# Patient Record
Sex: MALE | Race: BLACK OR AFRICAN AMERICAN | Employment: OTHER | URBAN - METROPOLITAN AREA
[De-identification: names, ages, dates, MRNs, and addresses within clinical notes are randomized per-mention and may not be internally consistent; named-entity substitution may affect disease eponyms.]

---

## 2017-03-01 RX ORDER — OXYCODONE AND ACETAMINOPHEN 7.5; 325 MG/1; MG/1
1 TABLET ORAL SEE ADMIN INSTRUCTIONS
Qty: 75 TABLET | Refills: 0 | Status: SHIPPED | OUTPATIENT
Start: 2017-03-04 | End: 2017-03-22 | Stop reason: SDUPTHER

## 2017-03-01 RX ORDER — MORPHINE SULFATE 30 MG/1
30 TABLET, FILM COATED, EXTENDED RELEASE ORAL EVERY 12 HOURS
Qty: 60 TABLET | Refills: 0 | Status: SHIPPED | OUTPATIENT
Start: 2017-03-04 | End: 2017-03-22 | Stop reason: SDUPTHER

## 2017-03-22 ENCOUNTER — HOSPITAL ENCOUNTER (OUTPATIENT)
Dept: PAIN MANAGEMENT | Age: 67
Discharge: HOME | End: 2017-03-22
Payer: MEDICARE

## 2017-03-22 VITALS
HEIGHT: 67 IN | OXYGEN SATURATION: 97 % | WEIGHT: 190 LBS | DIASTOLIC BLOOD PRESSURE: 104 MMHG | HEART RATE: 59 BPM | RESPIRATION RATE: 16 BRPM | TEMPERATURE: 97.6 F | BODY MASS INDEX: 29.82 KG/M2 | SYSTOLIC BLOOD PRESSURE: 134 MMHG

## 2017-03-22 DIAGNOSIS — M17.12 PRIMARY OSTEOARTHRITIS OF LEFT KNEE: Primary | ICD-10-CM

## 2017-03-22 DIAGNOSIS — Z51.81 ENCOUNTER FOR MEDICATION MONITORING: ICD-10-CM

## 2017-03-22 DIAGNOSIS — Z98.890 S/P KNEE SURGERY: ICD-10-CM

## 2017-03-22 DIAGNOSIS — M17.11 PRIMARY OSTEOARTHRITIS OF RIGHT KNEE: ICD-10-CM

## 2017-03-22 DIAGNOSIS — Z51.81 MEDICATION MONITORING ENCOUNTER: ICD-10-CM

## 2017-03-22 PROCEDURE — 99214 OFFICE O/P EST MOD 30 MIN: CPT | Performed by: PAIN MEDICINE

## 2017-03-22 PROCEDURE — 99213 OFFICE O/P EST LOW 20 MIN: CPT

## 2017-03-22 RX ORDER — OXYCODONE AND ACETAMINOPHEN 7.5; 325 MG/1; MG/1
1 TABLET ORAL SEE ADMIN INSTRUCTIONS
Qty: 75 TABLET | Refills: 0 | Status: SHIPPED | OUTPATIENT
Start: 2017-04-08 | End: 2017-05-03 | Stop reason: SDUPTHER

## 2017-03-22 RX ORDER — MORPHINE SULFATE 30 MG/1
30 TABLET, FILM COATED, EXTENDED RELEASE ORAL EVERY 12 HOURS
Qty: 60 TABLET | Refills: 0 | Status: SHIPPED | OUTPATIENT
Start: 2017-04-08 | End: 2017-05-03 | Stop reason: SDUPTHER

## 2017-03-22 ASSESSMENT — PAIN SCALES - GENERAL: PAINLEVEL_OUTOF10: 8

## 2017-03-22 ASSESSMENT — ENCOUNTER SYMPTOMS
RESPIRATORY NEGATIVE: 1
EYES NEGATIVE: 1
GASTROINTESTINAL NEGATIVE: 1

## 2017-03-22 ASSESSMENT — PAIN DESCRIPTION - LOCATION: LOCATION: KNEE;LEG

## 2017-03-22 ASSESSMENT — PAIN DESCRIPTION - FREQUENCY: FREQUENCY: CONTINUOUS

## 2017-03-22 ASSESSMENT — PAIN DESCRIPTION - ONSET: ONSET: ON-GOING

## 2017-03-22 ASSESSMENT — PAIN DESCRIPTION - DESCRIPTORS: DESCRIPTORS: ACHING;BURNING;CONSTANT;SHARP

## 2017-03-22 ASSESSMENT — PAIN DESCRIPTION - DIRECTION: RADIATING_TOWARDS: BILATERAL KNEES

## 2017-03-22 ASSESSMENT — PAIN DESCRIPTION - PAIN TYPE: TYPE: CHRONIC PAIN

## 2017-03-22 ASSESSMENT — PAIN DESCRIPTION - PROGRESSION: CLINICAL_PROGRESSION: NOT CHANGED

## 2017-03-22 ASSESSMENT — PAIN DESCRIPTION - ORIENTATION: ORIENTATION: RIGHT;LEFT

## 2017-04-13 RX ORDER — IBUPROFEN 600 MG/1
600 TABLET ORAL EVERY 8 HOURS PRN
Qty: 90 TABLET | Refills: 2 | Status: SHIPPED | OUTPATIENT
Start: 2017-04-13 | End: 2017-07-07

## 2017-05-03 ENCOUNTER — HOSPITAL ENCOUNTER (OUTPATIENT)
Dept: PAIN MANAGEMENT | Age: 67
Discharge: HOME | End: 2017-05-03
Payer: MEDICARE

## 2017-05-03 DIAGNOSIS — Z98.890 S/P KNEE SURGERY: ICD-10-CM

## 2017-05-03 DIAGNOSIS — Z51.81 MEDICATION MONITORING ENCOUNTER: ICD-10-CM

## 2017-05-03 DIAGNOSIS — G89.29 CHRONIC PAIN OF LEFT KNEE: ICD-10-CM

## 2017-05-03 DIAGNOSIS — M17.11 PRIMARY OSTEOARTHRITIS OF RIGHT KNEE: ICD-10-CM

## 2017-05-03 DIAGNOSIS — M17.12 PRIMARY OSTEOARTHRITIS OF LEFT KNEE: Primary | ICD-10-CM

## 2017-05-03 DIAGNOSIS — M17.0 PRIMARY OSTEOARTHRITIS OF BOTH KNEES: ICD-10-CM

## 2017-05-03 DIAGNOSIS — Z51.81 ENCOUNTER FOR MEDICATION MONITORING: ICD-10-CM

## 2017-05-03 DIAGNOSIS — M25.562 CHRONIC PAIN OF LEFT KNEE: ICD-10-CM

## 2017-05-03 PROCEDURE — 99213 OFFICE O/P EST LOW 20 MIN: CPT | Performed by: NURSE PRACTITIONER

## 2017-05-03 PROCEDURE — 99213 OFFICE O/P EST LOW 20 MIN: CPT

## 2017-05-03 PROCEDURE — 80307 DRUG TEST PRSMV CHEM ANLYZR: CPT

## 2017-05-03 RX ORDER — OXYCODONE AND ACETAMINOPHEN 7.5; 325 MG/1; MG/1
1 TABLET ORAL SEE ADMIN INSTRUCTIONS
Qty: 75 TABLET | Refills: 0 | Status: SHIPPED | OUTPATIENT
Start: 2017-05-08 | End: 2017-06-07 | Stop reason: SDUPTHER

## 2017-05-03 RX ORDER — GABAPENTIN 400 MG/1
400 CAPSULE ORAL 3 TIMES DAILY
Qty: 90 CAPSULE | Refills: 3 | Status: SHIPPED | OUTPATIENT
Start: 2017-05-03 | End: 2017-09-05 | Stop reason: SDUPTHER

## 2017-05-03 RX ORDER — MORPHINE SULFATE 30 MG/1
30 TABLET, FILM COATED, EXTENDED RELEASE ORAL EVERY 12 HOURS
Qty: 60 TABLET | Refills: 0 | Status: SHIPPED | OUTPATIENT
Start: 2017-05-08 | End: 2017-06-07 | Stop reason: SDUPTHER

## 2017-05-10 LAB
6-ACETYLMORPHINE, UR: NOT DETECTED
7-AMINOCLONAZEPAM, URINE: NOT DETECTED
ALPHA-OH-ALPRAZ, URINE: NOT DETECTED
ALPRAZOLAM, URINE: NOT DETECTED
AMPHETAMINES, URINE: NOT DETECTED
BARBITURATES, URINE: NOT DETECTED
BENZOYLECGONINE, UR: NOT DETECTED
BUPRENORPHINE URINE: NOT DETECTED
CARISOPRODOL, UR: NOT DETECTED
CLONAZEPAM, URINE: NOT DETECTED
CODEINE, URINE: NOT DETECTED
CREATININE URINE: 146.4 MG/DL (ref 20–400)
DIAZEPAM, URINE: NOT DETECTED
DRUGS EXPECTED, UR: NORMAL
EER HI RES INTERP UR: NORMAL
ETHYL GLUCURONIDE UR: NOT DETECTED
FENTANYL URINE: NOT DETECTED
HYDROCODONE, URINE: NOT DETECTED
HYDROMORPHONE, URINE: NOT DETECTED
LORAZEPAM, URINE: NOT DETECTED
MARIJUANA METAB, UR: NOT DETECTED
MDA, UR: NOT DETECTED
MDEA, EVE, UR: NOT DETECTED
MDMA URINE: NOT DETECTED
MEPERIDINE METAB, UR: NOT DETECTED
METHADONE, URINE: NOT DETECTED
METHAMPHETAMINE, URINE: NOT DETECTED
METHYLPHENIDATE: NOT DETECTED
MIDAZOLAM, URINE: NOT DETECTED
MORPHINE URINE: PRESENT
NORBUPRENORPHINE, URINE: NOT DETECTED
NORDIAZEPAM, URINE: NOT DETECTED
NORFENTANYL, URINE: NOT DETECTED
NORHYDROCODONE, URINE: NOT DETECTED
NOROXYCODONE, URINE: PRESENT
NOROXYMORPHONE, URINE: NOT DETECTED
OXAZEPAM, URINE: NOT DETECTED
OXYCODONE URINE: PRESENT
OXYMORPHONE, URINE: PRESENT
PAIN MANAGEMENT DRUG PANEL INTERP, URINE: NORMAL
PAIN MGT DRUG PANEL, HI RES, UR: NORMAL
PCP,URINE: NOT DETECTED
PHENTERMINE, UR: NOT DETECTED
PROPOXYPHENE, URINE: NOT DETECTED
TAPENTADOL, URINE: NOT DETECTED
TAPENTADOL-O-SULFATE, URINE: NOT DETECTED
TEMAZEPAM, URINE: NOT DETECTED
TRAMADOL, URINE: NOT DETECTED
ZOLPIDEM, URINE: NOT DETECTED

## 2017-06-07 ENCOUNTER — HOSPITAL ENCOUNTER (OUTPATIENT)
Dept: PAIN MANAGEMENT | Age: 67
Discharge: HOME | End: 2017-06-07
Payer: MEDICARE

## 2017-06-07 DIAGNOSIS — M25.562 CHRONIC PAIN OF LEFT KNEE: Primary | ICD-10-CM

## 2017-06-07 DIAGNOSIS — Z98.890 S/P KNEE SURGERY: ICD-10-CM

## 2017-06-07 DIAGNOSIS — Z51.81 MEDICATION MONITORING ENCOUNTER: ICD-10-CM

## 2017-06-07 DIAGNOSIS — M17.12 PRIMARY OSTEOARTHRITIS OF LEFT KNEE: ICD-10-CM

## 2017-06-07 DIAGNOSIS — M17.11 PRIMARY OSTEOARTHRITIS OF RIGHT KNEE: ICD-10-CM

## 2017-06-07 DIAGNOSIS — M17.0 PRIMARY OSTEOARTHRITIS OF BOTH KNEES: ICD-10-CM

## 2017-06-07 DIAGNOSIS — Z51.81 ENCOUNTER FOR MEDICATION MONITORING: ICD-10-CM

## 2017-06-07 DIAGNOSIS — G89.29 CHRONIC PAIN OF LEFT KNEE: Primary | ICD-10-CM

## 2017-06-07 PROCEDURE — 99213 OFFICE O/P EST LOW 20 MIN: CPT | Performed by: NURSE PRACTITIONER

## 2017-06-07 PROCEDURE — 99213 OFFICE O/P EST LOW 20 MIN: CPT

## 2017-06-07 RX ORDER — OXYCODONE AND ACETAMINOPHEN 7.5; 325 MG/1; MG/1
1 TABLET ORAL SEE ADMIN INSTRUCTIONS
Qty: 75 TABLET | Refills: 0 | Status: SHIPPED | OUTPATIENT
Start: 2017-06-07 | End: 2017-07-07 | Stop reason: SDUPTHER

## 2017-06-07 RX ORDER — MORPHINE SULFATE 30 MG/1
30 TABLET, FILM COATED, EXTENDED RELEASE ORAL EVERY 12 HOURS
Qty: 60 TABLET | Refills: 0 | Status: SHIPPED | OUTPATIENT
Start: 2017-06-07 | End: 2017-07-07 | Stop reason: SDUPTHER

## 2017-06-14 ENCOUNTER — OFFICE VISIT (OUTPATIENT)
Dept: INTERNAL MEDICINE CLINIC | Age: 67
End: 2017-06-14
Payer: MEDICARE

## 2017-06-14 VITALS
HEIGHT: 67 IN | DIASTOLIC BLOOD PRESSURE: 80 MMHG | OXYGEN SATURATION: 98 % | SYSTOLIC BLOOD PRESSURE: 138 MMHG | WEIGHT: 213.8 LBS | TEMPERATURE: 98 F | BODY MASS INDEX: 33.56 KG/M2 | RESPIRATION RATE: 21 BRPM | HEART RATE: 81 BPM

## 2017-06-14 DIAGNOSIS — M23.90: ICD-10-CM

## 2017-06-14 DIAGNOSIS — Z12.11 SPECIAL SCREENING FOR MALIGNANT NEOPLASMS, COLON: ICD-10-CM

## 2017-06-14 DIAGNOSIS — L23.89 ALLERGIC CONTACT DERMATITIS DUE TO OTHER AGENTS: ICD-10-CM

## 2017-06-14 DIAGNOSIS — M48.07 STENOSIS OF LUMBOSACRAL SPINE: ICD-10-CM

## 2017-06-14 DIAGNOSIS — R53.83 FATIGUE, UNSPECIFIED TYPE: Primary | ICD-10-CM

## 2017-06-14 DIAGNOSIS — G89.4 CHRONIC PAIN SYNDROME: ICD-10-CM

## 2017-06-14 PROCEDURE — G8427 DOCREV CUR MEDS BY ELIG CLIN: HCPCS | Performed by: FAMILY MEDICINE

## 2017-06-14 PROCEDURE — 3017F COLORECTAL CA SCREEN DOC REV: CPT | Performed by: FAMILY MEDICINE

## 2017-06-14 PROCEDURE — 99204 OFFICE O/P NEW MOD 45 MIN: CPT | Performed by: FAMILY MEDICINE

## 2017-06-14 PROCEDURE — 90670 PCV13 VACCINE IM: CPT | Performed by: FAMILY MEDICINE

## 2017-06-14 PROCEDURE — G8417 CALC BMI ABV UP PARAM F/U: HCPCS | Performed by: FAMILY MEDICINE

## 2017-06-14 PROCEDURE — 4040F PNEUMOC VAC/ADMIN/RCVD: CPT | Performed by: FAMILY MEDICINE

## 2017-06-14 PROCEDURE — 1036F TOBACCO NON-USER: CPT | Performed by: FAMILY MEDICINE

## 2017-06-14 PROCEDURE — G0009 ADMIN PNEUMOCOCCAL VACCINE: HCPCS | Performed by: FAMILY MEDICINE

## 2017-06-14 PROCEDURE — 1123F ACP DISCUSS/DSCN MKR DOCD: CPT | Performed by: FAMILY MEDICINE

## 2017-06-14 ASSESSMENT — ENCOUNTER SYMPTOMS
ALLERGIC/IMMUNOLOGIC NEGATIVE: 1
RESPIRATORY NEGATIVE: 1
EYES NEGATIVE: 1

## 2017-06-15 ENCOUNTER — HOSPITAL ENCOUNTER (OUTPATIENT)
Age: 67
Setting detail: SPECIMEN
Discharge: HOME | End: 2017-06-15
Payer: MEDICARE

## 2017-06-15 DIAGNOSIS — Z12.11 SPECIAL SCREENING FOR MALIGNANT NEOPLASMS, COLON: ICD-10-CM

## 2017-06-15 LAB
-: NORMAL
ABSOLUTE EOS #: 0.2 K/UL (ref 0–0.4)
ABSOLUTE LYMPH #: 1.5 K/UL (ref 1–4.8)
ABSOLUTE MONO #: 0.3 K/UL (ref 0.1–1.2)
ALBUMIN SERPL-MCNC: 4.1 G/DL (ref 3.5–5.2)
ALBUMIN/GLOBULIN RATIO: 1.5 (ref 1–2.5)
ALP BLD-CCNC: 55 U/L (ref 40–129)
ALT SERPL-CCNC: 13 U/L (ref 5–41)
AMORPHOUS: NORMAL
ANION GAP SERPL CALCULATED.3IONS-SCNC: 14 MMOL/L (ref 9–17)
AST SERPL-CCNC: 17 U/L
BACTERIA: NORMAL
BASOPHILS # BLD: 0 %
BASOPHILS ABSOLUTE: 0 K/UL (ref 0–0.2)
BILIRUB SERPL-MCNC: 0.48 MG/DL (ref 0.3–1.2)
BILIRUBIN URINE: NEGATIVE
BUN BLDV-MCNC: 13 MG/DL (ref 8–23)
BUN/CREAT BLD: NORMAL (ref 9–20)
CALCIUM SERPL-MCNC: 8.7 MG/DL (ref 8.6–10.4)
CASTS UA: NORMAL /LPF (ref 0–8)
CHLORIDE BLD-SCNC: 101 MMOL/L (ref 98–107)
CHOLESTEROL/HDL RATIO: 5.2
CHOLESTEROL: 198 MG/DL
CO2: 26 MMOL/L (ref 20–31)
COLOR: YELLOW
CREAT SERPL-MCNC: 1.02 MG/DL (ref 0.7–1.2)
CRYSTALS, UA: NORMAL /HPF
DIFFERENTIAL TYPE: ABNORMAL
EOSINOPHILS RELATIVE PERCENT: 7 %
EPITHELIAL CELLS UA: NORMAL /HPF (ref 0–5)
ESTIMATED AVERAGE GLUCOSE: 123 MG/DL
GFR AFRICAN AMERICAN: >60 ML/MIN
GFR NON-AFRICAN AMERICAN: >60 ML/MIN
GFR SERPL CREATININE-BSD FRML MDRD: NORMAL ML/MIN/{1.73_M2}
GFR SERPL CREATININE-BSD FRML MDRD: NORMAL ML/MIN/{1.73_M2}
GLUCOSE BLD-MCNC: 85 MG/DL (ref 70–99)
GLUCOSE URINE: NEGATIVE
HAV IGM SER IA-ACNC: NONREACTIVE
HBA1C MFR BLD: 5.9 % (ref 4–6)
HCT VFR BLD CALC: 37.5 % (ref 41–53)
HDLC SERPL-MCNC: 38 MG/DL
HEMOGLOBIN: 12.3 G/DL (ref 13.5–17.5)
HEPATITIS B CORE IGM ANTIBODY: NONREACTIVE
HEPATITIS B SURFACE ANTIGEN: NONREACTIVE
HEPATITIS C ANTIBODY: NONREACTIVE
HIV AG/AB: NONREACTIVE
KETONES, URINE: NEGATIVE
LDL CHOLESTEROL: 105 MG/DL (ref 0–130)
LEUKOCYTE ESTERASE, URINE: NEGATIVE
LYMPHOCYTES # BLD: 40 %
MCH RBC QN AUTO: 28 PG (ref 26–34)
MCHC RBC AUTO-ENTMCNC: 32.7 G/DL (ref 31–37)
MCV RBC AUTO: 85.5 FL (ref 80–100)
MONOCYTES # BLD: 9 %
MUCUS: NORMAL
NITRITE, URINE: NEGATIVE
OTHER OBSERVATIONS UA: NORMAL
PDW BLD-RTO: 14.7 % (ref 12.5–15.4)
PH UA: 5.5 (ref 5–8)
PLATELET # BLD: 170 K/UL (ref 140–450)
PLATELET ESTIMATE: ABNORMAL
PMV BLD AUTO: 8.6 FL (ref 6–12)
POTASSIUM SERPL-SCNC: 4.5 MMOL/L (ref 3.7–5.3)
PROSTATE SPECIFIC ANTIGEN: 2.48 UG/L
PROTEIN UA: NEGATIVE
RBC # BLD: 4.39 M/UL (ref 4.5–5.9)
RBC # BLD: ABNORMAL 10*6/UL
RBC UA: NORMAL /HPF (ref 0–4)
RENAL EPITHELIAL, UA: NORMAL /HPF
SEG NEUTROPHILS: 44 %
SEGMENTED NEUTROPHILS ABSOLUTE COUNT: 1.6 K/UL (ref 1.8–7.7)
SODIUM BLD-SCNC: 141 MMOL/L (ref 135–144)
SPECIFIC GRAVITY UA: 1.02 (ref 1–1.03)
TOTAL PROTEIN: 6.9 G/DL (ref 6.4–8.3)
TRICHOMONAS: NORMAL
TRIGL SERPL-MCNC: 276 MG/DL
TSH SERPL DL<=0.05 MIU/L-ACNC: 0.99 MIU/L (ref 0.3–5)
TURBIDITY: CLEAR
URINE HGB: NEGATIVE
UROBILINOGEN, URINE: NORMAL
VLDLC SERPL CALC-MCNC: ABNORMAL MG/DL (ref 1–30)
WBC # BLD: 3.7 K/UL (ref 3.5–11)
WBC # BLD: ABNORMAL 10*3/UL
WBC UA: NORMAL /HPF (ref 0–5)
YEAST: NORMAL

## 2017-06-15 ASSESSMENT — PATIENT HEALTH QUESTIONNAIRE - PHQ9
2. FEELING DOWN, DEPRESSED OR HOPELESS: 0
SUM OF ALL RESPONSES TO PHQ9 QUESTIONS 1 & 2: 0
1. LITTLE INTEREST OR PLEASURE IN DOING THINGS: 0
SUM OF ALL RESPONSES TO PHQ QUESTIONS 1-9: 0

## 2017-06-21 ENCOUNTER — TELEPHONE (OUTPATIENT)
Dept: INTERNAL MEDICINE CLINIC | Age: 67
End: 2017-06-21

## 2017-06-29 DIAGNOSIS — Z12.11 SPECIAL SCREENING FOR MALIGNANT NEOPLASMS, COLON: ICD-10-CM

## 2017-06-29 LAB
CONTROL: NORMAL
HEMOCCULT STL QL: NEGATIVE

## 2017-06-29 PROCEDURE — 82274 ASSAY TEST FOR BLOOD FECAL: CPT | Performed by: FAMILY MEDICINE

## 2017-07-03 ENCOUNTER — OFFICE VISIT (OUTPATIENT)
Dept: INTERNAL MEDICINE CLINIC | Age: 67
End: 2017-07-03
Payer: MEDICARE

## 2017-07-03 VITALS
HEART RATE: 62 BPM | BODY MASS INDEX: 33.53 KG/M2 | HEIGHT: 67 IN | SYSTOLIC BLOOD PRESSURE: 130 MMHG | DIASTOLIC BLOOD PRESSURE: 80 MMHG | WEIGHT: 213.6 LBS | RESPIRATION RATE: 16 BRPM | OXYGEN SATURATION: 97 %

## 2017-07-03 DIAGNOSIS — R73.03 PRE-DIABETES: ICD-10-CM

## 2017-07-03 DIAGNOSIS — M12.811 ROTATOR CUFF ARTHROPATHY, RIGHT: Primary | ICD-10-CM

## 2017-07-03 DIAGNOSIS — Z98.890 S/P KNEE SURGERY: ICD-10-CM

## 2017-07-03 DIAGNOSIS — Z79.891 USE OF OPIATES FOR THERAPEUTIC PURPOSES: ICD-10-CM

## 2017-07-03 DIAGNOSIS — E78.5 DYSLIPIDEMIA: ICD-10-CM

## 2017-07-03 DIAGNOSIS — M25.562 CHRONIC PAIN OF LEFT KNEE: ICD-10-CM

## 2017-07-03 DIAGNOSIS — G89.29 CHRONIC PAIN OF LEFT KNEE: ICD-10-CM

## 2017-07-03 PROCEDURE — 3017F COLORECTAL CA SCREEN DOC REV: CPT | Performed by: FAMILY MEDICINE

## 2017-07-03 PROCEDURE — 1123F ACP DISCUSS/DSCN MKR DOCD: CPT | Performed by: FAMILY MEDICINE

## 2017-07-03 PROCEDURE — 1036F TOBACCO NON-USER: CPT | Performed by: FAMILY MEDICINE

## 2017-07-03 PROCEDURE — 99214 OFFICE O/P EST MOD 30 MIN: CPT | Performed by: FAMILY MEDICINE

## 2017-07-03 PROCEDURE — G8417 CALC BMI ABV UP PARAM F/U: HCPCS | Performed by: FAMILY MEDICINE

## 2017-07-03 PROCEDURE — 4040F PNEUMOC VAC/ADMIN/RCVD: CPT | Performed by: FAMILY MEDICINE

## 2017-07-03 PROCEDURE — G8427 DOCREV CUR MEDS BY ELIG CLIN: HCPCS | Performed by: FAMILY MEDICINE

## 2017-07-03 ASSESSMENT — ENCOUNTER SYMPTOMS
ALLERGIC/IMMUNOLOGIC NEGATIVE: 1
RESPIRATORY NEGATIVE: 1
EYES NEGATIVE: 1

## 2017-07-07 ENCOUNTER — HOSPITAL ENCOUNTER (OUTPATIENT)
Dept: PAIN MANAGEMENT | Age: 67
Discharge: HOME | End: 2017-07-07
Payer: MEDICARE

## 2017-07-07 DIAGNOSIS — M25.562 CHRONIC PAIN OF LEFT KNEE: Primary | ICD-10-CM

## 2017-07-07 DIAGNOSIS — Z98.890 S/P KNEE SURGERY: ICD-10-CM

## 2017-07-07 DIAGNOSIS — M17.11 PRIMARY OSTEOARTHRITIS OF RIGHT KNEE: ICD-10-CM

## 2017-07-07 DIAGNOSIS — Z51.81 ENCOUNTER FOR MEDICATION MONITORING: ICD-10-CM

## 2017-07-07 DIAGNOSIS — G89.29 CHRONIC PAIN OF LEFT KNEE: Primary | ICD-10-CM

## 2017-07-07 DIAGNOSIS — M17.12 PRIMARY OSTEOARTHRITIS OF LEFT KNEE: ICD-10-CM

## 2017-07-07 DIAGNOSIS — M17.0 PRIMARY OSTEOARTHRITIS OF BOTH KNEES: ICD-10-CM

## 2017-07-07 DIAGNOSIS — Z51.81 MEDICATION MONITORING ENCOUNTER: ICD-10-CM

## 2017-07-07 PROCEDURE — 99213 OFFICE O/P EST LOW 20 MIN: CPT | Performed by: NURSE PRACTITIONER

## 2017-07-07 PROCEDURE — 99213 OFFICE O/P EST LOW 20 MIN: CPT

## 2017-07-07 RX ORDER — OXYCODONE AND ACETAMINOPHEN 7.5; 325 MG/1; MG/1
1 TABLET ORAL SEE ADMIN INSTRUCTIONS
Qty: 75 TABLET | Refills: 0 | Status: SHIPPED | OUTPATIENT
Start: 2017-07-07 | End: 2017-07-31 | Stop reason: SDUPTHER

## 2017-07-07 RX ORDER — MORPHINE SULFATE 30 MG/1
30 TABLET, FILM COATED, EXTENDED RELEASE ORAL EVERY 12 HOURS
Qty: 60 TABLET | Refills: 0 | Status: SHIPPED | OUTPATIENT
Start: 2017-07-07 | End: 2017-07-31 | Stop reason: SDUPTHER

## 2017-07-07 RX ORDER — IBUPROFEN 400 MG/1
400 TABLET ORAL EVERY 8 HOURS PRN
Qty: 90 TABLET | Refills: 1 | Status: SHIPPED | OUTPATIENT
Start: 2017-07-07 | End: 2017-09-05 | Stop reason: SDUPTHER

## 2017-07-31 ENCOUNTER — HOSPITAL ENCOUNTER (OUTPATIENT)
Dept: PAIN MANAGEMENT | Age: 67
Discharge: HOME | End: 2017-07-31
Payer: MEDICARE

## 2017-07-31 VITALS
HEIGHT: 67 IN | OXYGEN SATURATION: 97 % | DIASTOLIC BLOOD PRESSURE: 82 MMHG | RESPIRATION RATE: 16 BRPM | SYSTOLIC BLOOD PRESSURE: 136 MMHG | WEIGHT: 213 LBS | TEMPERATURE: 98.3 F | HEART RATE: 62 BPM | BODY MASS INDEX: 33.43 KG/M2

## 2017-07-31 DIAGNOSIS — M25.562 CHRONIC PAIN OF LEFT KNEE: Primary | ICD-10-CM

## 2017-07-31 DIAGNOSIS — Z98.890 S/P KNEE SURGERY: ICD-10-CM

## 2017-07-31 DIAGNOSIS — Z79.891 USE OF OPIATES FOR THERAPEUTIC PURPOSES: ICD-10-CM

## 2017-07-31 DIAGNOSIS — Z51.81 ENCOUNTER FOR MEDICATION MONITORING: ICD-10-CM

## 2017-07-31 DIAGNOSIS — M17.11 PRIMARY OSTEOARTHRITIS OF RIGHT KNEE: ICD-10-CM

## 2017-07-31 DIAGNOSIS — G89.29 CHRONIC PAIN OF LEFT KNEE: Primary | ICD-10-CM

## 2017-07-31 DIAGNOSIS — Z51.81 MEDICATION MONITORING ENCOUNTER: ICD-10-CM

## 2017-07-31 DIAGNOSIS — M17.12 PRIMARY OSTEOARTHRITIS OF LEFT KNEE: ICD-10-CM

## 2017-07-31 PROCEDURE — 99213 OFFICE O/P EST LOW 20 MIN: CPT | Performed by: NURSE PRACTITIONER

## 2017-07-31 PROCEDURE — 99213 OFFICE O/P EST LOW 20 MIN: CPT

## 2017-07-31 RX ORDER — MORPHINE SULFATE 30 MG/1
30 TABLET, FILM COATED, EXTENDED RELEASE ORAL EVERY 12 HOURS
Qty: 60 TABLET | Refills: 0 | Status: SHIPPED | OUTPATIENT
Start: 2017-08-06 | End: 2017-09-05 | Stop reason: SDUPTHER

## 2017-07-31 RX ORDER — OXYCODONE AND ACETAMINOPHEN 7.5; 325 MG/1; MG/1
1 TABLET ORAL SEE ADMIN INSTRUCTIONS
Qty: 75 TABLET | Refills: 0 | Status: SHIPPED | OUTPATIENT
Start: 2017-08-06 | End: 2017-09-05 | Stop reason: SDUPTHER

## 2017-07-31 ASSESSMENT — ENCOUNTER SYMPTOMS
GASTROINTESTINAL NEGATIVE: 1
RESPIRATORY NEGATIVE: 1
EYES NEGATIVE: 1

## 2017-09-05 ENCOUNTER — HOSPITAL ENCOUNTER (OUTPATIENT)
Dept: PAIN MANAGEMENT | Age: 67
Discharge: HOME | End: 2017-09-05
Payer: MEDICARE

## 2017-09-05 VITALS
DIASTOLIC BLOOD PRESSURE: 92 MMHG | WEIGHT: 200 LBS | HEIGHT: 67 IN | HEART RATE: 62 BPM | BODY MASS INDEX: 31.39 KG/M2 | RESPIRATION RATE: 16 BRPM | SYSTOLIC BLOOD PRESSURE: 137 MMHG

## 2017-09-05 DIAGNOSIS — Z51.81 ENCOUNTER FOR MEDICATION MONITORING: Primary | ICD-10-CM

## 2017-09-05 DIAGNOSIS — M17.0 PRIMARY OSTEOARTHRITIS OF BOTH KNEES: ICD-10-CM

## 2017-09-05 PROCEDURE — 99213 OFFICE O/P EST LOW 20 MIN: CPT

## 2017-09-05 PROCEDURE — 99213 OFFICE O/P EST LOW 20 MIN: CPT | Performed by: NURSE PRACTITIONER

## 2017-09-05 RX ORDER — GABAPENTIN 400 MG/1
400 CAPSULE ORAL 3 TIMES DAILY
Qty: 90 CAPSULE | Refills: 3 | Status: SHIPPED | OUTPATIENT
Start: 2017-09-05 | End: 2018-01-03 | Stop reason: SDUPTHER

## 2017-09-05 RX ORDER — IBUPROFEN 400 MG/1
400 TABLET ORAL EVERY 8 HOURS PRN
Qty: 90 TABLET | Refills: 1 | Status: SHIPPED | OUTPATIENT
Start: 2017-09-05 | End: 2017-11-01 | Stop reason: SDUPTHER

## 2017-09-05 RX ORDER — MORPHINE SULFATE 30 MG/1
30 TABLET, FILM COATED, EXTENDED RELEASE ORAL EVERY 12 HOURS
Qty: 60 TABLET | Refills: 0 | Status: SHIPPED | OUTPATIENT
Start: 2017-09-05 | End: 2017-10-04 | Stop reason: SDUPTHER

## 2017-09-05 RX ORDER — OXYCODONE AND ACETAMINOPHEN 7.5; 325 MG/1; MG/1
1 TABLET ORAL SEE ADMIN INSTRUCTIONS
Qty: 75 TABLET | Refills: 0 | Status: SHIPPED | OUTPATIENT
Start: 2017-09-05 | End: 2017-10-04 | Stop reason: SDUPTHER

## 2017-09-05 ASSESSMENT — ENCOUNTER SYMPTOMS
COUGH: 0
SHORTNESS OF BREATH: 0
CONSTIPATION: 0

## 2017-09-05 ASSESSMENT — PAIN SCALES - GENERAL: PAINLEVEL_OUTOF10: 6

## 2017-09-18 ENCOUNTER — TELEPHONE (OUTPATIENT)
Dept: PAIN MANAGEMENT | Age: 67
End: 2017-09-18

## 2017-10-04 ENCOUNTER — HOSPITAL ENCOUNTER (OUTPATIENT)
Dept: PAIN MANAGEMENT | Age: 67
Discharge: HOME | End: 2017-10-04
Payer: MEDICARE

## 2017-10-04 VITALS
BODY MASS INDEX: 31.39 KG/M2 | SYSTOLIC BLOOD PRESSURE: 150 MMHG | RESPIRATION RATE: 16 BRPM | HEART RATE: 65 BPM | WEIGHT: 200 LBS | TEMPERATURE: 98.1 F | HEIGHT: 67 IN | DIASTOLIC BLOOD PRESSURE: 99 MMHG | OXYGEN SATURATION: 97 %

## 2017-10-04 DIAGNOSIS — M17.0 PRIMARY OSTEOARTHRITIS OF BOTH KNEES: Primary | ICD-10-CM

## 2017-10-04 DIAGNOSIS — M25.562 CHRONIC PAIN OF LEFT KNEE: ICD-10-CM

## 2017-10-04 DIAGNOSIS — Z51.81 ENCOUNTER FOR MEDICATION MONITORING: ICD-10-CM

## 2017-10-04 DIAGNOSIS — Z98.890 S/P KNEE SURGERY: ICD-10-CM

## 2017-10-04 DIAGNOSIS — G89.29 CHRONIC PAIN OF LEFT KNEE: ICD-10-CM

## 2017-10-04 PROCEDURE — 99214 OFFICE O/P EST MOD 30 MIN: CPT | Performed by: PAIN MEDICINE

## 2017-10-04 PROCEDURE — 99213 OFFICE O/P EST LOW 20 MIN: CPT

## 2017-10-04 RX ORDER — OXYCODONE AND ACETAMINOPHEN 7.5; 325 MG/1; MG/1
1 TABLET ORAL SEE ADMIN INSTRUCTIONS
Qty: 75 TABLET | Refills: 0 | Status: SHIPPED | OUTPATIENT
Start: 2017-10-09 | End: 2017-11-01 | Stop reason: SDUPTHER

## 2017-10-04 RX ORDER — MORPHINE SULFATE 30 MG/1
30 TABLET, FILM COATED, EXTENDED RELEASE ORAL EVERY 12 HOURS
Qty: 60 TABLET | Refills: 0 | Status: SHIPPED | OUTPATIENT
Start: 2017-10-06 | End: 2017-11-01 | Stop reason: SDUPTHER

## 2017-10-04 ASSESSMENT — PAIN DESCRIPTION - DIRECTION: RADIATING_TOWARDS: BILATERAL KNEES

## 2017-10-04 ASSESSMENT — PAIN DESCRIPTION - PROGRESSION: CLINICAL_PROGRESSION: GRADUALLY WORSENING

## 2017-10-04 ASSESSMENT — PAIN SCALES - GENERAL: PAINLEVEL_OUTOF10: 9

## 2017-10-04 ASSESSMENT — ENCOUNTER SYMPTOMS
GASTROINTESTINAL NEGATIVE: 1
EYES NEGATIVE: 1
RESPIRATORY NEGATIVE: 1

## 2017-10-04 ASSESSMENT — PAIN DESCRIPTION - ORIENTATION: ORIENTATION: RIGHT;LEFT

## 2017-10-04 ASSESSMENT — PAIN DESCRIPTION - DESCRIPTORS: DESCRIPTORS: ACHING;BURNING;CONSTANT;SHARP

## 2017-10-04 ASSESSMENT — PAIN DESCRIPTION - PAIN TYPE: TYPE: CHRONIC PAIN

## 2017-10-04 ASSESSMENT — PAIN DESCRIPTION - LOCATION: LOCATION: KNEE

## 2017-10-04 ASSESSMENT — PAIN DESCRIPTION - FREQUENCY: FREQUENCY: CONTINUOUS

## 2017-10-04 ASSESSMENT — PAIN DESCRIPTION - ONSET: ONSET: ON-GOING

## 2017-10-04 NOTE — IP AVS SNAPSHOT
Patient Information     Patient Name HERNANDEZ Prince 1950         This is your updated medication list to keep with you all times      TAKE these medications     morphine 30 MG extended release tablet   Commonly known as:  MS CONTIN   Take 1 tablet by mouth every 12 hours . Earliest Fill Date: 10/6/17   Start taking on:  10/6/2017       oxyCODONE-acetaminophen 7.5-325 MG per tablet   Commonly known as:  PERCOCET   Take 1 tablet by mouth See Admin Instructions  1 tablet 2-3 times a day as needed  for pain.  Earliest Fill Date: 10/9/17   Start taking on:  10/9/2017         ASK your doctor about these medications     gabapentin 400 MG capsule   Commonly known as:  NEURONTIN   Take 1 capsule by mouth 3 times daily       ibuprofen 400 MG tablet   Commonly known as:  ADVIL;MOTRIN   Take 1 tablet by mouth every 8 hours as needed for Pain       MELATONIN PO

## 2017-10-04 NOTE — PROGRESS NOTES
Left  Pain Radiating Towards: bilateral knees  Pain Descriptors: Aching, Burning, Constant, Sharp  Pain Frequency: Continuous  Pain Onset: On-going  Clinical Progression: Gradually worsening  Effect of Pain on Daily Activities: Unable to walk more than 15 minuts without stopping to rest  Patient's Stated Pain Goal:  (To have less pain with walking and decrease use of medication)  Pain Intervention(s): Medication (see eMar), Rest, Repositioned, Cold applied                    ADVERSE MEDICATION EFFECTS:   Constipation: no  Bowel Regimen: No:   Diet: common adult  Appetite:  ok  Sedation:  no  Urinary Retention: no    FOCUSED PAIN SCALE:  Highest : 10  Lowest :4  Average: Range-4  When and What  was your last procedure:      Was your procedure effective:  not applicable    ACTIVITY/SOCIAL/EMOTIONAL:  Sleep Pattern: 3 hours per night. nightime awakenings  Energy Level:  Tired/Fatigued  Currently attending Physical Therapy:  No  Home Exercises: daily stretches  Mobility: no current mobility problem   Currently seeing a Psychiatrist or Psychologist:  No  Emotional Issues: normal   Mood: appropriate     ABERRANT BEHAVIORS SINCE LAST VISIT:  Have you ever been treated in another Pain Clinic not applicable  Refills for prescriptions appropriate: yes  Lost rx/pills: no  Taking more medication than prescribed:  no  Are you receiving PAIN medications from  other doctors: no  Last Urine/Serum Drug Screen :5/30/17  Was Serum/UDS as anticipated?   yes  Brought pill bottles in :yes   Was Pill count appropriate? :yes   Are currently pregnant? not applicable  Recent ER visits: No             Past Medical History      Diagnosis Date    Arthritis     Left knee pain 4/9/2014    Teeth problem     abcessw       Surgical History  Past Surgical History:   Procedure Laterality Date    CARPAL TUNNEL RELEASE Bilateral     DENTAL SURGERY  10/2015    JOINT REPLACEMENT Left     KNEE  X 6 pt unsure of date    ROTATOR CUFF REPAIR Right     TONSILLECTOMY         Medications  Current Outpatient Prescriptions   Medication Sig Dispense Refill    [START ON 10/6/2017] morphine (MS CONTIN) 30 MG extended release tablet Take 1 tablet by mouth every 12 hours . Earliest Fill Date: 10/6/17 60 tablet 0    [START ON 10/9/2017] oxyCODONE-acetaminophen (PERCOCET) 7.5-325 MG per tablet Take 1 tablet by mouth See Admin Instructions  1 tablet 2-3 times a day as needed  for pain. Earliest Fill Date: 10/9/17 75 tablet 0    ibuprofen (ADVIL;MOTRIN) 400 MG tablet Take 1 tablet by mouth every 8 hours as needed for Pain 90 tablet 1    gabapentin (NEURONTIN) 400 MG capsule Take 1 capsule by mouth 3 times daily 90 capsule 3    MELATONIN PO Take by mouth       No current facility-administered medications for this encounter. Allergies  Oxycontin [oxycodone hcl]    Family History  family history is not on file. Social History  Social History     Social History    Marital status:      Spouse name: N/A    Number of children: N/A    Years of education: N/A     Occupational History    retired      Social History Main Topics    Smoking status: Never Smoker    Smokeless tobacco: Never Used    Alcohol use No    Drug use: No    Sexual activity: No     Other Topics Concern    None     Social History Narrative      reports that he does not use illicit drugs. REVIEW OF SYSTEMS:  Review of Systems   Constitutional:        Left leg   HENT: Negative. Eyes: Negative. Respiratory: Negative. Cardiovascular: Negative for chest pain, palpitations and leg swelling. Gastrointestinal: Negative. Genitourinary: Negative. Musculoskeletal: Positive for joint pain. Bilateral knees   Skin: Negative. Neurological: Positive for tingling and weakness. Left leg   Endo/Heme/Allergies: Negative. Psychiatric/Behavioral: The patient has insomnia.              GENERAL PHYSICAL EXAM:  Vitals: BP (!) 150/99  Pulse 65  Temp 98.1 °F (36.7 °C) (Oral)   Resp 16  Ht 5' 7\" (1.702 m)  Wt 200 lb (90.7 kg)  SpO2 97%  BMI 31.32 kg/m2, Body mass index is 31.32 kg/(m^2). Physical Exam   Constitutional: He is oriented to person, place, and time. He appears well-developed and well-nourished. No distress. HENT:   Head: Normocephalic and atraumatic. Eyes: Conjunctivae are normal. No scleral icterus. Neck: Normal range of motion. Neck supple. No JVD present. Pulmonary/Chest: Effort normal. No respiratory distress. Abdominal: Soft. He exhibits no distension. Musculoskeletal: Normal range of motion. He exhibits deformity. He exhibits no edema. Legs:  Neurological: He is alert and oriented to person, place, and time. He has normal strength and normal reflexes. No cranial nerve deficit or sensory deficit. Gait (due to left knee decreased range of movement. ) abnormal. Coordination normal.   Skin: Skin is warm, dry and intact. No rash noted. Psychiatric: He has a normal mood and affect. His speech is normal and behavior is normal. Judgment and thought content normal. Cognition and memory are normal.   Nursing note and vitals reviewed. Left Knee Exam     Other   Erythema: present  Scars: present  Sensation: normal  Pulse: present  Swelling: none    Comments:  Ext limited              DATA  Labs:  Benzodiazepines   Date Value Ref Range Status   07/03/2013 NEGATIVE NEG Final     Comment:           (Positive cutoff 200 ng/mL)                 Benzodiazepine Screen, Urine   Date Value Ref Range Status   03/05/2014 NEGATIVE NEG Final     Comment:           (Positive cutoff 200 ng/mL)                    Imaging:  Radiology Images and Reports reviewed where indicated and necessary  IMPRESSION:   IMPRESSION:       Distal LEFT ureter stone with moderate hydroureteronephrosis.    Nonobstructing RIGHT kidney stone or stones.       Noncontrast CT appearance of polycystic kidney disease and   bilateral small hemorrhagic cysts or nodules are not   significantly changed since the prior exam.       Prostate gland enlargement, probable cholelithiasis, and other   findings as above.       This report was electronically signed by:       Bernardo Mcclelland MD       11th Sep, 2012 11:35:00PM EDT   Transcribed by: [facility] on Sep 11 2012        [facility] John Roberts is a 77 y.o. male with     1. Primary osteoarthritis of both knees    2. Chronic pain of left knee    3. S/P knee surgery -Multiple    4. Encounter for medication monitoring      Patient Active Problem List   Diagnosis    S/P knee surgery -Multiple    Medication monitoring encounter    Encounter for medication monitoring    Primary osteoarthritis of both knees    Use of opiates for therapeutic purposes    Pre-diabetes    Dyslipidemia       PLAN    We will continue current pain medications  Current medications are being tolerated without any Adverse side effects. Orders Placed This Encounter   Medications    morphine (MS CONTIN) 30 MG extended release tablet     Sig: Take 1 tablet by mouth every 12 hours . Earliest Fill Date: 10/6/17     Dispense:  60 tablet     Refill:  0    oxyCODONE-acetaminophen (PERCOCET) 7.5-325 MG per tablet     Sig: Take 1 tablet by mouth See Admin Instructions  1 tablet 2-3 times a day as needed  for pain. Earliest Fill Date: 10/9/17     Dispense:  75 tablet     Refill:  0     Fill     Urine drug screens have been appropriate. No aberrant activity noted. Analgesia is achieved. Activities of daily living are possible because of medications. Safe use of medications explained to patient. Counselling/Preventive measures for pain  Control:    [x]  Spine strengthening exercises are discussed with patient in detail. [x] Ill effects of being on chronic pain medications such as sleep disturbances, hormonal changes, withdrawal symptoms,  chronic opioid dependence and tolerance were discussed with patient.  I had asked the patient to minimize medication use and utilize pain medications only for uncontrolled rest pain or pain with exertional activities. I advised patient not to self escalate pain medications without consulting with us. At each of patient's future visits we will try to taper pain medications, while adjusting the adjunct medications, and re-evaluating for Physical Therapy to improve spinal and joint strength. We will continue to have discussions to decrease pain medications as tolerated. We discussed the same at today's visit and have not been to implement it, as the patient's pain is not under control with current medications. Patient patient is short on his morphine by a couple of pills. Patient is warned that the drug management agreement should be followed as signed. Dangers of self medicating and not following the agreement were explained to patient including reparatory arrest or death. Any further violation of the agreement may result in patient being discharged from our care. Decision Making Process : Patient's health history and referral records thoroughly reviewed before focused physical examination and discussion with patient. Over 50% of today's visit is spent on examining the patient and counseling. Level of complexity of date to be reviewed is Moderate. The chart date reviewed include the following: Imaging Reports. Summary of Care. Time spent reviewing with patient the below reports:   Medication safety, Treatment options. Level of diagnosis and management options of this case is multiple: involving the following management options: Interventions as needed, medication management as appropriate, future visits, activity modification, heat/ice as needed, Urine drug screen as required. [x]The patient's questions were answered to the best of my abilities. This note was created using voice recognition software. There may be inaccuracies of transcription  that are inadvertently overlooked prior to the signature.   There is any questions about the transcription please contact me. Return in  4 weeks  with  Thais Lesch CNP  for further plan of treatment.     Electronically signed by Rupa Palmer MD on 10/4/2017 at 9:57 AM

## 2017-10-04 NOTE — IP AVS SNAPSHOT
not need to use this code after youve completed the sign-up process. If you do not sign up before the expiration date, you must request a new code. Vput[employer] Access Code: [email or web address]  Expires: 12/3/2017 10:09 AM    4. Enter your Social Security Number ([id number]) and Date of Birth (mm/dd/yyyy) as indicated and click Submit. You will be taken to the next sign-up page. 5. Create a Vputi ID. This will be your Vputi login ID and cannot be changed, so think of one that is secure and easy to remember. 6. Create a Vputi password. You can change your password at any time. 7. Enter your Password Reset Question and Answer. This can be used at a later time if you forget your password. 8. Enter your e-mail address. You will receive e-mail notification when new information is available in [address]. 9. Click Sign Up. You can now view your medical record. Additional Information  If you have questions, please contact the physician practice where you receive care. Remember, Vputi is NOT to be used for urgent needs. For medical emergencies, dial 911. For questions regarding your Vputi account call [phone]. If you have a clinical question, please call your doctor's office. View your information online  ? Review your current list of  medications, immunization, and allergies. ? Review your future test results online . ? Review your discharge instructions provided by your caregivers at discharge    Certain functionality such as prescription refills, scheduling appointments or sending messages to your provider are not activated if your provider does not use [employer] in his/her office    For questions regarding your Vputi account call [phone]. If you have a clinical question, please call your doctor's office.          The information on all pages of the After Visit Summary has been reviewed with me, the patient and/or responsible adult, by my health care provider(s). I had the opportunity to ask questions regarding this information. I understand I should dispose of my armband safely at home to protect my health information. A complete copy of the After Visit Summary has been given to me, the patient and/or responsible adult.            Patient Signature/Responsible Adult:____________________    Clinician Signature:_____________________    Date:_____________________    Time:_____________________

## 2017-11-01 ENCOUNTER — HOSPITAL ENCOUNTER (OUTPATIENT)
Dept: PAIN MANAGEMENT | Age: 67
Discharge: HOME | End: 2017-11-01
Payer: MEDICARE

## 2017-11-01 VITALS
OXYGEN SATURATION: 95 % | TEMPERATURE: 98.3 F | SYSTOLIC BLOOD PRESSURE: 140 MMHG | BODY MASS INDEX: 31.39 KG/M2 | DIASTOLIC BLOOD PRESSURE: 94 MMHG | RESPIRATION RATE: 16 BRPM | WEIGHT: 200 LBS | HEIGHT: 67 IN | HEART RATE: 70 BPM

## 2017-11-01 DIAGNOSIS — M25.562 CHRONIC PAIN OF LEFT KNEE: ICD-10-CM

## 2017-11-01 DIAGNOSIS — Z51.81 MEDICATION MONITORING ENCOUNTER: ICD-10-CM

## 2017-11-01 DIAGNOSIS — Z79.891 USE OF OPIATES FOR THERAPEUTIC PURPOSES: ICD-10-CM

## 2017-11-01 DIAGNOSIS — M17.0 PRIMARY OSTEOARTHRITIS OF BOTH KNEES: Primary | ICD-10-CM

## 2017-11-01 DIAGNOSIS — G89.29 CHRONIC PAIN OF LEFT KNEE: ICD-10-CM

## 2017-11-01 DIAGNOSIS — M17.11 PRIMARY OSTEOARTHRITIS OF RIGHT KNEE: ICD-10-CM

## 2017-11-01 DIAGNOSIS — Z98.890 S/P KNEE SURGERY: ICD-10-CM

## 2017-11-01 DIAGNOSIS — Z51.81 ENCOUNTER FOR MEDICATION MONITORING: ICD-10-CM

## 2017-11-01 DIAGNOSIS — M17.12 PRIMARY OSTEOARTHRITIS OF LEFT KNEE: ICD-10-CM

## 2017-11-01 PROCEDURE — 99213 OFFICE O/P EST LOW 20 MIN: CPT | Performed by: NURSE PRACTITIONER

## 2017-11-01 PROCEDURE — 99213 OFFICE O/P EST LOW 20 MIN: CPT

## 2017-11-01 RX ORDER — IBUPROFEN 400 MG/1
400 TABLET ORAL EVERY 8 HOURS PRN
Qty: 90 TABLET | Refills: 1 | Status: SHIPPED | OUTPATIENT
Start: 2017-11-04 | End: 2018-01-03 | Stop reason: SDUPTHER

## 2017-11-01 RX ORDER — OXYCODONE AND ACETAMINOPHEN 7.5; 325 MG/1; MG/1
1 TABLET ORAL SEE ADMIN INSTRUCTIONS
Qty: 75 TABLET | Refills: 0 | Status: SHIPPED | OUTPATIENT
Start: 2017-11-08 | End: 2017-11-28 | Stop reason: SDUPTHER

## 2017-11-01 RX ORDER — MORPHINE SULFATE 30 MG/1
30 TABLET, FILM COATED, EXTENDED RELEASE ORAL EVERY 12 HOURS
Qty: 60 TABLET | Refills: 0 | Status: SHIPPED | OUTPATIENT
Start: 2017-11-05 | End: 2017-11-28 | Stop reason: SDUPTHER

## 2017-11-01 ASSESSMENT — ENCOUNTER SYMPTOMS: CONSTIPATION: 1

## 2017-11-01 NOTE — PROGRESS NOTES
depends on accuracy and completeness of patient   medication information submitted by client. 6-Acetylmorphine, Ur Not Detected   Final 05/03/2017  9:00 AM [facility]   7-Aminoclonazepam, Urine Not Detected   Final 05/03/2017  9:00 AM [facility]   Alpha-OH-Alpraz, Urine Not Detected   Final 05/03/2017  9:00 AM [facility]   Alprazolam, Urine Not Detected   Final 05/03/2017  9:00 AM [facility]   Amphetamines, urine Not Detected   Final 05/03/2017  9:00 AM [facility]   Barbiturates, Ur Not Detected   Final 05/03/2017  9:00 AM [facility]   Benzoylecgonine, Ur Not Detected   Final 05/03/2017  9:00 AM [facility]   Buprenorphine Urine Not Detected   Final 05/03/2017  9:00 AM [facility]   Carisoprodol, Ur Not Detected   Final 05/03/2017  9:00 AM [facility]   (NOTE)   The carisoprodol immunoassay has cross-reactivity to carisoprodol   and meprobamate.     Clonazepam, Urine Not Detected   Final 05/03/2017  9:00 AM [facility]   Codeine, Urine Not Detected   Final 05/03/2017  9:00 AM [facility]   MDA, Ur Not Detected   Final 05/03/2017  9:00 AM [facility]   Diazepam, Urine Not Detected   Final 05/03/2017  9:00 AM [facility]   Ethyl Glucuronide Ur Not Detected   Final 05/03/2017  9:00 AM [facility]   Fentanyl, Ur Not Detected   Final 05/03/2017  9:00 AM [facility]   Hydrocodone, Urine Not Detected   Final 05/03/2017  9:00 AM [facility]   Hydromorphone, Urine Not Detected   Final 05/03/2017  9:00 AM [facility]   Lorazepam, Urine Not Detected   Final 05/03/2017  9:00 AM [facility]   Marijuana Metab, Ur Not Detected   Final 05/03/2017  9:00 AM [facility]   MDEA, GRACIELA, Ur Not Detected   Final 05/03/2017  9:00 AM [facility]   MDMA URINE Not Detected   Final 05/03/2017  9:00 AM [facility]   Meperidine Metab, Ur Not Detected   Final 05/03/2017  9:00 AM [facility]   Methadone, Urine Not Detected   Final 05/03/2017  9:00 AM [facility]   Methamphetamine, Urine Not Detected   Final 05/03/2017  9:00 AM [facility]   Methylphenidate Not Detected   Final 05/03/2017  9:00 AM [facility]   Midazolam, Urine Not Detected The   concentration must be greater than or equal to the cutoff to be   reported as present.  If specific drug concentrations are   required, contact the laboratory within two weeks of specimen   collection to request quantification by a second analytical   technique. Interpretive questions should be directed to the   laboratory. Results based on immunoassay detection that do not match clinical   expectations should be   interpreted with caution. Confirmatory testing by mass   spectrometry for immunoassay-based results is available, if   ordered within two weeks of specimen collection. Additional   charges apply. For medical purposes only; not valid for forensic use. This test was developed and its performance characteristics   determined by [facility]. The U.S. Food and Drug   Administration has not approved or cleared this test; however, FDA   clearance or approval is not currently required for clinical use. The results are not intended to be used as the sole means for   clinical diagnosis or patient management decisions. EER Hi Res Interp Ur See Note   Final 05/03/2017  9:00 AM [facility]   (NOTE)   Access [facility] Enhanced Report using either link below:   -Direct access: [email or web address]   -Enter Username, Password: [email or web address]   Username: [email or web address]   Password: [email or web address]   Performed by [facility] [address], [address] [facility] [phone]   www. Qasim Gómez MD, Lab. Director   Performed at [address] [place] [address] Dr Keli Lenz.    [address] [phone]      Assessment:     Problem List Items Addressed This Visit     S/P knee surgery -Multiple    Medication monitoring encounter    Encounter for medication monitoring    Primary osteoarthritis of both knees - Primary    Use of opiates for therapeutic purposes    Chronic pain of left knee    Relevant Medications    morphine (MS CONTIN) 30 MG extended release tablet

## 2017-11-28 ENCOUNTER — HOSPITAL ENCOUNTER (OUTPATIENT)
Dept: PAIN MANAGEMENT | Age: 67
Discharge: HOME | End: 2017-11-28
Payer: MEDICARE

## 2017-11-28 VITALS
DIASTOLIC BLOOD PRESSURE: 97 MMHG | TEMPERATURE: 97.8 F | RESPIRATION RATE: 20 BRPM | HEIGHT: 67 IN | OXYGEN SATURATION: 96 % | SYSTOLIC BLOOD PRESSURE: 128 MMHG | BODY MASS INDEX: 31.39 KG/M2 | HEART RATE: 69 BPM | WEIGHT: 200 LBS

## 2017-11-28 DIAGNOSIS — Z79.891 USE OF OPIATES FOR THERAPEUTIC PURPOSES: ICD-10-CM

## 2017-11-28 DIAGNOSIS — Z51.81 ENCOUNTER FOR MEDICATION MONITORING: ICD-10-CM

## 2017-11-28 DIAGNOSIS — M17.12 PRIMARY OSTEOARTHRITIS OF LEFT KNEE: ICD-10-CM

## 2017-11-28 DIAGNOSIS — Z51.81 MEDICATION MONITORING ENCOUNTER: ICD-10-CM

## 2017-11-28 DIAGNOSIS — Z98.890 S/P KNEE SURGERY: ICD-10-CM

## 2017-11-28 DIAGNOSIS — M17.0 PRIMARY OSTEOARTHRITIS OF BOTH KNEES: Primary | ICD-10-CM

## 2017-11-28 DIAGNOSIS — M25.562 CHRONIC PAIN OF LEFT KNEE: ICD-10-CM

## 2017-11-28 DIAGNOSIS — M17.11 PRIMARY OSTEOARTHRITIS OF RIGHT KNEE: ICD-10-CM

## 2017-11-28 DIAGNOSIS — G89.29 CHRONIC PAIN OF LEFT KNEE: ICD-10-CM

## 2017-11-28 PROCEDURE — 99213 OFFICE O/P EST LOW 20 MIN: CPT | Performed by: NURSE PRACTITIONER

## 2017-11-28 PROCEDURE — 99213 OFFICE O/P EST LOW 20 MIN: CPT

## 2017-11-28 RX ORDER — OXYCODONE AND ACETAMINOPHEN 7.5; 325 MG/1; MG/1
1 TABLET ORAL SEE ADMIN INSTRUCTIONS
Qty: 67 TABLET | Refills: 0 | Status: SHIPPED | OUTPATIENT
Start: 2017-12-10 | End: 2018-01-03 | Stop reason: SDUPTHER

## 2017-11-28 RX ORDER — MORPHINE SULFATE 30 MG/1
30 TABLET, FILM COATED, EXTENDED RELEASE ORAL EVERY 12 HOURS
Qty: 60 TABLET | Refills: 0 | Status: SHIPPED | OUTPATIENT
Start: 2017-12-06 | End: 2018-01-03 | Stop reason: SDUPTHER

## 2017-11-28 ASSESSMENT — ENCOUNTER SYMPTOMS
GASTROINTESTINAL NEGATIVE: 1
RESPIRATORY NEGATIVE: 1

## 2017-11-28 NOTE — PROGRESS NOTES
[facility]  Progress  Note    Patient is here today to review medication contract. Chief Complaint:  Knee pain        HPI: He c/o bilateral knee pain, Pain is unchanged. He has had multiple surgereis on left knee. He uses a cane when walking. He is not using braces on knees right now. He goes to the gym 3 times a week to exercise. He sleeps fairly well with melatonin. No Ed visits. Knee Pain    The incident occurred more than 1 week ago. There was no injury mechanism. The pain is present in the right knee, left knee and left leg. The quality of the pain is described as aching and stabbing (sharp, ). The pain is at a severity of 6/10. The pain is moderate. The pain has been constant since onset. Associated symptoms include muscle weakness. Foreign body present: left knee replaced. Exacerbated by: activity, walking. He has tried ice and NSAIDs (whirlpool) for the symptoms. The treatment provided mild relief. Possible side effects, risk of tolerance and or dependence and alternative treatments discussed    Obtaining appropriate analgesic effect of treatment   No signs of potential drug abuse or diversion identified  Treatment goals:  get off pain medication    Functional status: activity the same      Aberrancy  None   Analgesia pain 6  Adverse  Effects :  BM  QOD, rarely takes rx  ADL;s :goes to gym, exercises, housework    Patient denies any new neurological symptoms. No bowel or bladder incontinence, no weakness, and no falling. Pill count: appropriate    32 percocet left and 16 ms contin  Controlled Substances Monitoring:     Attestation: The Prescription Monitoring Report for this patient was reviewed today. QUIQUE Greene)  Documentation: Obtaining appropriate analgesic effect of treatment., No signs of potential drug abuse or diversion identified., Existing medication contract.  (Oarrs done 11-28-17 reviewed, no discrepancy  mmeq 93.75  E RENÉ ROSALES) Favian Blackwood, Glasses   Cardiovascular: Negative. Respiratory: Negative. Skin: Negative. Musculoskeletal: Positive for joint pain. Gastrointestinal: Negative. Genitourinary: Negative. Neurological: Positive for loss of balance. Psychiatric/Behavioral: Negative. Physical Exam:  There were no vitals taken for this visit. Physical Exam   Constitutional: He is oriented to person, place, and time and well-developed, well-nourished, and in no distress. obese   HENT:   Head: Normocephalic. Neck: Normal range of motion. Neck supple. Pulmonary/Chest: Effort normal.   Musculoskeletal:        Right knee: Tenderness found. Medial joint line and lateral joint line tenderness noted. Left knee: He exhibits decreased range of motion. Tenderness found. Medial joint line and lateral joint line tenderness noted. Cervical back: He exhibits tenderness. Lumbar back: He exhibits tenderness. Scars left knee, deformed   Neurological: He is alert and oriented to person, place, and time. Reflex Scores:       Patellar reflexes are 2+ on the right side and 0 on the left side. Achilles reflexes are 2+ on the right side and 2+ on the left side. Gait antalgic   Skin: Skin is warm, dry and intact.    Psychiatric: Affect and judgment normal.       Record/Diagnostics Review:    As above, I did review the imaging  5/10/2017  7:13 PM - Julius, pn Incoming Lab Results From [employer]     Component Results     Component Value Ref Range & Units Status Collected Lab   Pain Management Drug Panel Interp, Urine Consistent   [facility]03/2017  9[facility]   ([facility]   ________________________________________________________________   DRUGS EXPECTED:   OXYCODONE [5/03/17]   MORPHINE [5/03/17]   ________________________________________________________________   CONSISTENT with medications provided:   OXYCODONE : based on oxycodone, noroxycodone, oxymorphone   MORPHINE : based on morphine ________________________________________________________________   INTERPRETIVE INFORMATION:Pain Mgt Sing[place]ary, High Res/EMIT, Ur, Interp   Interpretation depends on accuracy and completeness of patient   medication information submitted by client. 6-Acetylmorphine, Ur Not Detected   Final 05/03/2017  9:00 AM [facility]   7-Aminoclonazepam, Urine Not Detected   Final 05/03/2017  9:00 AM [facility]   Alpha-OH-Alpraz, Urine Not Detected   Final 05/03/2017  9:00 AM [facility]   Alprazolam, Urine Not Detected   Final 05/03/2017  9:00 AM [facility]   Amphetamines, urine Not Detected   Final 05/03/2017  9:00 AM [facility]   Barbiturates, Ur Not Detected   Final 05/03/2017  9:00 AM [facility]   Benzoylecgonine, Ur Not Detected   Final 05/03/2017  9:00 AM [facility]   Buprenorphine Urine Not Detected   Final 05/03/2017  9:00 AM [facility]   Carisoprodol, Ur Not Detected   Final 05/03/2017  9:00 AM [facility]   (NOTE)   The carisoprodol immunoassay has cross-reactivity to carisoprodol   and meprobamate.     Clonazepam, Urine Not Detected   Final 05/03/2017  9:00 AM [facility]   Codeine, Urine Not Detected   Final 05/03/2017  9:00 AM [facility]   MDA, Ur Not Detected   Final 05/03/2017  9:00 AM [facility]   Diazepam, Urine Not Detected   Final 05/03/2017  9:00 AM [facility]   Ethyl Glucuronide Ur Not Detected   Final 05/03/2017  9:00 AM [facility]   Fentanyl, Ur Not Detected   Final 05/03/2017  9:00 AM [facility]   Hydrocodone, Urine Not Detected   Final 05/03/2017  9:00 AM [facility]   Hydromorphone, Urine Not Detected   Final 05/03/2017  9:00 AM [facility]   Lorazepam, Urine Not Detected   Final 05/03/2017  9:00 AM [facility]   Marijuana Metab, Ur Not Detected   Final 05/03/2017  9:00 AM [facility]   MDEA, GRACIELA, Ur Not Detected   Final 05/03/2017  9:00 AM [facility]   MDMA URINE Not Detected   Final 05/03/2017  9:00 AM [facility]   Meperidine Metab, Ur Not Detected   Final 05/03/2017  9:00 AM [facility]   Methadone, Urine Not Detected   Final 05/03/2017  9:00 AM [facility]   Methamphetamine,

## 2018-01-02 ENCOUNTER — TELEPHONE (OUTPATIENT)
Dept: INTERNAL MEDICINE CLINIC | Age: 68
End: 2018-01-02

## 2018-01-02 NOTE — TELEPHONE ENCOUNTER
Back pain since 12-23-17, had a fall, co pain severe. First appt is 1-11-18 and he says he cant wait that long. can he be added some day sooner?

## 2018-01-03 ENCOUNTER — APPOINTMENT (OUTPATIENT)
Dept: GENERAL RADIOLOGY | Age: 68
End: 2018-01-03
Payer: MEDICARE

## 2018-01-03 ENCOUNTER — HOSPITAL ENCOUNTER (OUTPATIENT)
Dept: PAIN MANAGEMENT | Age: 68
Discharge: HOME | End: 2018-01-03
Payer: MEDICARE

## 2018-01-03 ENCOUNTER — HOSPITAL ENCOUNTER (EMERGENCY)
Age: 68
Discharge: HOME | End: 2018-01-03
Attending: EMERGENCY MEDICINE
Payer: MEDICARE

## 2018-01-03 ENCOUNTER — TELEPHONE (OUTPATIENT)
Dept: INTERNAL MEDICINE CLINIC | Age: 68
End: 2018-01-03

## 2018-01-03 VITALS
HEART RATE: 75 BPM | WEIGHT: 200 LBS | DIASTOLIC BLOOD PRESSURE: 96 MMHG | SYSTOLIC BLOOD PRESSURE: 135 MMHG | TEMPERATURE: 98.7 F | RESPIRATION RATE: 20 BRPM | HEIGHT: 67 IN | OXYGEN SATURATION: 97 % | BODY MASS INDEX: 31.39 KG/M2

## 2018-01-03 VITALS
RESPIRATION RATE: 16 BRPM | SYSTOLIC BLOOD PRESSURE: 151 MMHG | HEIGHT: 67 IN | DIASTOLIC BLOOD PRESSURE: 105 MMHG | BODY MASS INDEX: 31.39 KG/M2 | OXYGEN SATURATION: 98 % | TEMPERATURE: 98 F | HEART RATE: 69 BPM | WEIGHT: 200 LBS

## 2018-01-03 DIAGNOSIS — M17.0 OSTEOARTHRITIS OF BOTH KNEES, UNSPECIFIED OSTEOARTHRITIS TYPE: Primary | ICD-10-CM

## 2018-01-03 DIAGNOSIS — Z79.891 USE OF OPIATES FOR THERAPEUTIC PURPOSES: ICD-10-CM

## 2018-01-03 DIAGNOSIS — G89.29 CHRONIC PAIN OF LEFT KNEE: ICD-10-CM

## 2018-01-03 DIAGNOSIS — M25.562 CHRONIC PAIN OF LEFT KNEE: ICD-10-CM

## 2018-01-03 DIAGNOSIS — Z98.890 S/P KNEE SURGERY: ICD-10-CM

## 2018-01-03 DIAGNOSIS — S22.31XA CLOSED FRACTURE OF ONE RIB OF RIGHT SIDE, INITIAL ENCOUNTER: Primary | ICD-10-CM

## 2018-01-03 DIAGNOSIS — Z98.890 S/P LEFT KNEE SURGERY: ICD-10-CM

## 2018-01-03 DIAGNOSIS — M17.0 PRIMARY OSTEOARTHRITIS OF BOTH KNEES: ICD-10-CM

## 2018-01-03 DIAGNOSIS — M17.12 PRIMARY OSTEOARTHRITIS OF LEFT KNEE: ICD-10-CM

## 2018-01-03 DIAGNOSIS — M17.11 PRIMARY OSTEOARTHRITIS OF RIGHT KNEE: ICD-10-CM

## 2018-01-03 PROCEDURE — 94664 DEMO&/EVAL PT USE INHALER: CPT

## 2018-01-03 PROCEDURE — 99213 OFFICE O/P EST LOW 20 MIN: CPT

## 2018-01-03 PROCEDURE — 71101 X-RAY EXAM UNILAT RIBS/CHEST: CPT

## 2018-01-03 PROCEDURE — 99283 EMERGENCY DEPT VISIT LOW MDM: CPT

## 2018-01-03 PROCEDURE — 72072 X-RAY EXAM THORAC SPINE 3VWS: CPT

## 2018-01-03 PROCEDURE — 99213 OFFICE O/P EST LOW 20 MIN: CPT | Performed by: NURSE PRACTITIONER

## 2018-01-03 RX ORDER — OXYCODONE AND ACETAMINOPHEN 7.5; 325 MG/1; MG/1
1 TABLET ORAL SEE ADMIN INSTRUCTIONS
Qty: 75 TABLET | Refills: 0 | Status: SHIPPED | OUTPATIENT
Start: 2018-01-05 | End: 2018-01-31 | Stop reason: SDUPTHER

## 2018-01-03 RX ORDER — MORPHINE SULFATE 30 MG/1
30 TABLET, FILM COATED, EXTENDED RELEASE ORAL EVERY 12 HOURS
Qty: 60 TABLET | Refills: 0 | Status: SHIPPED | OUTPATIENT
Start: 2018-01-05 | End: 2018-01-31 | Stop reason: SDUPTHER

## 2018-01-03 RX ORDER — IBUPROFEN 400 MG/1
400 TABLET ORAL EVERY 8 HOURS PRN
Qty: 90 TABLET | Refills: 2 | Status: SHIPPED | OUTPATIENT
Start: 2018-01-03 | End: 2018-03-28 | Stop reason: SDUPTHER

## 2018-01-03 RX ORDER — GABAPENTIN 400 MG/1
400 CAPSULE ORAL 3 TIMES DAILY
Qty: 90 CAPSULE | Refills: 3 | Status: SHIPPED | OUTPATIENT
Start: 2018-01-03 | End: 2018-05-02 | Stop reason: SDUPTHER

## 2018-01-03 ASSESSMENT — PAIN SCALES - GENERAL: PAINLEVEL_OUTOF10: 10

## 2018-01-03 ASSESSMENT — PAIN DESCRIPTION - DESCRIPTORS: DESCRIPTORS: ACHING

## 2018-01-03 ASSESSMENT — ENCOUNTER SYMPTOMS: BACK PAIN: 1

## 2018-01-03 ASSESSMENT — PAIN DESCRIPTION - LOCATION: LOCATION: BACK

## 2018-01-03 ASSESSMENT — PAIN DESCRIPTION - PAIN TYPE: TYPE: ACUTE PAIN

## 2018-01-03 NOTE — ED PROVIDER NOTES
[address] ED  eMERGENCY dEPARTMENT eNCOUnter   Independent Attestation     Pt Name: Le Hall  MRN: [id number]  [place] 1950  Date of evaluation: 1/3/18       Le Hall is a 79 y.o. male who presents with Back Pain      Vitals:   Vitals:    01/03/18 0905 01/03/18 1028   BP: (!) 174/98 (!) 151/105   Pulse: 70 69   Resp: 16    Temp: 98 °F (36.7 °C)    TempSrc: Oral    SpO2: 100% 98%   Weight: 200 lb (90.7 kg)    Height: 5' 7\" (1.702 m)        Impression:   1. Closed fracture of one rib of right side, initial encounter          Based on the medical record, the care appears appropriate. I was personally available for consultation in the Emergency Department.     Kiley Quezada MD  Attending Emergency  Physician                Kiley Quezada MD  01/03/18 9046

## 2018-01-03 NOTE — ED PROVIDER NOTES
[address]  eMERGENCY dEPARTMENT eNCOUnter      Pt Name: Fabienne Topete  MRN: [id number]  [place] 1950  Date of evaluation: 1/3/2018  Provider: MITCHELL Bueno    CHIEF COMPLAINT       Chief Complaint   Patient presents with    Back Pain           HISTORY OF PRESENT ILLNESS  (Location/Symptom, Timing/Onset, Context/Setting, Quality, Duration, Modifying Factors, Severity.)   Fabienne Topete is a 79 y.o. male who presents to the emergency department Complaining of right mid back pain. States several weeks ago he slipped and fell on the stairs hitting the wall. He states he became concerned because the pain did not go away. Patient is a patient of pain management, takes narcotics daily. Saw pain management before coming down to the emergency room for evaluation. Patient has a history of chronic back pain. No loss of bowel or bladder control, no numbness or tingling  Patient denies any history of IV drug use, denies any fevers, chills, abdominal pain nausea or vomiting    Location/Symptom: right mid back  Quality: Aching  Duration: Persistent  Modifying Factors: Worse with bending and twisting  Severity: Mild    Nursing Notes were reviewed. REVIEW OF SYSTEMS    (2-9 systems for level 4, 10 or more for level 5)     Review of Systems   Constitutional: Negative. Respiratory: Negative. Cardiovascular: Negative. Gastrointestinal: Negative. Musculoskeletal: Complaining of back pain  Genitourinary: Negative. Skin: Negative. Neurological: Negative. Except as noted above the remainder of the review of systems was reviewed and negative.        PAST MEDICAL HISTORY         Diagnosis Date    Arthritis     Left knee pain 4/9/2014    Teeth problem     abcessw     None otherwise stated in nurses notes    SURGICAL HISTORY           Procedure Laterality Date    CARPAL TUNNEL RELEASE Bilateral     DENTAL SURGERY  10/2015    JOINT REPLACEMENT Left     KNEE  X 6 pt unsure of date Normal range of motion. Mild paraspinal muscle tenderness over Thoracic right-sided over the ribs, no crepitus, no midline tenderness, no step-off deformity, no swelling, no rashes, pt able to stand on toes and heels. Pt ambulatory. Neurological: Alert and oriented to person, place, and time. GCS score is 15.  5/5 strength in bilateral lower extremities with sensation to light touch intact. Skin: Skin is warm and dry. No rash noted. No erythema. No pallor. DIAGNOSTIC RESULTS   RADIOLOGY:   All plain film, CT, MRI, and formal ultrasound images (except ED bedside ultrasound) are read by the radiologist and the images and interpretations are directly viewed by the emergency physician. XR THORACIC SPINE (3 VIEWS)   Preliminary Result   No evidence of acute compression fracture or malalignment in the thoracic   spine. XR RIBS RIGHT INCLUDE CHEST (MIN 3 VIEWS)   Preliminary Result   1. Nondisplaced fracture of the lateral right 9th rib. 2.  No acute intrathoracic process. Xr Ribs Right Include Chest (min 3 Views)    Result Date: 1/3/2018  EXAMINATION: TWO-VIEW RIGHT RIB SERIES WITH FRONTAL VIEW OF THE CHEST 1/3/2018 9:59 am COMPARISON: Chest x-ray dated 12/18/2016 HISTORY: ORDERING SYSTEM PROVIDED HISTORY: fall, posterior right ribs TECHNOLOGIST PROVIDED HISTORY: Reason for exam:->fall, posterior right ribs Ordering Physician Provided Reason for Exam: pain S/P fall Acuity: Acute Type of Exam: Initial FINDINGS: Cardiomediastinal silhouette and pulmonary vasculature are within normal limits. No focal airspace consolidation, pneumothorax, or pleural effusion. No free air beneath the diaphragm. No acute osseous abnormality. There is a nondisplaced fracture of the lateral right 9th rib. No displaced rib fractures are identified. 1.  Nondisplaced fracture of the lateral right 9th rib. 2.  No acute intrathoracic process.      Xr Thoracic Spine (3 Views)    Result Date:

## 2018-01-03 NOTE — PROGRESS NOTES
patient was reviewed today. QUIQUE Burt)  Documentation: Obtaining appropriate analgesic effect of treatment., No signs of potential drug abuse or diversion identified. (Oarrs 1-2-18 reiewed 1-3-18 no discrepancy  mmeq 82.84  E RENÉ ROSALES) OPAL Kenny  Chronic Pain: Treatment objectives documented - patient is progressing appropriately. , Functional status reviewed - continues with improved or maintaining ADL's., Reviewed the patient's functional status and documentation, including the 4A's of chronic pain treatment., Dose reduction has been attempted. QUIQUE Burt)  Medication Contracts: Existing medication contract. (QUIQUE Burt)  Morphine equivalent dose as reported on OARRS:  Review of OARRS does not show any aberrant prescription behavior. Medication is helping the patient stay active. Patient denies any side effects and reports adequate analgesia. No sign of misuse/abuse. Past Medical History:   Diagnosis Date    Arthritis     Left knee pain 4/9/2014    Teeth problem     abcessw       Past Surgical History:   Procedure Laterality Date    CARPAL TUNNEL RELEASE Bilateral     DENTAL SURGERY  10/2015    JOINT REPLACEMENT Left     KNEE  X 6 pt unsure of date    ROTATOR CUFF REPAIR Right     TONSILLECTOMY         Allergies   Allergen Reactions    Oxycontin [Oxycodone Hcl] Nausea Only         Current Outpatient Prescriptions:     [START ON 1/5/2018] morphine (MS CONTIN) 30 MG extended release tablet, Take 1 tablet by mouth every 12 hours for 30 days. Earliest Fill Date: 1/5/18, Disp: 60 tablet, Rfl: 0    [START ON 1/5/2018] oxyCODONE-acetaminophen (PERCOCET) 7.5-325 MG per tablet, Take 1 tablet by mouth See Admin Instructions for 30 days 1 tablet 2-3 times a day as needed  for pain.  Earliest Fill Date: 1/5/18, Disp: 75 tablet, Rfl: 0    ibuprofen (ADVIL;MOTRIN) 400 MG tablet, Take 1 tablet by mouth every 8 hours as needed for Pain, Disp: 90 tablet, Rfl: 2    gabapentin medications unless patient cannot get through daily activities due to pain.   Follow up appointment made for 4 weeks

## 2018-01-04 ENCOUNTER — CARE COORDINATION (OUTPATIENT)
Dept: CARE COORDINATION | Age: 68
End: 2018-01-04

## 2018-01-04 NOTE — CARE COORDINATION
Ambulatory Care Coordination ED Follow up Call       Reason for ED Visit:  Closed fracture of one rib, right side  Care Management Risk Score: CMRS 4  How are you feeling? :     not changed  Patient Reports the following:  Patient states he is in pain management, so he is taking pain medication prescribe from them. He states the pain medication doesn't seen to help his rib pain. Writer suggest that takes OTC tylenol if not better to make an appt to see PCP. He voiced understanding. He is ware of appt listed below. Contact [facility] regarding any worsening symptoms from above. Did you call your PCP prior to going to the ED? No          Post Discharge Status:  What health concerns since you left the Emergency Room?  none    Do you have wounds that you are caring for at home? No    Do you have a follow up appt scheduled? yes    Review of Instructions:                                 Do you have any questions regarding your discharge instructions?:  No  Medications:    What questions do you have about your medications? No  Are you taking your medications as directed? If not - why? Yes   Can you afford your medications? yes  ADLS:  Do you need assistance of any kind at home? No   What assistance is needed?  none      FU appts/Provider:    Future Appointments  Date Time Provider Rey Raya   1/31/2018 8:20 AM QUIQUE Dumont [address] None       Health Maintenance Due   Topic Date Due    DTaP/Tdap/Td vaccine (1 - Tdap) 11/13/1969    Zostavax vaccine  11/13/2010    Flu vaccine (1) 09/01/2017     Patient advised to contact PCP office to have HM items/records faxed to PCP Office directly? N/A      As a reminder, writer explained the importance of first calling their PCP to get an appointment instead going the ER especially Monday- Friday during office hours for non-emergency problems.  Writer advised the patient to speak to a nurse or MOA to assist them with the issue and maybe get them in as a same day/sick call before they decide to go to the ER. Writer also stressed that they can call me for any help regarding appointments,medications and questions/concerns regarding ER visits, patient understood well.

## 2018-01-31 ENCOUNTER — HOSPITAL ENCOUNTER (OUTPATIENT)
Dept: PAIN MANAGEMENT | Age: 68
Discharge: HOME | End: 2018-01-31
Payer: MEDICARE

## 2018-01-31 VITALS
BODY MASS INDEX: 31.39 KG/M2 | DIASTOLIC BLOOD PRESSURE: 84 MMHG | HEIGHT: 67 IN | SYSTOLIC BLOOD PRESSURE: 137 MMHG | WEIGHT: 200 LBS | HEART RATE: 68 BPM | TEMPERATURE: 98.5 F | OXYGEN SATURATION: 96 %

## 2018-01-31 DIAGNOSIS — M17.11 PRIMARY OSTEOARTHRITIS OF RIGHT KNEE: ICD-10-CM

## 2018-01-31 DIAGNOSIS — Z51.81 MEDICATION MONITORING ENCOUNTER: ICD-10-CM

## 2018-01-31 DIAGNOSIS — Z98.890 S/P LEFT KNEE SURGERY: ICD-10-CM

## 2018-01-31 DIAGNOSIS — M17.0 PRIMARY OSTEOARTHRITIS OF BOTH KNEES: ICD-10-CM

## 2018-01-31 DIAGNOSIS — Z98.890 S/P KNEE SURGERY: Primary | ICD-10-CM

## 2018-01-31 DIAGNOSIS — M17.0 OSTEOARTHRITIS OF BOTH KNEES, UNSPECIFIED OSTEOARTHRITIS TYPE: ICD-10-CM

## 2018-01-31 DIAGNOSIS — M17.12 PRIMARY OSTEOARTHRITIS OF LEFT KNEE: ICD-10-CM

## 2018-01-31 DIAGNOSIS — Z79.891 USE OF OPIATES FOR THERAPEUTIC PURPOSES: ICD-10-CM

## 2018-01-31 DIAGNOSIS — M25.562 CHRONIC PAIN OF LEFT KNEE: ICD-10-CM

## 2018-01-31 DIAGNOSIS — G89.29 CHRONIC PAIN OF LEFT KNEE: ICD-10-CM

## 2018-01-31 PROCEDURE — 99213 OFFICE O/P EST LOW 20 MIN: CPT

## 2018-01-31 PROCEDURE — 99213 OFFICE O/P EST LOW 20 MIN: CPT | Performed by: NURSE PRACTITIONER

## 2018-01-31 RX ORDER — MORPHINE SULFATE 30 MG/1
30 TABLET, FILM COATED, EXTENDED RELEASE ORAL EVERY 12 HOURS
Qty: 60 TABLET | Refills: 0 | Status: SHIPPED | OUTPATIENT
Start: 2018-02-04 | End: 2018-02-28 | Stop reason: SDUPTHER

## 2018-01-31 RX ORDER — OXYCODONE AND ACETAMINOPHEN 7.5; 325 MG/1; MG/1
1 TABLET ORAL SEE ADMIN INSTRUCTIONS
Qty: 75 TABLET | Refills: 0 | Status: SHIPPED | OUTPATIENT
Start: 2018-02-04 | End: 2018-02-28 | Stop reason: SDUPTHER

## 2018-01-31 ASSESSMENT — ENCOUNTER SYMPTOMS
GASTROINTESTINAL NEGATIVE: 1
RESPIRATORY NEGATIVE: 1
BACK PAIN: 1

## 2018-01-31 NOTE — PROGRESS NOTES
[facility]  Progress  Note    Patient is here today to review medication contract. Chief Complaint:  Knee pain    [facility]   Patient returns to pain clinic for medication visit. He has  history chronic knee pain. C/o bilateral knee pain. History of multiple left knee surgeries. Pain has increased. Right knee . He is going to see his Orthopaedic surgeon. He fell before last visit while walking up the steps , he fell against the wall. He was seen in the ED. He was diagnosed with right fractured rib , states pain is improving. He is not sleeping well due to the pain. He  Still goes to the gym 3 times a week. Knee Pain    The incident occurred more than 1 week ago. There was no injury mechanism. The pain is present in the right knee, left knee and left leg. The quality of the pain is described as pounding. The pain is at a severity of 10/10. The pain is moderate. The pain has been constant since onset. Associated symptoms include muscle weakness and numbness. Associated symptoms comments: Numbness left leg. Foreign body present: left kinee replacement. Exacerbated by:  Steps, walking, standing. He has tried heat, elevation, ice and NSAIDs for the symptoms. The treatment provided mild relief.      Possible side effects, risk of tolerance and or dependence and alternative treatments discussed    Obtaining appropriate analgesic effect of treatment   No signs of potential drug abuse or diversion identified    Treatment goals:  Functional status: to get off pain medications      Aberrancy none   Analgesia  Pain 10  Adverse  Effects :raymond  BM daily  ADL;s : house hold tasks, goes to gym        Patient denies any new neurological symptoms. No bowel or bladder incontinence, no weakness, and no falling. Pill count: appropriate  Controlled Substances Monitoring:     Attestation: The Prescription Monitoring Report for this patient was reviewed today.  QUIQUE Trujillo)  Documentation: Obtaining appropriate 1 tablet by mouth every 8 hours as needed for Pain, Disp: 90 tablet, Rfl: 2    History reviewed. No pertinent family history. Social History     Social History    Marital status:      Spouse name: N/A    Number of children: N/A    Years of education: N/A     Occupational History    retired      Social History Main Topics    Smoking status: Never Smoker    Smokeless tobacco: Never Used    Alcohol use No    Drug use: No    Sexual activity: No     Other Topics Concern    Not on file     Social History Narrative    No narrative on file       Review of Systems:  Review of Systems   Constitution: Negative. HENT: Negative. Eyes:        Glasses   Respiratory: Negative. Skin: Negative. Musculoskeletal: Positive for back pain and joint pain. Gastrointestinal: Negative. Genitourinary: Negative. Neurological: Negative. Psychiatric/Behavioral: Negative. Physical Exam:  /84   Pulse 68   Temp 98.5 °F (36.9 °C) (Oral)   Ht 5' 7\" (1.702 m)   Wt 200 lb (90.7 kg)   SpO2 96%   BMI 31.32 kg/m²     Physical Exam  Constitutional: He is oriented to person, place, and time. HENT:   Head: Normocephalic. Neck: Normal range of motion. Neck supple. Pulmonary/Chest: Effort normal.           Tender right lateral rib    Musculoskeletal:        Right knee: Tenderness found. Left knee: He exhibits decreased range of motion and deformity. Tenderness found. Cervical back: He exhibits tenderness. Lumbar back: He exhibits tenderness. Legs:  Paraspinal tenderness cervical to lumbar, no deformity noted    Scar left knee  Crepitus right knee   Neurological: He is alert and oriented to person, place, and time. He has normal strength. Gait abnormal.   Reflex Scores:       Patellar reflexes are 2+ on the right side and 0 on the left side. Achilles reflexes are 2+ on the right side and 2+ on the left side.   Pain getting up out of chair   Skin: Skin is [facility]   Ethyl Glucuronide Ur Not Detected   Final 05/03/2017  9:00 AM [facility]   Fentanyl, Ur Not Detected   Final 05/03/2017  9:00 AM [facility]   Hydrocodone, Urine Not Detected   Final 05/03/2017  9:00 AM [facility]   Hydromorphone, Urine Not Detected   Final 05/03/2017  9:00 AM [facility]   Lorazepam, Urine Not Detected   Final 05/03/2017  9:00 AM [facility]   Marijuana Metab, Ur Not Detected   Final 05/03/2017  9:00 AM [facility]   MDEA, GRACIELA, Ur Not Detected   Final 05/03/2017  9:00 AM [facility]   MDMA URINE Not Detected   Final 05/03/2017  9:00 AM [facility]   Meperidine Metab, Ur Not Detected   Final 05/03/2017  9:00 AM [facility]   Methadone, Urine Not Detected   Final 05/03/2017  9:00 AM [facility]   Methamphetamine, Urine Not Detected   Final 05/03/2017  9:00 AM [facility]   Methylphenidate Not Detected   Final 05/03/2017  9:00 AM [facility]   Midazolam, Urine Not Detected   Final 05/03/2017  9:00 AM [facility]   Morphine Urine Present   Final 05/03/2017  9:00 AM [facility]   Norbuprenorphine, Urine Not Detected   Final 05/03/2017  9:00 AM [facility]   Nordiazepam, Urine Not Detected   Final 05/03/2017  9:00 AM [facility]   Norfentanyl, Urine Not Detected   Final 05/03/2017  9:00 AM [facility]   NORHYDROCODONE, URINE Not Detected   Final 05/03/2017  9:00 AM [facility]   Noroxycodone, Urine Present   Final 05/03/2017  9:00 AM [facility]   NOROXYMORPHONE, URINE Not Detected   Final 05/03/2017  9:00 AM [facility]   Oxazepam, Urine Not Detected   Final 05/03/2017  9:00 AM [facility]   Oxycodone Urine Present   Final 05/03/2017  9:00 AM [facility]   Oxymorphone, Urine Present   Final 05/03/2017  9:00 AM [facility]   PCP, Urine Not Detected   Final 05/03/2017  9:00 AM [facility]   Phentermine, Ur Not Detected   Final 05/03/2017  9:00 AM [facility]   Propoxyphene, Urine Not Detected   Final 05/03/2017  9:00 AM [facility]   Tapentadol-O-Sulfate, Urine Not Detected   [facility] 05[facility]03/2017  9[facility]   Tapentadol, Urine Not Detected   Final 05/03/2017  9:00 AM [facility]   Temazepam, Urine changes, withdrawal symptoms,  chronic opioid dependence and tolerance as well as risk of taking opioids with Benzodiazepines and taking opioids along with  alcohol,  were discussed with patient. I had asked the patient to minimize medication use and utilize pain medications only for uncontrolled rest pain or pain with exertional activities. I advised patient not to self escalate pain medications without consulting with us. At each of patient's future visits we will try to taper pain medications, while adjusting the adjunct medications, and re-evaluating for Physical Therapy to improve spinal and joint strength. We will continue to have discussions to decrease pain medications as tolerated.      TREATMENT OPTIONS:   Return in 4 weeks  Continue opioid therapy. Script written for ms contin and perccoet  Contract requirements met. Satisfactory pain management plan. Medication helps with personal goals and self care needs. Patient is stable on current regimen of meds and medication is effectively managing pain, we will continue current medications without changes. As always, we encourage daily stretching and strengthening exercises, and recommend minimizing use of pain medications unless patient cannot get through daily activities due to pain.   Follow up appointment made for 4 weeks

## 2018-02-28 ENCOUNTER — HOSPITAL ENCOUNTER (OUTPATIENT)
Dept: PAIN MANAGEMENT | Age: 68
Discharge: HOME | End: 2018-02-28
Payer: MEDICARE

## 2018-02-28 VITALS
HEART RATE: 59 BPM | SYSTOLIC BLOOD PRESSURE: 128 MMHG | BODY MASS INDEX: 31.39 KG/M2 | TEMPERATURE: 97.6 F | OXYGEN SATURATION: 96 % | HEIGHT: 67 IN | DIASTOLIC BLOOD PRESSURE: 93 MMHG | WEIGHT: 200 LBS | RESPIRATION RATE: 20 BRPM

## 2018-02-28 DIAGNOSIS — M25.562 CHRONIC PAIN OF LEFT KNEE: ICD-10-CM

## 2018-02-28 DIAGNOSIS — Z51.81 ENCOUNTER FOR MEDICATION MONITORING: ICD-10-CM

## 2018-02-28 DIAGNOSIS — Z51.81 MEDICATION MONITORING ENCOUNTER: ICD-10-CM

## 2018-02-28 DIAGNOSIS — M17.0 OSTEOARTHRITIS OF BOTH KNEES, UNSPECIFIED OSTEOARTHRITIS TYPE: ICD-10-CM

## 2018-02-28 DIAGNOSIS — Z98.890 S/P LEFT KNEE SURGERY: ICD-10-CM

## 2018-02-28 DIAGNOSIS — Z79.891 USE OF OPIATES FOR THERAPEUTIC PURPOSES: ICD-10-CM

## 2018-02-28 DIAGNOSIS — G89.29 CHRONIC PAIN OF LEFT KNEE: ICD-10-CM

## 2018-02-28 DIAGNOSIS — Z98.890 S/P KNEE SURGERY: ICD-10-CM

## 2018-02-28 DIAGNOSIS — M17.11 PRIMARY OSTEOARTHRITIS OF RIGHT KNEE: Primary | ICD-10-CM

## 2018-02-28 DIAGNOSIS — M17.0 PRIMARY OSTEOARTHRITIS OF BOTH KNEES: ICD-10-CM

## 2018-02-28 DIAGNOSIS — M17.12 PRIMARY OSTEOARTHRITIS OF LEFT KNEE: ICD-10-CM

## 2018-02-28 PROCEDURE — 99213 OFFICE O/P EST LOW 20 MIN: CPT

## 2018-02-28 PROCEDURE — 99213 OFFICE O/P EST LOW 20 MIN: CPT | Performed by: NURSE PRACTITIONER

## 2018-02-28 RX ORDER — MORPHINE SULFATE 30 MG/1
30 TABLET, FILM COATED, EXTENDED RELEASE ORAL EVERY 12 HOURS
Qty: 60 TABLET | Refills: 0 | Status: SHIPPED | OUTPATIENT
Start: 2018-03-06 | End: 2018-03-28 | Stop reason: SDUPTHER

## 2018-02-28 RX ORDER — OXYCODONE AND ACETAMINOPHEN 7.5; 325 MG/1; MG/1
1 TABLET ORAL SEE ADMIN INSTRUCTIONS
Qty: 75 TABLET | Refills: 0 | Status: SHIPPED | OUTPATIENT
Start: 2018-03-11 | End: 2018-03-28 | Stop reason: SDUPTHER

## 2018-02-28 ASSESSMENT — ENCOUNTER SYMPTOMS
GASTROINTESTINAL NEGATIVE: 1
RESPIRATORY NEGATIVE: 1
BACK PAIN: 1

## 2018-02-28 NOTE — PROGRESS NOTES
effect of treatment. QUIQUE Alvarado)         Treatment objectives documented - patient is progressing appropriately. , Functional status reviewed - continues with improved or maintaining ADL's., Reviewed the patient's functional status and documentation. , Dose reduction has been attempted. QUIQUE Alvarado)    Existing medication contract. (QUIQUE Alvarado)    Morphine equivalent dose as reported on OARRS: 93.75  Review of OARRS does not show any aberrant prescription behavior. Medication is helping the patient stay active. Patient denies any side effects and reports adequate analgesia. No sign of misuse/abuse. Past Medical History:   Diagnosis Date    Arthritis     Left knee pain 4/9/2014    Teeth problem     abcessw       Past Surgical History:   Procedure Laterality Date    CARPAL TUNNEL RELEASE Bilateral     DENTAL SURGERY  10/2015    JOINT REPLACEMENT Left     KNEE  X 6 pt unsure of date    ROTATOR CUFF REPAIR Right     TONSILLECTOMY         Allergies   Allergen Reactions    Oxycontin [Oxycodone Hcl] Nausea Only         Current Outpatient Prescriptions:     [START ON 3/6/2018] morphine (MS CONTIN) 30 MG extended release tablet, Take 1 tablet by mouth every 12 hours for 30 days. Earliest Fill Date: 3/6/18, Disp: 60 tablet, Rfl: 0    [START ON 3/11/2018] oxyCODONE-acetaminophen (PERCOCET) 7.5-325 MG per tablet, Take 1 tablet by mouth See Admin Instructions for 30 days 1 tablet 2-3 times a day as needed  for pain. Earliest Fill Date: 3/11/18, Disp: 75 tablet, Rfl: 0    gabapentin (NEURONTIN) 400 MG capsule, Take 1 capsule by mouth 3 times daily for 30 days. , Disp: 90 capsule, Rfl: 3    MELATONIN PO, Take by mouth, Disp: , Rfl:     ibuprofen (ADVIL;MOTRIN) 400 MG tablet, Take 1 tablet by mouth every 8 hours as needed for Pain, Disp: 90 tablet, Rfl: 2    History reviewed. No pertinent family history. Social History     Social History    Marital status:       Spouse name: N/A   Perry Hernandez Hydrocodone, Urine Not Detected    Final 05/03/2017  9:00 AM [facility]   Hydromorphone, Urine Not Detected    Final 05/03/2017  9:00 AM [facility]   Lorazepam, Urine Not Detected    Final 05/03/2017  9:00 AM [facility]   Marijuana Metab, Ur Not Detected    Final 05/03/2017  9:00 AM [facility]   MDEA, GRACIELA, Ur Not Detected    Final 05/03/2017  9:00 AM [facility]   MDMA URINE Not Detected    Final 05/03/2017  9:00 AM [facility]   Meperidine Metab, Ur Not Detected    Final 05/03/2017  9:00 AM [facility]   Methadone, Urine Not Detected    Final 05/03/2017  9:00 AM [facility]   Methamphetamine, Urine Not Detected    Final 05/03/2017  9:00 AM [facility]   Methylphenidate Not Detected    Final 05/03/2017  9:00 AM [facility]   Midazolam, Urine Not Detected    Final 05/03/2017  9:00 AM [facility]   Morphine Urine Present    Final 05/03/2017  9:00 AM [facility]   Norbuprenorphine, Urine Not Detected    Final 05/03/2017  9:00 AM [facility]   Nordiazepam, Urine Not Detected    Final 05/03/2017  9:00 AM [facility]   Norfentanyl, Urine Not Detected    Final 05/03/2017  9:00 AM [facility]   NORHYDROCODONE, URINE Not Detected    Final 05/03/2017  9:00 AM [facility]   Noroxycodone, Urine Present    Final 05/03/2017  9:00 AM [facility]   NOROXYMORPHONE, URINE Not Detected    Final 05/03/2017  9:00 AM [facility]   Oxazepam, Urine Not Detected    Final 05/03/2017  9:00 AM [facility]   Oxycodone Urine Present    Final 05/03/2017  9:00 AM [facility]   Oxymorphone, Urine Present    Final 05/03/2017  9:00 AM [facility]   PCP, Urine Not Detected    Final 05/03/2017  9:00 AM [facility]   Phentermine, Ur Not Detected    Final 05/03/2017  9:00 AM [facility]   Propoxyphene, Urine Not Detected    Final 05/03/2017  9:00 AM [facility]   Tapentadol-O-Sulfate, Urine Not Detected    Final 05/03/2017  9:00 AM [facility]   Tapentadol, Urine Not Detected    Final 05/03/2017  9:00 AM [facility]   Temazepam, Urine Not Detected    Final 05/03/2017  9:00 AM [facility]   Tramadol, Urine Not Detected    Final 05/03/2017  9:[facility] use and utilize pain medications only for uncontrolled rest pain or pain with exertional activities. I advised patient not to self escalate pain medications without consulting with us. At each of patient's future visits we will try to taper pain medications, while adjusting the adjunct medications, and re-evaluating for Physical Therapy to improve spinal and joint strength. We will continue to have discussions to decrease pain medications as tolerated.    short 10 percocet,  Given verbal warning,  Another issue may result in discharge, discussed dangers self escalation  Will bring in for pill count, patient states he believes his pharmacy shorted on his pills, he was told to count them with the pharmacist if he believes he is not getting the correct amount    Will bring in for pill count    TREATMENT OPTIONS:   Return in 4 weeks  Continue opioid therapy. Script written for percocet, ms contin  Contract requirements met. Satisfactory pain management plan. Medication helps with personal goals and self care needs. Patient is stable on current regimen of meds and medication is effectively managing pain, we will continue current medications without changes. As always, we encourage daily stretching and strengthening exercises, and recommend minimizing use of pain medications unless patient cannot get through daily activities due to pain.   Follow up appointment made for 4 weeks

## 2018-03-28 ENCOUNTER — HOSPITAL ENCOUNTER (OUTPATIENT)
Dept: PAIN MANAGEMENT | Age: 68
Discharge: HOME | End: 2018-03-28
Payer: MEDICARE

## 2018-03-28 VITALS
RESPIRATION RATE: 16 BRPM | SYSTOLIC BLOOD PRESSURE: 119 MMHG | HEART RATE: 68 BPM | WEIGHT: 200 LBS | OXYGEN SATURATION: 97 % | BODY MASS INDEX: 31.39 KG/M2 | DIASTOLIC BLOOD PRESSURE: 62 MMHG | TEMPERATURE: 98.1 F | HEIGHT: 67 IN

## 2018-03-28 DIAGNOSIS — Z51.81 MEDICATION MONITORING ENCOUNTER: ICD-10-CM

## 2018-03-28 DIAGNOSIS — M17.0 PRIMARY OSTEOARTHRITIS OF BOTH KNEES: ICD-10-CM

## 2018-03-28 DIAGNOSIS — Z98.890 S/P KNEE SURGERY: ICD-10-CM

## 2018-03-28 DIAGNOSIS — G89.29 CHRONIC PAIN OF LEFT KNEE: ICD-10-CM

## 2018-03-28 DIAGNOSIS — M17.11 PRIMARY OSTEOARTHRITIS OF RIGHT KNEE: ICD-10-CM

## 2018-03-28 DIAGNOSIS — M17.0 OSTEOARTHRITIS OF BOTH KNEES, UNSPECIFIED OSTEOARTHRITIS TYPE: Primary | ICD-10-CM

## 2018-03-28 DIAGNOSIS — M25.562 CHRONIC PAIN OF LEFT KNEE: ICD-10-CM

## 2018-03-28 DIAGNOSIS — M17.12 PRIMARY OSTEOARTHRITIS OF LEFT KNEE: ICD-10-CM

## 2018-03-28 DIAGNOSIS — Z98.890 S/P LEFT KNEE SURGERY: ICD-10-CM

## 2018-03-28 PROCEDURE — 80307 DRUG TEST PRSMV CHEM ANLYZR: CPT

## 2018-03-28 PROCEDURE — 99213 OFFICE O/P EST LOW 20 MIN: CPT

## 2018-03-28 RX ORDER — MORPHINE SULFATE 30 MG/1
30 TABLET, FILM COATED, EXTENDED RELEASE ORAL EVERY 12 HOURS
Qty: 60 TABLET | Refills: 0 | Status: SHIPPED | OUTPATIENT
Start: 2018-04-05 | End: 2018-05-02 | Stop reason: SDUPTHER

## 2018-03-28 RX ORDER — OXYCODONE AND ACETAMINOPHEN 7.5; 325 MG/1; MG/1
1 TABLET ORAL SEE ADMIN INSTRUCTIONS
Qty: 75 TABLET | Refills: 0 | Status: SHIPPED | OUTPATIENT
Start: 2018-04-10 | End: 2018-05-02 | Stop reason: SDUPTHER

## 2018-03-28 RX ORDER — IBUPROFEN 400 MG/1
400 TABLET ORAL EVERY 8 HOURS PRN
Qty: 90 TABLET | Refills: 2 | Status: SHIPPED | OUTPATIENT
Start: 2018-04-03 | End: 2018-08-14 | Stop reason: ALTCHOICE

## 2018-03-28 ASSESSMENT — ENCOUNTER SYMPTOMS
BACK PAIN: 1
CONSTIPATION: 1
RESPIRATORY NEGATIVE: 1

## 2018-03-28 NOTE — PROGRESS NOTES
QUIQUE)  Medication Contracts: Existing medication contract. (Ahmet Sanchez, QUIQUE)  Review of OARRS does not show any aberrant prescription behavior. Medication is helping the patient stay active. Patient denies any side effects and reports adequate analgesia. No sign of misuse/abuse. When was the last UDS: 5-3-17  Was the UDS appropriate:yes    Record/Diagnostics Review:      As above, I did review the imaging    5/10/2017  7:13 PM - Julius, pn Incoming Lab Results From [facility]     Component Results     Component Value Ref Range & Units Status Collected Lab   Pain Management Drug Panel Interp, Urine Consistent   Final 05/03/2017  9:00 AM [facility]   (NOTE)   ________________________________________________________________   DRUGS EXPECTED:   OXYCODONE [5/03/17]   MORPHINE [5/03/17]   ________________________________________________________________   CONSISTENT with medications provided:   OXYCODONE : based on oxycodone, noroxycodone, oxymorphone   MORPHINE : based on morphine   ________________________________________________________________   INTERPRETIVE INFORMATION:Pain Mgt [place], High Res/EMIT, Ur, Interp   Interpretation depends on accuracy and completeness of patient   medication information submitted by client.     6-Acetylmorphine, Ur Not Detected   Final 05/03/2017  9:00 AM [facility]   7-Aminoclonazepam, Urine Not Detected   Final 05/03/2017  9:00 AM [facility]   Alpha-OH-Alpraz, Urine Not Detected   Final 05/03/2017  9:00 AM [facility]   Alprazolam, Urine Not Detected   [facility]2017  [facility]   Amphetamines, urine Not Detected   Final 05/03/2017  9:00 AM [facility]   Barbiturates, Ur Not Detected   Final 05/03/2017  9:00 AM [facility]   Benzoylecgonine, Ur Not Detected   Final [facility]03/2017  9[facility]   Buprenorphine Urine Not Detected   Final 05/03/2017  9:00 [facility]   Carisoprodol, Ur Not Detected   Final 05/03/2017  9:00 [facility]   (NOTE)   The carisoprodol immunoassay has cross-reactivity to carisoprodol   and meprobamate.     Clonazepam, Urine Not Detected   Final 05/03/2017  9:00 AM [facility]   Codeine, Urine Not Detected   Final 05/03/2017  9:00 AM [facility]   MDA, Ur Not Detected   Final 05/03/2017  9:00 AM [facility]   Diazepam, Urine Not Detected   Final 05/03/2017  9:00 AM [facility]   Ethyl Glucuronide Ur Not Detected   Final 05/03/2017  9:00 AM [facility]   Fentanyl, Ur Not Detected   Final 05/03/2017  9:00 AM [facility]   Hydrocodone, Urine Not Detected   Final 05/03/2017  9:00 AM [facility]   Hydromorphone, Urine Not Detected   Final 05/03/2017  9:00 AM [facility]   Lorazepam, Urine Not Detected   Final 05/03/2017  9:00 AM [facility]   Marijuana Metab, Ur Not Detected   Final 05/03/2017  9:00 AM [facility]   MDEA, GRACIELA, Ur Not Detected   Final 05/03/2017  9:00 AM [facility]   MDMA URINE Not Detected   Final 05/03/2017  9:00 AM [facility]   Meperidine Metab, Ur Not Detected   Final 05/03/2017  9:00 AM [facility]   Methadone, Urine Not Detected   Final 05/03/2017  9:00 AM [facility]   Methamphetamine, Urine Not Detected   Final 05/03/2017  9:00 AM [facility]   Methylphenidate Not Detected   Final 05/03/2017  9:00 AM [facility]   Midazolam, Urine Not Detected   Final 05/03/2017  9:00 AM [facility]   Morphine Urine Present   Final 05/03/2017  9:00 AM [facility]   Norbuprenorphine, Urine Not Detected   Final 05/03/2017  9:00 AM [facility]   Nordiazepam, Urine Not Detected   Final 05/03/2017  9:00 AM [facility]   Norfentanyl, Urine Not Detected   Final 05/03/2017  9:00 AM [facility]   NORHYDROCODONE, URINE Not Detected   Final 05/03/2017  9:00 AM [facility]   Noroxycodone, Urine Present   Final 05/03/2017  9:00 AM [facility]   NOROXYMORPHONE, URINE Not Detected   Final 05/03/2017  9:00 AM [facility]   Oxazepam, Urine Not Detected   Final 05/03/2017  9:00 AM [facility]   Oxycodone Urine Present   Final 05/03/2017  9:00 AM [facility]   Oxymorphone, Urine Present   Final 05/03/2017  9:00 AM [facility]   PCP, Urine Not Detected   Final 05/03/2017  9:00 AM [facility]   Phentermine, Ur Not Detected   Final 05/03/2017  9:00 AM [facility]   Propoxyphene, Urine Not Detected   Final 05/03/2017  9:00 AM [facility]   Tapentadol-O-Sulfate, Urine Not Detected   Final 05/03/2017  9:00 AM [facility]   Tapentadol, Urine Not Detected   Final 05/03/2017  9:00 AM [facility]   Temazepam, Urine Not Detected   Final 05/03/2017  9:00 AM [facility]   Tramadol, Urine Not Detected   Final 05/03/2017  9:00 AM [facility]   Zolpidem, Urine Not Detected   Final 05/03/2017  9:00 AM [facility]   Drugs Expected, Ur   Final 05/03/2017  9:00 AM [facility]   OXYCODONE ON 746833 IN AM, MORPHINE ON [id number] IN AM    Creatinine, Ur 146.4  20.0 - 400.0 mg/dL Final 05/03/2017  9:00 AM [facility]   Pain Mgt Drug Panel, Hi Res, Ur See Below   Final 05/03/2017  9:00 AM [facility]   (NOTE)   Methodology: Qualitative Enzyme Immunoassay and Qualitative Liquid   Chromatography-Time of Flight-Mass Spectrometry, Quantitative   Spectrophotometry   The absence of expected drug(s) and/or drug metabolite(s) may   indicate non-compliance, inappropriate timing of specimen   collection relative to drug administration, poor drug absorption,   diluted/adulterated urine, or limitations of testing. The   concentration must be greater than or equal to the cutoff to be   reported as present.  If specific drug concentrations are   required, contact the laboratory within two weeks of specimen   collection to request quantification by a second analytical   technique. Interpretive questions should be directed to the   laboratory. Results based on immunoassay detection that do not match clinical   expectations should be   interpreted with caution. Confirmatory testing by mass   spectrometry for immunoassay-based results is available, if   ordered within two weeks of specimen collection. Additional   charges apply. For medical purposes only; not valid for forensic use. This test was developed and its performance characteristics   determined by [employer]. The [place]  Food and Drug side.    Skin: Skin is warm, dry and intact. Psychiatric: Affect and judgment normal  Assessment:      Problem List Items Addressed This Visit     S/P left knee surgery    Relevant Medications    oxyCODONE-acetaminophen (PERCOCET) 7.5-325 MG per tablet (Start on 4/10/2018)    morphine (MS CONTIN) 30 MG extended release tablet (Start on 4/5/2018)    Medication monitoring encounter    Primary osteoarthritis of both knees    Chronic pain of left knee    Relevant Medications    ibuprofen (ADVIL;MOTRIN) 400 MG tablet (Start on 4/3/2018)    oxyCODONE-acetaminophen (PERCOCET) 7.5-325 MG per tablet (Start on 4/10/2018)    morphine (MS CONTIN) 30 MG extended release tablet (Start on 4/5/2018)    Primary osteoarthritis of left knee    Relevant Medications    ibuprofen (ADVIL;MOTRIN) 400 MG tablet (Start on 4/3/2018)    oxyCODONE-acetaminophen (PERCOCET) 7.5-325 MG per tablet (Start on 4/10/2018)    morphine (MS CONTIN) 30 MG extended release tablet (Start on 4/5/2018)    Osteoarthritis of both knees - Primary    Relevant Medications    ibuprofen (ADVIL;MOTRIN) 400 MG tablet (Start on 4/3/2018)    oxyCODONE-acetaminophen (PERCOCET) 7.5-325 MG per tablet (Start on 4/10/2018)    morphine (MS CONTIN) 30 MG extended release tablet (Start on 4/5/2018)      Other Visit Diagnoses     Primary osteoarthritis of right knee        Relevant Medications    ibuprofen (ADVIL;MOTRIN) 400 MG tablet (Start on 4/3/2018)    oxyCODONE-acetaminophen (PERCOCET) 7.5-325 MG per tablet (Start on 4/10/2018)    morphine (MS CONTIN) 30 MG extended release tablet (Start on 4/5/2018)    S/P knee surgery -Multiple            Treatment Plan:  DISCUSSION: Treatment options discussed with patient and all questions answered to patient's satisfaction.      Possible side effects, risk of tolerance and or dependence and alternative treatments discussed    Obtaining appropriate analgesic effect of treatment   No signs of potential drug abuse or diversion

## 2018-04-01 LAB
6-ACETYLMORPHINE, UR: NOT DETECTED
7-AMINOCLONAZEPAM, URINE: NOT DETECTED
ALPHA-OH-ALPRAZ, URINE: NOT DETECTED
ALPRAZOLAM, URINE: NOT DETECTED
AMPHETAMINES, URINE: NOT DETECTED
BARBITURATES, URINE: NOT DETECTED
BENZOYLECGONINE, UR: NOT DETECTED
BUPRENORPHINE URINE: NOT DETECTED
CARISOPRODOL, UR: NOT DETECTED
CLONAZEPAM, URINE: NOT DETECTED
CODEINE, URINE: NOT DETECTED
CREATININE URINE: 200 MG/DL (ref 20–400)
DIAZEPAM, URINE: NOT DETECTED
DRUGS EXPECTED, UR: NORMAL
EER HI RES INTERP UR: NORMAL
ETHYL GLUCURONIDE UR: NOT DETECTED
FENTANYL URINE: NOT DETECTED
HYDROCODONE, URINE: NOT DETECTED
HYDROMORPHONE, URINE: NOT DETECTED
LORAZEPAM, URINE: NOT DETECTED
MARIJUANA METAB, UR: NOT DETECTED
MDA, UR: NOT DETECTED
MDEA, EVE, UR: NOT DETECTED
MDMA URINE: NOT DETECTED
MEPERIDINE METAB, UR: NOT DETECTED
METHADONE, URINE: NOT DETECTED
METHAMPHETAMINE, URINE: NOT DETECTED
METHYLPHENIDATE: NOT DETECTED
MIDAZOLAM, URINE: NOT DETECTED
MORPHINE URINE: PRESENT
NORBUPRENORPHINE, URINE: NOT DETECTED
NORDIAZEPAM, URINE: NOT DETECTED
NORFENTANYL, URINE: NOT DETECTED
NORHYDROCODONE, URINE: NOT DETECTED
NOROXYCODONE, URINE: PRESENT
NOROXYMORPHONE, URINE: NOT DETECTED
OXAZEPAM, URINE: NOT DETECTED
OXYCODONE URINE: PRESENT
OXYMORPHONE, URINE: PRESENT
PAIN MANAGEMENT DRUG PANEL INTERP, URINE: NORMAL
PAIN MGT DRUG PANEL, HI RES, UR: NORMAL
PCP,URINE: NOT DETECTED
PHENTERMINE, UR: NOT DETECTED
PROPOXYPHENE, URINE: NOT DETECTED
TAPENTADOL, URINE: NOT DETECTED
TAPENTADOL-O-SULFATE, URINE: NOT DETECTED
TEMAZEPAM, URINE: NOT DETECTED
TRAMADOL, URINE: NOT DETECTED
ZOLPIDEM, URINE: NOT DETECTED

## 2018-05-02 ENCOUNTER — HOSPITAL ENCOUNTER (OUTPATIENT)
Dept: PAIN MANAGEMENT | Age: 68
Discharge: HOME | End: 2018-05-02
Payer: MEDICARE

## 2018-05-02 VITALS
TEMPERATURE: 98 F | OXYGEN SATURATION: 97 % | SYSTOLIC BLOOD PRESSURE: 129 MMHG | BODY MASS INDEX: 31.39 KG/M2 | HEIGHT: 67 IN | RESPIRATION RATE: 16 BRPM | WEIGHT: 200 LBS | DIASTOLIC BLOOD PRESSURE: 76 MMHG | HEART RATE: 68 BPM

## 2018-05-02 DIAGNOSIS — Z98.890 S/P LEFT KNEE SURGERY: ICD-10-CM

## 2018-05-02 DIAGNOSIS — M25.562 CHRONIC PAIN OF LEFT KNEE: ICD-10-CM

## 2018-05-02 DIAGNOSIS — M17.0 OSTEOARTHRITIS OF BOTH KNEES, UNSPECIFIED OSTEOARTHRITIS TYPE: ICD-10-CM

## 2018-05-02 DIAGNOSIS — G89.29 CHRONIC PAIN OF LEFT KNEE: ICD-10-CM

## 2018-05-02 DIAGNOSIS — Z51.81 ENCOUNTER FOR MEDICATION MONITORING: ICD-10-CM

## 2018-05-02 DIAGNOSIS — Z79.891 USE OF OPIATES FOR THERAPEUTIC PURPOSES: ICD-10-CM

## 2018-05-02 DIAGNOSIS — M17.0 PRIMARY OSTEOARTHRITIS OF BOTH KNEES: Primary | ICD-10-CM

## 2018-05-02 PROCEDURE — 99214 OFFICE O/P EST MOD 30 MIN: CPT | Performed by: PAIN MEDICINE

## 2018-05-02 PROCEDURE — 99213 OFFICE O/P EST LOW 20 MIN: CPT

## 2018-05-02 RX ORDER — MORPHINE SULFATE 30 MG/1
30 TABLET, FILM COATED, EXTENDED RELEASE ORAL EVERY 12 HOURS
Qty: 60 TABLET | Refills: 0 | Status: SHIPPED | OUTPATIENT
Start: 2018-05-06 | End: 2018-05-30 | Stop reason: SDUPTHER

## 2018-05-02 RX ORDER — OXYCODONE AND ACETAMINOPHEN 7.5; 325 MG/1; MG/1
1 TABLET ORAL SEE ADMIN INSTRUCTIONS
Qty: 75 TABLET | Refills: 0 | Status: SHIPPED | OUTPATIENT
Start: 2018-05-10 | End: 2018-05-30 | Stop reason: SDUPTHER

## 2018-05-02 RX ORDER — GABAPENTIN 400 MG/1
CAPSULE ORAL
COMMUNITY
Start: 2018-04-02 | End: 2018-05-02 | Stop reason: SDUPTHER

## 2018-05-02 RX ORDER — GABAPENTIN 400 MG/1
400 CAPSULE ORAL 3 TIMES DAILY
Qty: 90 CAPSULE | Refills: 3 | Status: SHIPPED | OUTPATIENT
Start: 2018-05-02 | End: 2018-07-19

## 2018-05-02 ASSESSMENT — PAIN DESCRIPTION - ORIENTATION: ORIENTATION: RIGHT;LEFT

## 2018-05-02 ASSESSMENT — ENCOUNTER SYMPTOMS
EYES NEGATIVE: 1
RESPIRATORY NEGATIVE: 1
GASTROINTESTINAL NEGATIVE: 1

## 2018-05-02 ASSESSMENT — PAIN DESCRIPTION - DESCRIPTORS: DESCRIPTORS: ACHING;BURNING;CONSTANT;SHARP

## 2018-05-02 ASSESSMENT — PAIN DESCRIPTION - FREQUENCY: FREQUENCY: CONTINUOUS

## 2018-05-02 ASSESSMENT — PAIN DESCRIPTION - DIRECTION: RADIATING_TOWARDS: BILATERAL KNEES

## 2018-05-02 ASSESSMENT — PAIN DESCRIPTION - PROGRESSION: CLINICAL_PROGRESSION: GRADUALLY WORSENING

## 2018-05-02 ASSESSMENT — PAIN DESCRIPTION - ONSET: ONSET: ON-GOING

## 2018-05-02 ASSESSMENT — PAIN SCALES - GENERAL: PAINLEVEL_OUTOF10: 8

## 2018-05-02 ASSESSMENT — PAIN DESCRIPTION - PAIN TYPE: TYPE: CHRONIC PAIN

## 2018-05-02 ASSESSMENT — PAIN DESCRIPTION - LOCATION: LOCATION: KNEE

## 2018-05-21 ENCOUNTER — OFFICE VISIT (OUTPATIENT)
Dept: INTERNAL MEDICINE CLINIC | Age: 68
End: 2018-05-21
Payer: MEDICARE

## 2018-05-21 VITALS
SYSTOLIC BLOOD PRESSURE: 128 MMHG | HEIGHT: 67 IN | RESPIRATION RATE: 21 BRPM | HEART RATE: 82 BPM | DIASTOLIC BLOOD PRESSURE: 82 MMHG | WEIGHT: 220.6 LBS | BODY MASS INDEX: 34.62 KG/M2 | OXYGEN SATURATION: 96 %

## 2018-05-21 DIAGNOSIS — R73.03 PRE-DIABETES: Primary | ICD-10-CM

## 2018-05-21 DIAGNOSIS — Z98.890 S/P LEFT KNEE SURGERY: ICD-10-CM

## 2018-05-21 DIAGNOSIS — Z01.818 PRE-OPERATIVE CLEARANCE: ICD-10-CM

## 2018-05-21 DIAGNOSIS — Z79.891 USE OF OPIATES FOR THERAPEUTIC PURPOSES: ICD-10-CM

## 2018-05-21 DIAGNOSIS — E78.5 DYSLIPIDEMIA: ICD-10-CM

## 2018-05-21 DIAGNOSIS — Z79.891 CHRONIC PRESCRIPTION OPIATE USE: ICD-10-CM

## 2018-05-21 PROBLEM — M17.11 ARTHRITIS OF RIGHT KNEE: Status: ACTIVE | Noted: 2017-06-01

## 2018-05-21 PROBLEM — T84.54XA INFECTION OF PROSTHETIC LEFT KNEE JOINT (HCC): Status: ACTIVE | Noted: 2017-06-01

## 2018-05-21 PROBLEM — R29.898 WEAKNESS OF LEFT LEG: Status: ACTIVE | Noted: 2018-02-06

## 2018-05-21 PROCEDURE — 93000 ELECTROCARDIOGRAM COMPLETE: CPT | Performed by: FAMILY MEDICINE

## 2018-05-21 PROCEDURE — G8510 SCR DEP NEG, NO PLAN REQD: HCPCS | Performed by: FAMILY MEDICINE

## 2018-05-21 PROCEDURE — 99213 OFFICE O/P EST LOW 20 MIN: CPT | Performed by: FAMILY MEDICINE

## 2018-05-21 ASSESSMENT — ENCOUNTER SYMPTOMS
RESPIRATORY NEGATIVE: 1
ALLERGIC/IMMUNOLOGIC NEGATIVE: 1
GASTROINTESTINAL NEGATIVE: 1
EYES NEGATIVE: 1

## 2018-05-21 ASSESSMENT — PATIENT HEALTH QUESTIONNAIRE - PHQ9
2. FEELING DOWN, DEPRESSED OR HOPELESS: 0
SUM OF ALL RESPONSES TO PHQ QUESTIONS 1-9: 0
SUM OF ALL RESPONSES TO PHQ9 QUESTIONS 1 & 2: 0
1. LITTLE INTEREST OR PLEASURE IN DOING THINGS: 0

## 2018-05-23 ENCOUNTER — HOSPITAL ENCOUNTER (OUTPATIENT)
Age: 68
Setting detail: SPECIMEN
Discharge: HOME | End: 2018-05-23
Payer: MEDICARE

## 2018-05-23 DIAGNOSIS — E78.5 DYSLIPIDEMIA: ICD-10-CM

## 2018-05-23 DIAGNOSIS — R73.03 PRE-DIABETES: ICD-10-CM

## 2018-05-23 LAB
BILIRUBIN URINE: NEGATIVE
CHOLESTEROL/HDL RATIO: 4
CHOLESTEROL: 176 MG/DL
COLOR: YELLOW
COMMENT UA: NORMAL
CREATININE URINE: 115.2 MG/DL (ref 39–259)
ESTIMATED AVERAGE GLUCOSE: 120 MG/DL
GLUCOSE URINE: NEGATIVE
HAV IGM SER IA-ACNC: NONREACTIVE
HBA1C MFR BLD: 5.8 % (ref 4–6)
HDLC SERPL-MCNC: 44 MG/DL
HEPATITIS B CORE IGM ANTIBODY: NONREACTIVE
HEPATITIS B SURFACE ANTIGEN: NONREACTIVE
HEPATITIS C ANTIBODY: NONREACTIVE
HIV AG/AB: NONREACTIVE
KETONES, URINE: NEGATIVE
LDL CHOLESTEROL: 115 MG/DL (ref 0–130)
LEUKOCYTE ESTERASE, URINE: NEGATIVE
MICROALBUMIN/CREAT 24H UR: <12 MG/L
MICROALBUMIN/CREAT UR-RTO: NORMAL MCG/MG CREAT
NITRITE, URINE: NEGATIVE
PH UA: 5.5 (ref 5–8)
PROSTATE SPECIFIC ANTIGEN: 2.51 UG/L
PROTEIN UA: NEGATIVE
SPECIFIC GRAVITY UA: 1.02 (ref 1–1.03)
TRIGL SERPL-MCNC: 87 MG/DL
TSH SERPL DL<=0.05 MIU/L-ACNC: 1.15 MIU/L (ref 0.3–5)
TURBIDITY: CLEAR
URINE HGB: NEGATIVE
UROBILINOGEN, URINE: NORMAL
VLDLC SERPL CALC-MCNC: NORMAL MG/DL (ref 1–30)

## 2018-05-24 LAB — VDRL, QUANTITATIVE: NEGATIVE

## 2018-05-30 ENCOUNTER — HOSPITAL ENCOUNTER (OUTPATIENT)
Dept: PAIN MANAGEMENT | Age: 68
Discharge: HOME | End: 2018-05-30
Payer: MEDICARE

## 2018-05-30 VITALS
BODY MASS INDEX: 34.53 KG/M2 | TEMPERATURE: 98.1 F | RESPIRATION RATE: 20 BRPM | HEART RATE: 68 BPM | WEIGHT: 220 LBS | SYSTOLIC BLOOD PRESSURE: 147 MMHG | OXYGEN SATURATION: 95 % | DIASTOLIC BLOOD PRESSURE: 86 MMHG | HEIGHT: 67 IN

## 2018-05-30 DIAGNOSIS — M17.0 PRIMARY OSTEOARTHRITIS OF BOTH KNEES: ICD-10-CM

## 2018-05-30 DIAGNOSIS — Z98.890 S/P LEFT KNEE SURGERY: Primary | ICD-10-CM

## 2018-05-30 DIAGNOSIS — Z79.891 USE OF OPIATES FOR THERAPEUTIC PURPOSES: ICD-10-CM

## 2018-05-30 DIAGNOSIS — Z98.890 S/P KNEE SURGERY: ICD-10-CM

## 2018-05-30 DIAGNOSIS — R29.898 WEAKNESS OF LEFT LEG: ICD-10-CM

## 2018-05-30 DIAGNOSIS — G89.29 CHRONIC PAIN OF LEFT KNEE: ICD-10-CM

## 2018-05-30 DIAGNOSIS — Z51.81 MEDICATION MONITORING ENCOUNTER: ICD-10-CM

## 2018-05-30 DIAGNOSIS — Z51.81 ENCOUNTER FOR MEDICATION MONITORING: ICD-10-CM

## 2018-05-30 DIAGNOSIS — M17.0 OSTEOARTHRITIS OF BOTH KNEES, UNSPECIFIED OSTEOARTHRITIS TYPE: ICD-10-CM

## 2018-05-30 DIAGNOSIS — M25.562 CHRONIC PAIN OF LEFT KNEE: ICD-10-CM

## 2018-05-30 DIAGNOSIS — M17.12 PRIMARY OSTEOARTHRITIS OF LEFT KNEE: ICD-10-CM

## 2018-05-30 DIAGNOSIS — M17.11 PRIMARY OSTEOARTHRITIS OF RIGHT KNEE: ICD-10-CM

## 2018-05-30 PROCEDURE — 99213 OFFICE O/P EST LOW 20 MIN: CPT

## 2018-05-30 PROCEDURE — 99213 OFFICE O/P EST LOW 20 MIN: CPT | Performed by: NURSE PRACTITIONER

## 2018-05-30 RX ORDER — OXYCODONE AND ACETAMINOPHEN 7.5; 325 MG/1; MG/1
1 TABLET ORAL SEE ADMIN INSTRUCTIONS
Qty: 75 TABLET | Refills: 0 | Status: SHIPPED | OUTPATIENT
Start: 2018-06-09 | End: 2018-07-02 | Stop reason: SDUPTHER

## 2018-05-30 RX ORDER — MORPHINE SULFATE 30 MG/1
30 TABLET, FILM COATED, EXTENDED RELEASE ORAL EVERY 12 HOURS
Qty: 60 TABLET | Refills: 0 | Status: SHIPPED | OUTPATIENT
Start: 2018-06-06 | End: 2018-07-02 | Stop reason: SDUPTHER

## 2018-05-30 RX ORDER — NALOXONE HYDROCHLORIDE 4 MG/.1ML
1 SPRAY NASAL PRN
Qty: 1 EACH | Refills: 0 | Status: SHIPPED | OUTPATIENT
Start: 2018-05-30 | End: 2018-07-19

## 2018-05-30 ASSESSMENT — ENCOUNTER SYMPTOMS: GASTROINTESTINAL NEGATIVE: 1

## 2018-06-12 ENCOUNTER — TELEPHONE (OUTPATIENT)
Dept: PAIN MANAGEMENT | Age: 68
End: 2018-06-12

## 2018-07-02 ENCOUNTER — HOSPITAL ENCOUNTER (OUTPATIENT)
Dept: PAIN MANAGEMENT | Age: 68
Discharge: HOME | End: 2018-07-02
Payer: MEDICARE

## 2018-07-02 DIAGNOSIS — M17.0 OSTEOARTHRITIS OF BOTH KNEES, UNSPECIFIED OSTEOARTHRITIS TYPE: ICD-10-CM

## 2018-07-02 DIAGNOSIS — Z51.81 MEDICATION MONITORING ENCOUNTER: ICD-10-CM

## 2018-07-02 DIAGNOSIS — M17.0 PRIMARY OSTEOARTHRITIS OF BOTH KNEES: Primary | ICD-10-CM

## 2018-07-02 PROCEDURE — 99213 OFFICE O/P EST LOW 20 MIN: CPT | Performed by: NURSE PRACTITIONER

## 2018-07-02 PROCEDURE — 99213 OFFICE O/P EST LOW 20 MIN: CPT

## 2018-07-02 RX ORDER — IBUPROFEN 400 MG/1
400 TABLET ORAL EVERY 8 HOURS PRN
Qty: 90 TABLET | Refills: 2 | Status: SHIPPED | OUTPATIENT
Start: 2018-07-06 | End: 2018-09-20

## 2018-07-02 RX ORDER — OXYCODONE AND ACETAMINOPHEN 7.5; 325 MG/1; MG/1
1 TABLET ORAL SEE ADMIN INSTRUCTIONS
Qty: 60 TABLET | Refills: 0 | Status: SHIPPED | OUTPATIENT
Start: 2018-07-06 | End: 2018-09-20 | Stop reason: SDUPTHER

## 2018-07-02 RX ORDER — MORPHINE SULFATE 30 MG/1
30 TABLET, FILM COATED, EXTENDED RELEASE ORAL EVERY 12 HOURS
Qty: 60 TABLET | Refills: 0 | Status: SHIPPED | OUTPATIENT
Start: 2018-07-06 | End: 2018-08-24 | Stop reason: SDUPTHER

## 2018-07-02 ASSESSMENT — ENCOUNTER SYMPTOMS
CONSTIPATION: 0
SHORTNESS OF BREATH: 0
COUGH: 0

## 2018-07-19 ENCOUNTER — APPOINTMENT (OUTPATIENT)
Dept: GENERAL RADIOLOGY | Age: 68
End: 2018-07-19
Payer: MEDICARE

## 2018-07-19 ENCOUNTER — HOSPITAL ENCOUNTER (EMERGENCY)
Age: 68
Discharge: HOME | End: 2018-07-19
Payer: MEDICARE

## 2018-07-19 VITALS
HEIGHT: 67 IN | WEIGHT: 210 LBS | OXYGEN SATURATION: 94 % | HEART RATE: 114 BPM | SYSTOLIC BLOOD PRESSURE: 135 MMHG | DIASTOLIC BLOOD PRESSURE: 78 MMHG | TEMPERATURE: 98.5 F | RESPIRATION RATE: 16 BRPM | BODY MASS INDEX: 32.96 KG/M2

## 2018-07-19 DIAGNOSIS — M54.6 BILATERAL THORACIC BACK PAIN, UNSPECIFIED CHRONICITY: Primary | ICD-10-CM

## 2018-07-19 DIAGNOSIS — M62.830 BACK SPASM: ICD-10-CM

## 2018-07-19 PROCEDURE — 72072 X-RAY EXAM THORAC SPINE 3VWS: CPT

## 2018-07-19 PROCEDURE — 99283 EMERGENCY DEPT VISIT LOW MDM: CPT

## 2018-07-19 PROCEDURE — 6370000000 HC RX 637 (ALT 250 FOR IP): Performed by: NURSE PRACTITIONER

## 2018-07-19 RX ORDER — CYCLOBENZAPRINE HCL 10 MG
10 TABLET ORAL 3 TIMES DAILY PRN
Qty: 20 TABLET | Refills: 0 | Status: SHIPPED | OUTPATIENT
Start: 2018-07-19 | End: 2018-07-29

## 2018-07-19 RX ORDER — LIDOCAINE 50 MG/G
1 PATCH TOPICAL ONCE
Status: DISCONTINUED | OUTPATIENT
Start: 2018-07-19 | End: 2018-07-19 | Stop reason: HOSPADM

## 2018-07-19 RX ORDER — LIDOCAINE 50 MG/G
1 PATCH TOPICAL DAILY
Qty: 10 PATCH | Refills: 0 | Status: SHIPPED | OUTPATIENT
Start: 2018-07-19 | End: 2018-08-24

## 2018-07-19 ASSESSMENT — ENCOUNTER SYMPTOMS
SHORTNESS OF BREATH: 0
BACK PAIN: 1
COUGH: 0
COLOR CHANGE: 0
ABDOMINAL PAIN: 0

## 2018-07-19 ASSESSMENT — PAIN SCALES - GENERAL: PAINLEVEL_OUTOF10: 10

## 2018-07-19 NOTE — ED PROVIDER NOTES
Attending Supervisory Note/Shared Visit   I have personally performed a face to face diagnostic evaluation on this patient. I have reviewed the mid-levels findings and agree.         (Please note that portions of this note were completed with a voice recognition program.  Efforts were made to edit the dictations but occasionally words are mis-transcribed.)    Lan Willingham MD  Attending Emergency Physician        Lan Willingham MD  07/19/18 [id number]

## 2018-07-19 NOTE — ED PROVIDER NOTES
[address] ED  eMERGENCY dEPARTMENT eNCOUnter      Pt Name: Reena Turcios  MRN: [id number]  [place] 1950  Date of evaluation: 7/19/2018  Provider: QUIQUE Gutierrez [id number]       Chief Complaint   Patient presents with    Back Pain         HISTORY OF PRESENT ILLNESS  (Location/Symptom, Timing/Onset, Context/Setting, Quality, Duration, Modifying Factors, Severity.)   Reena Turcios is a 79 y.o. male who presents to the emergency department via private auto for mid upper back pain. Onset was 3-4 days ago. Denies injury, fever, chills, N/T to his extremities, cough, SOB. The pain increases with palpation and movement. He takes morphine, percocet and gabapentin for his chronic knee pain; he refilled his prescriptions 7-6-18 per OARRS. Rates his pain 10/10 at this time. Nursing Notes were reviewed. ALLERGIES     Oxycontin [oxycodone hcl]    CURRENT MEDICATIONS       Discharge Medication List as of 7/19/2018  3:51 PM      CONTINUE these medications which have NOT CHANGED    Details   morphine (MS CONTIN) 30 MG extended release tablet Take 1 tablet by mouth every 12 hours for 30 days. ., Disp-60 tablet, R-0Normal      oxyCODONE-acetaminophen (PERCOCET) 7.5-325 MG per tablet Take 1 tablet by mouth See Admin Instructions for 30 days. 1 tablet 2-3 times a day as needed  for pain., Disp-60 tablet, R-0Normal      !! ibuprofen (ADVIL;MOTRIN) 400 MG tablet Take 1 tablet by mouth every 8 hours as needed for Pain, Disp-90 tablet, R-2Normal      !! ibuprofen (ADVIL;MOTRIN) 400 MG tablet Take 1 tablet by mouth every 8 hours as needed for Pain, Disp-90 tablet, R-2Fill 4-3-18Normal      MELATONIN PO Take by mouth       !! - Potential duplicate medications found. Please discuss with provider.           PAST MEDICAL HISTORY         Diagnosis Date    Arthritis     Left knee pain 4/9/2014    Teeth problem     abcessw       SURGICAL HISTORY           Procedure Laterality Date    Lidoderm patch was ordered. He was tender with minimal palpation. OARRS was reviewed. He filled prescriptions for morphine, percocet, and Neurontin 7/6/18. Prescriptions were written for Lidoderm and flexeril. The patient was advised to not drink alcohol, drive, or operate heavy machinery while taking the flexeril. Follow up with pcp, return to ED if condition worsens. FINAL IMPRESSION      1. Bilateral thoracic back pain, unspecified chronicity    2.  Back spasm            Problem List  Patient Active Problem List   Diagnosis Code    S/P knee surgery Z98.890    Medication monitoring encounter Z51.81    Encounter for medication monitoring Z51.81    Primary osteoarthritis of both knees M17.0    Use of opiates for therapeutic purposes Z79.891    Pre-diabetes R73.03    Dyslipidemia E78.5    Chronic pain of left knee M25.562, G89.29    Primary osteoarthritis of left knee M17.12    Osteoarthritis of both knees M17.0    Age-related macular degeneration, dry, both eyes H35.3130    Arthritis of right knee M17.11    Cortical cataract of both eyes H26.9    Drusen (degenerative) of retina H35.369    Hyperopia H52.00    Hypertensive retinopathy of left eye H35.032    Infection of prosthetic left knee joint ([address] T84.54XA    Nuclear sclerotic cataract of both eyes H25.13    Open angle with borderline findings and high glaucoma risk in both eyes H40.023    Open angle, high risk H40.029    Presbyopia H52.4    Primary open angle glaucoma of both eyes, mild stage H40.1131    Weakness of left leg R29.898         DISPOSITION/PLAN   DISPOSITION Decision To Discharge 07/19/2018 03:48:25 PM      PATIENT REFERRED TO:   Keila Ceballos MD  [address] [address] [facility]  [phone]    Schedule an appointment as soon as possible for a visit       [place] [facility]  [address] [facility]  [phone]    If symptoms worsen, As needed      DISCHARGE MEDICATIONS:

## 2018-08-14 ENCOUNTER — TELEPHONE (OUTPATIENT)
Dept: PHARMACY | Facility: CLINIC | Age: 68
End: 2018-08-14

## 2018-08-14 DIAGNOSIS — Z96.651 S/P TOTAL KNEE ARTHROPLASTY, RIGHT: Primary | ICD-10-CM

## 2018-08-14 PROCEDURE — 1111F DSCHRG MED/CURRENT MED MERGE: CPT

## 2018-08-14 RX ORDER — DOCUSATE SODIUM 100 MG/1
100 CAPSULE, LIQUID FILLED ORAL 2 TIMES DAILY
COMMUNITY
End: 2018-10-23

## 2018-08-14 RX ORDER — LANOLIN ALCOHOL/MO/W.PET/CERES
6 CREAM (GRAM) TOPICAL NIGHTLY PRN
COMMUNITY

## 2018-08-14 RX ORDER — GABAPENTIN 400 MG/1
400 CAPSULE ORAL 3 TIMES DAILY
COMMUNITY
End: 2018-12-19 | Stop reason: SDUPTHER

## 2018-08-14 RX ORDER — IBUPROFEN 200 MG
1 CAPSULE ORAL 2 TIMES DAILY
COMMUNITY
Start: 2018-07-25 | End: 2018-09-20

## 2018-08-14 RX ORDER — OXYCODONE HYDROCHLORIDE 5 MG/1
5 TABLET ORAL EVERY 6 HOURS PRN
COMMUNITY
End: 2018-09-20 | Stop reason: ALTCHOICE

## 2018-08-14 RX ORDER — ASPIRIN 325 MG
325 TABLET ORAL 2 TIMES DAILY
COMMUNITY
Start: 2018-07-25 | End: 2018-08-24

## 2018-08-14 NOTE — TELEPHONE ENCOUNTER
CLINICAL PHARMACY NOTE  Post-Discharge Transitions of Care (SONG)    Non-face-to-face services provided:  Assessment and support for treatment adherence and medication management-medication reconciliation    Subjective/Objective:  Lennox Prose is a 79 y.o. male. Patient was discharged from [facility] on 7/25/18 with a diagnosis of right total knee arthroplasty. Patient outreach to review discharge medications and provide medication review and management. Spoke with patient    Allergies   Allergen Reactions    Oxycontin [Oxycodone Hcl] Nausea Only       Medication Sig    aspirin 325 MG tablet  · New prescription at discharge for DVT prophylaxis. Pt to take for 30 days total. Added to home med list.  Take 325 mg by mouth 2 times daily    vitamin D (CHOLECALCIFEROL) 1000 UNIT TABS tablet  · New prescription at discharge - pt to take for 30 days. Added to med list.  Take 1,000 Units by mouth daily    calcium citrate (CALCITRATE) 950 MG tablet  · New prescription at discharge - pt to take for 30 days. Added to med list.  Take 1 tablet by mouth 2 times daily    docusate sodium (COLACE) 100 MG capsule  · New prescription at discharge - pt to take for 30 days. Added to med list.  Take 100 mg by mouth 2 times daily    melatonin 3 MG TABS tablet  · Updated - pt reported he will take this for insomnia Take 6 mg by mouth nightly as needed (insomnia)    oxyCODONE (ROXICODONE) 5 MG immediate release tablet  · Added to home med list. Pt sees pain management, recently filled on 8/8/18. Take 5 mg by mouth every 6 hours as needed for Pain. Carry Amsler ibuprofen (ADVIL;MOTRIN) 400 MG tablet Take 1 tablet by mouth every 8 hours as needed for Pain    gabapentin (NEURONTIN) 400 MG capsule  · Not currently taking. Prescribed by pain management. Take 400 mg by mouth 3 times daily. .    lidocaine (LIDODERM) 5 %  · Not taking. Place 1 patch onto the skin daily 12 hours on, 12 hours off.      Meds to #: 0   - Level 3 #: 0  Outreach Status: Review Complete  Care Coordinator Outreach to Patient?: No  Provider Contacted?: No  Waiting on response from: n/a  Time Spent (min): 20    Additional Documentation:

## 2018-08-24 ENCOUNTER — HOSPITAL ENCOUNTER (OUTPATIENT)
Dept: PAIN MANAGEMENT | Age: 68
Discharge: HOME | End: 2018-08-24
Payer: MEDICARE

## 2018-08-24 VITALS
WEIGHT: 200 LBS | DIASTOLIC BLOOD PRESSURE: 101 MMHG | RESPIRATION RATE: 16 BRPM | HEIGHT: 67 IN | BODY MASS INDEX: 31.39 KG/M2 | SYSTOLIC BLOOD PRESSURE: 141 MMHG | HEART RATE: 87 BPM | OXYGEN SATURATION: 97 % | TEMPERATURE: 97.9 F

## 2018-08-24 DIAGNOSIS — Z98.890 S/P LEFT KNEE SURGERY: ICD-10-CM

## 2018-08-24 DIAGNOSIS — M17.0 OSTEOARTHRITIS OF BOTH KNEES, UNSPECIFIED OSTEOARTHRITIS TYPE: ICD-10-CM

## 2018-08-24 PROCEDURE — 99213 OFFICE O/P EST LOW 20 MIN: CPT

## 2018-08-24 PROCEDURE — 99213 OFFICE O/P EST LOW 20 MIN: CPT | Performed by: ANESTHESIOLOGY

## 2018-08-24 RX ORDER — OXYCODONE AND ACETAMINOPHEN 7.5; 325 MG/1; MG/1
1 TABLET ORAL SEE ADMIN INSTRUCTIONS
Qty: 60 TABLET | Refills: 0 | Status: CANCELLED | OUTPATIENT
Start: 2018-08-24 | End: 2018-09-23

## 2018-08-24 RX ORDER — GABAPENTIN 400 MG/1
400 CAPSULE ORAL 3 TIMES DAILY
Qty: 90 CAPSULE | Refills: 3 | Status: SHIPPED | OUTPATIENT
Start: 2018-08-24 | End: 2018-10-23 | Stop reason: SDUPTHER

## 2018-08-24 RX ORDER — MORPHINE SULFATE 30 MG/1
30 TABLET, FILM COATED, EXTENDED RELEASE ORAL EVERY 12 HOURS
Qty: 60 TABLET | Refills: 0 | Status: SHIPPED | OUTPATIENT
Start: 2018-08-24 | End: 2018-09-20 | Stop reason: SDUPTHER

## 2018-08-24 RX ORDER — OXYCODONE HYDROCHLORIDE AND ACETAMINOPHEN 5; 325 MG/1; MG/1
1 TABLET ORAL 2 TIMES DAILY
Qty: 60 TABLET | Refills: 0 | Status: SHIPPED | OUTPATIENT
Start: 2018-08-24 | End: 2018-09-23

## 2018-08-24 ASSESSMENT — ENCOUNTER SYMPTOMS
GASTROINTESTINAL NEGATIVE: 1
EYES NEGATIVE: 1
RESPIRATORY NEGATIVE: 1
BACK PAIN: 1

## 2018-08-24 ASSESSMENT — PAIN DESCRIPTION - LOCATION: LOCATION: KNEE

## 2018-08-24 ASSESSMENT — PAIN DESCRIPTION - ONSET: ONSET: ON-GOING

## 2018-08-24 ASSESSMENT — PAIN DESCRIPTION - PAIN TYPE: TYPE: CHRONIC PAIN

## 2018-08-24 ASSESSMENT — PAIN DESCRIPTION - ORIENTATION: ORIENTATION: LEFT

## 2018-08-24 ASSESSMENT — PAIN DESCRIPTION - PROGRESSION: CLINICAL_PROGRESSION: NOT CHANGED

## 2018-08-24 ASSESSMENT — PAIN SCALES - GENERAL: PAINLEVEL_OUTOF10: 7

## 2018-08-24 ASSESSMENT — PAIN DESCRIPTION - FREQUENCY: FREQUENCY: CONTINUOUS

## 2018-09-20 ENCOUNTER — HOSPITAL ENCOUNTER (OUTPATIENT)
Dept: PAIN MANAGEMENT | Age: 68
Discharge: HOME | End: 2018-09-20
Payer: MEDICARE

## 2018-09-20 VITALS
SYSTOLIC BLOOD PRESSURE: 161 MMHG | TEMPERATURE: 97.8 F | BODY MASS INDEX: 31.39 KG/M2 | OXYGEN SATURATION: 97 % | HEIGHT: 67 IN | RESPIRATION RATE: 16 BRPM | HEART RATE: 65 BPM | DIASTOLIC BLOOD PRESSURE: 106 MMHG | WEIGHT: 200 LBS

## 2018-09-20 DIAGNOSIS — G89.29 CHRONIC PAIN OF LEFT KNEE: ICD-10-CM

## 2018-09-20 DIAGNOSIS — M17.12 PRIMARY OSTEOARTHRITIS OF LEFT KNEE: ICD-10-CM

## 2018-09-20 DIAGNOSIS — M17.11 PRIMARY OSTEOARTHRITIS OF RIGHT KNEE: ICD-10-CM

## 2018-09-20 DIAGNOSIS — Z51.81 MEDICATION MONITORING ENCOUNTER: ICD-10-CM

## 2018-09-20 DIAGNOSIS — M17.0 PRIMARY OSTEOARTHRITIS OF BOTH KNEES: ICD-10-CM

## 2018-09-20 DIAGNOSIS — Z98.890 S/P LEFT KNEE SURGERY: ICD-10-CM

## 2018-09-20 DIAGNOSIS — M17.0 OSTEOARTHRITIS OF BOTH KNEES, UNSPECIFIED OSTEOARTHRITIS TYPE: Primary | ICD-10-CM

## 2018-09-20 DIAGNOSIS — R29.898 WEAKNESS OF LEFT LEG: ICD-10-CM

## 2018-09-20 DIAGNOSIS — Z79.891 USE OF OPIATES FOR THERAPEUTIC PURPOSES: ICD-10-CM

## 2018-09-20 DIAGNOSIS — Z98.890 S/P KNEE SURGERY: ICD-10-CM

## 2018-09-20 DIAGNOSIS — M25.562 CHRONIC PAIN OF LEFT KNEE: ICD-10-CM

## 2018-09-20 PROCEDURE — 99213 OFFICE O/P EST LOW 20 MIN: CPT

## 2018-09-20 PROCEDURE — 99213 OFFICE O/P EST LOW 20 MIN: CPT | Performed by: NURSE PRACTITIONER

## 2018-09-20 RX ORDER — OXYCODONE AND ACETAMINOPHEN 7.5; 325 MG/1; MG/1
1 TABLET ORAL SEE ADMIN INSTRUCTIONS
Qty: 60 TABLET | Refills: 0 | Status: SHIPPED | OUTPATIENT
Start: 2018-09-23 | End: 2018-10-23 | Stop reason: SDUPTHER

## 2018-09-20 RX ORDER — IBUPROFEN 800 MG/1
800 TABLET ORAL EVERY 8 HOURS PRN
Qty: 90 TABLET | Refills: 2 | Status: SHIPPED | OUTPATIENT
Start: 2018-09-23 | End: 2018-12-19 | Stop reason: SDUPTHER

## 2018-09-20 RX ORDER — MORPHINE SULFATE 30 MG/1
30 TABLET, FILM COATED, EXTENDED RELEASE ORAL EVERY 12 HOURS
Qty: 60 TABLET | Refills: 0 | Status: SHIPPED | OUTPATIENT
Start: 2018-09-23 | End: 2018-10-23 | Stop reason: SDUPTHER

## 2018-09-20 ASSESSMENT — ENCOUNTER SYMPTOMS
GASTROINTESTINAL NEGATIVE: 1
BACK PAIN: 1

## 2018-09-20 NOTE — PROGRESS NOTES
[facility] [address] PROGRESS NOTE      Patient here today to review MEDICATION CONTRACT    Chief Complaint:knee pain    HPI:  He c/o bilateral knee pain. He has had multiple surgeries left knee and right knee replacement   2 months. He completed PT which improved ROM. He sees his Orthopaedic surgeon in 2 months. He is becoming more active,  Sleep is improving. No ED visits. He still has a lot of pain in his right knee. Knee Pain    The incident occurred more than 1 week ago. There was no injury mechanism. The pain is present in the right knee and left knee. Quality: right knee, sore, left sharp dull achey. The pain is at a severity of 6/10. The pain is moderate. The pain has been constant since onset. Associated symptoms include muscle weakness. Foreign body present: joint replacements  The symptoms are aggravated by weight bearing. He has tried elevation, ice and NSAIDs for the symptoms. The treatment provided mild relief. Patient denies any new neurological symptoms. No bowel or bladder incontinence, no weakness, and no falling. Treatment goals:  Functional status:  Right knee to get rid of pain      Aberrancy  None   Analgesia pain 6  Adverse  Effects :none BM daily  ADL;s :short walking        Pill count: appropriate    Morphine equivalent dose as reported on OARRS:75  Attestation: The Prescription Monitoring Report for this patient was reviewed today. Isabella Haile, QUIQUE - CNP)  Documentation: Obtaining appropriate analgesic effect of treatment., No signs of potential drug abuse or diversion identified. Isabella Haile, APRN - CNP)  Medication Contracts: Existing medication contract. Isabella Haile, APRN - CNP)  Review of OARRS does not show any aberrant prescription behavior. Medication is helping the patient stay active. Patient denies any side effects and reports adequate analgesia. No sign of misuse/abuse.         When was the last UDS:  3-28-18           Was the UDS and 1+ on the left side. Skin: Skin is warm, dry and intact. Areas of pain scars both knees   Psychiatric: Affect and judgment normal.         Assessment:    Problem List Items Addressed This Visit     S/P knee surgery    Medication monitoring encounter    Primary osteoarthritis of both knees    Relevant Medications    morphine (MS CONTIN) 30 MG extended release tablet (Start on 9/23/2018)    ibuprofen (IBU) 800 MG tablet (Start on 9/23/2018)    oxyCODONE-acetaminophen (PERCOCET) 7.5-325 MG per tablet (Start on 9/23/2018)    Use of opiates for therapeutic purposes    Chronic pain of left knee    Relevant Medications    morphine (MS CONTIN) 30 MG extended release tablet (Start on 9/23/2018)    ibuprofen (IBU) 800 MG tablet (Start on 9/23/2018)    oxyCODONE-acetaminophen (PERCOCET) 7.5-325 MG per tablet (Start on 9/23/2018)    Primary osteoarthritis of left knee    Relevant Medications    morphine (MS CONTIN) 30 MG extended release tablet (Start on 9/23/2018)    ibuprofen (IBU) 800 MG tablet (Start on 9/23/2018)    oxyCODONE-acetaminophen (PERCOCET) 7.5-325 MG per tablet (Start on 9/23/2018)    Osteoarthritis of both knees - Primary    Relevant Medications    morphine (MS CONTIN) 30 MG extended release tablet (Start on 9/23/2018)    ibuprofen (IBU) 800 MG tablet (Start on 9/23/2018)    oxyCODONE-acetaminophen (PERCOCET) 7.5-325 MG per tablet (Start on 9/23/2018)    Weakness of left leg      Other Visit Diagnoses     Primary osteoarthritis of right knee        Relevant Medications    morphine (MS CONTIN) 30 MG extended release tablet (Start on 9/23/2018)    ibuprofen (IBU) 800 MG tablet (Start on 9/23/2018)    oxyCODONE-acetaminophen (PERCOCET) 7.5-325 MG per tablet (Start on 9/23/2018)          Treatment Plan:  DISCUSSION: Treatment options discussed with patient and all questions answered to patient's satisfaction.      Possible side effects, risk of tolerance and or dependence and alternative treatments discussed    Obtaining appropriate analgesic effect of treatment   No signs of potential drug abuse or diversion identified    [x] Ill effects of being on chronic pain medications such as sleep disturbances, respiratory depression, hormonal changes, withdrawal symptoms, chronic opioid dependence and tolerance as well as risk of taking opioids with Benzodiazepines and taking opioids along with alcohol,  were discussed with patient. I had asked the patient to minimize medication use and utilize pain medications only for uncontrolled rest pain or pain with exertional activities. I advised patient not to self-escalate pain medications without consulting with us. At each of patient's future visits we will try to taper pain medications, while adjusting the adjunct medications, and re-evaluating for Physical Therapy to improve spinal and joint strength. We will continue to have discussions to decrease pain medications as tolerated. Counseled patient on effects their pain medication and /or their medical condition may have on their  ability to drive or operate machinery. Instructed not to drive or operate machinery if drowsy     I also discussed with the patient regarding the dangers of combining narcotic pain medication with tranquilizers, alcohol or illegal drugs or taking the medication any way other than prescribed. The dangers were discussed  including respiratory depression and death. Patient was told to tell  all  physicians regarding the medications he is getting from pain clinic. Patient is warned not to take any unprescribed medications over-the-counter medications that can depress breathing . Patient is advised to talk to the pharmacist or physicians if planning to take any over-the-counter medications before  takeing them.  Patient is strongly advised to avoid tranquilizers or  relaxants, illegal drugs  or any medications that can depress breathing  Patient is also advised to tell us if there is any changes in their medications from other physicians.     Will increase his percocet back to 7.5/325 2 tabs a day prn as pain  Not controlled, I told him once pain is better controlled next few months will decrease to 5/325 and instructed to start decreasing motrin, suggested he try breaking it in 1/2 and take 1/2 tab    advised of elevated B/P and to call his PCP    TREATMENT OPTIONS:     Return in 4 weeks  Medication Agreement Requirements Met  Continue Opioid therapy  Script written for  Percocet, ms contin, ibuprofen  Follow up appointment made

## 2018-09-28 ENCOUNTER — OFFICE VISIT (OUTPATIENT)
Dept: INTERNAL MEDICINE CLINIC | Age: 68
End: 2018-09-28
Payer: MEDICARE

## 2018-09-28 VITALS
OXYGEN SATURATION: 96 % | WEIGHT: 221 LBS | BODY MASS INDEX: 34.69 KG/M2 | RESPIRATION RATE: 20 BRPM | HEIGHT: 67 IN | HEART RATE: 72 BPM | SYSTOLIC BLOOD PRESSURE: 140 MMHG | DIASTOLIC BLOOD PRESSURE: 98 MMHG

## 2018-09-28 DIAGNOSIS — G89.4 CHRONIC PAIN SYNDROME: ICD-10-CM

## 2018-09-28 DIAGNOSIS — M17.0 PRIMARY OSTEOARTHRITIS OF BOTH KNEES: ICD-10-CM

## 2018-09-28 DIAGNOSIS — E78.5 DYSLIPIDEMIA: ICD-10-CM

## 2018-09-28 DIAGNOSIS — R73.03 PRE-DIABETES: ICD-10-CM

## 2018-09-28 DIAGNOSIS — Z79.891 USE OF OPIATES FOR THERAPEUTIC PURPOSES: ICD-10-CM

## 2018-09-28 DIAGNOSIS — I10 ESSENTIAL HYPERTENSION: Primary | ICD-10-CM

## 2018-09-28 PROCEDURE — 99214 OFFICE O/P EST MOD 30 MIN: CPT | Performed by: FAMILY MEDICINE

## 2018-09-28 RX ORDER — CLONIDINE HYDROCHLORIDE 0.1 MG/1
0.1 TABLET ORAL 2 TIMES DAILY
Qty: 60 TABLET | Refills: 3 | Status: SHIPPED | OUTPATIENT
Start: 2018-09-28 | End: 2019-05-22 | Stop reason: SDUPTHER

## 2018-09-28 ASSESSMENT — ENCOUNTER SYMPTOMS
ALLERGIC/IMMUNOLOGIC NEGATIVE: 1
RESPIRATORY NEGATIVE: 1
GASTROINTESTINAL NEGATIVE: 1
BACK PAIN: 1
EYES NEGATIVE: 1

## 2018-09-28 NOTE — PROGRESS NOTES
Underlying chronic pain syndrome       Assessment:       Diagnosis Orders   1. Essential hypertension     2. Primary osteoarthritis of both knees     3. Use of opiates for therapeutic purposes     4. Pre-diabetes     5. Chronic pain syndrome     6. Dyslipidemia             Plan:      59-year-old -American male in for follow-up. Known history of chronic pain syndrome. Opiate dependent as provided by pain management. Avoid constipation  Hypertension. Failed nonpharmacological treatment. I have placed him on clonidine. Is advised low-fat high-fiber diet, daily moderate exercise, lifestyle change, consume less than 2 g of salt a day  Obesity. Lose weight to keep BMI 27. Prediabetes. Risk factor stratification is advised. Hyperlipidemia nonpharmacologically treated  Degenerative arthralgia bilateral knees status post surgery. He denies tobacco, excessive alcohol or illicit drug use  He is updated on screening colonoscopy  Med list reviewed advised to continue  Observe and call for any concern  This note is created with a voice recognition program and while intend to generate a document that accurately reflects the content of the visit, no guarantee can be provided that every mistake has been identified and corrected by editing.                 Xin Katz MD

## 2018-10-23 ENCOUNTER — HOSPITAL ENCOUNTER (OUTPATIENT)
Dept: PAIN MANAGEMENT | Age: 68
Discharge: HOME | End: 2018-10-23
Payer: MEDICARE

## 2018-10-23 VITALS
HEART RATE: 58 BPM | HEIGHT: 67 IN | WEIGHT: 221 LBS | BODY MASS INDEX: 34.69 KG/M2 | DIASTOLIC BLOOD PRESSURE: 99 MMHG | RESPIRATION RATE: 20 BRPM | OXYGEN SATURATION: 96 % | TEMPERATURE: 98.3 F | SYSTOLIC BLOOD PRESSURE: 129 MMHG

## 2018-10-23 DIAGNOSIS — Z79.891 USE OF OPIATES FOR THERAPEUTIC PURPOSES: ICD-10-CM

## 2018-10-23 DIAGNOSIS — M17.0 PRIMARY OSTEOARTHRITIS OF BOTH KNEES: ICD-10-CM

## 2018-10-23 DIAGNOSIS — G89.29 CHRONIC PAIN OF LEFT KNEE: ICD-10-CM

## 2018-10-23 DIAGNOSIS — Z51.81 ENCOUNTER FOR MEDICATION MONITORING: ICD-10-CM

## 2018-10-23 DIAGNOSIS — M17.0 OSTEOARTHRITIS OF BOTH KNEES, UNSPECIFIED OSTEOARTHRITIS TYPE: Primary | ICD-10-CM

## 2018-10-23 DIAGNOSIS — Z98.890 S/P LEFT KNEE SURGERY: ICD-10-CM

## 2018-10-23 DIAGNOSIS — Z98.890 S/P KNEE SURGERY: ICD-10-CM

## 2018-10-23 DIAGNOSIS — R29.898 WEAKNESS OF LEFT LEG: ICD-10-CM

## 2018-10-23 DIAGNOSIS — M17.11 PRIMARY OSTEOARTHRITIS OF RIGHT KNEE: ICD-10-CM

## 2018-10-23 DIAGNOSIS — Z51.81 MEDICATION MONITORING ENCOUNTER: ICD-10-CM

## 2018-10-23 DIAGNOSIS — M17.12 PRIMARY OSTEOARTHRITIS OF LEFT KNEE: ICD-10-CM

## 2018-10-23 DIAGNOSIS — M25.562 CHRONIC PAIN OF LEFT KNEE: ICD-10-CM

## 2018-10-23 PROCEDURE — 99213 OFFICE O/P EST LOW 20 MIN: CPT

## 2018-10-23 PROCEDURE — 99213 OFFICE O/P EST LOW 20 MIN: CPT | Performed by: NURSE PRACTITIONER

## 2018-10-23 RX ORDER — OXYCODONE AND ACETAMINOPHEN 7.5; 325 MG/1; MG/1
1 TABLET ORAL SEE ADMIN INSTRUCTIONS
Qty: 60 TABLET | Refills: 0 | Status: SHIPPED | OUTPATIENT
Start: 2018-10-23 | End: 2018-11-20 | Stop reason: SDUPTHER

## 2018-10-23 RX ORDER — MORPHINE SULFATE 30 MG/1
30 TABLET, FILM COATED, EXTENDED RELEASE ORAL EVERY 12 HOURS
Qty: 60 TABLET | Refills: 0 | Status: SHIPPED | OUTPATIENT
Start: 2018-10-29 | End: 2018-11-20 | Stop reason: SDUPTHER

## 2018-10-23 ASSESSMENT — ENCOUNTER SYMPTOMS
GASTROINTESTINAL NEGATIVE: 1
RESPIRATORY NEGATIVE: 1

## 2018-10-23 NOTE — PROGRESS NOTES
has cross-reactivity to carisoprodol   and meprobamate.     Clonazepam, Urine Not Detected    Final 03/28/2018  8:40 AM [facility]   Codeine, Urine Not Detected    Final 03/28/2018  8:40 AM [facility]   MDA, Ur Not Detected    Final 03/28/2018  8:40 AM [facility]   Diazepam, Urine Not Detected    Final 03/28/2018  8:40 AM [facility]   Ethyl Glucuronide Ur Not Detected    Final 03/28/2018  8:40 AM [facility]   Fentanyl, Ur Not Detected    Final 03/28/2018  8:40 AM [facility]   Hydrocodone, Urine Not Detected    Final 03/28/2018  8:40 AM [facility]   Hydromorphone, Urine Not Detected    Final 03/28/2018  8:40 AM [facility]   Lorazepam, Urine Not Detected    Final 03/28/2018  8:40 AM [facility]   Marijuana Metab, Ur Not Detected    Final 03/28/2018  8:40 AM [facility]   MDEA, GRACIELA, Ur Not Detected    Final 03/28/2018  8:40 AM [facility]   MDMA, Urine Not Detected    Final 03/28/2018  8:40 AM [facility]   Meperidine Metab, Ur Not Detected    Final 03/28/2018  8:40 AM [facility]   Methadone, Urine Not Detected    Final 03/28/2018  8:40 AM [facility]   Methamphetamine, Urine Not Detected    Final 03/28/2018  8:40 AM [facility]   Methylphenidate Not Detected    Final 03/28/2018  8:40 AM [facility]   Midazolam, Urine Not Detected    Final 03/28/2018  8:40 AM [facility]   Morphine Urine Present    Final 03/28/2018  8:40 AM [facility]   Norbuprenorphine, Urine Not Detected    Final 03/28/2018  8:40 AM [facility]   Nordiazepam, Urine Not Detected    Final 03/28/2018  8:40 AM [facility]   Norfentanyl, Urine Not Detected    Final 03/28/2018  8:40 AM [facility]   NORHYDROCODONE, URINE Not Detected    Final 03/28/2018  8:40 AM [facility]   Noroxycodone, Urine Present    Final 03/28/2018  8:40 AM [facility]   NOROXYMORPHONE, URINE Not Detected    Final 03/28/2018  8:40 AM [facility]   Oxazepam, Urine Not Detected    Final 03/28/2018  8:40 AM [facility]   Oxycodone Urine Present    Final 03/28/2018  8:40 AM [facility]   Oxymorphone, Urine Present    Final 03/28/2018  8:40 AM [facility]   PCP, Urine Not Detected    Final 03/28/2018  8:40 AM [facility]   Phentermine, Ur Not Detected    Final symptoms, chronic opioid dependence and tolerance as well as risk of taking opioids with Benzodiazepines and taking opioids along with alcohol,  werediscussed with patient. I had asked the patient to minimize medication use and utilize pain medications only for uncontrolled rest pain or pain with exertional activities. I advised patient not to self-escalate painmedications without consulting with us. At each of patient's future visits we will try to taper pain medications, while adjusting the adjunct medications, and re-evaluating for Physical Therapy to improve spinal andjoint strength. We will continue to have discussions to decrease pain medications as tolerated. Counseled patient on effects their pain medication and /or their medical condition mayhave on their  ability to drive or operate machinery. Instructed not to drive or operate machinery if drowsy     I also discussed with the patient regarding the dangers of combining narcotic pain medication with tranquilizers, alcohol or illegal drugs or taking the medication any way other thanprescribed. The dangers were discussed  including respiratory depression and death. Patient was told to tell  all  physicians regarding the medications he is getting from pain clinic. Patient is warned not to take any unprescribed medications over-the-countermedications that can depress breathing . Patient is advised to talk to the pharmacist or physicians if planning to take any over-the-counter medications before  takeing them. Patient is strongly advised to avoidtranquilizers or  relaxants, illegal drugs  or any medications that can depress breathing  Patient is also advised to tell us if there is any changes in their medications from other physicians.         TREATMENT OPTIONS:     Return in 4 weeks    Medication Agreement Requirements Met  Continue Opioid therapy  Script written for  ms aj Wise  Follow up appointment made

## 2018-11-20 ENCOUNTER — HOSPITAL ENCOUNTER (OUTPATIENT)
Dept: PAIN MANAGEMENT | Age: 68
Discharge: HOME | End: 2018-11-20
Payer: MEDICARE

## 2018-11-20 VITALS
SYSTOLIC BLOOD PRESSURE: 197 MMHG | HEIGHT: 67 IN | OXYGEN SATURATION: 96 % | RESPIRATION RATE: 16 BRPM | WEIGHT: 221 LBS | TEMPERATURE: 97.6 F | HEART RATE: 63 BPM | BODY MASS INDEX: 34.69 KG/M2 | DIASTOLIC BLOOD PRESSURE: 87 MMHG

## 2018-11-20 DIAGNOSIS — M25.562 CHRONIC PAIN OF LEFT KNEE: ICD-10-CM

## 2018-11-20 DIAGNOSIS — M17.0 OSTEOARTHRITIS OF BOTH KNEES, UNSPECIFIED OSTEOARTHRITIS TYPE: Primary | ICD-10-CM

## 2018-11-20 DIAGNOSIS — M17.0 PRIMARY OSTEOARTHRITIS OF BOTH KNEES: ICD-10-CM

## 2018-11-20 DIAGNOSIS — M17.12 PRIMARY OSTEOARTHRITIS OF LEFT KNEE: ICD-10-CM

## 2018-11-20 DIAGNOSIS — R29.898 WEAKNESS OF LEFT LEG: ICD-10-CM

## 2018-11-20 DIAGNOSIS — G89.29 CHRONIC PAIN OF LEFT KNEE: ICD-10-CM

## 2018-11-20 DIAGNOSIS — Z51.81 ENCOUNTER FOR MEDICATION MONITORING: ICD-10-CM

## 2018-11-20 DIAGNOSIS — M17.11 PRIMARY OSTEOARTHRITIS OF RIGHT KNEE: ICD-10-CM

## 2018-11-20 DIAGNOSIS — Z98.890 S/P LEFT KNEE SURGERY: ICD-10-CM

## 2018-11-20 DIAGNOSIS — Z98.890 S/P KNEE SURGERY: ICD-10-CM

## 2018-11-20 DIAGNOSIS — Z79.891 USE OF OPIATES FOR THERAPEUTIC PURPOSES: ICD-10-CM

## 2018-11-20 DIAGNOSIS — Z51.81 MEDICATION MONITORING ENCOUNTER: ICD-10-CM

## 2018-11-20 PROCEDURE — 99213 OFFICE O/P EST LOW 20 MIN: CPT

## 2018-11-20 PROCEDURE — 99213 OFFICE O/P EST LOW 20 MIN: CPT | Performed by: NURSE PRACTITIONER

## 2018-11-20 RX ORDER — OXYCODONE AND ACETAMINOPHEN 7.5; 325 MG/1; MG/1
1 TABLET ORAL SEE ADMIN INSTRUCTIONS
Qty: 60 TABLET | Refills: 0 | Status: SHIPPED | OUTPATIENT
Start: 2018-11-25 | End: 2018-12-19

## 2018-11-20 RX ORDER — MORPHINE SULFATE 30 MG/1
30 TABLET, FILM COATED, EXTENDED RELEASE ORAL EVERY 12 HOURS
Qty: 52 TABLET | Refills: 0 | Status: SHIPPED | OUTPATIENT
Start: 2018-11-29 | End: 2018-12-19 | Stop reason: SDUPTHER

## 2018-11-20 ASSESSMENT — ENCOUNTER SYMPTOMS
GASTROINTESTINAL NEGATIVE: 1
BACK PAIN: 1

## 2018-11-20 NOTE — PROGRESS NOTES
Note    Final 03/28/2018  8:40 AM [facility]   (NOTE)   Access [facility] Enhanced Report using either link below:   -Direct access: [email or web address]   -Enter Username, Password: [email or web address]   Username: [email or web address] Password: [email or web address]   Performed by [address] [phone]   www. Lester Olivia MD, Lab. Director   Performed at [address] [place] [address] Dr Keli Lenz. [place], [address] [place] [address] [phone]          Past Medical History           Past Medical History:   Diagnosis Date    Arthritis     Left knee pain 4/9/2014    Teeth problem     abcessw       Past Surgical History:   Procedure Laterality Date    CARPAL TUNNEL RELEASE Bilateral     DENTAL SURGERY  10/2015    JOINT REPLACEMENT Left     KNEE  X 6 pt unsure of date    JOINT REPLACEMENT Right 07/20/2018    ROTATOR CUFF REPAIR Right     TONSILLECTOMY         Allergies   Allergen Reactions    Oxycontin [Oxycodone Hcl] Nausea Only         Current Outpatient Prescriptions:     [START ON 11/25/2018] oxyCODONE-acetaminophen (PERCOCET) 7.5-325 MG per tablet, Take 1 tablet by mouth See Admin Instructions for 30 days. 1 tablet 2 times a day as needed  for pain., Disp: 60 tablet, Rfl: 0    [START ON 11/29/2018] morphine (MS CONTIN) 30 MG extended release tablet, Take 1 tablet by mouth every 12 hours for 26 days. ., Disp: 52 tablet, Rfl: 0    cloNIDine (CATAPRES) 0.1 MG tablet, Take 1 tablet by mouth 2 times daily, Disp: 60 tablet, Rfl: 3    gabapentin (NEURONTIN) 400 MG capsule, Take 400 mg by mouth 3 times daily. ., Disp: , Rfl:     ibuprofen (IBU) 800 MG tablet, Take 1 tablet by mouth every 8 hours as needed for Pain, Disp: 90 tablet, Rfl: 2    melatonin 3 MG TABS tablet, Take 6 mg by mouth nightly as needed (insomnia), Disp: , Rfl:     History reviewed. No pertinent family history. Social History     Social History    Marital status:       Spouse name: N/A   Hany Spears Number of children: N/A    Years of education: N/A     Occupational History    retired      Social History Main Topics    Smoking status: Never Smoker    Smokeless tobacco: Never Used    Alcohol use No    Drug use: No    Sexual activity: No     Other Topics Concern    Not on file     Social History Narrative    No narrative on file       Review of Systems:  Review of Systems   Constitution: Negative. HENT: Negative. Eyes:        Glasses   Cardiovascular: Negative. Skin: Negative. Musculoskeletal: Positive for back pain, joint pain and neck pain. Gastrointestinal: Negative. Genitourinary: Negative. Neurological: Negative. Psychiatric/Behavioral: Negative. Physical Exam:  BP (!) 197/87   Pulse 63   Temp 97.6 °F (36.4 °C) (Oral)   Resp 16   Ht 5' 7\" (1.702 m)   Wt 221 lb (100.2 kg)   SpO2 96%   BMI 34.61 kg/m²     Physical Exam   Constitutional: He is oriented to person, place, and time. He appears well-developed. overweight   HENT:   Head: Normocephalic. Neck: Normal range of motion. Pulmonary/Chest: Effort normal.   Musculoskeletal:        Right knee: He exhibits deformity. Tenderness found. Medial joint line and lateral joint line tenderness noted. Left knee: He exhibits deformity. Tenderness found. Medial joint line and lateral joint line tenderness noted. Cervical back: He exhibits tenderness. Lumbar back: He exhibits tenderness. Neurological: He is alert and oriented to person, place, and time. He has normal strength. Gait abnormal.   Reflex Scores:       Patellar reflexes are 0 on the right side and 0 on the left side. Achilles reflexes are 2+ on the right side and 2+ on the left side. Skin: Skin is warm, dry and intact. Psychiatric: He has a normal mood and affect.  His speech is normal and behavior is normal. Judgment and thought content normal. Cognition and memory are normal.         Assessment:        Problem List Items

## 2018-11-26 ENCOUNTER — TELEPHONE (OUTPATIENT)
Dept: PAIN MANAGEMENT | Age: 68
End: 2018-11-26

## 2018-12-19 ENCOUNTER — HOSPITAL ENCOUNTER (OUTPATIENT)
Dept: PAIN MANAGEMENT | Age: 68
Discharge: HOME | End: 2018-12-19
Payer: MEDICARE

## 2018-12-19 VITALS
WEIGHT: 221 LBS | OXYGEN SATURATION: 98 % | SYSTOLIC BLOOD PRESSURE: 154 MMHG | DIASTOLIC BLOOD PRESSURE: 77 MMHG | TEMPERATURE: 98.1 F | HEART RATE: 64 BPM | BODY MASS INDEX: 34.69 KG/M2 | HEIGHT: 67 IN | RESPIRATION RATE: 18 BRPM

## 2018-12-19 DIAGNOSIS — M17.0 PRIMARY OSTEOARTHRITIS OF BOTH KNEES: ICD-10-CM

## 2018-12-19 DIAGNOSIS — R29.898 WEAKNESS OF LEFT LEG: ICD-10-CM

## 2018-12-19 DIAGNOSIS — Z98.890 S/P LEFT KNEE SURGERY: ICD-10-CM

## 2018-12-19 DIAGNOSIS — Z51.81 ENCOUNTER FOR MEDICATION MONITORING: ICD-10-CM

## 2018-12-19 DIAGNOSIS — Z51.81 MEDICATION MONITORING ENCOUNTER: Primary | ICD-10-CM

## 2018-12-19 DIAGNOSIS — M17.0 OSTEOARTHRITIS OF BOTH KNEES, UNSPECIFIED OSTEOARTHRITIS TYPE: ICD-10-CM

## 2018-12-19 PROCEDURE — 99213 OFFICE O/P EST LOW 20 MIN: CPT

## 2018-12-19 PROCEDURE — 99214 OFFICE O/P EST MOD 30 MIN: CPT | Performed by: PAIN MEDICINE

## 2018-12-19 RX ORDER — IBUPROFEN 800 MG/1
800 TABLET ORAL EVERY 8 HOURS PRN
Qty: 90 TABLET | Refills: 2 | Status: SHIPPED | OUTPATIENT
Start: 2018-12-28 | End: 2019-03-27 | Stop reason: SDUPTHER

## 2018-12-19 RX ORDER — OXYCODONE AND ACETAMINOPHEN 7.5; 325 MG/1; MG/1
1 TABLET ORAL SEE ADMIN INSTRUCTIONS
Qty: 60 TABLET | Refills: 0 | Status: CANCELLED | OUTPATIENT
Start: 2018-12-19 | End: 2019-01-18

## 2018-12-19 RX ORDER — OXYCODONE HYDROCHLORIDE AND ACETAMINOPHEN 5; 325 MG/1; MG/1
1 TABLET ORAL EVERY 8 HOURS PRN
Qty: 60 TABLET | Refills: 0 | Status: SHIPPED | OUTPATIENT
Start: 2018-12-28 | End: 2018-12-19

## 2018-12-19 RX ORDER — OXYCODONE HYDROCHLORIDE AND ACETAMINOPHEN 5; 325 MG/1; MG/1
1 TABLET ORAL EVERY 12 HOURS PRN
Qty: 60 TABLET | Refills: 0 | Status: SHIPPED | OUTPATIENT
Start: 2018-12-28 | End: 2019-01-23 | Stop reason: SDUPTHER

## 2018-12-19 RX ORDER — OXYCODONE HYDROCHLORIDE AND ACETAMINOPHEN 5; 325 MG/1; MG/1
1 TABLET ORAL EVERY 6 HOURS PRN
Qty: 60 TABLET | Refills: 0 | Status: CANCELLED | OUTPATIENT
Start: 2018-12-28 | End: 2019-01-27

## 2018-12-19 RX ORDER — OXYCODONE HYDROCHLORIDE AND ACETAMINOPHEN 5; 325 MG/1; MG/1
1 TABLET ORAL EVERY 12 HOURS PRN
Qty: 60 TABLET | Refills: 0 | Status: SHIPPED | OUTPATIENT
Start: 2018-12-19 | End: 2018-12-19 | Stop reason: SDUPTHER

## 2018-12-19 RX ORDER — GABAPENTIN 400 MG/1
400 CAPSULE ORAL 3 TIMES DAILY
Qty: 90 CAPSULE | Refills: 0 | Status: SHIPPED | OUTPATIENT
Start: 2018-12-28 | End: 2019-01-23 | Stop reason: SDUPTHER

## 2018-12-19 RX ORDER — MORPHINE SULFATE 30 MG/1
30 TABLET, FILM COATED, EXTENDED RELEASE ORAL EVERY 12 HOURS
Qty: 60 TABLET | Refills: 0 | Status: SHIPPED | OUTPATIENT
Start: 2018-12-28 | End: 2019-01-23 | Stop reason: SDUPTHER

## 2018-12-19 ASSESSMENT — PAIN SCALES - GENERAL: PAINLEVEL_OUTOF10: 7

## 2018-12-19 ASSESSMENT — PAIN DESCRIPTION - DESCRIPTORS: DESCRIPTORS: ACHING

## 2018-12-19 ASSESSMENT — ENCOUNTER SYMPTOMS
COLOR CHANGE: 0
COUGH: 0
RESPIRATORY NEGATIVE: 1
SINUS PAIN: 1
EYES NEGATIVE: 1
CONSTIPATION: 0
ALLERGIC/IMMUNOLOGIC NEGATIVE: 1
SHORTNESS OF BREATH: 0
NAUSEA: 0
VOMITING: 0
ABDOMINAL PAIN: 0
GASTROINTESTINAL NEGATIVE: 1
SINUS PRESSURE: 1
PHOTOPHOBIA: 0

## 2018-12-19 ASSESSMENT — PAIN DESCRIPTION - LOCATION: LOCATION: KNEE

## 2018-12-19 ASSESSMENT — PAIN DESCRIPTION - ORIENTATION: ORIENTATION: LEFT

## 2018-12-19 ASSESSMENT — PAIN DESCRIPTION - FREQUENCY: FREQUENCY: CONTINUOUS

## 2018-12-19 ASSESSMENT — PAIN DESCRIPTION - ONSET: ONSET: ON-GOING

## 2018-12-19 ASSESSMENT — PAIN DESCRIPTION - PAIN TYPE: TYPE: CHRONIC PAIN

## 2018-12-19 NOTE — PROGRESS NOTES
side and 0 on the left side. There is slight atrophy of the quadriceps bilaterally   Skin: Skin is warm, dry and intact. No rash noted. No erythema. Psychiatric: He has a normal mood and affect. His speech is normal and behavior is normal. Judgment and thought content normal. Cognition and memory are normal.    Right Ankle Exam     Muscle Strength   The patient has normal right ankle strength. Left Ankle Exam     Muscle Strength   The patient has normal left ankle strength. Right Knee Exam     Tenderness   The patient is experiencing tenderness in the lateral joint line and medial joint line. Range of Motion   Right knee extension: Painful. Right knee flexion: Painful and limited. Other   Erythema: absent  Scars: present  Sensation: normal  Pulse: present  Swelling: none  Other tests: no effusion present      Left Knee Exam     Range of Motion   Left knee extension: Painful and limited. Left knee flexion: Painful and limited. Other   Erythema: absent  Scars: present  Sensation: normal  Pulse: present  Swelling: none  Effusion: no effusion present    Comments:  Ext limited              Nurses Notes and Vital Signs reviewed. DATA  Labs:  Benzodiazepines   Date Value Ref Range Status   07/03/2013 NEGATIVE NEG Final     Comment:           (Positive cutoff 200 ng/mL)                 Benzodiazepine Screen, Urine   Date Value Ref Range Status   03/05/2014 NEGATIVE NEG Final     Comment:           (Positive cutoff 200 ng/mL)                    Imaging:  Radiology Images and Reports reviewed where indicated and necessary  HISTORY:    Right knee arthroplasty. 3 views the right knee were obtained.  Patient status post right knee arthroplasty.  Alignment is anatomic.  No joint effusion. IMPRESSION:    Satisfactory post operative knee.     Workstation:[id number]    Finalized by Aarti Gutierrez MD on 12/18/2018 12:19 AM   Other Result Information   Interface - Rad Results/Orders In 1 - utilize pain medications only for uncontrolled rest pain or pain with exertional activities. I advised patient not to self escalate pain medications without consulting with us. At each of patient's future visits we will try to taper pain medications, while adjusting the adjunct medications, and re-evaluating for Physical Therapy to improve spinal and joint strength. We will continue to have discussions to decrease pain medications as tolerated. I also discussed with the patient regarding the dangers of combining narcotic pain medication with tranquilizers, alcohol or illegal drugs or taking the medication any other than prescribed. The dangers including the respiratory depression and death. Patient was told to tell  to all  physicians regarding the medications he is getting from pain clinic. Patient is warned not to take any unprescribed medications over-the-counter medications that can depress breathing . Patient is advised to talk to the pharmacist or physicians if planning to take any over-the-counter medications before  takeing them. Patient is strongly advised to avoid tranquilizers or  Relaxants for any medications that can depress breathing or recreational drugs. Patient is also advised to tell us if there is any changes in his medications from other physicians. We discussed the same at today's visit and have not been to implement it, as the patient's pain is not under control with current medications. I discussed with him regarding any genicular nERVE  blocks to help the pain. Patient is advised to try exercises for 4-6 weeks and if he still continues to have pain then we may consider doing nerve blocks to help the pain in the care of successful but short-lived and we will consider doing RFA of the genicular nerves to help  the pain  Decision Making Process : Patient's health history and referral records thoroughly reviewed before focused physical examination and discussion with patient.  I have

## 2019-01-23 ENCOUNTER — HOSPITAL ENCOUNTER (OUTPATIENT)
Dept: PAIN MANAGEMENT | Age: 69
Discharge: HOME | End: 2019-01-23
Payer: MEDICARE

## 2019-01-23 VITALS
OXYGEN SATURATION: 95 % | DIASTOLIC BLOOD PRESSURE: 103 MMHG | RESPIRATION RATE: 20 BRPM | HEART RATE: 65 BPM | WEIGHT: 221 LBS | HEIGHT: 67 IN | BODY MASS INDEX: 34.69 KG/M2 | TEMPERATURE: 97.9 F | SYSTOLIC BLOOD PRESSURE: 154 MMHG

## 2019-01-23 DIAGNOSIS — R29.898 WEAKNESS OF LEFT LEG: ICD-10-CM

## 2019-01-23 DIAGNOSIS — Z98.890 S/P KNEE SURGERY: ICD-10-CM

## 2019-01-23 DIAGNOSIS — M25.562 CHRONIC PAIN OF LEFT KNEE: ICD-10-CM

## 2019-01-23 DIAGNOSIS — M17.0 OSTEOARTHRITIS OF BOTH KNEES, UNSPECIFIED OSTEOARTHRITIS TYPE: Primary | ICD-10-CM

## 2019-01-23 DIAGNOSIS — G89.29 CHRONIC PAIN OF LEFT KNEE: ICD-10-CM

## 2019-01-23 DIAGNOSIS — M17.0 PRIMARY OSTEOARTHRITIS OF BOTH KNEES: ICD-10-CM

## 2019-01-23 DIAGNOSIS — M17.11 PRIMARY OSTEOARTHRITIS OF RIGHT KNEE: ICD-10-CM

## 2019-01-23 DIAGNOSIS — Z51.81 ENCOUNTER FOR MEDICATION MONITORING: ICD-10-CM

## 2019-01-23 DIAGNOSIS — Z79.891 USE OF OPIATES FOR THERAPEUTIC PURPOSES: ICD-10-CM

## 2019-01-23 DIAGNOSIS — Z51.81 MEDICATION MONITORING ENCOUNTER: ICD-10-CM

## 2019-01-23 DIAGNOSIS — Z98.890 S/P LEFT KNEE SURGERY: ICD-10-CM

## 2019-01-23 PROCEDURE — 99213 OFFICE O/P EST LOW 20 MIN: CPT

## 2019-01-23 PROCEDURE — 99213 OFFICE O/P EST LOW 20 MIN: CPT | Performed by: NURSE PRACTITIONER

## 2019-01-23 RX ORDER — MORPHINE SULFATE 30 MG/1
30 TABLET, FILM COATED, EXTENDED RELEASE ORAL EVERY 12 HOURS
Qty: 60 TABLET | Refills: 0 | Status: SHIPPED | OUTPATIENT
Start: 2019-01-29 | End: 2019-02-27 | Stop reason: SDUPTHER

## 2019-01-23 RX ORDER — GABAPENTIN 400 MG/1
400 CAPSULE ORAL 3 TIMES DAILY
Qty: 90 CAPSULE | Refills: 2 | Status: SHIPPED | OUTPATIENT
Start: 2019-01-29 | End: 2019-02-27

## 2019-01-23 RX ORDER — OXYCODONE HYDROCHLORIDE AND ACETAMINOPHEN 5; 325 MG/1; MG/1
1 TABLET ORAL EVERY 12 HOURS PRN
Qty: 60 TABLET | Refills: 0 | Status: SHIPPED | OUTPATIENT
Start: 2019-01-29 | End: 2019-02-27 | Stop reason: SDUPTHER

## 2019-01-23 ASSESSMENT — ENCOUNTER SYMPTOMS
RESPIRATORY NEGATIVE: 1
GASTROINTESTINAL NEGATIVE: 1
EYES NEGATIVE: 1

## 2019-02-01 RX ORDER — CLONIDINE HYDROCHLORIDE 0.1 MG/1
TABLET ORAL
Qty: 60 TABLET | Refills: 2 | OUTPATIENT
Start: 2019-02-01

## 2019-02-27 ENCOUNTER — HOSPITAL ENCOUNTER (OUTPATIENT)
Dept: PAIN MANAGEMENT | Age: 69
Discharge: HOME | End: 2019-02-27
Payer: MEDICARE

## 2019-02-27 VITALS
OXYGEN SATURATION: 97 % | HEART RATE: 70 BPM | SYSTOLIC BLOOD PRESSURE: 133 MMHG | RESPIRATION RATE: 20 BRPM | HEIGHT: 67 IN | BODY MASS INDEX: 34.69 KG/M2 | DIASTOLIC BLOOD PRESSURE: 111 MMHG | WEIGHT: 221 LBS | TEMPERATURE: 98 F

## 2019-02-27 DIAGNOSIS — Z98.890 S/P KNEE SURGERY: ICD-10-CM

## 2019-02-27 DIAGNOSIS — M17.0 PRIMARY OSTEOARTHRITIS OF BOTH KNEES: ICD-10-CM

## 2019-02-27 DIAGNOSIS — Z51.81 MEDICATION MONITORING ENCOUNTER: ICD-10-CM

## 2019-02-27 DIAGNOSIS — M17.12 PRIMARY OSTEOARTHRITIS OF LEFT KNEE: ICD-10-CM

## 2019-02-27 DIAGNOSIS — G89.29 CHRONIC PAIN OF LEFT KNEE: ICD-10-CM

## 2019-02-27 DIAGNOSIS — M17.0 OSTEOARTHRITIS OF BOTH KNEES, UNSPECIFIED OSTEOARTHRITIS TYPE: Primary | ICD-10-CM

## 2019-02-27 DIAGNOSIS — M17.11 PRIMARY OSTEOARTHRITIS OF RIGHT KNEE: ICD-10-CM

## 2019-02-27 DIAGNOSIS — R29.898 WEAKNESS OF LEFT LEG: ICD-10-CM

## 2019-02-27 DIAGNOSIS — Z98.890 S/P LEFT KNEE SURGERY: ICD-10-CM

## 2019-02-27 DIAGNOSIS — Z79.891 USE OF OPIATES FOR THERAPEUTIC PURPOSES: ICD-10-CM

## 2019-02-27 DIAGNOSIS — Z51.81 ENCOUNTER FOR MEDICATION MONITORING: ICD-10-CM

## 2019-02-27 DIAGNOSIS — M25.562 CHRONIC PAIN OF LEFT KNEE: ICD-10-CM

## 2019-02-27 PROCEDURE — 99213 OFFICE O/P EST LOW 20 MIN: CPT

## 2019-02-27 PROCEDURE — 99213 OFFICE O/P EST LOW 20 MIN: CPT | Performed by: NURSE PRACTITIONER

## 2019-02-27 RX ORDER — GABAPENTIN 600 MG/1
600 TABLET ORAL 3 TIMES DAILY
Qty: 90 TABLET | Refills: 3 | Status: SHIPPED | OUTPATIENT
Start: 2019-02-27 | End: 2019-06-26 | Stop reason: SDUPTHER

## 2019-02-27 RX ORDER — OXYCODONE HYDROCHLORIDE AND ACETAMINOPHEN 5; 325 MG/1; MG/1
1 TABLET ORAL EVERY 12 HOURS PRN
Qty: 60 TABLET | Refills: 0 | Status: SHIPPED | OUTPATIENT
Start: 2019-03-03 | End: 2019-03-27 | Stop reason: SDUPTHER

## 2019-02-27 RX ORDER — MORPHINE SULFATE 30 MG/1
30 TABLET, FILM COATED, EXTENDED RELEASE ORAL EVERY 12 HOURS
Qty: 60 TABLET | Refills: 0 | Status: SHIPPED | OUTPATIENT
Start: 2019-03-03 | End: 2019-03-27 | Stop reason: SDUPTHER

## 2019-02-27 ASSESSMENT — ENCOUNTER SYMPTOMS
RESPIRATORY NEGATIVE: 1
EYES NEGATIVE: 1
GASTROINTESTINAL NEGATIVE: 1

## 2019-03-27 ENCOUNTER — HOSPITAL ENCOUNTER (OUTPATIENT)
Dept: PAIN MANAGEMENT | Age: 69
Discharge: HOME | End: 2019-03-27
Payer: MEDICARE

## 2019-03-27 VITALS
DIASTOLIC BLOOD PRESSURE: 99 MMHG | TEMPERATURE: 97.8 F | HEART RATE: 63 BPM | WEIGHT: 221 LBS | HEIGHT: 67 IN | OXYGEN SATURATION: 95 % | BODY MASS INDEX: 34.69 KG/M2 | RESPIRATION RATE: 20 BRPM | SYSTOLIC BLOOD PRESSURE: 139 MMHG

## 2019-03-27 DIAGNOSIS — Z51.81 MEDICATION MONITORING ENCOUNTER: ICD-10-CM

## 2019-03-27 DIAGNOSIS — R29.898 WEAKNESS OF LEFT LEG: ICD-10-CM

## 2019-03-27 DIAGNOSIS — M17.12 PRIMARY OSTEOARTHRITIS OF LEFT KNEE: ICD-10-CM

## 2019-03-27 DIAGNOSIS — Z98.890 S/P KNEE SURGERY: Primary | ICD-10-CM

## 2019-03-27 DIAGNOSIS — M17.11 PRIMARY OSTEOARTHRITIS OF RIGHT KNEE: ICD-10-CM

## 2019-03-27 DIAGNOSIS — G89.29 CHRONIC PAIN OF LEFT KNEE: ICD-10-CM

## 2019-03-27 DIAGNOSIS — Z98.890 S/P LEFT KNEE SURGERY: ICD-10-CM

## 2019-03-27 DIAGNOSIS — M25.562 CHRONIC PAIN OF LEFT KNEE: ICD-10-CM

## 2019-03-27 DIAGNOSIS — Z51.81 ENCOUNTER FOR MEDICATION MONITORING: ICD-10-CM

## 2019-03-27 DIAGNOSIS — M17.0 PRIMARY OSTEOARTHRITIS OF BOTH KNEES: ICD-10-CM

## 2019-03-27 DIAGNOSIS — M17.0 OSTEOARTHRITIS OF BOTH KNEES, UNSPECIFIED OSTEOARTHRITIS TYPE: ICD-10-CM

## 2019-03-27 PROCEDURE — 99213 OFFICE O/P EST LOW 20 MIN: CPT | Performed by: NURSE PRACTITIONER

## 2019-03-27 PROCEDURE — 99213 OFFICE O/P EST LOW 20 MIN: CPT

## 2019-03-27 RX ORDER — MORPHINE SULFATE 30 MG/1
30 TABLET, FILM COATED, EXTENDED RELEASE ORAL EVERY 12 HOURS
Qty: 60 TABLET | Refills: 0 | Status: SHIPPED | OUTPATIENT
Start: 2019-04-02 | End: 2019-05-01 | Stop reason: SDUPTHER

## 2019-03-27 RX ORDER — IBUPROFEN 800 MG/1
800 TABLET ORAL EVERY 8 HOURS PRN
Qty: 90 TABLET | Refills: 2 | Status: SHIPPED | OUTPATIENT
Start: 2019-03-31 | End: 2019-07-01 | Stop reason: SDUPTHER

## 2019-03-27 RX ORDER — OXYCODONE HYDROCHLORIDE AND ACETAMINOPHEN 5; 325 MG/1; MG/1
1 TABLET ORAL EVERY 12 HOURS PRN
Qty: 60 TABLET | Refills: 0 | Status: SHIPPED | OUTPATIENT
Start: 2019-04-02 | End: 2019-05-01 | Stop reason: SDUPTHER

## 2019-03-27 ASSESSMENT — ENCOUNTER SYMPTOMS
BACK PAIN: 1
RESPIRATORY NEGATIVE: 1
CONSTIPATION: 1

## 2019-05-01 ENCOUNTER — HOSPITAL ENCOUNTER (OUTPATIENT)
Dept: PAIN MANAGEMENT | Age: 69
Discharge: HOME | End: 2019-05-01
Payer: MEDICARE

## 2019-05-01 VITALS
RESPIRATION RATE: 16 BRPM | BODY MASS INDEX: 34.69 KG/M2 | DIASTOLIC BLOOD PRESSURE: 103 MMHG | SYSTOLIC BLOOD PRESSURE: 144 MMHG | TEMPERATURE: 98 F | WEIGHT: 221 LBS | OXYGEN SATURATION: 98 % | HEIGHT: 67 IN | HEART RATE: 79 BPM

## 2019-05-01 DIAGNOSIS — G89.29 CHRONIC PAIN OF LEFT KNEE: ICD-10-CM

## 2019-05-01 DIAGNOSIS — Z51.81 MEDICATION MONITORING ENCOUNTER: ICD-10-CM

## 2019-05-01 DIAGNOSIS — M25.562 CHRONIC PAIN OF LEFT KNEE: ICD-10-CM

## 2019-05-01 DIAGNOSIS — Z79.891 USE OF OPIATES FOR THERAPEUTIC PURPOSES: ICD-10-CM

## 2019-05-01 DIAGNOSIS — R29.898 WEAKNESS OF LEFT LEG: ICD-10-CM

## 2019-05-01 DIAGNOSIS — Z98.890 S/P LEFT KNEE SURGERY: Primary | ICD-10-CM

## 2019-05-01 DIAGNOSIS — M17.11 ARTHRITIS OF RIGHT KNEE: ICD-10-CM

## 2019-05-01 DIAGNOSIS — M17.0 OSTEOARTHRITIS OF BOTH KNEES, UNSPECIFIED OSTEOARTHRITIS TYPE: ICD-10-CM

## 2019-05-01 DIAGNOSIS — Z51.81 ENCOUNTER FOR MEDICATION MONITORING: ICD-10-CM

## 2019-05-01 DIAGNOSIS — M17.0 PRIMARY OSTEOARTHRITIS OF BOTH KNEES: ICD-10-CM

## 2019-05-01 DIAGNOSIS — M17.12 PRIMARY OSTEOARTHRITIS OF LEFT KNEE: ICD-10-CM

## 2019-05-01 DIAGNOSIS — Z98.890 S/P KNEE SURGERY: ICD-10-CM

## 2019-05-01 DIAGNOSIS — M17.11 PRIMARY OSTEOARTHRITIS OF RIGHT KNEE: ICD-10-CM

## 2019-05-01 PROCEDURE — 99213 OFFICE O/P EST LOW 20 MIN: CPT | Performed by: NURSE PRACTITIONER

## 2019-05-01 PROCEDURE — 99213 OFFICE O/P EST LOW 20 MIN: CPT

## 2019-05-01 RX ORDER — MORPHINE SULFATE 30 MG/1
30 TABLET, FILM COATED, EXTENDED RELEASE ORAL EVERY 12 HOURS
Qty: 60 TABLET | Refills: 0 | Status: SHIPPED | OUTPATIENT
Start: 2019-05-02 | End: 2019-05-29 | Stop reason: SDUPTHER

## 2019-05-01 RX ORDER — OXYCODONE HYDROCHLORIDE AND ACETAMINOPHEN 5; 325 MG/1; MG/1
1 TABLET ORAL EVERY 12 HOURS PRN
Qty: 60 TABLET | Refills: 0 | Status: SHIPPED | OUTPATIENT
Start: 2019-05-02 | End: 2019-05-29 | Stop reason: SDUPTHER

## 2019-05-01 ASSESSMENT — ENCOUNTER SYMPTOMS
GASTROINTESTINAL NEGATIVE: 1
RESPIRATORY NEGATIVE: 1

## 2019-05-01 NOTE — PROGRESS NOTES
Final 03/28/2018  8:40 AM [facility]   Ethyl Glucuronide Ur Not Detected    Final 03/28/2018  8:40 AM [facility]   Fentanyl, Ur Not Detected    Final 03/28/2018  8:40 AM [facility]   Hydrocodone, Urine Not Detected    Final 03/28/2018  8:40 AM [facility]   Hydromorphone, Urine Not Detected    Final 03/28/2018  8:40 AM [facility]   Lorazepam, Urine Not Detected    Final 03/28/2018  8:40 AM [facility]   Marijuana Metab, Ur Not Detected    Final 03/28/2018  8:40 AM [facility]   MDEA, GRACIELA, Ur Not Detected    Final 03/28/2018  8:40 AM [facility]   MDMA, Urine Not Detected    Final 03/28/2018  8:40 AM [facility]   Meperidine Metab, Ur Not Detected    Final 03/28/2018  8:40 AM [facility]   Methadone, Urine Not Detected    Final 03/28/2018  8:40 AM [facility]   Methamphetamine, Urine Not Detected    Final 03/28/2018  8:40 AM [facility]   Methylphenidate Not Detected    Final 03/28/2018  8:40 AM [facility]   Midazolam, Urine Not Detected    Final 03/28/2018  8:40 AM [facility]   Morphine Urine Present    Final 03/28/2018  8:40 AM [facility]   Norbuprenorphine, Urine Not Detected    Final 03/28/2018  8:40 AM [facility]   Nordiazepam, Urine Not Detected    Final 03/28/2018  8:40 AM [facility]   Norfentanyl, Urine Not Detected    Final 03/28/2018  8:40 AM [facility]   NORHYDROCODONE, URINE Not Detected    Final 03/28/2018  8:40 AM [facility]   Noroxycodone, Urine Present    Final 03/28/2018  8:40 AM [facility]   NOROXYMORPHONE, URINE Not Detected    Final 03/28/2018  8:40 AM [facility]   Oxazepam, Urine Not Detected    Final 03/28/2018  8:40 AM [facility]   Oxycodone Urine Present    Final 03/28/2018  8:40 AM [facility]   Oxymorphone, Urine Present    Final 03/28/2018  8:40 AM [facility]   PCP, Urine Not Detected    Final 03/28/2018  8:40 AM [facility]   Phentermine, Ur Not Detected    Final 03/28/2018  8:40 AM [facility]   Propoxyphene, Urine Not Detected    Final 03/28/2018  8:40 AM [facility]   Tapentadol-O-Sulfate, Urine Not Detected    Final 03/28/2018  8:40 AM [facility]   Tapentadol, Urine Not Detected    Final 03/28/2018  8:40 AM [facility]   Temazepam, Urine Not Detected    Final 03/28/2018  8:40 AM [facility]   Tramadol, Urine Not Detected    Final 03/28/2018  8:40 AM [facility]   Zolpidem, Urine Not Detected    Final 03/28/2018  8:40 AM [facility]   Drugs Expected, Ur     Final 03/28/2018  8:40 AM [facility]   OXYCODONE 2051ML [id number]. MORPHINE 0630 [id number]. QUANTITATE OPIOIDS IF     NEGATIVE. Creatinine, Ur 200.0  20.0 - 400.0 mg/dL Final 03/28/2018  8:40 AM [facility]   Pain Mgt Drug Panel, Hi Res, Ur See Below    Final 03/28/2018  8:40 AM [facility]   (NOTE)   Methodology: Qualitative Enzyme Immunoassay and Qualitative Liquid   Chromatography-Time of Flight-Mass Spectrometry or Tandem Mass   Spectrometry, Quantitative Spectrophotometry   The absence of expected drug(s) and/or drug metabolite(s) may   indicate non-compliance, inappropriate timing of specimen   collection relative to drug administration, poor drug absorption,   diluted/adulterated urine, or limitations of testing. The   concentration must be greater than or equal to the cutoff to be   reported as present.  If specific drug concentrations are   required, contact the laboratory within two weeks of specimen   collection to request quantification by a second analytical   technique. Interpretive questions should be directed to the   laboratory. Results based on immunoassay detection that do not match clinical   expectations should be   interpreted with caution. Confirmatory testing by mass   spectrometry for immunoassay-based results is available, if   ordered within two weeks of specimen collection. Additional   charges apply. For medical purposes only; not valid for forensic use. This test was developed and its performance characteristics   determined by [employer] The U.S. Food and Drug   Administration has not approved or cleared this test; however, FDA   clearance or approval is not currently required for clinical use.    The results are not intended to be used as the sole means for   clinical diagnosis or patient management decisions. EER Hi Res Interp Ur See Note    Final 03/28/2018  8:40 AM [facility]   (NOTE)   Access [facility] Enhanced Report using either link below:   -Direct access: [email or web address]   -Enter Username, Password: [email or web address]   Username: [email or web address] Password: [email or web address]   Performed by [facility] [address] [place] [address] [phone]   www. Harpal Michele MD, Lab. Director   Performed at [facility]: [facility]IA[facility] KHAN [address] [place] [address] [phone]                          Past Medical History:   Diagnosis Date    Arthritis     Left knee pain 4/9/2014    Teeth problem     abcessw       Past Surgical History:   Procedure Laterality Date    CARPAL TUNNEL RELEASE Bilateral     DENTAL SURGERY  10/2015    JOINT REPLACEMENT Left     KNEE  X 6 pt unsure of date    JOINT REPLACEMENT Right 07/20/2018    ROTATOR CUFF REPAIR Right     TONSILLECTOMY         Allergies   Allergen Reactions    Oxycontin [Oxycodone Hcl] Nausea Only         Current Outpatient Medications:     [START ON 5/2/2019] oxyCODONE-acetaminophen (PERCOCET) 5-325 MG per tablet, Take 1 tablet by mouth every 12 hours as needed for Pain for up to 30 days. , Disp: 60 tablet, Rfl: 0    [START ON 5/2/2019] morphine (MS CONTIN) 30 MG extended release tablet, Take 1 tablet by mouth every 12 hours for 30 days. , Disp: 60 tablet, Rfl: 0    gabapentin (NEURONTIN) 600 MG tablet, Take 1 tablet by mouth 3 times daily for 30 days. ., Disp: 90 tablet, Rfl: 3    ibuprofen (IBU) 800 MG tablet, Take 1 tablet by mouth every 8 hours as needed for Pain, Disp: 90 tablet, Rfl: 2    cloNIDine (CATAPRES) 0.1 MG tablet, Take 1 tablet by mouth 2 times daily, Disp: 60 tablet, Rfl: 3    melatonin 3 MG TABS tablet, Take 6 mg by mouth nightly as needed (insomnia), Disp: , Rfl:     Family History   Problem Relation Age of Onset    Diabetes Mother     No Known Problems Father        Social History     Socioeconomic History    Marital status:      Spouse name: Not on file    Number of children: Not on file    Years of education: Not on file    Highest education level: Not on file   Occupational History    Occupation: retired   Social Needs    Financial resource strain: Not on file    Food insecurity:     Worry: Not on file     Inability: Not on file   [email or web address] needs:     Medical: Not on file     Non-medical: Not on file   Tobacco Use    Smoking status: Never Smoker    Smokeless tobacco: Never Used   Substance and Sexual Activity    Alcohol use: No    Drug use: No    Sexual activity: Never   Lifestyle    Physical activity:     Days per week: Not on file     Minutes per session: Not on file    Stress: Not on file   Relationships    Social connections:     Talks on phone: Not on file     Gets together: Not on file     Attends Buddhism service: Not on file     Active member of club or organization: Not on file     Attends meetings of clubs or organizations: Not on file     Relationship status: Not on file    Intimate partner violence:     Fear of current or ex partner: Not on file     Emotionally abused: Not on file     Physically abused: Not on file     Forced sexual activity: Not on file   Other Topics Concern    Not on file   Social History Narrative    Not on file       Review of Systems:  Review of Systems   Constitution: Negative. HENT: Negative. Eyes:        Glasses   Cardiovascular: Negative. Respiratory: Negative. Endocrine: Negative. Hematologic/Lymphatic: Negative. Skin: Negative. Musculoskeletal: Positive for joint pain. Gastrointestinal: Negative. Genitourinary: Negative. Neurological: Positive for loss of balance. Psychiatric/Behavioral: Negative.           Physical Exam:  BP (!) 144/103   Pulse 79   Temp 98 °F (36.7 °C)   Resp 16   Ht 5' 7\" (1.702 m)   Wt 221 lb (100.2 kg)   SpO2 98%   BMI 34.61 kg/m²     Physical Exam 5/2/2019)    Encounter for medication monitoring    Relevant Medications    oxyCODONE-acetaminophen (PERCOCET) 5-325 MG per tablet (Start on 5/2/2019)    Chronic pain of left knee    Relevant Medications    oxyCODONE-acetaminophen (PERCOCET) 5-325 MG per tablet (Start on 5/2/2019)    morphine (MS CONTIN) 30 MG extended release tablet (Start on 5/2/2019)    Arthritis of right knee    Relevant Medications    oxyCODONE-acetaminophen (PERCOCET) 5-325 MG per tablet (Start on 5/2/2019)    morphine (MS CONTIN) 30 MG extended release tablet (Start on 5/2/2019)      Other Visit Diagnoses     Osteoarthritis of both knees, unspecified osteoarthritis type        Relevant Medications    oxyCODONE-acetaminophen (PERCOCET) 5-325 MG per tablet (Start on 5/2/2019)    morphine (MS CONTIN) 30 MG extended release tablet (Start on 5/2/2019)                Treatment Plan:  DISCUSSION: Treatment options discussed withpatient and all questions answered to patient's satisfaction. Possible side effects, risk of tolerance and or dependence and alternative treatments discussed    Obtaining appropriate analgesic effect of treatment   No signs of potential drug abuse or diversion identified    [x] Ill effects of being on chronic pain medications such as sleep disturbances, respiratory depression, hormonal changes, withdrawal symptoms, chronic opioid dependence and tolerance as well as risk of taking opioids with Benzodiazepines and taking opioids along with alcohol,  werediscussed with patient. I had asked the patient to minimize medication use and utilize pain medications only for uncontrolled rest pain or pain with exertional activities. I advised patient not to self-escalate painmedications without consulting with us. At each of patient's future visits we will try to taper pain medications, while adjusting the adjunct medications, and re-evaluating for Physical Therapy to improve spinal andjoint strength.  We will continue to have

## 2019-05-22 ENCOUNTER — OFFICE VISIT (OUTPATIENT)
Dept: INTERNAL MEDICINE CLINIC | Age: 69
End: 2019-05-22
Payer: MEDICARE

## 2019-05-22 VITALS
HEART RATE: 95 BPM | OXYGEN SATURATION: 98 % | BODY MASS INDEX: 35.41 KG/M2 | DIASTOLIC BLOOD PRESSURE: 90 MMHG | SYSTOLIC BLOOD PRESSURE: 130 MMHG | RESPIRATION RATE: 24 BRPM | WEIGHT: 225.6 LBS | HEIGHT: 67 IN

## 2019-05-22 DIAGNOSIS — I10 ESSENTIAL HYPERTENSION: ICD-10-CM

## 2019-05-22 DIAGNOSIS — H40.023 OPEN ANGLE WITH BORDERLINE FINDINGS AND HIGH GLAUCOMA RISK IN BOTH EYES: ICD-10-CM

## 2019-05-22 DIAGNOSIS — Z98.890 S/P KNEE SURGERY: ICD-10-CM

## 2019-05-22 DIAGNOSIS — E78.2 MIXED HYPERLIPIDEMIA: Primary | ICD-10-CM

## 2019-05-22 DIAGNOSIS — H35.3130 BILATERAL NONEXUDATIVE AGE-RELATED MACULAR DEGENERATION, UNSPECIFIED STAGE: ICD-10-CM

## 2019-05-22 DIAGNOSIS — H26.9 CORTICAL CATARACT OF BOTH EYES: ICD-10-CM

## 2019-05-22 DIAGNOSIS — H35.363 DEGENERATIVE DRUSEN OF BOTH EYES: ICD-10-CM

## 2019-05-22 DIAGNOSIS — H40.1131 PRIMARY OPEN ANGLE GLAUCOMA OF BOTH EYES, MILD STAGE: ICD-10-CM

## 2019-05-22 DIAGNOSIS — Z79.891 USE OF OPIATES FOR THERAPEUTIC PURPOSES: ICD-10-CM

## 2019-05-22 DIAGNOSIS — M17.0 PRIMARY OSTEOARTHRITIS OF BOTH KNEES: ICD-10-CM

## 2019-05-22 DIAGNOSIS — Z23 NEED FOR PROPHYLACTIC VACCINATION AGAINST STREPTOCOCCUS PNEUMONIAE (PNEUMOCOCCUS): ICD-10-CM

## 2019-05-22 DIAGNOSIS — F51.04 PSYCHOPHYSIOLOGICAL INSOMNIA: ICD-10-CM

## 2019-05-22 DIAGNOSIS — R73.03 PRE-DIABETES: ICD-10-CM

## 2019-05-22 PROBLEM — T84.54XA INFECTION OF PROSTHETIC LEFT KNEE JOINT (HCC): Status: RESOLVED | Noted: 2017-06-01 | Resolved: 2019-05-22

## 2019-05-22 PROBLEM — R29.898 WEAKNESS OF LEFT LEG: Status: RESOLVED | Noted: 2018-02-06 | Resolved: 2019-05-22

## 2019-05-22 PROBLEM — M17.11 ARTHRITIS OF RIGHT KNEE: Status: RESOLVED | Noted: 2017-06-01 | Resolved: 2019-05-22

## 2019-05-22 PROCEDURE — G0009 ADMIN PNEUMOCOCCAL VACCINE: HCPCS | Performed by: FAMILY MEDICINE

## 2019-05-22 PROCEDURE — 99214 OFFICE O/P EST MOD 30 MIN: CPT | Performed by: FAMILY MEDICINE

## 2019-05-22 PROCEDURE — 90732 PPSV23 VACC 2 YRS+ SUBQ/IM: CPT | Performed by: FAMILY MEDICINE

## 2019-05-22 RX ORDER — CLONIDINE HYDROCHLORIDE 0.1 MG/1
0.1 TABLET ORAL 2 TIMES DAILY
Qty: 180 TABLET | Refills: 1 | Status: SHIPPED | OUTPATIENT
Start: 2019-05-22 | End: 2019-11-28 | Stop reason: SDUPTHER

## 2019-05-22 ASSESSMENT — PATIENT HEALTH QUESTIONNAIRE - PHQ9
SUM OF ALL RESPONSES TO PHQ QUESTIONS 1-9: 0
2. FEELING DOWN, DEPRESSED OR HOPELESS: 0
1. LITTLE INTEREST OR PLEASURE IN DOING THINGS: 0
SUM OF ALL RESPONSES TO PHQ QUESTIONS 1-9: 0
SUM OF ALL RESPONSES TO PHQ9 QUESTIONS 1 & 2: 0

## 2019-05-22 NOTE — PROGRESS NOTES
Chronic Disease Visit Information    BP Readings from Last 3 Encounters:   05/01/19 (!) 144/103   03/27/19 (!) 139/99   02/27/19 (!) 133/111          Hemoglobin A1C (%)   Date Value   05/23/2018 5.8   06/15/2017 5.9     Microalb/Crt. Ratio (mcg/mg creat)   Date Value   05/23/2018 CANNOT BE CALCULATED     LDL Cholesterol (mg/dL)   Date Value   05/23/2018 115     HDL (mg/dL)   Date Value   05/23/2018 44     BUN (mg/dL)   Date Value   06/15/2017 13     CREATININE (mg/dL)   Date Value   06/15/2017 1.02     Glucose (mg/dL)   Date Value   06/15/2017 85   11/28/2011 115 (H)            Have you changed or started any medications since your last visit including any over-the-counter medicines, vitamins, or herbal medicines? no   Are you having any side effects from any of your medications? -  no  Have you stopped taking any of your medications? Is so, why? -  no    Have you seen any other physician or provider since your last visit? No  Have you had any other diagnostic tests since your last visit? No  Have you been seen in the emergency room and/or had an admission to a hospital since we last saw you? No  Have you had your annual diabetic retinal (eye) exam? No  Have you had your routine dental cleaning in the past 6 months? no    Have you activated your [employer] account? If not, what are your barriers?  No     Patient Care Team:  Ryan Berger MD as PCP - General (Family Medicine)  Ryan Berger MD as PCP - S Attributed Provider  Rita Gutiérrez MD as Consulting Physician (Pain Management)  Brennen Cruz MD as Consulting Physician         Medical History Review  Past Medical, Family, and Social History reviewed and does not contribute to the patient presenting condition    Health Maintenance   Topic Date Due    Pneumococcal 65+ years Vaccine (2 of 2 - PPSV23) 06/14/2018    Colon Cancer Screen FIT/FOBT  06/29/2018    A1C test (Diabetic or Prediabetic)  05/23/2019    Shingles Vaccine (1 of 2) 06/18/2019 (Originally 11/13/2000)    Flu vaccine (Season Ended) 09/01/2019    Lipid screen  05/23/2023    DTaP/Tdap/Td vaccine (2 - Td) 07/01/2025    Hepatitis C screen  Completed

## 2019-05-22 NOTE — PROGRESS NOTES
Subjective:      Patient ID: Darlyn Soto is a 76 y.o. male. Hypertension   This is a chronic problem. The problem has been waxing and waning since onset. The problem is uncontrolled. Associated symptoms include anxiety. Risk factors for coronary artery disease include stress, sedentary lifestyle, male gender, obesity and dyslipidemia. Past treatments include central alpha agonists. The current treatment provides moderate improvement. Compliance problems include exercise and diet (He has not taken his medication in last couple of months). Review of Systems   Constitutional: Negative. HENT: Negative. Eyes: Negative. Respiratory: Negative. Cardiovascular: Negative. Gastrointestinal: Negative. Endocrine: Negative. Musculoskeletal: Positive for arthralgias, back pain, gait problem and myalgias. Skin: Negative. Allergic/Immunologic: Negative. Hematological: Negative. Psychiatric/Behavioral: The patient is nervous/anxious. Past family and social history unremarkable. Objective:   Physical Exam   Constitutional: He is oriented to person, place, and time. He appears well-developed and well-nourished. HENT:   Head: Normocephalic and atraumatic. Right Ear: External ear normal.   Left Ear: External ear normal.   Nose: Nose normal.   Mouth/Throat: Oropharynx is clear and moist.   Eyes: Pupils are equal, round, and reactive to light. Conjunctivae and EOM are normal.   Neck: Normal range of motion. Neck supple. Cardiovascular: Normal rate, regular rhythm, normal heart sounds and intact distal pulses. Pulmonary/Chest: Effort normal and breath sounds normal.   Abdominal: Soft. Bowel sounds are normal.   Musculoskeletal: Normal range of motion. Chronic pain syndrome  Degenerative polyarthropathy  Opiate  Dependent, Neurontin is provided by pain management   Neurological: He is alert and oriented to person, place, and time. He has normal reflexes.    Skin: Skin is warm and dry.   Psychiatric:   Mood disorder. Underlying chronic pain syndrome. He denies suicidality   Nursing note and vitals reviewed. Assessment:       Diagnosis Orders   1. Mixed hyperlipidemia  COLOGUARD (For external results only)    CBC    Comprehensive Metabolic Panel    Hemoglobin A1C    Lipid Panel    Urinalysis with Microscopic    TSH without Reflex    Psa screening   2. Primary osteoarthritis of both knees     3. S/P knee surgery     4. Use of opiates for therapeutic purposes     5. Pre-diabetes     6. Bilateral nonexudative age-related macular degeneration, unspecified stage     7. Need for prophylactic vaccination against Streptococcus pneumoniae (pneumococcus)  Pneumococcal polysaccharide vaccine 23-valent PPSV23   8. Degenerative drusen of both eyes     9. Cortical cataract of both eyes     10. Open angle with borderline findings and high glaucoma risk in both eyes     11. Primary open angle glaucoma of both eyes, mild stage     12. Essential hypertension     13. Psychophysiological insomnia             Plan:      79-year-old -American male returns for follow-up. Subjective complaint of ongoing chronic pain syndrome. He is on opiate and gabapentin as provided by pain management. Reassurance provided. Continue activity as tolerated with fall precaution. Avoid constipation. Improve oral hydration, for some vegetables, over-the-counter fiber  Hypertension-elevated. Patient stated that he has not been taking his clonidine for couple of months. Compliance is advised. Medication refill provided, consume less than 2 g of salt a day  Bilateral early cataracts. Glaucoma. Continue current topical medications as provided by ophthalmology. Insomnia. Reassurance provided. Continue melatonin. He would benefit from sleep hygiene, stress relaxation  He is updated on influenza, pneumococcal vaccine.   He will be provided with cologuard  Further recommendations to follow labs  Observe and call for any concern  This note is created with a voice recognition program and while intend to generate a document that accurately reflects the content of the visit, no guarantee can be provided that every mistake has been identified and corrected by editing.                 Patsy Love MD

## 2019-05-23 ENCOUNTER — HOSPITAL ENCOUNTER (OUTPATIENT)
Age: 69
Setting detail: SPECIMEN
Discharge: HOME | End: 2019-05-23
Payer: MEDICARE

## 2019-05-23 DIAGNOSIS — E78.2 MIXED HYPERLIPIDEMIA: ICD-10-CM

## 2019-05-23 LAB
-: ABNORMAL
ALBUMIN SERPL-MCNC: 4.2 G/DL (ref 3.5–5.2)
ALBUMIN/GLOBULIN RATIO: 1.4 (ref 1–2.5)
ALP BLD-CCNC: 61 U/L (ref 40–129)
ALT SERPL-CCNC: 14 U/L (ref 5–41)
AMORPHOUS: ABNORMAL
ANION GAP SERPL CALCULATED.3IONS-SCNC: 13 MMOL/L (ref 9–17)
AST SERPL-CCNC: 17 U/L
BACTERIA: ABNORMAL
BILIRUB SERPL-MCNC: 0.76 MG/DL (ref 0.3–1.2)
BILIRUBIN URINE: NEGATIVE
BUN BLDV-MCNC: 15 MG/DL (ref 8–23)
BUN/CREAT BLD: NORMAL (ref 9–20)
CALCIUM SERPL-MCNC: 8.9 MG/DL (ref 8.6–10.4)
CASTS UA: ABNORMAL /LPF (ref 0–8)
CHLORIDE BLD-SCNC: 103 MMOL/L (ref 98–107)
CHOLESTEROL/HDL RATIO: 4.7
CHOLESTEROL: 187 MG/DL
CO2: 27 MMOL/L (ref 20–31)
COLOR: YELLOW
CREAT SERPL-MCNC: 1 MG/DL (ref 0.7–1.2)
CRYSTALS, UA: ABNORMAL /HPF
EPITHELIAL CELLS UA: ABNORMAL /HPF (ref 0–5)
GFR AFRICAN AMERICAN: >60 ML/MIN
GFR NON-AFRICAN AMERICAN: >60 ML/MIN
GFR SERPL CREATININE-BSD FRML MDRD: NORMAL ML/MIN/{1.73_M2}
GFR SERPL CREATININE-BSD FRML MDRD: NORMAL ML/MIN/{1.73_M2}
GLUCOSE BLD-MCNC: 87 MG/DL (ref 70–99)
GLUCOSE URINE: NEGATIVE
HCT VFR BLD CALC: 37.6 % (ref 40.7–50.3)
HDLC SERPL-MCNC: 40 MG/DL
HEMOGLOBIN: 11.5 G/DL (ref 13–17)
KETONES, URINE: NEGATIVE
LDL CHOLESTEROL: 127 MG/DL (ref 0–130)
LEUKOCYTE ESTERASE, URINE: ABNORMAL
MCH RBC QN AUTO: 27.6 PG (ref 25.2–33.5)
MCHC RBC AUTO-ENTMCNC: 30.6 G/DL (ref 28.4–34.8)
MCV RBC AUTO: 90.4 FL (ref 82.6–102.9)
MUCUS: ABNORMAL
NITRITE, URINE: POSITIVE
NRBC AUTOMATED: 0 PER 100 WBC
OTHER OBSERVATIONS UA: ABNORMAL
PDW BLD-RTO: 12.9 % (ref 11.8–14.4)
PH UA: 5.5 (ref 5–8)
PLATELET # BLD: 178 K/UL (ref 138–453)
PMV BLD AUTO: 10.6 FL (ref 8.1–13.5)
POTASSIUM SERPL-SCNC: 4.6 MMOL/L (ref 3.7–5.3)
PROSTATE SPECIFIC ANTIGEN: 3.16 UG/L
PROTEIN UA: NEGATIVE
RBC # BLD: 4.16 M/UL (ref 4.21–5.77)
RBC UA: ABNORMAL /HPF (ref 0–4)
RENAL EPITHELIAL, UA: ABNORMAL /HPF
SODIUM BLD-SCNC: 143 MMOL/L (ref 135–144)
SPECIFIC GRAVITY UA: 1.02 (ref 1–1.03)
TOTAL PROTEIN: 7.3 G/DL (ref 6.4–8.3)
TRICHOMONAS: ABNORMAL
TRIGL SERPL-MCNC: 102 MG/DL
TSH SERPL DL<=0.05 MIU/L-ACNC: 1.15 MIU/L (ref 0.3–5)
TURBIDITY: CLEAR
URINE HGB: ABNORMAL
UROBILINOGEN, URINE: NORMAL
VLDLC SERPL CALC-MCNC: ABNORMAL MG/DL (ref 1–30)
WBC # BLD: 5.4 K/UL (ref 3.5–11.3)
WBC UA: ABNORMAL /HPF (ref 0–5)
YEAST: ABNORMAL

## 2019-05-24 ENCOUNTER — TELEPHONE (OUTPATIENT)
Dept: INTERNAL MEDICINE CLINIC | Age: 69
End: 2019-05-24

## 2019-05-24 LAB
ESTIMATED AVERAGE GLUCOSE: 117 MG/DL
HBA1C MFR BLD: 5.7 % (ref 4–6)

## 2019-05-24 RX ORDER — CIPROFLOXACIN 500 MG/1
500 TABLET, FILM COATED ORAL 2 TIMES DAILY
Qty: 14 TABLET | Refills: 0 | Status: SHIPPED | OUTPATIENT
Start: 2019-05-24 | End: 2019-05-31

## 2019-05-24 NOTE — TELEPHONE ENCOUNTER
----- Message from Jamaica Cole MD sent at 5/23/2019  2:57 PM EDT -----  UTInotify patient  Cipro 500 mg by mouth twice a day for 7 days

## 2019-05-29 ENCOUNTER — HOSPITAL ENCOUNTER (OUTPATIENT)
Dept: PAIN MANAGEMENT | Age: 69
Discharge: HOME | End: 2019-05-29
Payer: MEDICARE

## 2019-05-29 VITALS
OXYGEN SATURATION: 97 % | DIASTOLIC BLOOD PRESSURE: 65 MMHG | BODY MASS INDEX: 35.31 KG/M2 | SYSTOLIC BLOOD PRESSURE: 128 MMHG | TEMPERATURE: 97.5 F | HEIGHT: 67 IN | HEART RATE: 56 BPM | RESPIRATION RATE: 20 BRPM | WEIGHT: 225 LBS

## 2019-05-29 DIAGNOSIS — Z98.890 S/P LEFT KNEE SURGERY: ICD-10-CM

## 2019-05-29 DIAGNOSIS — R29.898 WEAKNESS OF LEFT LEG: ICD-10-CM

## 2019-05-29 DIAGNOSIS — M25.562 CHRONIC PAIN OF LEFT KNEE: ICD-10-CM

## 2019-05-29 DIAGNOSIS — Z79.891 USE OF OPIATES FOR THERAPEUTIC PURPOSES: ICD-10-CM

## 2019-05-29 DIAGNOSIS — Z98.890 S/P KNEE SURGERY: Primary | ICD-10-CM

## 2019-05-29 DIAGNOSIS — Z51.81 ENCOUNTER FOR MEDICATION MONITORING: ICD-10-CM

## 2019-05-29 DIAGNOSIS — Z51.81 MEDICATION MONITORING ENCOUNTER: ICD-10-CM

## 2019-05-29 DIAGNOSIS — G89.29 CHRONIC PAIN OF LEFT KNEE: ICD-10-CM

## 2019-05-29 DIAGNOSIS — M17.0 PRIMARY OSTEOARTHRITIS OF BOTH KNEES: ICD-10-CM

## 2019-05-29 DIAGNOSIS — Z98.890 S/P KNEE SURGERY: ICD-10-CM

## 2019-05-29 DIAGNOSIS — M17.11 PRIMARY OSTEOARTHRITIS OF RIGHT KNEE: ICD-10-CM

## 2019-05-29 DIAGNOSIS — M17.0 OSTEOARTHRITIS OF BOTH KNEES, UNSPECIFIED OSTEOARTHRITIS TYPE: ICD-10-CM

## 2019-05-29 PROCEDURE — 99213 OFFICE O/P EST LOW 20 MIN: CPT

## 2019-05-29 PROCEDURE — 99213 OFFICE O/P EST LOW 20 MIN: CPT | Performed by: NURSE PRACTITIONER

## 2019-05-29 RX ORDER — OXYCODONE HYDROCHLORIDE AND ACETAMINOPHEN 5; 325 MG/1; MG/1
1 TABLET ORAL EVERY 8 HOURS PRN
Qty: 90 TABLET | Refills: 0 | Status: SHIPPED | OUTPATIENT
Start: 2019-06-01 | End: 2019-06-26 | Stop reason: SDUPTHER

## 2019-05-29 RX ORDER — MORPHINE SULFATE 30 MG/1
30 TABLET, FILM COATED, EXTENDED RELEASE ORAL EVERY 12 HOURS
Qty: 60 TABLET | Refills: 0 | Status: SHIPPED | OUTPATIENT
Start: 2019-06-01 | End: 2019-06-26 | Stop reason: SDUPTHER

## 2019-05-29 ASSESSMENT — ENCOUNTER SYMPTOMS
GASTROINTESTINAL NEGATIVE: 1
RESPIRATORY NEGATIVE: 1

## 2019-05-29 NOTE — PROGRESS NOTES
[facility] 89 PROGRESS NOTE      Patient here today to review Medication Agreement    Chief Complaint:knee pain      HPI: He c/o bilateral knee pain  . Pain has increased. He had right knee replaced last year but continues to have pain. He does pool therapy a few times a week. It helps while in it. It is hard for him to walk because of the pain. He has a part time job. He is not sleeping well due to the pain. No Ed visits,      Knee Pain    The incident occurred more than 1 week ago. There was no injury mechanism. The pain is present in the right knee and left knee (radiates to right leg). The pain is at a severity of 10/10. The pain has been worsening since onset. Associated symptoms include muscle weakness. Foreign body present: joint replacement. The symptoms are aggravated by weight bearing. He has tried ice for the symptoms. The treatment provided mild relief. Patient denies any new neurological symptoms. No bowel or bladder incontinence, no weakness, and no falling. Treatment goals:  Functional status: get off meds        Aberrancy:   Any alcoholic beverages  no     Any illegal drugs   no        Analgesia:pain 10      Adverse  Effects :BM daily     ADL;s :goes to gym                 Pill count: appropriate    Morphine equivalent dose as reported on OARRS:75  Attestation: The Prescription Monitoring Report for this patient was reviewed today. QUIQUE Marshall CNP)  Chronic Pain Routine Monitoring: No signs of potential drug abuse or diversion identified: otherwise, see note documentation, Obtaining appropriate analgesic effect of treatment. (QUIQUE Marshall CNP)  Chronic Pain > 80 MEDD: Obtained or confirmed a written medication contract was on file. QUIQUE Marshall CNP)  Review ofOARRS does not show any aberrant prescription behavior. Medication is helping the patient stay active. Patient denies any side effects and reports adequate analgesia.  No sign of misuse/abuse.                 When was the last UDS:  3-28-18           Was the UDS appropriate:yes        Record/Diagnostics Review:       As above, I did review the imaging     4/1/2018  1:59 PM - Estefani Madrid Incoming Lab Results From [facility]      Component Results      Component Value Ref Range & Units Status Collected Lab   Pain Management Drug Panel Interp, Urine Consistent    Final 03/28/2018  8:40 AM [facility]   (NOTE)   ________________________________________________________________   DRUGS EXPECTED:   OXYCODONE [3/28/18]   MORPHINE   ________________________________________________________________   CONSISTENT with medications provided:   OXYCODONE : based on oxycodone, noroxycodone, oxymorphone   MORPHINE: based on morphine   ________________________________________________________________   INTERPRETIVE INFORMATION: Pain Mgt [place], Mass Spec/EMIT, Ur,                            Interp   Interpretation depends on accuracy and completeness of patient   medication information submitted by client. 6-Acetylmorphine, Ur Not Detected    Final 03/28/2018  8:40 AM [facility]   7-Aminoclonazepam, Urine Not Detected    Final 03/28/2018  8:40 AM [facility]   Alpha-OH-Alpraz, Urine Not Detected    Final 03/28/2018  8:40 AM [facility]   Alprazolam, Urine Not Detected    Final 03/28/2018  8:40 AM [facility]   Amphetamines, urine Not Detected    Final 03/28/2018  8:40 AM [facility]   Barbiturates, Ur Not Detected    Final 03/28/2018  8:40 AM [facility]   Benzoylecgonine, Ur Not Detected    Final 03/28/2018  8:40 AM [facility]   Buprenorphine Urine Not Detected    Final 03/28/2018  8:40 AM [facility]   Carisoprodol, Ur Not Detected    Final 03/28/2018  8:40 AM [facility]   (NOTE)   The carisoprodol immunoassay has cross-reactivity to carisoprodol   and meprobamate.     Clonazepam, Urine Not Detected    Final 03/28/2018  8:40 AM [facility]   Codeine, Urine Not Detected    Final 03/28/2018  8:40 AM [facility]   MDA, Ur Not Detected    Final 03/28/2018  8:40 AM [facility]   Diazepam, Urine Not Detected    Final 03/28/2018  8:40 AM [facility]   Ethyl Glucuronide Ur Not Detected    Final 03/28/2018  8:40 AM [facility]   Fentanyl, Ur Not Detected    Final 03/28/2018  8:40 AM [facility]   Hydrocodone, Urine Not Detected    Final 03/28/2018  8:40 AM [facility]   Hydromorphone, Urine Not Detected    Final 03/28/2018  8:40 AM [facility]   Lorazepam, Urine Not Detected    Final 03/28/2018  8:40 AM [facility]   Marijuana Metab, Ur Not Detected    Final 03/28/2018  8:40 AM [facility]   MDEA, GRACIELA, Ur Not Detected    Final 03/28/2018  8:40 AM [facility]   MDMA, Urine Not Detected    Final 03/28/2018  8:40 AM [facility]   Meperidine Metab, Ur Not Detected    Final 03/28/2018  8:40 AM [facility]   Methadone, Urine Not Detected    Final 03/28/2018  8:40 AM [facility]   Methamphetamine, Urine Not Detected    Final 03/28/2018  8:40 AM [facility]   Methylphenidate Not Detected    Final 03/28/2018  8:40 AM [facility]   Midazolam, Urine Not Detected    Final 03/28/2018  8:40 AM [facility]   Morphine Urine Present    Final 03/28/2018  8:40 AM [facility]   Norbuprenorphine, Urine Not Detected    Final 03/28/2018  8:40 AM [facility]   Nordiazepam, Urine Not Detected    Final 03/28/2018  8:40 AM [facility]   Norfentanyl, Urine Not Detected    Final 03/28/2018  8:40 AM [facility]   NORHYDROCODONE, URINE Not Detected    Final 03/28/2018  8:40 AM [facility]   Noroxycodone, Urine Present    Final 03/28/2018  8:40 AM [facility]   NOROXYMORPHONE, URINE Not Detected    Final 03/28/2018  8:40 AM [facility]   Oxazepam, Urine Not Detected    Final 03/28/2018  8:40 AM [facility]   Oxycodone Urine Present    Final 03/28/2018  8:40 AM [facility]   Oxymorphone, Urine Present    Final 03/28/2018  8:40 AM [facility]   PCP, Urine Not Detected    Final 03/28/2018  8:40 AM [facility]   Phentermine, Ur Not Detected    Final 03/28/2018  8:40 AM [facility]   Propoxyphene, Urine Not Detected    Final 03/28/2018  8:40 AM [facility]   Tapentadol-O-Sulfate, Urine Not Detected    Final 03/28/2018  8:40 AM [facility]   Tapentadol, Urine Not Detected    Final 03/28/2018  8:40 AM [facility]   Temazepam, Urine Not Detected    patient management decisions. EER Hi Res Interp Ur See Note    Final 03/28/2018  8:40 AM [facility]   (NOTE)   Access [facility] Enhanced Report using either link below:   -Direct access: [email or web address]   -Enter Username, Password: [email or web address]   Username: [email or web address] Password: [email or web address]   Performed by [facility] [address] [place] [address] [phone]   www. Bard Kelly MD, Lab. Director   Performed at [facility]: DIAZ KHAN [address] [place] [address] [phone]                           Past Medical History:   Diagnosis Date    Arthritis     Left knee pain 4/9/2014    Teeth problem     abcessw       Past Surgical History:   Procedure Laterality Date    CARPAL TUNNEL RELEASE Bilateral     DENTAL SURGERY  10/2015    JOINT REPLACEMENT Left     KNEE  X 6 pt unsure of date    JOINT REPLACEMENT Right 07/20/2018    ROTATOR CUFF REPAIR Right     TONSILLECTOMY         Allergies   Allergen Reactions    Oxycontin [Oxycodone Hcl] Nausea Only         Current Outpatient Medications:     [START ON 6/1/2019] oxyCODONE-acetaminophen (PERCOCET) 5-325 MG per tablet, Take 1 tablet by mouth every 8 hours as needed for Pain for up to 30 days. , Disp: 90 tablet, Rfl: 0    [START ON 6/1/2019] morphine (MS CONTIN) 30 MG extended release tablet, Take 1 tablet by mouth every 12 hours for 30 days. , Disp: 60 tablet, Rfl: 0    ciprofloxacin (CIPRO) 500 MG tablet, Take 1 tablet by mouth 2 times daily for 7 days, Disp: 14 tablet, Rfl: 0    cloNIDine (CATAPRES) 0.1 MG tablet, Take 1 tablet by mouth 2 times daily, Disp: 180 tablet, Rfl: 1    gabapentin (NEURONTIN) 600 MG tablet, Take 1 tablet by mouth 3 times daily for 30 days. ., Disp: 90 tablet, Rfl: 3    ibuprofen (IBU) 800 MG tablet, Take 1 tablet by mouth every 8 hours as needed for Pain, Disp: 90 tablet, Rfl: 2    melatonin 3 MG TABS tablet, Take 6 mg by mouth nightly as needed (insomnia), Disp: , Rfl:     Family History   Problem Relation Age of Onset    Diabetes Mother     No Known Problems Father        Social History     Socioeconomic History    Marital status:      Spouse name: Not on file    Number of children: Not on file    Years of education: Not on file    Highest education level: Not on file   Occupational History    Occupation: retired   Social Needs    Financial resource strain: Not on file    Food insecurity:     Worry: Not on file     Inability: Not on file   [employer] needs:     Medical: Not on file     Non-medical: Not on file   Tobacco Use    Smoking status: Never Smoker    Smokeless tobacco: Never Used   Substance and Sexual Activity    Alcohol use: No    Drug use: No    Sexual activity: Never   Lifestyle    Physical activity:     Days per week: Not on file     Minutes per session: Not on file    Stress: Not on file   Relationships    Social connections:     Talks on phone: Not on file     Gets together: Not on file     Attends [employer] service: Not on file     Active member of club or organization: Not on file     Attends meetings of clubs or organizations: Not on file     Relationship status: Not on file    Intimate partner violence:     Fear of current or ex partner: Not on file     Emotionally abused: Not on file     Physically abused: Not on file     Forced sexual activity: Not on file   Other Topics Concern    Not on file   Social History Narrative    Not on file       Review of Systems:  Review of Systems   Constitution: Negative. HENT: Negative. Eyes:        Glasses   Cardiovascular: Negative. Respiratory: Negative. Endocrine: Negative. Hematologic/Lymphatic: Negative. Skin: Negative. Musculoskeletal: Positive for joint pain. Gastrointestinal: Negative. Genitourinary: Negative. Neurological: Positive for loss of balance. Psychiatric/Behavioral: Negative.           Physical Exam:  /65   Pulse 56   Temp 97.5 °F (36.4 °C) (Oral)   Resp 20   Ht 5' 7\" (1.702 m)   Wt 225 lb (102.1 kg)   SpO2 97%   BMI 35.24 kg/m²     Physical Exam   Constitutional: He appears well-developed. Neck: Normal range of motion. Neck supple. Musculoskeletal:        Right knee: He exhibits deformity. Tenderness found. Medial joint line and lateral joint line tenderness noted. Left knee: He exhibits decreased range of motion and deformity. Tenderness found. Neurological: He is alert. He has normal strength. Gait abnormal.   Reflex Scores:       Patellar reflexes are 0 on the right side and 0 on the left side. Achilles reflexes are 2+ on the right side and 2+ on the left side. Skin:        Psychiatric: He has a normal mood and affect.  His speech is normal and behavior is normal. Judgment and thought content normal. Cognition and memory are normal.         Assessment:      Problem List Items Addressed This Visit     Use of opiates for therapeutic purposes    S/P knee surgery - Primary    Relevant Medications    oxyCODONE-acetaminophen (PERCOCET) 5-325 MG per tablet (Start on 6/1/2019)    Primary osteoarthritis of both knees    Relevant Medications    oxyCODONE-acetaminophen (PERCOCET) 5-325 MG per tablet (Start on 6/1/2019)    morphine (MS CONTIN) 30 MG extended release tablet (Start on 6/1/2019)      Other Visit Diagnoses     Osteoarthritis of both knees, unspecified osteoarthritis type        Relevant Medications    oxyCODONE-acetaminophen (PERCOCET) 5-325 MG per tablet (Start on 6/1/2019)    morphine (MS CONTIN) 30 MG extended release tablet (Start on 6/1/2019)    Medication monitoring encounter        Relevant Medications    oxyCODONE-acetaminophen (PERCOCET) 5-325 MG per tablet (Start on 6/1/2019)    Weakness of left leg        Relevant Medications    oxyCODONE-acetaminophen (PERCOCET) 5-325 MG per tablet (Start on 6/1/2019)    Encounter for medication monitoring        Relevant Medications    oxyCODONE-acetaminophen (PERCOCET) 5-325 MG per tablet (Start on 6/1/2019)    S/P left knee surgery        Relevant Medications    oxyCODONE-acetaminophen (PERCOCET) 5-325 MG per tablet (Start on 6/1/2019)          Treatment Plan:  DISCUSSION: Treatment options discussed withpatient and all questions answered to patient's satisfaction. Possible side effects, risk of tolerance and or dependence and alternative treatments discussed    Obtaining appropriate analgesic effect of treatment   No signs of potential drug abuse or diversion identified    [x] Ill effects of being on chronic pain medications such as sleep disturbances, respiratory depression, hormonal changes, withdrawal symptoms, chronic opioid dependence and tolerance as well as risk of taking opioids with Benzodiazepines and taking opioids along with alcohol,  werediscussed with patient. I had asked the patient to minimize medication use and utilize pain medications only for uncontrolled rest pain or pain with exertional activities. I advised patient not to self-escalate painmedications without consulting with us. At each of patient's future visits we will try to taper pain medications, while adjusting the adjunct medications, and re-evaluating for Physical Therapy to improve spinal andjoint strength. We will continue to have discussions to decrease pain medications as tolerated. Counseled patient on effects their pain medication and /or their medical condition mayhave on their  ability to drive or operate machinery. Instructed not to drive or operate machinery if drowsy     I also discussed with the patient regarding the dangers of combining narcotic pain medication with tranquilizers, alcohol or illegal drugs or taking the medication any way other than prescribed. The dangers were discussed  including respiratory depression and death. Patient was told to tell  all  physicians regarding the medications he is getting from pain clinic.  Patient is warned not to take any unprescribed medications over-the-countermedications that can depress breathing . Patient is advised to talk to the pharmacist or physicians if planning to take any over-the-counter medications before  takeing them. Patient is strongly advised to avoid tranquilizers or  relaxants, illegal drugs  or any medications that can depress breathing  Patient is also advised to tell us if there is any changes in their medications from other physicians. 1. Will give samples flector patches for knee pain  2.  Pain worsened, I told him I would increase his percocet to 3 a day for this month to get his pain under control and evaluate next visit    TREATMENT OPTIONS:     Return in 4 weeks  Medication Agreement Requirements Met  Continue Opioid therapy  Script written for percocet, ms contin, samples flector patches  Follow up appointment made

## 2019-06-13 ENCOUNTER — TELEPHONE (OUTPATIENT)
Dept: INTERNAL MEDICINE CLINIC | Age: 69
End: 2019-06-13

## 2019-06-13 DIAGNOSIS — R19.5 POSITIVE COLORECTAL CANCER SCREENING USING COLOGUARD TEST: Primary | ICD-10-CM

## 2019-06-26 ENCOUNTER — HOSPITAL ENCOUNTER (OUTPATIENT)
Dept: PAIN MANAGEMENT | Age: 69
Discharge: HOME | End: 2019-06-26
Payer: MEDICARE

## 2019-06-26 VITALS
SYSTOLIC BLOOD PRESSURE: 142 MMHG | DIASTOLIC BLOOD PRESSURE: 62 MMHG | HEART RATE: 64 BPM | BODY MASS INDEX: 31.39 KG/M2 | WEIGHT: 200 LBS | RESPIRATION RATE: 16 BRPM | HEIGHT: 67 IN | TEMPERATURE: 98.2 F

## 2019-06-26 DIAGNOSIS — Z79.891 USE OF OPIATES FOR THERAPEUTIC PURPOSES: ICD-10-CM

## 2019-06-26 DIAGNOSIS — G89.29 CHRONIC PAIN OF LEFT KNEE: ICD-10-CM

## 2019-06-26 DIAGNOSIS — R29.898 WEAKNESS OF LEFT LEG: ICD-10-CM

## 2019-06-26 DIAGNOSIS — M17.12 PRIMARY OSTEOARTHRITIS OF LEFT KNEE: ICD-10-CM

## 2019-06-26 DIAGNOSIS — M17.11 PRIMARY OSTEOARTHRITIS OF RIGHT KNEE: ICD-10-CM

## 2019-06-26 DIAGNOSIS — M25.562 CHRONIC PAIN OF LEFT KNEE: ICD-10-CM

## 2019-06-26 DIAGNOSIS — Z98.890 S/P KNEE SURGERY: ICD-10-CM

## 2019-06-26 DIAGNOSIS — M17.0 OSTEOARTHRITIS OF BOTH KNEES, UNSPECIFIED OSTEOARTHRITIS TYPE: ICD-10-CM

## 2019-06-26 DIAGNOSIS — M17.0 PRIMARY OSTEOARTHRITIS OF BOTH KNEES: ICD-10-CM

## 2019-06-26 DIAGNOSIS — Z98.890 S/P LEFT KNEE SURGERY: ICD-10-CM

## 2019-06-26 DIAGNOSIS — Z51.81 ENCOUNTER FOR MEDICATION MONITORING: ICD-10-CM

## 2019-06-26 DIAGNOSIS — Z51.81 MEDICATION MONITORING ENCOUNTER: ICD-10-CM

## 2019-06-26 PROCEDURE — 99213 OFFICE O/P EST LOW 20 MIN: CPT

## 2019-06-26 PROCEDURE — 99214 OFFICE O/P EST MOD 30 MIN: CPT | Performed by: PAIN MEDICINE

## 2019-06-26 PROCEDURE — 80307 DRUG TEST PRSMV CHEM ANLYZR: CPT

## 2019-06-26 RX ORDER — MORPHINE SULFATE 30 MG/1
30 TABLET, FILM COATED, EXTENDED RELEASE ORAL EVERY 12 HOURS
Qty: 60 TABLET | Refills: 0 | Status: SHIPPED | OUTPATIENT
Start: 2019-07-01 | End: 2019-07-24 | Stop reason: SDUPTHER

## 2019-06-26 RX ORDER — NALOXONE HYDROCHLORIDE 4 MG/.1ML
1 SPRAY NASAL PRN
Qty: 1 EACH | Refills: 0 | Status: SHIPPED | OUTPATIENT
Start: 2019-06-26 | End: 2020-08-26 | Stop reason: SDUPTHER

## 2019-06-26 RX ORDER — GABAPENTIN 600 MG/1
600 TABLET ORAL 3 TIMES DAILY
Qty: 90 TABLET | Refills: 3 | Status: SHIPPED | OUTPATIENT
Start: 2019-06-26 | End: 2019-10-29 | Stop reason: SDUPTHER

## 2019-06-26 RX ORDER — OXYCODONE HYDROCHLORIDE AND ACETAMINOPHEN 5; 325 MG/1; MG/1
1 TABLET ORAL EVERY 8 HOURS PRN
Qty: 90 TABLET | Refills: 0 | Status: SHIPPED | OUTPATIENT
Start: 2019-07-01 | End: 2019-07-24 | Stop reason: SDUPTHER

## 2019-06-26 ASSESSMENT — PAIN DESCRIPTION - ORIENTATION: ORIENTATION: RIGHT;LEFT

## 2019-06-26 ASSESSMENT — PAIN DESCRIPTION - LOCATION: LOCATION: KNEE;LEG

## 2019-06-26 ASSESSMENT — ENCOUNTER SYMPTOMS
EYE PAIN: 0
CHEST TIGHTNESS: 0
RESPIRATORY NEGATIVE: 1
PHOTOPHOBIA: 0
EYE DISCHARGE: 0
CONSTIPATION: 0
TROUBLE SWALLOWING: 0
EYES NEGATIVE: 1
COUGH: 0
GASTROINTESTINAL NEGATIVE: 1
SORE THROAT: 0

## 2019-06-26 ASSESSMENT — PAIN SCALES - GENERAL: PAINLEVEL_OUTOF10: 6

## 2019-06-26 ASSESSMENT — PAIN DESCRIPTION - ONSET: ONSET: ON-GOING

## 2019-06-26 ASSESSMENT — PAIN DESCRIPTION - PROGRESSION: CLINICAL_PROGRESSION: NOT CHANGED

## 2019-06-26 ASSESSMENT — PAIN DESCRIPTION - PAIN TYPE: TYPE: CHRONIC PAIN

## 2019-06-26 ASSESSMENT — PAIN - FUNCTIONAL ASSESSMENT: PAIN_FUNCTIONAL_ASSESSMENT: PREVENTS OR INTERFERES WITH ALL ACTIVE AND SOME PASSIVE ACTIVITIES

## 2019-06-26 NOTE — PROGRESS NOTES
[address] Pain Management  Patient Pain Assessment  RECHECK - Dr. Christiana Herbert    Primary Care Physician: Ethan Hyman MD    Chief complaint:   Chief Complaint   Patient presents with    Knee Pain   . HISTORY OF PRESENT ILLNESS:  Cathy Kumar is 76 y.o. male with    . Patient returns to the pain clinic with a chief complaint of pain involving the knees bilaterally. Patient had bilateral knee replacements done in the past.  Patient reports there is not much change in his pain since his last visit. He had several surgeries on his knees including arthroscopies followed by total knee joint replacements. Patient has been on MS Contin along with Percocet for pain control he reports medications are helping the pain. He has been seen by orthopedic surgeon last week for his knee pain is getting worse on the right side. Patient reports he is exercising on a regular basis and doing pool therapy. Knee Pain    Incident onset: Years in duration. Injury mechanism: Had bilateral total knee joint replacements. The pain is present in the right knee and left knee. The pain is at a severity of 4/10. The pain is moderate. The pain has been constant since onset. Associated symptoms include an inability to bear weight. The symptoms are aggravated by movement and weight bearing. Patient relates current medications are helping the pain. Patient reports taking pain medications as prescribed, denies obtaining medications from different sources and denies use of illegal drugs. Patient denies side effects from medications like nausea, vomiting, constipation or drowsiness. Patient reports current activities of daily living ar possible due to medications and would like to continue them. RX Monitoring 6/26/2019   Attestation -   Acute Pain Prescriptions Prescription exceeds daily limit for a specific reason. See comments or note. ;Severe pain not adequately treated with lower dose. Periodic Controlled Substance Monitoring Possible medication side effects, risk of tolerance/dependence & alternative treatments discussed. ;No signs of potential drug abuse or diversion identified. ;Assessed functional status. ;Random urine drug screen sent today. Chronic Pain > 50 MEDD Re-evaluated the status of the patient's underlying condition causing pain.;Obtained or confirmed \"Consent for Opioid Use\" on file. Chronic Pain > 80 MEDD Co-prescribed Naloxone.;Obtained or confirmed \"Medication Contract\" on file. Current Pain Assessment  Pain Assessment  Pain Assessment: 0-10  Pain Level: 6  Patient's Stated Pain Goal: 2(manage pain better, & more activiites)  Pain Type: Chronic pain  Pain Location: Knee, Leg  Pain Orientation: Right, Left  Pain Radiating Towards: none  Pain Descriptors: Aching, Constant, Burning, Throbbing, Stabbing  Pain Onset: On-going  Clinical Progression: Not changed  Effect of Pain on Daily Activities: unable to walk w/o pain  Functional Pain Assessment: Prevents or interferes with all active and some passive activities  Non-Pharmaceutical Pain Intervention(s): Rest, Cold applied                    ADVERSE MEDICATION EFFECTS:   Constipation: no  Bowel Regimen: Yes  Diet: common adult  Appetite:  ok  Sedation:  no  Urinary Retention: no    FOCUSED PAINSCALE:  Highest : 4  Lowest :4  Average: Range-4  When and What  was your last procedure:    none  Was your procedure effective:  not applicable    ACTIVITY/SOCIAL/EMOTIONAL:  Sleep Pattern: 4 hours per night. difficulty falling asleep  Energy Level:  Tired/Fatigued  Currently attending Physical Therapy:  No  Home Exercises: daily gym pool, knee,   Mobility: pain w/walking  Do you use assistive devices?   Yes, cane  Have you fallen in the last 30 days?:  No  Currently seeing a Psychiatrist or Psychologist:  No  Emotional Issues: normal   Mood: frustrated     ABERRANT BEHAVIORS SINCE LAST VISIT:  Have you ever been treated in another Pain Clinic no  Refills for prescriptions appropriate: yes  Lost rx/pills:no  Taking more medication than prescribed:  no  Are you receiving PAIN medications from  other doctors: no  Last Urine/Serum Drug Screen : 3/28/18  Was Serum/UDS as anticipated? yes  Brought pill bottles in :yes   Was Pill count appropriate? :yes   Are currently pregnant?not applicable  Recent ER visits: No             Past Medical History      Diagnosis Date    Arthritis     Left knee pain 4/9/2014    Teeth problem     abcessw       Surgical History  Past Surgical History:   Procedure Laterality Date    CARPAL TUNNEL RELEASE Bilateral     DENTAL SURGERY  10/2015    JOINT REPLACEMENT Left     KNEE  X 6 pt unsure of date    JOINT REPLACEMENT Right 07/20/2018    ROTATOR CUFF REPAIR Right     TONSILLECTOMY         Medications  Current Outpatient Medications   Medication Sig Dispense Refill    [START ON 7/1/2019] oxyCODONE-acetaminophen (PERCOCET) 5-325 MG per tablet Take 1 tablet by mouth every 8 hours as needed for Pain for up to 30 days. 90 tablet 0    [START ON 7/1/2019] morphine (MS CONTIN) 30 MG extended release tablet Take 1 tablet by mouth every 12 hours for 30 days. 60 tablet 0    gabapentin (NEURONTIN) 600 MG tablet Take 1 tablet by mouth 3 times daily for 30 days. 90 tablet 3    naloxone (NARCAN) 4 MG/0.1ML LIQD nasal spray 1 spray by Nasal route as needed for Opioid Reversal 1 each 0    diclofenac (FLECTOR) 1.3 % patch Place 1 patch onto the skin 2 times daily 4 patch 0    cloNIDine (CATAPRES) 0.1 MG tablet Take 1 tablet by mouth 2 times daily 180 tablet 1    ibuprofen (IBU) 800 MG tablet Take 1 tablet by mouth every 8 hours as needed for Pain 90 tablet 2    melatonin 3 MG TABS tablet Take 6 mg by mouth nightly as needed (insomnia)       No current facility-administered medications for this encounter. Allergies  Oxycontin [oxycodone hcl]    Family History  family history includes Diabetes in his mother;  No Skin: Negative. Negative for rash. Allergic/Immunologic: Positive for food allergies (peanuts/hives). Neurological: Negative for headaches. Hematological: Negative. Does not bruise/bleed easily. Psychiatric/Behavioral: Negative. Negative for confusion and suicidal ideas. The patient is not nervous/anxious. GENERAL PHYSICAL EXAM:  Vitals: BP (!) 142/62   Pulse 64   Temp 98.2 °F (36.8 °C) (Oral)   Resp 16   Ht 5' 7\" (1.702 m)   Wt 200 lb (90.7 kg)   BMI 31.32 kg/m² , Body mass index is 31.32 kg/m². Physical Exam   Constitutional: He is oriented to person, place, and time. He appears well-developed and well-nourished. HENT:   Head: Normocephalic and atraumatic. Eyes: Pupils are equal, round, and reactive to light. Conjunctivae and EOM are normal.   Neck: Normal range of motion. Neck supple. No tracheal deviation present. No thyromegaly present. Cardiovascular: Normal rate and regular rhythm. Pulmonary/Chest: Effort normal and breath sounds normal.   Abdominal: Soft. Bowel sounds are normal. He exhibits no distension. There is no tenderness. Musculoskeletal: He exhibits no edema. Right knee: He exhibits no effusion. Left knee: He exhibits no effusion. Neurological: He is alert and oriented to person, place, and time. He has normal strength. He displays no atrophy and no tremor. No cranial nerve deficit or sensory deficit. He exhibits normal muscle tone. Coordination normal.   Reflex Scores:       Patellar reflexes are 0 on the right side and 0 on the left side. Achilles reflexes are 1+ on the right side and 1+ on the left side. Skin: Skin is warm and dry. Psychiatric: He has a normal mood and affect. His speech is normal and behavior is normal. Judgment and thought content normal.    Right Knee Exam     Tenderness   The patient is experiencing tenderness in the lateral joint line and medial joint line.     Range of Motion   Right knee extension: Painful and limited. Right knee flexion: Painful and limited. Other   Erythema: absent  Scars: present  Sensation: normal  Swelling: mild  Effusion: no effusion present      Left Knee Exam     Tenderness   The patient is experiencing tenderness in the medial joint line and lateral joint line. Range of Motion   Left knee extension: Painful and limited. Left knee flexion: Painful and limited. Other   Erythema: absent  Scars: present  Sensation: normal  Swelling: mild  Effusion: no effusion present            Nurses Notes and Vital Signs reviewed.     DATA  Labs:  Benzodiazepines   Date Value Ref Range Status   07/03/2013 NEGATIVE NEG Final     Comment:           (Positive cutoff 200 ng/mL)                 Benzodiazepine Screen, Urine   Date Value Ref Range Status   03/05/2014 NEGATIVE NEG Final     Comment:           (Positive cutoff 200 ng/mL)                  5/23/2019  3:06 PM - Julius, [facility] Incoming Lab Results From Valuation App     Component Value Ref Range & Units Status Collected Lab   Glucose 87  70 - 99 mg/dL Final 05/23/2019  9:23  [address]   BUN 15  8 - 23 mg/dL Final 05/23/2019  9:23  [address] 1.00  0.70 - 1.20 mg/dL Final 05/23/2019  9:23  [address]   Bun/Cre Ratio NOT REPORTED  9 - 20 Final 05/23/2019  9:23  [address]   Calcium 8.9  8.6 - 10.4 mg/dL Final 05/23/2019  9:23  [address]   Sodium 143  135 - 144 mmol/L Final 05/23/2019  9:23  [address]   Potassium 4.6  3.7 - 5.3 mmol/L Final 05/23/2019  9:23  [address]   Chloride 103  98 - 107 mmol/L Final 05/23/2019  9:23  [address]   CO2 27  20 - 31 mmol/L Final 05/23/2019  9:23  [address]   Anion Gap 13  9 - 17 mmol/L Final 05/23/2019  9:23  [address]   Alkaline Phosphatase 61  40 - 129 U/L Final 05/23/2019  9:23 AM [facility]   ALT 14  5 - 41 U/L Final 05/23/2019  9:23  [address]   AST 17  <40 U/L Final 05/23/2019  9:23  [address]   Total Bilirubin 0.76  0.3 - 1.2 mg/dL Final 05/23/2019  9:23  [address]   Total Protein 7.3  6.4 - 8.3 g/dL Final 05/23/2019  9:23  [address]   Alb 4.2  3.5 - 5.2 g/dL Final 05/23/2019  9:23  [address]   Albumin/Globulin Ratio 1.4  1.0 - 2.5 Final 05/23/2019  9:23  [address]   GFR Non-African American >60  >60 mL/min Final 05/23/2019  9:23  [address]   GFR African American >60  >60 mL/min Final         Imaging:  Radiology Images and Reports reviewed where indicated and necessary  HISTORY:    Right knee arthroplasty. 3 views the right knee were obtained. Patient status post right knee arthroplasty. Alignment is anatomic. No joint effusion. IMPRESSION:    Satisfactory post operative knee. Workstation:[id number]    Finalized by Abdifatah Martell MD on 12/18/2018   Patient Active Problem List   Diagnosis    S/P left knee surgery    Medication monitoring encounter    Encounter for medication monitoring    Primary osteoarthritis of both knees    Use of opiates for therapeutic purposes    Pre-diabetes    Dyslipidemia    Osteoarthritis of both knees    Age-related macular degeneration, dry, both eyes    Cortical cataract of both eyes    Drusen (degenerative) of retina    Open angle with borderline findings and high glaucoma risk in both eyes    Primary open angle glaucoma of both eyes, mild stage    Weakness of left leg    Mixed hyperlipidemia    Need for prophylactic vaccination against Streptococcus pneumoniae (pneumococcus)    Essential hypertension    Psychophysiological insomnia        ASSESSMENT    Darlyn Soto is a 76 y.o. male with     1. Osteoarthritis of both knees, unspecified osteoarthritis type    2.  Medication monitoring advised patient not to self escalate pain medications without consulting with us. At each of patient's future visits we will try to taper pain medications, while adjusting the adjunct medications, and re-evaluating for Physical Therapy to improve spinal and joint strength. We will continue to have discussions to decrease pain medications as tolerated. I also discussed with the patient regarding the dangers of combining narcotic pain medication with tranquilizers, alcohol or illegal drugs or taking the medication any other than prescribed. The dangers including the respiratory depression and death. Patient was told to tell  to all  physicians regarding the medications he is getting from pain clinic. Patient is warned not to take any unprescribed medications over-the-counter medications that can depress breathing . Patient is advised to talk to the pharmacist or physicians if planning to take any over-the-counter medications before  takeing them. Patient is strongly advised to avoid tranquilizers or  Relaxants for any medications that can depress breathing or recreational drugs. Patient is also advised to tell us if there is any changes in his medications from other physicians. We discussed the same at today's visit and have not been to implement it, as the patient's pain is not under control with current medications. I discussed with the patient regarding his other options for pain control including genicular nerve blocks and possible RFA for long-term pain relief. Patient is not keen on interventions at this time hence we will continue him on the current medications. Orders Placed This Encounter   Procedures    DRUG SCREEN, PAIN     Standing Status:   Standing     Number of Occurrences:   1       Decision Making Process : Patient's health history and referral records thoroughly reviewed before focused physical examination and discussion with patient.  I have spent 25 Over 50% of today's visit is spent on examining the patient and counseling and coordinating the care. Level of complexity of date to be reviewed is Moderate. The chart date reviewed include the following: Imaging Reports. Summary of Care. Time spent reviewing with patient the below reports:   Medication safety, Treatment options. Level of diagnosis and management options of this case is multiple: involving the following management options: Interventions as needed, medication management as appropriate, future visits, activity modification, heat/ice as needed, Urine drug screen as required. [x]The patient's questions were answered to the best of my abilities. This note was created using voice recognition software. There may be inaccuracies of transcription  that are inadvertently overlooked prior to the signature. There is any questions about the transcription please contact me. Return in  4 weeks  with CNP  for further plan of treatment.            Electronically signed by Robyn Dawson MD on 6/26/2019 at 9:54 PM

## 2019-06-27 ENCOUNTER — TELEPHONE (OUTPATIENT)
Dept: GASTROENTEROLOGY | Age: 69
End: 2019-06-27

## 2019-06-29 LAB
6-ACETYLMORPHINE, UR: NOT DETECTED
7-AMINOCLONAZEPAM, URINE: NOT DETECTED
ALPHA-OH-ALPRAZ, URINE: NOT DETECTED
ALPRAZOLAM, URINE: NOT DETECTED
AMPHETAMINES, URINE: NOT DETECTED
BARBITURATES, URINE: NOT DETECTED
BENZOYLECGONINE, UR: NOT DETECTED
BUPRENORPHINE URINE: NOT DETECTED
CARISOPRODOL, UR: NOT DETECTED
CLONAZEPAM, URINE: NOT DETECTED
CODEINE, URINE: NOT DETECTED
CREATININE URINE: 158.4 MG/DL (ref 20–400)
DIAZEPAM, URINE: NOT DETECTED
DRUGS EXPECTED, UR: NORMAL
EER HI RES INTERP UR: NORMAL
ETHYL GLUCURONIDE UR: NOT DETECTED
FENTANYL URINE: NOT DETECTED
HYDROCODONE, URINE: NOT DETECTED
HYDROMORPHONE, URINE: PRESENT
LORAZEPAM, URINE: NOT DETECTED
MARIJUANA METAB, UR: NOT DETECTED
MDA, UR: NOT DETECTED
MDEA, EVE, UR: NOT DETECTED
MDMA URINE: NOT DETECTED
MEPERIDINE METAB, UR: NOT DETECTED
METHADONE, URINE: NOT DETECTED
METHAMPHETAMINE, URINE: NOT DETECTED
METHYLPHENIDATE: NOT DETECTED
MIDAZOLAM, URINE: NOT DETECTED
MORPHINE URINE: PRESENT
NORBUPRENORPHINE, URINE: NOT DETECTED
NORDIAZEPAM, URINE: NOT DETECTED
NORFENTANYL, URINE: NOT DETECTED
NORHYDROCODONE, URINE: NOT DETECTED
NOROXYCODONE, URINE: PRESENT
NOROXYMORPHONE, URINE: PRESENT
OXAZEPAM, URINE: NOT DETECTED
OXYCODONE URINE: PRESENT
OXYMORPHONE, URINE: PRESENT
PAIN MANAGEMENT DRUG PANEL INTERP, URINE: NORMAL
PAIN MGT DRUG PANEL, HI RES, UR: NORMAL
PCP,URINE: NOT DETECTED
PHENTERMINE, UR: NOT DETECTED
PROPOXYPHENE, URINE: NOT DETECTED
TAPENTADOL, URINE: NOT DETECTED
TAPENTADOL-O-SULFATE, URINE: NOT DETECTED
TEMAZEPAM, URINE: NOT DETECTED
TRAMADOL, URINE: NOT DETECTED
ZOLPIDEM, URINE: NOT DETECTED

## 2019-07-01 RX ORDER — IBUPROFEN 800 MG/1
TABLET ORAL
Qty: 90 TABLET | Refills: 1 | Status: SHIPPED | OUTPATIENT
Start: 2019-07-01 | End: 2019-09-12 | Stop reason: SDUPTHER

## 2019-07-24 ENCOUNTER — HOSPITAL ENCOUNTER (OUTPATIENT)
Dept: PAIN MANAGEMENT | Age: 69
Discharge: HOME | End: 2019-07-24
Payer: MEDICARE

## 2019-07-24 VITALS
DIASTOLIC BLOOD PRESSURE: 83 MMHG | TEMPERATURE: 98.1 F | HEART RATE: 69 BPM | HEIGHT: 67 IN | SYSTOLIC BLOOD PRESSURE: 114 MMHG | BODY MASS INDEX: 31.39 KG/M2 | RESPIRATION RATE: 16 BRPM | WEIGHT: 200 LBS | OXYGEN SATURATION: 96 %

## 2019-07-24 DIAGNOSIS — Z51.81 ENCOUNTER FOR MEDICATION MONITORING: ICD-10-CM

## 2019-07-24 DIAGNOSIS — M17.0 OSTEOARTHRITIS OF BOTH KNEES, UNSPECIFIED OSTEOARTHRITIS TYPE: ICD-10-CM

## 2019-07-24 DIAGNOSIS — Z98.890 S/P LEFT KNEE SURGERY: Primary | ICD-10-CM

## 2019-07-24 DIAGNOSIS — Z51.81 MEDICATION MONITORING ENCOUNTER: ICD-10-CM

## 2019-07-24 DIAGNOSIS — M17.0 PRIMARY OSTEOARTHRITIS OF BOTH KNEES: ICD-10-CM

## 2019-07-24 DIAGNOSIS — R29.898 WEAKNESS OF LEFT LEG: ICD-10-CM

## 2019-07-24 PROCEDURE — 99213 OFFICE O/P EST LOW 20 MIN: CPT | Performed by: NURSE PRACTITIONER

## 2019-07-24 PROCEDURE — 99213 OFFICE O/P EST LOW 20 MIN: CPT

## 2019-07-24 RX ORDER — MORPHINE SULFATE 30 MG/1
30 TABLET, FILM COATED, EXTENDED RELEASE ORAL EVERY 12 HOURS
Qty: 60 TABLET | Refills: 0 | Status: SHIPPED | OUTPATIENT
Start: 2019-07-31 | End: 2019-08-28 | Stop reason: SDUPTHER

## 2019-07-24 RX ORDER — OXYCODONE HYDROCHLORIDE AND ACETAMINOPHEN 5; 325 MG/1; MG/1
1 TABLET ORAL EVERY 8 HOURS PRN
Qty: 90 TABLET | Refills: 0 | Status: SHIPPED | OUTPATIENT
Start: 2019-07-31 | End: 2019-08-28 | Stop reason: SDUPTHER

## 2019-07-24 ASSESSMENT — ENCOUNTER SYMPTOMS
RESPIRATORY NEGATIVE: 1
GASTROINTESTINAL NEGATIVE: 1

## 2019-07-24 NOTE — PROGRESS NOTES
Physical Exam:  /83   Pulse 69   Temp 98.1 °F (36.7 °C) (Oral)   Resp 16   Ht 5' 7\" (1.702 m)   Wt 200 lb (90.7 kg)   SpO2 96%   BMI 31.32 kg/m²     Physical Exam   Constitutional: He appears well-developed. HENT:   Head: Normocephalic. Neck: Normal range of motion. Neck supple. Pulmonary/Chest: Effort normal.   Musculoskeletal:        Right knee: He exhibits deformity. Tenderness found. Medial joint line and lateral joint line tenderness noted. Cervical back: He exhibits tenderness. Lumbar back: He exhibits tenderness. Neurological: He is alert. He has normal strength. Gait abnormal.   Reflex Scores:       Patellar reflexes are 0 on the right side and 0 on the left side. Achilles reflexes are 2+ on the right side and 2+ on the left side. Ambulates with a cane   Skin: Skin is warm, dry and intact. Psychiatric: He has a normal mood and affect.  His speech is normal and behavior is normal. Judgment and thought content normal. Cognition and memory are normal.         Assessment:    Problem List Items Addressed This Visit     Weakness of left leg    Relevant Medications    oxyCODONE-acetaminophen (PERCOCET) 5-325 MG per tablet (Start on 7/31/2019)    S/P left knee surgery - Primary    Relevant Medications    oxyCODONE-acetaminophen (PERCOCET) 5-325 MG per tablet (Start on 7/31/2019)    Primary osteoarthritis of both knees    Relevant Medications    oxyCODONE-acetaminophen (PERCOCET) 5-325 MG per tablet (Start on 7/31/2019)    morphine (MS CONTIN) 30 MG extended release tablet (Start on 7/31/2019)    Osteoarthritis of both knees    Relevant Medications    oxyCODONE-acetaminophen (PERCOCET) 5-325 MG per tablet (Start on 7/31/2019)    morphine (MS CONTIN) 30 MG extended release tablet (Start on 7/31/2019)    Medication monitoring encounter    Relevant Medications    oxyCODONE-acetaminophen (PERCOCET) 5-325 MG per tablet (Start on 7/31/2019)    Encounter for medication

## 2019-08-28 ENCOUNTER — HOSPITAL ENCOUNTER (OUTPATIENT)
Dept: PAIN MANAGEMENT | Age: 69
Discharge: HOME | End: 2019-08-28
Payer: MEDICARE

## 2019-08-28 VITALS
HEART RATE: 57 BPM | WEIGHT: 200 LBS | RESPIRATION RATE: 20 BRPM | TEMPERATURE: 97.6 F | HEIGHT: 67 IN | OXYGEN SATURATION: 97 % | BODY MASS INDEX: 31.39 KG/M2 | SYSTOLIC BLOOD PRESSURE: 140 MMHG | DIASTOLIC BLOOD PRESSURE: 87 MMHG

## 2019-08-28 DIAGNOSIS — R29.898 WEAKNESS OF LEFT LEG: ICD-10-CM

## 2019-08-28 DIAGNOSIS — Z98.890 S/P LEFT KNEE SURGERY: Primary | ICD-10-CM

## 2019-08-28 DIAGNOSIS — Z51.81 ENCOUNTER FOR MEDICATION MONITORING: ICD-10-CM

## 2019-08-28 DIAGNOSIS — M17.0 OSTEOARTHRITIS OF BOTH KNEES, UNSPECIFIED OSTEOARTHRITIS TYPE: ICD-10-CM

## 2019-08-28 DIAGNOSIS — M17.0 PRIMARY OSTEOARTHRITIS OF BOTH KNEES: ICD-10-CM

## 2019-08-28 DIAGNOSIS — Z51.81 MEDICATION MONITORING ENCOUNTER: ICD-10-CM

## 2019-08-28 PROCEDURE — 99213 OFFICE O/P EST LOW 20 MIN: CPT | Performed by: NURSE PRACTITIONER

## 2019-08-28 PROCEDURE — 99213 OFFICE O/P EST LOW 20 MIN: CPT

## 2019-08-28 RX ORDER — MORPHINE SULFATE 30 MG/1
30 TABLET, FILM COATED, EXTENDED RELEASE ORAL EVERY 12 HOURS
Qty: 60 TABLET | Refills: 0 | Status: SHIPPED | OUTPATIENT
Start: 2019-08-31 | End: 2019-09-27 | Stop reason: SDUPTHER

## 2019-08-28 RX ORDER — OXYCODONE HYDROCHLORIDE AND ACETAMINOPHEN 5; 325 MG/1; MG/1
1 TABLET ORAL EVERY 8 HOURS PRN
Qty: 90 TABLET | Refills: 0 | Status: SHIPPED | OUTPATIENT
Start: 2019-08-31 | End: 2019-09-27 | Stop reason: SDUPTHER

## 2019-08-28 ASSESSMENT — ENCOUNTER SYMPTOMS
GASTROINTESTINAL NEGATIVE: 1
RESPIRATORY NEGATIVE: 1

## 2019-08-28 NOTE — PROGRESS NOTES
[facility] PROGRESS NOTE      Patient here today to review Medication Agreement    Chief Complaint: knee pain      HPI: He c/o bilateral knee pain. He had right knee replaced last summer continues to have pain. He has had multiple surgeries on his left knee. He uses a cane to assist with ambulation. He still exercises, he goes to a  Gym. He states  about 4 1/2 hours of sleep. No Ed visits. Knee Pain    There was no injury mechanism. The pain is present in the left knee, right knee and left leg. Quality: left knee, sharp, right knee burns. The pain is at a severity of 6/10. The pain is moderate. The pain has been constant since onset. Associated symptoms include muscle weakness. Foreign body present: knee replacement. Exacerbated by: walking. He has tried ice and NSAIDs for the symptoms. The treatment provided mild relief. Patient denies any new neurological symptoms. No bowel or bladder incontinence, no weakness, and no falling. Treatment goals:  Functional status: get off meds        Aberrancy:   Any alcoholic beverages  no     Any illegal drugs   no        Analgesia:pain6     Adverse  Effects :BM daily     ADL;s :goes to gym           Pill count: appropriate      Morphine equivalent dose as reported on [place]:82.50  Periodic Controlled Substance Monitoring: Possible medication side effects, risk of tolerance/dependence & alternative treatments discussed., No signs of potential drug abuse or diversion identified. , Assessed functional status., Obtaining appropriate analgesic effect of treatment. (Vivek Peters, APRN - CNP)  Chronic Pain > 80 MEDD: Co-prescribed Naloxone., Obtained or confirmed \"Medication Contract\" on file. Vivek Peters APRN - CNP)  Review of[place] does not show any aberrant prescription behavior. Medication is helping the patient stay active. Patient denies any side effects and reports adequate analgesia. No sign of misuse/abuse.             As above, I did review the for clinical use. The results are not intended to be used as the sole means for   clinical diagnosis or patient management decisions. EER Hi Res Interp Ur See Note    Final 06/26/2019 10:40 AM [facility]   (NOTE)   Access [facility] Enhanced Report using either link below:   -Direct access: [email or web address]   -Enter Username, Password: [email or web address]   Username: [email or web address]   Password: [email or web address]   Performed by [address] [address] [facility] [phone]   www. Anne Marie Serrano MD, Lab. Director    Testing Performed By                  Past Medical History:   Diagnosis Date    Arthritis     Left knee pain 4/9/2014    Teeth problem     abcessw       Past Surgical History:   Procedure Laterality Date    CARPAL TUNNEL RELEASE Bilateral     DENTAL SURGERY  10/2015    JOINT REPLACEMENT Left     KNEE  X 6 pt unsure of date    JOINT REPLACEMENT Right 07/20/2018    ROTATOR CUFF REPAIR Right     TONSILLECTOMY         Allergies   Allergen Reactions    Oxycontin [Oxycodone Hcl] Nausea Only         Current Outpatient Medications:     [START ON 8/31/2019] oxyCODONE-acetaminophen (PERCOCET) 5-325 MG per tablet, Take 1 tablet by mouth every 8 hours as needed for Pain for up to 30 days. , Disp: 90 tablet, Rfl: 0    [START ON 8/31/2019] morphine (MS CONTIN) 30 MG extended release tablet, Take 1 tablet by mouth every 12 hours for 30 days. , Disp: 60 tablet, Rfl: 0    gabapentin (NEURONTIN) 600 MG tablet, Take 1 tablet by mouth 3 times daily for 30 days. , Disp: 90 tablet, Rfl: 3    cloNIDine (CATAPRES) 0.1 MG tablet, Take 1 tablet by mouth 2 times daily, Disp: 180 tablet, Rfl: 1    ibuprofen (ADVIL;MOTRIN) 800 MG tablet, TAKE ONE TABLET BY MOUTH EVERY 8 HOURS AS NEEDED FOR PAIN, Disp: 90 tablet, Rfl: 1    naloxone (NARCAN) 4 MG/0.1ML LIQD nasal spray, 1 spray by Nasal route as needed for Opioid Reversal, Disp: 1 each, Rfl: 0    melatonin 3 MG TABS tablet, Take 6 mg by mouth nightly as needed (insomnia), Disp: , Rfl:     Family History   Problem Relation Age of Onset    Diabetes Mother     No Known Problems Father        Social History     Socioeconomic History    Marital status:      Spouse name: Not on file    Number of children: Not on file    Years of education: Not on file    Highest education level: Not on file   Occupational History    Occupation: retired   Social Needs    Financial resource strain: Not on file    Food insecurity:     Worry: Not on file     Inability: Not on file   [employer] needs:     Medical: Not on file     Non-medical: Not on file   Tobacco Use    Smoking status: Never Smoker    Smokeless tobacco: Never Used   Substance and Sexual Activity    Alcohol use: No    Drug use: No    Sexual activity: Never   Lifestyle    Physical activity:     Days per week: Not on file     Minutes per session: Not on file    Stress: Not on file   Relationships    Social connections:     Talks on phone: Not on file     Gets together: Not on file     Attends [employer] service: Not on file     Active member of club or organization: Not on file     Attends meetings of clubs or organizations: Not on file     Relationship status: Not on file    Intimate partner violence:     Fear of current or ex partner: Not on file     Emotionally abused: Not on file     Physically abused: Not on file     Forced sexual activity: Not on file   Other Topics Concern    Not on file   Social History Narrative    Not on file       Review of Systems:  Review of Systems   Constitution: Negative. HENT: Negative. Eyes:        Glasses   Respiratory: Negative. Endocrine: Negative. Hematologic/Lymphatic: Negative. Skin: Negative. Musculoskeletal: Positive for joint pain. Gastrointestinal: Negative. Genitourinary: Negative. Neurological: Positive for loss of balance. Cane    Psychiatric/Behavioral: Negative.           Physical Exam:  BP (!) oxyCODONE-acetaminophen (PERCOCET) 5-325 MG per tablet (Start on 8/31/2019)    Encounter for medication monitoring    Relevant Medications    oxyCODONE-acetaminophen (PERCOCET) 5-325 MG per tablet (Start on 8/31/2019)            Treatment Plan:  DISCUSSION: Treatment options discussed withpatient and all questions answered to patient's satisfaction. Possible side effects, risk of tolerance and or dependence and alternative treatments discussed    Obtaining appropriate analgesic effect of treatment   No signs of potential drug abuse or diversion identified    [x] Ill effects of being on chronic pain medications such as sleep disturbances, respiratory depression, hormonal changes, withdrawal symptoms, chronic opioid dependence and tolerance as well as risk of taking opioids with Benzodiazepines and taking opioids along with alcohol,  werediscussed with patient. I had asked the patient to minimize medication use and utilize pain medications only for uncontrolled rest pain or pain with exertional activities. I advised patient not to self-escalate painmedications without consulting with us. At each of patient's future visits we will try to taper pain medications, while adjusting the adjunct medications, and re-evaluating for Physical Therapy to improve spinal andjoint strength. We will continue to have discussions to decrease pain medications as tolerated. Counseled patient on effects their pain medication and /or their medical condition mayhave on their  ability to drive or operate machinery. Instructed not to drive or operate machinery if drowsy     I also discussed with the patient regarding the dangers of combining narcotic pain medication with tranquilizers, alcohol or illegal drugs or taking the medication any way other than prescribed. The dangers were discussed  including respiratory depression and death. Patient was told to tell  all  physicians regarding the medications he is getting from pain clinic.

## 2019-09-12 RX ORDER — IBUPROFEN 800 MG/1
TABLET ORAL
Qty: 90 TABLET | Refills: 1 | Status: SHIPPED | OUTPATIENT
Start: 2019-09-12 | End: 2019-12-18 | Stop reason: SDUPTHER

## 2019-09-13 ENCOUNTER — OFFICE VISIT (OUTPATIENT)
Dept: INTERNAL MEDICINE CLINIC | Age: 69
End: 2019-09-13
Payer: MEDICARE

## 2019-09-13 VITALS
WEIGHT: 216 LBS | SYSTOLIC BLOOD PRESSURE: 100 MMHG | HEART RATE: 64 BPM | BODY MASS INDEX: 33.9 KG/M2 | OXYGEN SATURATION: 97 % | HEIGHT: 67 IN | DIASTOLIC BLOOD PRESSURE: 80 MMHG | RESPIRATION RATE: 15 BRPM

## 2019-09-13 DIAGNOSIS — E78.5 DYSLIPIDEMIA: ICD-10-CM

## 2019-09-13 DIAGNOSIS — R73.03 PRE-DIABETES: ICD-10-CM

## 2019-09-13 DIAGNOSIS — F39 MOOD DISORDER (HCC): ICD-10-CM

## 2019-09-13 DIAGNOSIS — Z79.891 USE OF OPIATES FOR THERAPEUTIC PURPOSES: ICD-10-CM

## 2019-09-13 DIAGNOSIS — Z72.820 SLEEP DEPRIVATION: ICD-10-CM

## 2019-09-13 DIAGNOSIS — Z98.890 S/P LEFT KNEE SURGERY: Primary | ICD-10-CM

## 2019-09-13 DIAGNOSIS — M17.0 PRIMARY OSTEOARTHRITIS OF BOTH KNEES: ICD-10-CM

## 2019-09-13 DIAGNOSIS — I10 ESSENTIAL HYPERTENSION: ICD-10-CM

## 2019-09-13 DIAGNOSIS — M79.609 TRIGGER POINT OF EXTREMITY: ICD-10-CM

## 2019-09-13 DIAGNOSIS — M79.7 FIBROMYALGIA: ICD-10-CM

## 2019-09-13 DIAGNOSIS — F51.04 PSYCHOPHYSIOLOGICAL INSOMNIA: ICD-10-CM

## 2019-09-13 PROBLEM — R29.898 WEAKNESS OF LEFT LEG: Status: RESOLVED | Noted: 2018-02-06 | Resolved: 2019-09-13

## 2019-09-13 PROBLEM — Z23 NEED FOR PROPHYLACTIC VACCINATION AGAINST STREPTOCOCCUS PNEUMONIAE (PNEUMOCOCCUS): Status: RESOLVED | Noted: 2019-05-22 | Resolved: 2019-09-13

## 2019-09-13 PROBLEM — E78.2 MIXED HYPERLIPIDEMIA: Status: RESOLVED | Noted: 2019-05-22 | Resolved: 2019-09-13

## 2019-09-13 PROCEDURE — 99214 OFFICE O/P EST MOD 30 MIN: CPT | Performed by: FAMILY MEDICINE

## 2019-09-13 RX ORDER — DIVALPROEX SODIUM 125 MG/1
125 TABLET, DELAYED RELEASE ORAL 2 TIMES DAILY
Qty: 90 TABLET | Refills: 1 | Status: SHIPPED | OUTPATIENT
Start: 2019-09-13 | End: 2019-12-13 | Stop reason: SDUPTHER

## 2019-09-13 ASSESSMENT — ENCOUNTER SYMPTOMS
BACK PAIN: 1
GASTROINTESTINAL NEGATIVE: 1
ALLERGIC/IMMUNOLOGIC NEGATIVE: 1
EYES NEGATIVE: 1
RESPIRATORY NEGATIVE: 1

## 2019-09-13 NOTE — PROGRESS NOTES
05/23/2020    Creatinine monitoring  05/23/2020    Colon cancer screen fecal DNA test (Cologuard)  06/01/2022    Lipid screen  05/23/2024    DTaP/Tdap/Td vaccine (2 - Td) 07/01/2025    Pneumococcal 65+ years Vaccine  Completed    Hepatitis C screen  Completed
nonpharmacologically treated. Risk factor stratification is advised  Hemodynamically stable  He denies tobacco, excessive alcohol or illicit drug use  Available labs reviewed, discussed with patient, questions answered  He is updated on screening colonoscopy  Further recommendations to follow  Observe and call for any concern  This note is created with a voice recognition program and while intend to generate a document that accurately reflects the content of the visit, no guarantee can be provided that every mistake has been identified and corrected by editing.           Mahnaz Dowling MD

## 2019-09-27 ENCOUNTER — HOSPITAL ENCOUNTER (OUTPATIENT)
Dept: PAIN MANAGEMENT | Age: 69
Discharge: HOME | End: 2019-09-27
Payer: MEDICARE

## 2019-09-27 VITALS
HEIGHT: 67 IN | OXYGEN SATURATION: 94 % | DIASTOLIC BLOOD PRESSURE: 92 MMHG | RESPIRATION RATE: 16 BRPM | WEIGHT: 216 LBS | HEART RATE: 53 BPM | BODY MASS INDEX: 33.9 KG/M2 | SYSTOLIC BLOOD PRESSURE: 139 MMHG

## 2019-09-27 DIAGNOSIS — Z98.890 S/P LEFT KNEE SURGERY: Primary | ICD-10-CM

## 2019-09-27 DIAGNOSIS — Z51.81 ENCOUNTER FOR MEDICATION MONITORING: ICD-10-CM

## 2019-09-27 DIAGNOSIS — M17.0 OSTEOARTHRITIS OF BOTH KNEES, UNSPECIFIED OSTEOARTHRITIS TYPE: ICD-10-CM

## 2019-09-27 DIAGNOSIS — M17.0 PRIMARY OSTEOARTHRITIS OF BOTH KNEES: ICD-10-CM

## 2019-09-27 DIAGNOSIS — R29.898 WEAKNESS OF LEFT LEG: ICD-10-CM

## 2019-09-27 DIAGNOSIS — Z51.81 MEDICATION MONITORING ENCOUNTER: ICD-10-CM

## 2019-09-27 PROCEDURE — 99213 OFFICE O/P EST LOW 20 MIN: CPT

## 2019-09-27 PROCEDURE — 99213 OFFICE O/P EST LOW 20 MIN: CPT | Performed by: NURSE PRACTITIONER

## 2019-09-27 RX ORDER — OXYCODONE HYDROCHLORIDE AND ACETAMINOPHEN 5; 325 MG/1; MG/1
1 TABLET ORAL EVERY 8 HOURS PRN
Qty: 90 TABLET | Refills: 0 | Status: SHIPPED | OUTPATIENT
Start: 2019-09-30 | End: 2019-10-23 | Stop reason: SDUPTHER

## 2019-09-27 RX ORDER — MORPHINE SULFATE 30 MG/1
30 TABLET, FILM COATED, EXTENDED RELEASE ORAL EVERY 12 HOURS
Qty: 60 TABLET | Refills: 0 | Status: SHIPPED | OUTPATIENT
Start: 2019-09-30 | End: 2019-10-23 | Stop reason: SDUPTHER

## 2019-09-27 ASSESSMENT — ENCOUNTER SYMPTOMS
GASTROINTESTINAL NEGATIVE: 1
EYES NEGATIVE: 1
RESPIRATORY NEGATIVE: 1

## 2019-09-27 NOTE — PROGRESS NOTES
[facility]   MDA, Ur Not Detected    Final 06/26/2019 10:40 AM [facility]   Diazepam, Urine Not Detected    Final 06/26/2019 10:40 AM [facility]   Ethyl Glucuronide Ur Not Detected    Final 06/26/2019 10:40 AM [facility]   Fentanyl, Ur Not Detected    Final 06/26/2019 10:40 AM [facility]   Hydrocodone, Urine Not Detected    Final 06/26/2019 10:40 AM [facility]   Hydromorphone, Urine Present    Final 06/26/2019 10:40 AM [facility]   Lorazepam, Urine Not Detected    Final 06/26/2019 10:40 AM [facility]   Marijuana Metab, Ur Not Detected    Final 06/26/2019 10:40 AM [facility]   MDEA, GRACIELA, Ur Not Detected    Final 06/26/2019 10:40 AM [facility]   MDMA, Urine Not Detected    Final 06/26/2019 10:40 AM [facility]   Meperidine Metab, Ur Not Detected    Final 06/26/2019 10:40 AM [facility]   Methadone, Urine Not Detected    Final 06/26/2019 10:40 AM [facility]   Methamphetamine, Urine Not Detected    Final 06/26/2019 10:40 AM [facility]   Methylphenidate Not Detected    Final 06/26/2019 10:40 AM [facility]   Midazolam, Urine Not Detected    Final 06/26/2019 10:40 AM [facility]   Morphine Urine Present    Final 06/26/2019 10:40 AM [facility]   Norbuprenorphine, Urine Not Detected    Final 06/26/2019 10:40 AM [facility]   Nordiazepam, Urine Not Detected    Final 06/26/2019 10:40 AM [facility]   Norfentanyl, Urine Not Detected    Final 06/26/2019 10:40 AM [facility]   NORHYDROCODONE, URINE Not Detected    Final 06/26/2019 10:40 AM [facility]   Noroxycodone, Urine Present    Final 06/26/2019 10:40 AM [facility]   NOROXYMORPHONE, URINE Present    Final 06/26/2019 10:40 AM [facility]   Oxazepam, Urine Not Detected    Final 06/26/2019 10:40 AM [facility]   Oxycodone Urine Present    Final 06/26/2019 10:40 AM [facility]   Oxymorphone, Urine Present    Final 06/26/2019 10:40 AM [facility]   PCP, Urine Not Detected    Final 06/26/2019 10:40 AM [facility]   Phentermine, Ur Not Detected    Final 06/26/2019 10:40 AM [facility]   Propoxyphene, Urine Not Detected    Final 06/26/2019 10:40 AM [facility]   Tapentadol-O-Sulfate, Urine Not Detected    Final 06/26/2019 10:40 AM [facility]   Tapentadol, Urine Not Detected    Final 06/26/2019 10:40 AM [facility]   Temazepam, Urine Not Detected    Final 06/26/2019 10:40 AM [facility]   Tramadol, Urine Not Detected    Final 06/26/2019 10:40 AM [facility]   Zolpidem, Urine Not Detected    Final 06/26/2019 10:40 AM [facility]   Drugs Expected, Ur     Final 06/26/2019 10:40  [facility]   PERCOCET ON 6/26/19, MS GREEN ON 6/26/19    Creatinine, Ur 158.4  20.0 - 400.0 mg/dL Final 06/26/2019 10:40 AM [facility]   Pain Mgt Drug Panel, Hi Res, Ur See Below    Final 06/26/2019 10:40 AM [facility]   (NOTE)   Methodology: Qualitative Enzyme Immunoassay and Qualitative Liquid   Chromatography-Time of Flight-Mass Spectrometry or Tandem Mass   Spectrometry, Quantitative Spectrophotometry   The absence of expected drug(s) and/or drug metabolite(s) may   indicate non-compliance, inappropriate timing of specimen   collection relative to drug administration, poor drug absorption,   diluted/adulterated urine, or limitations of testing. The   concentration must be greater than or equal to the cutoff to be   reported as present.  If specific drug concentrations are   required, contact the laboratory within two weeks of specimen   collection to request quantification by a second analytical   technique. Interpretive questions should be directed to the   laboratory. Results based on immunoassay detection that do not match clinical   expectations should be   interpreted with caution. Confirmatory testing by mass   spectrometry for immunoassay-based results is available, if   ordered within two weeks of specimen collection. Additional   charges apply. For medical purposes only; not valid for forensic use. This test was developed and its performance characteristics   determined by [employer]. The U.S. Food and Drug   Administration has not approved or cleared this test; however, FDA   clearance or approval is not currently required for clinical use.    The results are not intended to be used as the sole

## 2019-10-23 ENCOUNTER — HOSPITAL ENCOUNTER (OUTPATIENT)
Dept: PAIN MANAGEMENT | Age: 69
Discharge: HOME | End: 2019-10-23
Payer: MEDICARE

## 2019-10-23 VITALS
HEIGHT: 67 IN | TEMPERATURE: 98.4 F | SYSTOLIC BLOOD PRESSURE: 119 MMHG | BODY MASS INDEX: 33.9 KG/M2 | RESPIRATION RATE: 16 BRPM | HEART RATE: 52 BPM | OXYGEN SATURATION: 96 % | WEIGHT: 216 LBS | DIASTOLIC BLOOD PRESSURE: 87 MMHG

## 2019-10-23 DIAGNOSIS — M17.0 OSTEOARTHRITIS OF BOTH KNEES, UNSPECIFIED OSTEOARTHRITIS TYPE: ICD-10-CM

## 2019-10-23 DIAGNOSIS — Z51.81 MEDICATION MONITORING ENCOUNTER: ICD-10-CM

## 2019-10-23 DIAGNOSIS — R29.898 WEAKNESS OF LEFT LEG: ICD-10-CM

## 2019-10-23 DIAGNOSIS — Z51.81 ENCOUNTER FOR MEDICATION MONITORING: ICD-10-CM

## 2019-10-23 DIAGNOSIS — Z98.890 S/P LEFT KNEE SURGERY: Primary | ICD-10-CM

## 2019-10-23 DIAGNOSIS — M17.0 PRIMARY OSTEOARTHRITIS OF BOTH KNEES: ICD-10-CM

## 2019-10-23 PROCEDURE — 99213 OFFICE O/P EST LOW 20 MIN: CPT | Performed by: NURSE PRACTITIONER

## 2019-10-23 PROCEDURE — 99213 OFFICE O/P EST LOW 20 MIN: CPT

## 2019-10-23 RX ORDER — MORPHINE SULFATE 30 MG/1
30 TABLET, FILM COATED, EXTENDED RELEASE ORAL EVERY 12 HOURS
Qty: 60 TABLET | Refills: 0 | Status: SHIPPED | OUTPATIENT
Start: 2019-10-31 | End: 2019-11-27 | Stop reason: SDUPTHER

## 2019-10-23 RX ORDER — OXYCODONE HYDROCHLORIDE AND ACETAMINOPHEN 5; 325 MG/1; MG/1
1 TABLET ORAL EVERY 8 HOURS PRN
Qty: 90 TABLET | Refills: 0 | Status: SHIPPED | OUTPATIENT
Start: 2019-10-31 | End: 2019-11-27 | Stop reason: SDUPTHER

## 2019-10-23 ASSESSMENT — ENCOUNTER SYMPTOMS
GASTROINTESTINAL NEGATIVE: 1
RESPIRATORY NEGATIVE: 1

## 2019-10-29 DIAGNOSIS — M17.12 PRIMARY OSTEOARTHRITIS OF LEFT KNEE: ICD-10-CM

## 2019-10-29 DIAGNOSIS — G89.29 CHRONIC PAIN OF LEFT KNEE: ICD-10-CM

## 2019-10-29 DIAGNOSIS — M17.11 PRIMARY OSTEOARTHRITIS OF RIGHT KNEE: ICD-10-CM

## 2019-10-29 DIAGNOSIS — Z98.890 S/P LEFT KNEE SURGERY: ICD-10-CM

## 2019-10-29 DIAGNOSIS — M17.0 OSTEOARTHRITIS OF BOTH KNEES, UNSPECIFIED OSTEOARTHRITIS TYPE: ICD-10-CM

## 2019-10-29 DIAGNOSIS — M25.562 CHRONIC PAIN OF LEFT KNEE: ICD-10-CM

## 2019-10-29 DIAGNOSIS — Z79.891 USE OF OPIATES FOR THERAPEUTIC PURPOSES: ICD-10-CM

## 2019-10-29 DIAGNOSIS — Z51.81 MEDICATION MONITORING ENCOUNTER: ICD-10-CM

## 2019-10-29 DIAGNOSIS — Z51.81 ENCOUNTER FOR MEDICATION MONITORING: ICD-10-CM

## 2019-10-29 DIAGNOSIS — M17.0 PRIMARY OSTEOARTHRITIS OF BOTH KNEES: ICD-10-CM

## 2019-10-29 DIAGNOSIS — Z98.890 S/P KNEE SURGERY: ICD-10-CM

## 2019-10-29 RX ORDER — GABAPENTIN 600 MG/1
TABLET ORAL
Qty: 90 TABLET | Refills: 2 | Status: SHIPPED | OUTPATIENT
Start: 2019-10-29 | End: 2020-02-26 | Stop reason: SDUPTHER

## 2019-11-27 ENCOUNTER — HOSPITAL ENCOUNTER (OUTPATIENT)
Dept: PAIN MANAGEMENT | Age: 69
Discharge: HOME | End: 2019-11-27
Payer: MEDICARE

## 2019-11-27 VITALS
HEIGHT: 67 IN | BODY MASS INDEX: 33.9 KG/M2 | HEART RATE: 65 BPM | RESPIRATION RATE: 16 BRPM | DIASTOLIC BLOOD PRESSURE: 92 MMHG | WEIGHT: 216 LBS | OXYGEN SATURATION: 94 % | TEMPERATURE: 97.4 F | SYSTOLIC BLOOD PRESSURE: 120 MMHG

## 2019-11-27 DIAGNOSIS — Z51.81 ENCOUNTER FOR MEDICATION MONITORING: ICD-10-CM

## 2019-11-27 DIAGNOSIS — Z51.81 MEDICATION MONITORING ENCOUNTER: ICD-10-CM

## 2019-11-27 DIAGNOSIS — M17.0 PRIMARY OSTEOARTHRITIS OF BOTH KNEES: ICD-10-CM

## 2019-11-27 DIAGNOSIS — M79.7 FIBROMYALGIA: ICD-10-CM

## 2019-11-27 DIAGNOSIS — R29.898 WEAKNESS OF LEFT LEG: ICD-10-CM

## 2019-11-27 DIAGNOSIS — M17.0 OSTEOARTHRITIS OF BOTH KNEES, UNSPECIFIED OSTEOARTHRITIS TYPE: ICD-10-CM

## 2019-11-27 DIAGNOSIS — Z98.890 S/P LEFT KNEE SURGERY: Primary | ICD-10-CM

## 2019-11-27 PROCEDURE — 99213 OFFICE O/P EST LOW 20 MIN: CPT | Performed by: NURSE PRACTITIONER

## 2019-11-27 PROCEDURE — 99213 OFFICE O/P EST LOW 20 MIN: CPT

## 2019-11-27 RX ORDER — OXYCODONE HYDROCHLORIDE AND ACETAMINOPHEN 5; 325 MG/1; MG/1
1 TABLET ORAL EVERY 8 HOURS PRN
Qty: 90 TABLET | Refills: 0 | Status: SHIPPED | OUTPATIENT
Start: 2019-12-01 | End: 2019-12-18 | Stop reason: SDUPTHER

## 2019-11-27 RX ORDER — MORPHINE SULFATE 30 MG/1
30 TABLET, FILM COATED, EXTENDED RELEASE ORAL EVERY 12 HOURS
Qty: 60 TABLET | Refills: 0 | Status: SHIPPED | OUTPATIENT
Start: 2019-12-02 | End: 2019-12-18 | Stop reason: SDUPTHER

## 2019-11-27 ASSESSMENT — ENCOUNTER SYMPTOMS
GASTROINTESTINAL NEGATIVE: 1
EYES NEGATIVE: 1
RESPIRATORY NEGATIVE: 1

## 2019-12-02 RX ORDER — CLONIDINE HYDROCHLORIDE 0.1 MG/1
TABLET ORAL
Qty: 180 TABLET | Refills: 0 | Status: SHIPPED | OUTPATIENT
Start: 2019-12-02 | End: 2020-02-28

## 2019-12-17 RX ORDER — DIVALPROEX SODIUM 125 MG/1
TABLET, DELAYED RELEASE ORAL
Qty: 90 TABLET | Refills: 0 | Status: SHIPPED | OUTPATIENT
Start: 2019-12-17 | End: 2020-02-03

## 2019-12-18 ENCOUNTER — HOSPITAL ENCOUNTER (OUTPATIENT)
Dept: PAIN MANAGEMENT | Age: 69
Discharge: HOME | End: 2019-12-18
Payer: MEDICARE

## 2019-12-18 VITALS
DIASTOLIC BLOOD PRESSURE: 89 MMHG | OXYGEN SATURATION: 98 % | HEART RATE: 65 BPM | SYSTOLIC BLOOD PRESSURE: 130 MMHG | RESPIRATION RATE: 16 BRPM | TEMPERATURE: 98.2 F | HEIGHT: 67 IN | WEIGHT: 216 LBS | BODY MASS INDEX: 33.9 KG/M2

## 2019-12-18 DIAGNOSIS — Z98.890 S/P LEFT KNEE SURGERY: Primary | ICD-10-CM

## 2019-12-18 DIAGNOSIS — Z51.81 ENCOUNTER FOR MEDICATION MONITORING: ICD-10-CM

## 2019-12-18 DIAGNOSIS — M17.0 OSTEOARTHRITIS OF BOTH KNEES, UNSPECIFIED OSTEOARTHRITIS TYPE: ICD-10-CM

## 2019-12-18 DIAGNOSIS — R29.898 WEAKNESS OF LEFT LEG: ICD-10-CM

## 2019-12-18 DIAGNOSIS — Z51.81 MEDICATION MONITORING ENCOUNTER: ICD-10-CM

## 2019-12-18 DIAGNOSIS — M79.7 FIBROMYALGIA: ICD-10-CM

## 2019-12-18 DIAGNOSIS — M17.0 PRIMARY OSTEOARTHRITIS OF BOTH KNEES: ICD-10-CM

## 2019-12-18 PROCEDURE — 99213 OFFICE O/P EST LOW 20 MIN: CPT

## 2019-12-18 PROCEDURE — 99214 OFFICE O/P EST MOD 30 MIN: CPT | Performed by: PAIN MEDICINE

## 2019-12-18 RX ORDER — MORPHINE SULFATE 30 MG/1
30 TABLET, FILM COATED, EXTENDED RELEASE ORAL EVERY 12 HOURS
Qty: 60 TABLET | Refills: 0 | Status: SHIPPED | OUTPATIENT
Start: 2019-12-31 | End: 2020-01-29 | Stop reason: SDUPTHER

## 2019-12-18 RX ORDER — IBUPROFEN 800 MG/1
800 TABLET ORAL EVERY 6 HOURS PRN
Qty: 90 TABLET | Refills: 3 | Status: SHIPPED | OUTPATIENT
Start: 2019-12-18 | End: 2020-04-30

## 2019-12-18 RX ORDER — OXYCODONE HYDROCHLORIDE AND ACETAMINOPHEN 5; 325 MG/1; MG/1
1 TABLET ORAL EVERY 8 HOURS PRN
Qty: 90 TABLET | Refills: 0 | Status: SHIPPED | OUTPATIENT
Start: 2019-12-31 | End: 2020-01-29 | Stop reason: SDUPTHER

## 2019-12-18 RX ORDER — NALOXONE HYDROCHLORIDE 4 MG/.1ML
1 SPRAY NASAL PRN
Qty: 1 EACH | Refills: 5 | Status: SHIPPED | OUTPATIENT
Start: 2019-12-18 | End: 2020-01-29

## 2019-12-18 ASSESSMENT — ENCOUNTER SYMPTOMS
ALLERGIC/IMMUNOLOGIC NEGATIVE: 1
RESPIRATORY NEGATIVE: 1
EYES NEGATIVE: 1
GASTROINTESTINAL NEGATIVE: 1

## 2019-12-18 ASSESSMENT — PAIN DESCRIPTION - FREQUENCY: FREQUENCY: CONTINUOUS

## 2019-12-18 ASSESSMENT — PAIN DESCRIPTION - PROGRESSION: CLINICAL_PROGRESSION: NOT CHANGED

## 2019-12-18 ASSESSMENT — PAIN DESCRIPTION - PAIN TYPE: TYPE: CHRONIC PAIN

## 2019-12-18 ASSESSMENT — PAIN DESCRIPTION - LOCATION: LOCATION: KNEE;LEG

## 2019-12-18 ASSESSMENT — PAIN SCALES - GENERAL: PAINLEVEL_OUTOF10: 7

## 2019-12-18 ASSESSMENT — PAIN DESCRIPTION - ORIENTATION: ORIENTATION: RIGHT

## 2019-12-18 ASSESSMENT — PAIN DESCRIPTION - ONSET: ONSET: ON-GOING

## 2019-12-22 ASSESSMENT — ENCOUNTER SYMPTOMS
DIARRHEA: 0
VOMITING: 0
EYE REDNESS: 0
CONSTIPATION: 0
BACK PAIN: 0
EYE PAIN: 0
ABDOMINAL PAIN: 0
SORE THROAT: 0
NAUSEA: 0

## 2020-01-08 ENCOUNTER — OFFICE VISIT (OUTPATIENT)
Dept: INTERNAL MEDICINE CLINIC | Age: 70
End: 2020-01-08
Payer: MEDICARE

## 2020-01-08 VITALS
OXYGEN SATURATION: 97 % | DIASTOLIC BLOOD PRESSURE: 80 MMHG | WEIGHT: 222 LBS | SYSTOLIC BLOOD PRESSURE: 130 MMHG | HEART RATE: 68 BPM | TEMPERATURE: 97 F | BODY MASS INDEX: 34.84 KG/M2 | RESPIRATION RATE: 18 BRPM | HEIGHT: 67 IN

## 2020-01-08 PROBLEM — Z72.820 SLEEP DEPRIVATION: Status: RESOLVED | Noted: 2019-09-13 | Resolved: 2020-01-08

## 2020-01-08 PROBLEM — R29.898 WEAKNESS OF LEFT LEG: Status: RESOLVED | Noted: 2018-02-06 | Resolved: 2020-01-08

## 2020-01-08 PROCEDURE — 99214 OFFICE O/P EST MOD 30 MIN: CPT | Performed by: FAMILY MEDICINE

## 2020-01-08 RX ORDER — ECHINACEA PURPUREA EXTRACT 125 MG
1 TABLET ORAL PRN
Qty: 1 BOTTLE | Refills: 3 | Status: SHIPPED | OUTPATIENT
Start: 2020-01-08 | End: 2020-05-26

## 2020-01-08 ASSESSMENT — PATIENT HEALTH QUESTIONNAIRE - PHQ9
2. FEELING DOWN, DEPRESSED OR HOPELESS: 0
SUM OF ALL RESPONSES TO PHQ9 QUESTIONS 1 & 2: 0
SUM OF ALL RESPONSES TO PHQ QUESTIONS 1-9: 0
1. LITTLE INTEREST OR PLEASURE IN DOING THINGS: 0
SUM OF ALL RESPONSES TO PHQ QUESTIONS 1-9: 0

## 2020-01-08 ASSESSMENT — ENCOUNTER SYMPTOMS
BACK PAIN: 1
EYES NEGATIVE: 1
GASTROINTESTINAL NEGATIVE: 1
ALLERGIC/IMMUNOLOGIC NEGATIVE: 1
SINUS COMPLAINT: 1
RESPIRATORY NEGATIVE: 1

## 2020-01-08 NOTE — PROGRESS NOTES
Subjective:      Patient ID: Cele Angeles is a 71 y.o. male. Sinus Problem   This is a new problem. The current episode started 1 to 4 weeks ago. The problem is unchanged. There has been no fever. His pain is at a severity of 0/10. He is experiencing no pain. Associated symptoms include congestion and sneezing. Past treatments include nothing. The treatment provided no relief. Review of Systems   Constitutional: Negative. HENT: Positive for congestion and sneezing. Eyes: Negative. Respiratory: Negative. Cardiovascular: Negative. Gastrointestinal: Negative. Endocrine: Negative. Musculoskeletal: Positive for arthralgias, back pain and myalgias. Skin: Negative. Allergic/Immunologic: Negative. Neurological: Negative. Hematological: Negative. Psychiatric/Behavioral: Positive for dysphoric mood and sleep disturbance. The patient is nervous/anxious. Past family and social history unremarkable. Diagnosis Orders   1. Seasonal allergic rhinitis due to pollen     2. Essential hypertension     3. Use of opiates for therapeutic purposes     4. Dyslipidemia     5. Trigger point of extremity     6. S/P left knee surgery     7. Psychophysiological insomnia     8. Primary osteoarthritis of both knees     9. Pre-diabetes     10. Mood disorder ([address]     11. Fibromyalgia           Objective:   Physical Exam  Vitals signs and nursing note reviewed. Constitutional:       Appearance: He is well-developed. HENT:      Head: Normocephalic and atraumatic. Right Ear: External ear normal.      Left Ear: External ear normal.      Nose: Nose normal.      Comments: Mucosal congestion, moderate DNS, inferior turbinate hypertrophy  Eyes:      Conjunctiva/sclera: Conjunctivae normal.      Pupils: Pupils are equal, round, and reactive to light. Neck:      Musculoskeletal: Normal range of motion and neck supple. Cardiovascular:      Rate and Rhythm: Normal rate and regular rhythm. Heart sounds: Normal heart sounds. Pulmonary:      Effort: Pulmonary effort is normal.      Breath sounds: Normal breath sounds. Abdominal:      General: Bowel sounds are normal.      Palpations: Abdomen is soft. Musculoskeletal: Normal range of motion. Comments: Chronic pain syndrome. Opiate dependent as provided by pain management   Skin:     General: Skin is warm and dry. Neurological:      Mental Status: He is alert and oriented to person, place, and time. Deep Tendon Reflexes: Reflexes are normal and symmetric. Psychiatric:      Comments: Anxiety and depression. Underlying chronic pain syndrome. He is on Depakote. Assessment:       Diagnosis Orders   1. Seasonal allergic rhinitis due to pollen     2. Essential hypertension     3. Use of opiates for therapeutic purposes     4. Dyslipidemia     5. Trigger point of extremity     6. S/P left knee surgery     7. Psychophysiological insomnia     8. Primary osteoarthritis of both knees     9. Pre-diabetes     10. Mood disorder ([facility] [address]     11. Fibromyalgia             Plan:      55-year-old non-smoker is presented with signs and symptoms of upper respiratory tract infection associated with allergic rhinitis with flare. Overall he is well compensated, afebrile hemodynamically stable. Reassurance provided. Anticipate gradual improvement. I have placed him on Claritin-D, nasal saline. Push more oral fluid, salt water gargles, over-the-counter Tylenol and cough drops  Chronic pain syndrome. He is established with pain management on Percocet, MS Contin, ibuprofen and Depakote. Avoid constipation  Anxiety depression. Underlying chronic pain syndrome. Continue clonidine, Depakote. Gabapentin reassurance provided  Fibromyalgia  Prediabetes with A1c of less than 6. Risk factor stratification is advised. Degenerative polyarthralgia  Obesity.   He will benefit from low-fat high-fiber diet, daily moderate exercise, lifestyle change and weight reduction to keep BMI 27 below  Med list and available labs reviewed, discussed with patient, questions answered  Insomnia on melatonin   Is advised to update influenza vaccination  He is updated on Tdap, pneumococcal vaccine and colonoscopy  Advised to call for any concern  This note is created with a voice recognition program and while intend to generate a document that accurately reflects the content of the visit, no guarantee can be provided that every mistake has been identified and corrected by editing.        Army Wang MD

## 2020-01-29 ENCOUNTER — HOSPITAL ENCOUNTER (OUTPATIENT)
Dept: PAIN MANAGEMENT | Age: 70
Discharge: HOME | End: 2020-01-29
Payer: MEDICARE

## 2020-01-29 VITALS
WEIGHT: 222 LBS | OXYGEN SATURATION: 95 % | HEIGHT: 67 IN | DIASTOLIC BLOOD PRESSURE: 87 MMHG | SYSTOLIC BLOOD PRESSURE: 132 MMHG | RESPIRATION RATE: 16 BRPM | TEMPERATURE: 97.5 F | HEART RATE: 57 BPM | BODY MASS INDEX: 34.84 KG/M2

## 2020-01-29 PROCEDURE — 99213 OFFICE O/P EST LOW 20 MIN: CPT | Performed by: NURSE PRACTITIONER

## 2020-01-29 PROCEDURE — 99213 OFFICE O/P EST LOW 20 MIN: CPT

## 2020-01-29 RX ORDER — MORPHINE SULFATE 30 MG/1
30 TABLET, FILM COATED, EXTENDED RELEASE ORAL EVERY 12 HOURS
Qty: 60 TABLET | Refills: 0 | Status: SHIPPED | OUTPATIENT
Start: 2020-01-31 | End: 2020-02-26 | Stop reason: SDUPTHER

## 2020-01-29 RX ORDER — OXYCODONE HYDROCHLORIDE AND ACETAMINOPHEN 5; 325 MG/1; MG/1
1 TABLET ORAL EVERY 8 HOURS PRN
Qty: 90 TABLET | Refills: 0 | Status: SHIPPED | OUTPATIENT
Start: 2020-01-31 | End: 2020-02-26 | Stop reason: SDUPTHER

## 2020-01-29 ASSESSMENT — ENCOUNTER SYMPTOMS
RESPIRATORY NEGATIVE: 1
GASTROINTESTINAL NEGATIVE: 1

## 2020-01-29 NOTE — DISCHARGE INSTR - COC
Continuity of Care Form    Patient Name: Jennie Barron   :  1950  MRN:  [id number]    Admit date:  (Not on file)  Discharge date:  ***    Code Status Order: No Order   Advance Directives:     Admitting Physician:  No admitting provider for patient encounter. PCP: Catarina No MD    Discharging Nurse: [facility] Unit/Room#: No information available for this encounter. Discharging Unit Phone Number: ***    Emergency Contact:   Extended Emergency Contact Information  Primary Emergency Contact: [address] Phone: [phone]  Mobile Phone: [phone]  Relation: Child    Past Surgical History:  Past Surgical History:   Procedure Laterality Date    CARPAL TUNNEL RELEASE Bilateral     DENTAL SURGERY  10/2015    JOINT REPLACEMENT Left     KNEE  X 6 pt unsure of date    JOINT REPLACEMENT Right 2018    ROTATOR CUFF REPAIR Right     TONSILLECTOMY         Immunization History:   Immunization History   Administered Date(s) Administered    Pneumococcal Conjugate 13-valent (Vanjyrr94) 2017    Pneumococcal Polysaccharide (Eqpqlkinq07) 2019    Tdap (Boostrix, Adacel) 2015       Active Problems:  Patient Active Problem List   Diagnosis Code    S/P left knee surgery Z98.890    Medication monitoring encounter Z51.81    Use of opiates for therapeutic purposes Z79.891    Pre-diabetes R73.03    Dyslipidemia E78.5    Osteoarthritis of both knees M17.0    Essential hypertension I10    Psychophysiological insomnia F51.04    Fibromyalgia M79.7    Trigger point of extremity M79.609    Mood disorder (HCC) F39       Isolation/Infection:   Isolation          No Isolation        Patient Infection Status     None to display          Nurse Assessment:  Last Vital Signs: There were no vitals taken for this visit.     Last documented pain score (0-10 scale):    Last Weight:   Wt Readings from Last 1 Encounters:   20 222 lb (100.7 kg) Mental Status:  {IP PT MENTAL STATUS:47388}    IV Access:  { KRISTINA IV ACCESS:425497286}    Nursing Mobility/ADLs:  Walking   {CHP DME HSI}  Transfer  {CHP DME YIJQ:053577058}  Bathing  {CHP DME IPDN:792571592}  Dressing  {CHP DME KXBS:043962000}  Toileting  {CHP DME SQC}  Feeding  {CHP DME DCXV:749264669}  Med Admin  {P DME IDFC:208066982}  Med Delivery   { KRISTINA MED Delivery:876339560}    Wound Care Documentation and Therapy:        Elimination:  Continence:   · Bowel: {YES / VF:54089}  · Bladder: {YES / HC:32314}  Urinary Catheter: {Urinary Catheter:426902115}   Colostomy/Ileostomy/Ileal Conduit: {YES / JJ:53336}       Date of Last BM: ***  No intake or output data in the 24 hours ending 20 0652  No intake/output data recorded.     Safety Concerns:     [address] MedWhat Safety Concerns:225191211}    Impairments/Disabilities:      [address] MedWhat Impairments/Disabilities:903893597}    Nutrition Therapy:  Current Nutrition Therapy:   [address] MedWhat Diet List:228972891}    Routes of Feeding: {[facility] DME Other Feedings:985096651}  Liquids: {Slp liquid thickness:21031}  Daily Fluid Restriction: {CHP DME Yes amt example:915367716}  Last Modified Barium Swallow with Video (Video Swallowing Test): {Done Not Done QXXF:627885619}    Treatments at the Time of Hospital Discharge:   Respiratory Treatments: ***  Oxygen Therapy:  {Therapy; copd oxygen:13695}  Ventilator:    {[facility] Vent TFKX:687335807}    Rehab Therapies: {THERAPEUTIC INTERVENTION:3988379098}  Weight Bearing Status/Restrictions: [address] [employer] Weight Bearin}  Other Medical Equipment (for information only, NOT a DME order):  {EQUIPMENT:371957597}  Other Treatments: ***    Patient's personal belongings (please select all that are sent with patient):  {[facility] DME Belongings:699264357}    RN SIGNATURE:  {Esignature:425197742}    CASE MANAGEMENT/SOCIAL WORK SECTION    Inpatient Status Date: ***    Readmission Risk Assessment Score:  Readmission Risk Risk of Unplanned Readmission:        0           Discharging to Facility/ Agency   · Name:   · Address:  · Phone:  · Fax:    Dialysis Facility (if applicable)   · Name:  · Address:  · Dialysis Schedule:  · Phone:  · Fax:    / signature: {Esignature:246097098}    PHYSICIAN SECTION    Prognosis: {Prognosis:4900716041}    Condition at Discharge: [address] Tricia Clancy Patient Condition:649536688}    Rehab Potential (if transferring to Rehab): {Prognosis:7267246249}    Recommended Labs or Other Treatments After Discharge: ***    Physician Certification: I certify the above information and transfer of Stanton Frazier  is necessary for the continuing treatment of the diagnosis listed and that he requires {Admit to Appropriate Level of Care:41793} for {GREATER/LESS:154547655} 30 days.      Update Admission H&P: {CHP DME Changes in PFOI}    PHYSICIAN SIGNATURE:  {Esignature:297701293}

## 2020-01-29 NOTE — PROGRESS NOTES
[address] PROGRESS NOTE      Patient here today to review Medication Agreement    Chief Complaint:   Knee pain      HPI: He c/o bilateral knee pain. He has had multiple surgeries left knee and he had a right knee replacement over a year ago. His left knee gives out on hi, . He uses a cane. He sleeps about 4 hours a night, he takes melatonin prn. He remains active, he goes to the gym. No Ed visits,    Knee Pain    The pain is present in the right knee and left knee. The quality of the pain is described as aching and burning (left knee  sharp, achey, right knee burning). The pain is at a severity of 6/10. The pain is moderate. The pain has been constant since onset. Associated symptoms include a loss of motion and muscle weakness. Associated symptoms comments: Left knee. Foreign body present: left and right knee. Exacerbated by: standing, steps, walking. He has tried NSAIDs, ice and heat for the symptoms. The treatment provided mild relief. Patient denies any new neurological symptoms. No bowel or bladder incontinence, no weakness, and no falling. Treatment goals:  Functional status: get off meds        Aberrancy:   Any alcoholic beverages  no     Any illegal drugs   no        Analgesia:pain, 6  Adverse  Effects :BM daily     ADL;s :goes to gym           Pill count:  NOT  Appropriate  Short #7 percocet and #1 MS contin    Morphine equivalent dose as reported on [place]:  82.50  Periodic Controlled Substance Monitoring: Possible medication side effects, risk of tolerance/dependence & alternative treatments discussed., No signs of potential drug abuse or diversion identified. , Assessed functional status., Obtaining appropriate analgesic effect of treatment. Jerome Ruiz, APRN - CNP)  Review of[place]ARRS does not show any aberrant prescription behavior. Medication is helping the patient stay active. Patient denies any side effects and reports adequate analgesia.  No sign of misuse/abuse. As above, I did review the imaging        As above, I did review the imaging  UDT: 6-26-19 Appropriate: yes     Record/Diagnostics Review:       As above, I did review the imaging  6/29/2019  7:09 PM - Estefani Madrid Incoming Lab Results From [facility]      Component Value Ref Range & Units Status Collected Lab   Pain Management Drug Panel Interp, Urine Consistent    Final 06/26/2019 10:40 AM [facility]   (NOTE)   ________________________________________________________________   DRUGS EXPECTED:   PERCOCET (OXYCODONE) [6/26/19]   MS CONTIN (MORPHINE) [6/26/19]   ________________________________________________________________   CONSISTENT with medications provided:   PERCOCET (OXYCODONE) : based on oxycodone, noroxycodone,   noroxymorphone, oxymorphone   MS CONTIN (MORPHINE) : based on morphine, hydromorphone   ________________________________________________________________   Drugs Not Included in this Assay:   Acetaminophen   ________________________________________________________________   INTERPRETIVE INFORMATION: Pain Mgt [place], Mass Spec/EMIT, Ur,                            Interp   Interpretation depends on accuracy and completeness of patient   medication information submitted by client. 6-Acetylmorphine, Ur Not Detected    Final 06/26/2019 10:40 AM [facility]   7-Aminoclonazepam, Urine Not Detected    Final 06/26/2019 10:40 AM [facility]   Alpha-OH-Alpraz, Urine Not Detected    Final 06/26/2019 10:40 AM [facility]   Alprazolam, Urine Not Detected    Final 06/26/2019 10:40 AM [facility]   Amphetamines, urine Not Detected    Final 06/26/2019 10:40 AM [facility]   Barbiturates, Ur Not Detected    Final 06/26/2019 10:40 AM [facility]   Benzoylecgonine, Ur Not Detected    Final 06/26/2019 10:40 AM [facility]   Buprenorphine Urine Not Detected    Final 06/26/2019 10:40 AM [facility]   Carisoprodol, Ur Not Detected    Final 06/26/2019 10:40 AM [facility]   (NOTE)   The carisoprodol immunoassay has cross-reactivity to carisoprodol   and meprobamate. 06/26/2019 10:40 AM [facility]   Tapentadol-O-Sulfate, Urine Not Detected    Final 06/26/2019 10:40 AM [facility]   Tapentadol, Urine Not Detected    Final 06/26/2019 10:40 AM [facility]   Temazepam, Urine Not Detected    Final 06/26/2019 10:40 AM [facility]   Tramadol, Urine Not Detected    Final 06/26/2019 10:40 AM [facility]   Zolpidem, Urine Not Detected    Final 06/26/2019 10:40 AM [facility]   Drugs Expected, Ur     Final 06/26/2019 10:40  [address] [facility]   PERCOCET ON 6/26/19, MS GREEN ON 6/26/19    Creatinine, Ur 158.4  20.0 - 400.0 mg/dL Final 06/26/2019 10:40 AM [facility]   Pain Mgt Drug Panel, Hi Res, Ur See Below    Final 06/26/2019 10:40 AM [facility]   (NOTE)   Methodology: Qualitative Enzyme Immunoassay and Qualitative Liquid   Chromatography-Time of Flight-Mass Spectrometry or Tandem Mass   Spectrometry, Quantitative Spectrophotometry   The absence of expected drug(s) and/or drug metabolite(s) may   indicate non-compliance, inappropriate timing of specimen   collection relative to drug administration, poor drug absorption,   diluted/adulterated urine, or limitations of testing. The   concentration must be greater than or equal to the cutoff to be   reported as present.  If specific drug concentrations are   required, contact the laboratory within two weeks of specimen   collection to request quantification by a second analytical   technique. Interpretive questions should be directed to the   laboratory. Results based on immunoassay detection that do not match clinical   expectations should be   interpreted with caution. Confirmatory testing by mass   spectrometry for immunoassay-based results is available, if   ordered within two weeks of specimen collection. Additional   charges apply. For medical purposes only; not valid for forensic use. This test was developed and its performance characteristics   determined by [employer]. The U.S.  Food and Drug   Administration has not approved or cleared this test; however, FDA sodium chloride (ALTAMIST SPRAY) 0.65 % nasal spray, 1 spray by Nasal route as needed for Congestion, Disp: 1 Bottle, Rfl: 3    ibuprofen (ADVIL;MOTRIN) 800 MG tablet, Take 1 tablet by mouth every 6 hours as needed for Pain, Disp: 90 tablet, Rfl: 3    naloxone (NARCAN) 4 MG/0.1ML LIQD nasal spray, 1 spray by Nasal route as needed for Opioid Reversal, Disp: 1 each, Rfl: 0    melatonin 3 MG TABS tablet, Take 6 mg by mouth nightly as needed (insomnia), Disp: , Rfl:     Family History   Problem Relation Age of Onset    Diabetes Mother     No Known Problems Father        Social History     Socioeconomic History    Marital status:       Spouse name: Not on file    Number of children: Not on file    Years of education: Not on file    Highest education level: Not on file   Occupational History    Occupation: retired   Social Needs    Financial resource strain: Not on file    Food insecurity:     Worry: Not on file     Inability: Not on file   [employer] needs:     Medical: Not on file     Non-medical: Not on file   Tobacco Use    Smoking status: Never Smoker    Smokeless tobacco: Never Used   Substance and Sexual Activity    Alcohol use: No    Drug use: No    Sexual activity: Never   Lifestyle    Physical activity:     Days per week: Not on file     Minutes per session: Not on file    Stress: Not on file   Relationships    Social connections:     Talks on phone: Not on file     Gets together: Not on file     Attends [employer] service: Not on file     Active member of club or organization: Not on file     Attends meetings of clubs or organizations: Not on file     Relationship status: Not on file    Intimate partner violence:     Fear of current or ex partner: Not on file     Emotionally abused: Not on file     Physically abused: Not on file     Forced sexual activity: Not on file   Other Topics Concern    Not on file   Social History Narrative    Not on file       Travel Screen    Have you contin  Follow up appointment made

## 2020-02-03 RX ORDER — DIVALPROEX SODIUM 125 MG/1
TABLET, DELAYED RELEASE ORAL
Qty: 180 TABLET | Refills: 0 | Status: SHIPPED | OUTPATIENT
Start: 2020-02-03 | End: 2020-04-30

## 2020-02-19 ENCOUNTER — TELEPHONE (OUTPATIENT)
Dept: PAIN MANAGEMENT | Age: 70
End: 2020-02-19

## 2020-02-26 ENCOUNTER — HOSPITAL ENCOUNTER (OUTPATIENT)
Dept: PAIN MANAGEMENT | Age: 70
Discharge: HOME | End: 2020-02-26
Payer: MEDICARE

## 2020-02-26 VITALS
WEIGHT: 222 LBS | RESPIRATION RATE: 20 BRPM | HEIGHT: 67 IN | SYSTOLIC BLOOD PRESSURE: 124 MMHG | TEMPERATURE: 97.7 F | DIASTOLIC BLOOD PRESSURE: 63 MMHG | HEART RATE: 67 BPM | BODY MASS INDEX: 34.84 KG/M2

## 2020-02-26 PROCEDURE — 99213 OFFICE O/P EST LOW 20 MIN: CPT | Performed by: NURSE PRACTITIONER

## 2020-02-26 PROCEDURE — 99213 OFFICE O/P EST LOW 20 MIN: CPT

## 2020-02-26 RX ORDER — OXYCODONE HYDROCHLORIDE AND ACETAMINOPHEN 5; 325 MG/1; MG/1
1 TABLET ORAL EVERY 8 HOURS PRN
Qty: 90 TABLET | Refills: 0 | Status: SHIPPED | OUTPATIENT
Start: 2020-03-01 | End: 2020-03-26 | Stop reason: SDUPTHER

## 2020-02-26 RX ORDER — GABAPENTIN 600 MG/1
TABLET ORAL
Qty: 90 TABLET | Refills: 2 | Status: SHIPPED | OUTPATIENT
Start: 2020-02-26 | End: 2020-03-26 | Stop reason: SDUPTHER

## 2020-02-26 RX ORDER — MORPHINE SULFATE 30 MG/1
30 TABLET, FILM COATED, EXTENDED RELEASE ORAL EVERY 12 HOURS
Qty: 60 TABLET | Refills: 0 | Status: SHIPPED | OUTPATIENT
Start: 2020-03-01 | End: 2020-03-26 | Stop reason: SDUPTHER

## 2020-02-26 ASSESSMENT — ENCOUNTER SYMPTOMS
RESPIRATORY NEGATIVE: 1
GASTROINTESTINAL NEGATIVE: 1

## 2020-02-26 NOTE — PROGRESS NOTES
Urine Not Detected    Final 06/26/2019 10:40 AM [facility]   MDA, Ur Not Detected    Final 06/26/2019 10:40 AM [facility]   Diazepam, Urine Not Detected    Final 06/26/2019 10:40 AM [facility]   Ethyl Glucuronide Ur Not Detected    Final 06/26/2019 10:40 AM [facility]   Fentanyl, Ur Not Detected    Final 06/26/2019 10:40 AM [facility]   Hydrocodone, Urine Not Detected    Final 06/26/2019 10:40 AM [facility]   Hydromorphone, Urine Present    Final 06/26/2019 10:40 AM [facility]   Lorazepam, Urine Not Detected    Final 06/26/2019 10:40 AM [facility]   Marijuana Metab, Ur Not Detected    Final 06/26/2019 10:40 AM [facility]   MDEA, GRACIELA, Ur Not Detected    Final 06/26/2019 10:40 AM [facility]   MDMA, Urine Not Detected    Final 06/26/2019 10:40 AM [facility]   Meperidine Metab, Ur Not Detected    Final 06/26/2019 10:40 AM [facility]   Methadone, Urine Not Detected    Final 06/26/2019 10:40 AM [facility]   Methamphetamine, Urine Not Detected    Final 06/26/2019 10:40 AM [facility]   Methylphenidate Not Detected    Final 06/26/2019 10:40 AM [facility]   Midazolam, Urine Not Detected    Final 06/26/2019 10:40 AM [facility]   Morphine Urine Present    Final 06/26/2019 10:40 AM [facility]   Norbuprenorphine, Urine Not Detected    Final 06/26/2019 10:40 AM [facility]   Nordiazepam, Urine Not Detected    Final 06/26/2019 10:40 AM [facility]   Norfentanyl, Urine Not Detected    Final 06/26/2019 10:40 AM [facility]   NORHYDROCODONE, URINE Not Detected    Final 06/26/2019 10:40 AM [facility]   Noroxycodone, Urine Present    Final 06/26/2019 10:40 AM [facility]   NOROXYMORPHONE, URINE Present    Final 06/26/2019 10:40 AM [facility]   Oxazepam, Urine Not Detected    Final 06/26/2019 10:40 AM [facility]   Oxycodone Urine Present    Final 06/26/2019 10:40 AM [facility]   Oxymorphone, Urine Present    Final 06/26/2019 10:40 AM [facility]   PCP, Urine Not Detected    Final 06/26/2019 10:40 AM [facility]   Phentermine, Ur Not Detected    Final 06/26/2019 10:40 AM [facility]   Propoxyphene, Urine Not Detected    Final 06/26/2019 10:40 AM [facility]   Tapentadol-O-Sulfate, Urine Not Detected    Final results are not intended to be used as the sole means for   clinical diagnosis or patient management decisions. EER Hi Res Interp Ur See Note    Final 06/26/2019 10:40 AM [facility]   (NOTE)   Access [facility] Enhanced Report using either link below:   -Direct access: [email or web address]   -Enter Username, Password: [email or web address]   Username: [email or web address]   Password: [email or web address]   Performed by [facility] [address] [facility] [phone]   www. Cari Small MD, Lab. Director    Testing Performed By            Past Medical History           Past Medical History:   Diagnosis Date    Arthritis     Left knee pain 4/9/2014    Teeth problem     abcessw       Past Surgical History:   Procedure Laterality Date    CARPAL TUNNEL RELEASE Bilateral     DENTAL SURGERY  10/2015    JOINT REPLACEMENT Left     KNEE  X 6 pt unsure of date    JOINT REPLACEMENT Right 07/20/2018    ROTATOR CUFF REPAIR Right     TONSILLECTOMY         Allergies   Allergen Reactions    Oxycontin [Oxycodone Hcl] Nausea Only         Current Outpatient Medications:     gabapentin (NEURONTIN) 600 MG tablet, TAKE ONE TABLET BY MOUTH THREE TIMES A DAY, Disp: 90 tablet, Rfl: 2    [START ON 3/1/2020] morphine (MS CONTIN) 30 MG extended release tablet, Take 1 tablet by mouth every 12 hours for 30 days. , Disp: 60 tablet, Rfl: 0    [START ON 3/1/2020] oxyCODONE-acetaminophen (PERCOCET) 5-325 MG per tablet, Take 1 tablet by mouth every 8 hours as needed for Pain for up to 30 days. , Disp: 90 tablet, Rfl: 0    divalproex (DEPAKOTE) 125 MG DR tablet, TAKE ONE TABLET BY MOUTH TWICE A DAY, Disp: 180 tablet, Rfl: 0    cloNIDine (CATAPRES) 0.1 MG tablet, TAKE ONE TABLET BY MOUTH TWICE A DAY, Disp: 180 tablet, Rfl: 0    loratadine-pseudoephedrine (CLARITIN-D 12 HOUR) 5-120 MG per extended release tablet, Take 1 tablet by mouth 2 times daily, Disp: 60 tablet, Rfl: 0    sodium chloride (ALTAMIST SPRAY) 0.65 % sick    Yes ----------   no --x--------    unable to assess ---------    Do you have any of the following symptoms:      Abdominal pain ------  Bruising or bleeding ------ Cough  ----    Joint pain -------- Muscle pain -------  Rash  ------    Vomiting -------- Weakness  -------  None of these _____    Diarrhea -------  Red eye -------   Unable to assess ----------      Traveled outside the  in the last month   yes---- no-x----      Location-----------------------------------  start  date  ----------- -----  end date  -        Review of Systems:  Review of Systems   Constitution: Negative. HENT: Negative. Cardiovascular: Negative. Respiratory: Negative. Endocrine: Negative. Hematologic/Lymphatic: Negative. Musculoskeletal: Positive for joint pain. Gastrointestinal: Negative. Genitourinary: Negative. Neurological:        Gait issue, uses cane    Psychiatric/Behavioral: Negative. Physical Exam:  /63   Pulse 67   Temp 97.7 °F (36.5 °C) (Oral)   Resp 20   Ht 5' 7\" (1.702 m)   Wt 222 lb (100.7 kg)   BMI 34.77 kg/m²     Physical Exam  HENT:      Head: Normocephalic. Pulmonary:      Effort: Pulmonary effort is normal.   Musculoskeletal:      Right knee: Tenderness found. Left knee: He exhibits decreased range of motion. Tenderness found. Cervical back: He exhibits tenderness. Skin:     General: Skin is warm and dry. Neurological:      Mental Status: He is alert. Gait: Gait abnormal.      Deep Tendon Reflexes:      Reflex Scores:       Patellar reflexes are 0 on the right side and 0 on the left side. Achilles reflexes are 2+ on the right side and 2+ on the left side. Comments: Decreased strength left quadriceps,    Psychiatric:         Attention and Perception: Attention normal.         Mood and Affect: Mood normal.         Speech: Speech normal.         Behavior: Behavior normal.         Thought Content:  Thought content normal. tablet (Start on 3/1/2020)    S/P knee surgery -Multiple        Relevant Medications    gabapentin (NEURONTIN) 600 MG tablet            Treatment Plan:  DISCUSSION: Treatment options discussed withpatient and all questions answered to patient's satisfaction. Possible side effects, risk of tolerance and or dependence and alternative treatments discussed    Obtaining appropriate analgesic effect of treatment   No signs of potential drug abuse or diversion identified    [x] Ill effects of being on chronic pain medications such as sleep disturbances, respiratory depression, hormonal changes, withdrawal symptoms, chronic opioid dependence and tolerance as well as risk of taking opioids with Benzodiazepines and taking opioids along with alcohol,  werediscussed with patient. I had asked the patient to minimize medication use and utilize pain medications only for uncontrolled rest pain or pain with exertional activities. I advised patient not to self-escalate painmedications without consulting with us. At each of patient's future visits we will try to taper pain medications, while adjusting the adjunct medications, and re-evaluating for Physical Therapy to improve spinal andjoint strength. We will continue to have discussions to decrease pain medications as tolerated. Counseled patient on effects their pain medication and /or their medical condition mayhave on their  ability to drive or operate machinery. Instructed not to drive or operate machinery if drowsy     I also discussed with the patient regarding the dangers of combining narcotic pain medication with tranquilizers, alcohol or illegal drugs or taking the medication any way other than prescribed. The dangers were discussed  including respiratory depression and death. Patient was told to tell  all  physicians regarding the medications he is getting from pain clinic.  Patient is warned not to take any unprescribed medications over-the-countermedications that

## 2020-02-26 NOTE — DISCHARGE INSTR - COC
Weight:   Wt Readings from Last 1 Encounters:   20 222 lb (100.7 kg)     Mental Status:  {IP PT MENTAL STATUS:06193}    IV Access:  { KRISTINA IV ACCESS:609496240}    Nursing Mobility/ADLs:  Walking   {CHP DME WJXW:785679506}  Transfer  {CHP DME NXOH:352736618}  Bathing  {CHP DME YMKI:077144282}  Dressing  {CHP DME CHRM:925961055}  Toileting  {CHP DME PXYL:818798211}  Feeding  {CHP DME VSKC:949171665}  Med Admin  {P DME YPAD:734479420}  Med Delivery   { KRISTINA MED Delivery:057703272}    Wound Care Documentation and Therapy:        Elimination:  Continence:   · Bowel: {YES / LT:45178}  · Bladder: {YES / EA:76802}  Urinary Catheter: {Urinary Catheter:714858148}   Colostomy/Ileostomy/Ileal Conduit: {YES / :06935}       Date of Last BM: ***  No intake or output data in the 24 hours ending 20 1028  No intake/output data recorded.     Safety Concerns:     [employer] Safety Concerns:525808202}    Impairments/Disabilities:      [employer] Impairments/Disabilities:036771005}    Nutrition Therapy:  Current Nutrition Therapy:   [employer] Diet List:085658068}    Routes of Feeding: {P DME Other Feedings:008240432}  Liquids: {Slp liquid thickness:05891}  Daily Fluid Restriction: {CHP DME Yes amt example:774008124}  Last Modified Barium Swallow with Video (Video Swallowing Test): {Done Not Done SFFI:401542484}    Treatments at the Time of Hospital Discharge:   Respiratory Treatments: ***  Oxygen Therapy:  {Therapy; copd oxygen:26930}  Ventilator:    {[facility] Vent LGRX:061375436}    Rehab Therapies: {THERAPEUTIC INTERVENTION:3287471316}  Weight Bearing Status/Restrictions: [address] [employer]  Weight Bearin}  Other Medical Equipment (for information only, NOT a DME order):  {EQUIPMENT:357208713}  Other Treatments: ***    Patient's personal belongings (please select all that are sent with patient):  {P DME Belongings:294630811}    RN SIGNATURE:  {Esignature:694459944}    CASE MANAGEMENT/SOCIAL WORK SECTION    Inpatient Status

## 2020-02-28 RX ORDER — CLONIDINE HYDROCHLORIDE 0.1 MG/1
TABLET ORAL
Qty: 60 TABLET | Refills: 0 | Status: SHIPPED | OUTPATIENT
Start: 2020-02-28 | End: 2020-03-27

## 2020-03-13 ENCOUNTER — TELEPHONE (OUTPATIENT)
Dept: INTERNAL MEDICINE CLINIC | Age: 70
End: 2020-03-13

## 2020-03-27 RX ORDER — CLONIDINE HYDROCHLORIDE 0.1 MG/1
TABLET ORAL
Qty: 60 TABLET | Refills: 2 | Status: SHIPPED | OUTPATIENT
Start: 2020-03-27 | End: 2020-06-29

## 2020-04-22 RX ORDER — MORPHINE SULFATE 30 MG/1
30 TABLET, FILM COATED, EXTENDED RELEASE ORAL EVERY 12 HOURS
Qty: 60 TABLET | Refills: 0 | Status: SHIPPED | OUTPATIENT
Start: 2020-04-30 | End: 2020-05-26 | Stop reason: SDUPTHER

## 2020-04-22 RX ORDER — OXYCODONE HYDROCHLORIDE AND ACETAMINOPHEN 5; 325 MG/1; MG/1
1 TABLET ORAL EVERY 8 HOURS PRN
Qty: 90 TABLET | Refills: 0 | Status: SHIPPED | OUTPATIENT
Start: 2020-04-30 | End: 2020-05-26 | Stop reason: SDUPTHER

## 2020-04-30 RX ORDER — IBUPROFEN 800 MG/1
TABLET ORAL
Qty: 90 TABLET | Refills: 2 | Status: SHIPPED | OUTPATIENT
Start: 2020-04-30 | End: 2020-07-30 | Stop reason: SDUPTHER

## 2020-04-30 RX ORDER — DIVALPROEX SODIUM 125 MG/1
TABLET, DELAYED RELEASE ORAL
Qty: 180 TABLET | Refills: 0 | Status: SHIPPED | OUTPATIENT
Start: 2020-04-30 | End: 2020-07-28

## 2020-05-08 ENCOUNTER — VIRTUAL VISIT (OUTPATIENT)
Dept: INTERNAL MEDICINE CLINIC | Age: 70
End: 2020-05-08
Payer: MEDICARE

## 2020-05-08 PROCEDURE — 99443 PR PHYS/QHP TELEPHONE EVALUATION 21-30 MIN: CPT | Performed by: FAMILY MEDICINE

## 2020-05-26 ENCOUNTER — HOSPITAL ENCOUNTER (OUTPATIENT)
Dept: PAIN MANAGEMENT | Age: 70
Discharge: HOME | End: 2020-05-26
Payer: MEDICARE

## 2020-05-26 PROCEDURE — 99213 OFFICE O/P EST LOW 20 MIN: CPT

## 2020-05-26 PROCEDURE — 99213 OFFICE O/P EST LOW 20 MIN: CPT | Performed by: NURSE PRACTITIONER

## 2020-05-26 RX ORDER — GABAPENTIN 600 MG/1
TABLET ORAL
Qty: 90 TABLET | Refills: 2 | Status: SHIPPED
Start: 2020-05-26 | End: 2020-11-17 | Stop reason: SDUPTHER

## 2020-05-26 RX ORDER — MORPHINE SULFATE 30 MG/1
30 TABLET, FILM COATED, EXTENDED RELEASE ORAL EVERY 12 HOURS
Qty: 60 TABLET | Refills: 0 | Status: SHIPPED | OUTPATIENT
Start: 2020-06-03 | End: 2020-06-25 | Stop reason: SDUPTHER

## 2020-05-26 RX ORDER — OXYCODONE HYDROCHLORIDE AND ACETAMINOPHEN 5; 325 MG/1; MG/1
1 TABLET ORAL EVERY 8 HOURS PRN
Qty: 90 TABLET | Refills: 0 | Status: SHIPPED | OUTPATIENT
Start: 2020-06-01 | End: 2020-06-25 | Stop reason: SDUPTHER

## 2020-05-26 ASSESSMENT — ENCOUNTER SYMPTOMS
RESPIRATORY NEGATIVE: 1
GASTROINTESTINAL NEGATIVE: 1

## 2020-05-26 NOTE — PROGRESS NOTES
approved or cleared this test; however, FDA   clearance or approval is not currently required for clinical use. The results are not intended to be used as the sole means for   clinical diagnosis or patient management decisions. EER Hi Res Interp Ur See Note   Final 06/26/2019 10:40 AM [facility]   (NOTE)   Access [facility] Enhanced Report using either link below:   -Direct access: [email or web address]   -Enter Username, Password: [email or web address]   Username: [email or web address]   Password: [email or web address]   Performed by [address] [phone]   www. Kalie Villalobos MD, Lab. Director    Testing Performed By           Past Medical History:   Diagnosis Date    Arthritis     Left knee pain 4/9/2014    Teeth problem     abcessw       Past Surgical History:   Procedure Laterality Date    CARPAL TUNNEL RELEASE Bilateral     DENTAL SURGERY  10/2015    JOINT REPLACEMENT Left     KNEE  X 6 pt unsure of date    JOINT REPLACEMENT Right 07/20/2018    ROTATOR CUFF REPAIR Right     TONSILLECTOMY         Allergies   Allergen Reactions    Oxycontin [Oxycodone Hcl] Nausea Only         Current Outpatient Medications:     divalproex (DEPAKOTE) 125 MG DR tablet, TAKE ONE TABLET BY MOUTH TWICE A DAY, Disp: 180 tablet, Rfl: 0    morphine (MS CONTIN) 30 MG extended release tablet, Take 1 tablet by mouth every 12 hours for 30 days. , Disp: 60 tablet, Rfl: 0    oxyCODONE-acetaminophen (PERCOCET) 5-325 MG per tablet, Take 1 tablet by mouth every 8 hours as needed for Pain for up to 30 days. , Disp: 90 tablet, Rfl: 0    cloNIDine (CATAPRES) 0.1 MG tablet, TAKE ONE TABLET BY MOUTH TWICE A DAY, Disp: 60 tablet, Rfl: 2    gabapentin (NEURONTIN) 600 MG tablet, TAKE ONE TABLET BY MOUTH THREE TIMES A DAY, Disp: 90 tablet, Rfl: 0    ibuprofen (ADVIL;MOTRIN) 800 MG tablet, TAKE ONE TABLET BY MOUTH EVERY 6 HOURS AS NEEDED FOR PAIN, Disp: 90 tablet, Rfl: 2   ability to drive or operate machinery. Instructed not to drive or operate machinery if drowsy     I also discussed with the patient regarding the dangers of combining narcotic pain medication with tranquilizers, alcohol or illegal drugs or taking the medication any way other than prescribed. The dangers were discussed  including respiratory depression and death. Patient was told to tell  all  physicians regarding the medications he is getting from pain clinic. Patient is warned not to take any unprescribed medications over-the-countermedications that can depress breathing . Patient is advised to talk to the pharmacist or physicians if planning to take any over-the-counter medications before  takeing them. Patient is strongly advised to avoid tranquilizers or  relaxants, illegal drugs  or any medications that can depress breathing  Patient is also advised to tell us if there is any changes in their medications from other physicians.     Location:  patient  at    home   , provider working from home    TREATMENT OPTIONS:       Medication Agreement Requirements Met  Continue Opioid therapy  Script written for percocet, ms contin, gabapentin  Follow up appointment made

## 2020-06-25 ENCOUNTER — HOSPITAL ENCOUNTER (OUTPATIENT)
Dept: PAIN MANAGEMENT | Age: 70
Discharge: HOME | End: 2020-06-25
Payer: MEDICARE

## 2020-06-25 ENCOUNTER — TELEPHONE (OUTPATIENT)
Dept: FAMILY MEDICINE CLINIC | Age: 70
End: 2020-06-25

## 2020-06-25 PROCEDURE — 99213 OFFICE O/P EST LOW 20 MIN: CPT

## 2020-06-25 PROCEDURE — 99213 OFFICE O/P EST LOW 20 MIN: CPT | Performed by: NURSE PRACTITIONER

## 2020-06-25 RX ORDER — MORPHINE SULFATE 30 MG/1
30 TABLET, FILM COATED, EXTENDED RELEASE ORAL EVERY 12 HOURS
Qty: 56 TABLET | Refills: 0 | Status: SHIPPED | OUTPATIENT
Start: 2020-07-03 | End: 2020-07-30 | Stop reason: SDUPTHER

## 2020-06-25 RX ORDER — OXYCODONE HYDROCHLORIDE AND ACETAMINOPHEN 5; 325 MG/1; MG/1
1 TABLET ORAL EVERY 8 HOURS PRN
Qty: 90 TABLET | Refills: 0 | Status: SHIPPED | OUTPATIENT
Start: 2020-07-01 | End: 2020-07-30 | Stop reason: SDUPTHER

## 2020-06-25 ASSESSMENT — ENCOUNTER SYMPTOMS
GASTROINTESTINAL NEGATIVE: 1
RESPIRATORY NEGATIVE: 1

## 2020-06-25 NOTE — TELEPHONE ENCOUNTER
Jovanna Love was contacted to set up an annual wellness visit    Spoke with: Patient    Patient educated on purpose of AWV    Patient was agreeable to schedule AWV       Tio West

## 2020-06-25 NOTE — PROGRESS NOTES
[address] [facility] PROGRESS NOTE      Patient  Video visit to  review Medication Agreement    Chief Complaint:knee pain      HPI: He c/o bilateral knee pain. He had right knee replacement 2 years ago and continues to have pain. He has had multiple surgeries on left knee. He doesn\"t feel he can cut back on his pain medications at this time. He wears a brace on each knee. He does some walking. He states not sleeping well. He has had no ED visits. Knee Pain    The pain is present in the left knee, right knee and left leg. Quality: sharp. The pain is at a severity of 6/10. The pain is moderate. The pain has been constant since onset. Associated symptoms include muscle weakness. Foreign body present: knee replacements. Exacerbated by: walking. The treatment provided mild relief. Patient denies any new neurological symptoms. No bowel or bladder incontinence, no weakness, and no falling. Any new diagnostic workup: [x] no  [] yes [] Xray [] CT scan [] MRI [] DEXA scan     [] Other                    Treatment goals:  Functional status: get off pain medication    Aberrancy:   Any alcoholic beverages   no    Any illegal drugs   no      Analgesia:6      Adverse  Effects :none    ADL;s :walks        Pill count: appropriate reports # 15 ms contin and #20 percocet tabs  Morphine equivalent dose as reported on [place]:82.50 has narcan at home  Periodic Controlled Substance Monitoring: Possible medication side effects, risk of tolerance/dependence & alternative treatments discussed., No signs of potential drug abuse or diversion identified. , Assessed functional status., Obtaining appropriate analgesic effect of treatment. (QUIQUE Rodriguez CNP)  Chronic Pain > 80 MEDD: Co-prescribed Naloxone., Obtained or confirmed \"Medication Contract\" on file. QUIQUE Rodriguez CNP)  Review of[place] does not show any aberrant prescription behavior. Medication is helping the patient stay active.  Patient denies any for up to 30 days. , Disp: 90 tablet, Rfl: 0    gabapentin (NEURONTIN) 600 MG tablet, TAKE ONE TABLET BY MOUTH THREE TIMES A DAY, Disp: 90 tablet, Rfl: 2    divalproex (DEPAKOTE) 125 MG DR tablet, TAKE ONE TABLET BY MOUTH TWICE A DAY, Disp: 180 tablet, Rfl: 0    loratadine-pseudoephedrine (CLARITIN-D 12 HOUR) 5-120 MG per extended release tablet, Take 1 tablet by mouth 2 times daily, Disp: 60 tablet, Rfl: 0    ibuprofen (ADVIL;MOTRIN) 800 MG tablet, TAKE ONE TABLET BY MOUTH EVERY 6 HOURS AS NEEDED FOR PAIN, Disp: 90 tablet, Rfl: 2    cloNIDine (CATAPRES) 0.1 MG tablet, TAKE ONE TABLET BY MOUTH TWICE A DAY, Disp: 60 tablet, Rfl: 2    naloxone (NARCAN) 4 MG/0.1ML LIQD nasal spray, 1 spray by Nasal route as needed for Opioid Reversal, Disp: 1 each, Rfl: 0    melatonin 3 MG TABS tablet, Take 6 mg by mouth nightly as needed (insomnia), Disp: , Rfl:     Family History   Problem Relation Age of Onset    Diabetes Mother     No Known Problems Father        Social History     Socioeconomic History    Marital status:       Spouse name: Not on file    Number of children: Not on file    Years of education: Not on file    Highest education level: Not on file   Occupational History    Occupation: retired   Social Needs    Financial resource strain: Not on file    Food insecurity     Worry: Not on file     Inability: Not on file   [facility] needs     Medical: Not on file     Non-medical: Not on file   Tobacco Use    Smoking status: Never Smoker    Smokeless tobacco: Never Used   Substance and Sexual Activity    Alcohol use: No    Drug use: No    Sexual activity: Never   Lifestyle    Physical activity     Days per week: Not on file     Minutes per session: Not on file    Stress: Not on file   Relationships    Social connections     Talks on phone: Not on file     Gets together: Not on file     Attends Islam service: Not on file     Active member of club or organization: Not on file     Attends morphine (MS CONTIN) 30 MG extended release tablet (Start on 7/3/2020)      Other Visit Diagnoses     S/P left knee surgery        Relevant Medications    oxyCODONE-acetaminophen (PERCOCET) 5-325 MG per tablet (Start on 7/1/2020)            Treatment Plan:  DISCUSSION: Treatment options discussed withpatient and all questions answered to patient's satisfaction. Possible side effects, risk of tolerance and or dependence and alternative treatments discussed    Obtaining appropriate analgesic effect of treatment   No signs of potential drug abuse or diversion identified    [x] Ill effects of being on chronic pain medications such as sleep disturbances, respiratory depression, hormonal changes, withdrawal symptoms, chronic opioid dependence and tolerance as well as risk of taking opioids with Benzodiazepines and taking opioids along with alcohol,  werediscussed with patient. I had asked the patient to minimize medication use and utilize pain medications only for uncontrolled rest pain or pain with exertional activities. I advised patient not to self-escalate painmedications without consulting with us. At each of patient's future visits we will try to taper pain medications, while adjusting the adjunct medications, and re-evaluating for Physical Therapy to improve spinal andjoint strength. We will continue to have discussions to decrease pain medications as tolerated. Counseled patient on effects their pain medication and /or their medical condition mayhave on their  ability to drive or operate machinery. Instructed not to drive or operate machinery if drowsy     I also discussed with the patient regarding the dangers of combining narcotic pain medication with tranquilizers, alcohol or illegal drugs or taking the medication any way other than prescribed. The dangers were discussed  including respiratory depression and death.  Patient was told to tell  all  physicians regarding the medications he is getting from pain clinic. Patient is warned not to take any unprescribed medications over-the-countermedications that can depress breathing . Patient is advised to talk to the pharmacist or physicians if planning to take any over-the-counter medications before  takeing them. Patient is strongly advised to avoid tranquilizers or  relaxants, illegal drugs  or any medications that can depress breathing  Patient is also advised to tell us if there is any changes in their medications from other physicians. Location:  patient  at   home   , provider working from home    1.  Will do script for 28 days worth of ms contin to get on same schedule next month, scripts will be due again 7-    TREATMENT OPTIONS:       Medication Agreement Requirements Met  Continue Opioid therapy  Script written for percocet, ms contin  Follow up appointment made

## 2020-06-29 RX ORDER — CLONIDINE HYDROCHLORIDE 0.1 MG/1
TABLET ORAL
Qty: 180 TABLET | Refills: 0 | Status: SHIPPED | OUTPATIENT
Start: 2020-06-29 | End: 2020-08-11

## 2020-07-28 RX ORDER — DIVALPROEX SODIUM 125 MG/1
TABLET, DELAYED RELEASE ORAL
Qty: 180 TABLET | Refills: 0 | Status: SHIPPED | OUTPATIENT
Start: 2020-07-28 | End: 2020-10-27

## 2020-07-30 ENCOUNTER — HOSPITAL ENCOUNTER (OUTPATIENT)
Dept: PAIN MANAGEMENT | Age: 70
Discharge: HOME | End: 2020-07-30
Payer: MEDICARE

## 2020-07-30 PROCEDURE — 99213 OFFICE O/P EST LOW 20 MIN: CPT

## 2020-07-30 PROCEDURE — 99213 OFFICE O/P EST LOW 20 MIN: CPT | Performed by: NURSE PRACTITIONER

## 2020-07-30 RX ORDER — MORPHINE SULFATE 30 MG/1
30 TABLET, FILM COATED, EXTENDED RELEASE ORAL EVERY 12 HOURS
Qty: 60 TABLET | Refills: 0 | Status: SHIPPED | OUTPATIENT
Start: 2020-08-02 | End: 2020-08-26 | Stop reason: SDUPTHER

## 2020-07-30 RX ORDER — OXYCODONE HYDROCHLORIDE AND ACETAMINOPHEN 5; 325 MG/1; MG/1
1 TABLET ORAL EVERY 8 HOURS PRN
Qty: 90 TABLET | Refills: 0 | Status: SHIPPED | OUTPATIENT
Start: 2020-08-02 | End: 2020-08-26 | Stop reason: SDUPTHER

## 2020-07-30 RX ORDER — IBUPROFEN 800 MG/1
800 TABLET ORAL EVERY 8 HOURS PRN
Qty: 90 TABLET | Refills: 1 | Status: SHIPPED | OUTPATIENT
Start: 2020-07-30 | End: 2020-10-28 | Stop reason: SDUPTHER

## 2020-07-30 ASSESSMENT — ENCOUNTER SYMPTOMS
RESPIRATORY NEGATIVE: 1
EYES NEGATIVE: 1
GASTROINTESTINAL NEGATIVE: 1

## 2020-07-30 NOTE — PROGRESS NOTES
[address] [facility] PROGRESS NOTE      Patient video visit to review Medication Agreement    Chief Complaint:  Knee pain    He c/o bilateral knee pain. He had right knee replacement 2 years ago and still has pain. He has had multiple knee surgeries on left and has more pain. He state sis going to see his Orthopaedic surgeon. He wears braces on his knees. He  Exercises in the water 3 times a week. He is not sleeping well. No Ed visits. Knee Pain    The incident occurred more than 1 week ago. The pain is present in the left knee and right knee (radiates to left leg). The quality of the pain is described as burning and aching (sharp dull). The pain is at a severity of 6/10. The pain is moderate. The pain has been worsening since onset. Associated symptoms include a loss of motion and muscle weakness. Associated symptoms comments: Decreased rOm left knee. Foreign body present: knee replacements. The symptoms are aggravated by weight bearing (walking). He has tried ice (pain medication) for the symptoms. Patient denies any new neurological symptoms. No bowel or bladder incontinence, no weakness, and no falling. Any new diagnostic workup: [x] no  [] yes [] Xray [] CT scan [] MRI [] DEXA scan     [] Other                    Treatment goals:  Functional status: relief of pain      Aberrancy:   Any alcoholic beverages   no    Any illegal drugs   no      Analgesia:6      Adverse  Effects :none    ADL;s :goes in pool 3 times aweek        Pill count: appropriate    Morphine equivalent dose as reported on [place]ARRS:82.50 has narcan at home  Periodic Controlled Substance Monitoring: Possible medication side effects, risk of tolerance/dependence & alternative treatments discussed., No signs of potential drug abuse or diversion identified. , Assessed functional status., Obtaining appropriate analgesic effect of treatment.  Donnie Barnard, APRN - CNP)  Chronic Pain > 80 MEDD: Co-prescribed Naloxone., Obtained or confirmed \"Medication Contract\" on file. Martin Flowers, APRN - CNP)  Review of[place]RS does not show any aberrant prescription behavior. Medication is helping the patient stay active. Patient denies any side effects and reports adequate analgesia. No sign of misuse/abuse. When was thelast UDS:  6-           Was the UDS appropriate:yes      Record/Diagnostics Review:      As above, I did review the imaging    6/29/2019  7:09 PM - Julius, Tasiapn Incoming Lab Results From [facility]     Component  Value  Ref Range & Units  Status  Collected  Lab    Pain Management Drug Panel Interp, Urine  Consistent   Final  06/26/2019 10:40 AM  [facility]    (NOTE)   ________________________________________________________________   DRUGS EXPECTED:   PERCOCET (OXYCODONE) [6/26/19]   MS CONTIN (MORPHINE) [6/26/19]   ________________________________________________________________   CONSISTENT with medications provided:   PERCOCET (OXYCODONE) : based on oxycodone, noroxycodone,   noroxymorphone, oxymorphone   MS CONTIN (MORPHINE) : based on morphine, hydromorphone   ________________________________________________________________   Drugs Not Included in this Assay:   Acetaminophen   ________________________________________________________________   INTERPRETIVE INFORMATION: [facility] [place], [place] Spec/EMIT, Ur,                            Interp   Interpretation depends on accuracy and completeness of patient   medication information submitted by client.             Past Medical History:   Diagnosis Date    Arthritis     Left knee pain 4/9/2014    Teeth problem     abcessw       Past Surgical History:   Procedure Laterality Date    CARPAL TUNNEL RELEASE Bilateral     DENTAL SURGERY  10/2015    JOINT REPLACEMENT Left     KNEE  X 6 pt unsure of date    JOINT REPLACEMENT Right 07/20/2018    ROTATOR CUFF REPAIR Right     TONSILLECTOMY         Allergies   Allergen Reactions    Oxycontin [Oxycodone Hcl] Nausea Only         Current file     Minutes per session: Not on file    Stress: Not on file   Relationships    Social connections     Talks on phone: Not on file     Gets together: Not on file     Attends Buddhism service: Not on file     Active member of club or organization: Not on file     Attends meetings of clubs or organizations: Not on file     Relationship status: Not on file    Intimate partner violence     Fear of current or ex partner: Not on file     Emotionally abused: Not on file     Physically abused: Not on file     Forced sexual activity: Not on file   Other Topics Concern    Not on file   Social History Narrative    Not on file         Review of Systems:  Review of Systems   Constitution: Negative. HENT: Negative. Eyes: Negative. Cardiovascular: Negative. Respiratory: Negative. Endocrine: Negative. Hematologic/Lymphatic: Negative. Skin: Negative. Musculoskeletal: Positive for joint pain. Gastrointestinal: Negative. Genitourinary: Negative. Neurological: Negative. Psychiatric/Behavioral: Negative. Physical Exam:    Physical Exam  HENT:      Head: Normocephalic. Pulmonary:      Effort: Pulmonary effort is normal.   Skin:         Neurological:      Mental Status: He is alert. Psychiatric:         Mood and Affect: Mood normal.         Behavior: Behavior normal.         Thought Content: Thought content normal.           Assessment:            Treatment Plan:  DISCUSSION: Treatment options discussed withpatient and all questions answered to patient's satisfaction.      Possible side effects, risk of tolerance and or dependence and alternative treatments discussed    Obtaining appropriate analgesic effect of treatment   No signs of potential drug abuse or diversion identified    [x] Ill effects of being on chronic pain medications such as sleep disturbances, respiratory depression, hormonal changes, withdrawal symptoms, chronic opioid dependence and tolerance as well as risk of taking opioids with Benzodiazepines and taking opioids along with alcohol,  werediscussed with patient. I had asked the patient to minimize medication use and utilize pain medications only for uncontrolled rest pain or pain with exertional activities. I advised patient not to self-escalate painmedications without consulting with us. At each of patient's future visits we will try to taper pain medications, while adjusting the adjunct medications, and re-evaluating for Physical Therapy to improve spinal andjoint strength. We will continue to have discussions to decrease pain medications as tolerated. Counseled patient on effects their pain medication and /or their medical condition mayhave on their  ability to drive or operate machinery. Instructed not to drive or operate machinery if drowsy     I also discussed with the patient regarding the dangers of combining narcotic pain medication with tranquilizers, alcohol or illegal drugs or taking the medication any way other than prescribed. The dangers were discussed  including respiratory depression and death. Patient was told to tell  all  physicians regarding the medications he is getting from pain clinic. Patient is warned not to take any unprescribed medications over-the-countermedications that can depress breathing . Patient is advised to talk to the pharmacist or physicians if planning to take any over-the-counter medications before  takeing them. Patient is strongly advised to avoid tranquilizers or  relaxants, illegal drugs  or any medications that can depress breathing  Patient is also advised to tell us if there is any changes in their medications from other physicians.     Location:  patient  at home ,provider working from home  1. UDT, instructed to go to outpt lab by tomorrow    TREATMENT OPTIONS:     UDT  Medication Agreement Requirements Met  Continue Opioid therapy  Script written for percocet, ms contin, ibuprofen  Follow up appointment made

## 2020-08-07 ENCOUNTER — HOSPITAL ENCOUNTER (OUTPATIENT)
Age: 70
Setting detail: SPECIMEN
Discharge: HOME | End: 2020-08-07
Payer: MEDICARE

## 2020-08-07 PROCEDURE — 80307 DRUG TEST PRSMV CHEM ANLYZR: CPT

## 2020-08-10 LAB
6-ACETYLMORPHINE, UR: NOT DETECTED
7-AMINOCLONAZEPAM, URINE: NOT DETECTED
ALPHA-OH-ALPRAZ, URINE: NOT DETECTED
ALPRAZOLAM, URINE: NOT DETECTED
AMPHETAMINES, URINE: NOT DETECTED
BARBITURATES, URINE: NOT DETECTED
BENZOYLECGONINE, UR: NOT DETECTED
BUPRENORPHINE URINE: NOT DETECTED
CARISOPRODOL, UR: NOT DETECTED
CLONAZEPAM, URINE: NOT DETECTED
CODEINE, URINE: NOT DETECTED
CREATININE URINE: 206.9 MG/DL (ref 20–400)
DIAZEPAM, URINE: NOT DETECTED
DRUGS EXPECTED, UR: NORMAL
EER HI RES INTERP UR: NORMAL
ETHYL GLUCURONIDE UR: NOT DETECTED
FENTANYL URINE: NOT DETECTED
HYDROCODONE, URINE: NOT DETECTED
HYDROMORPHONE, URINE: NOT DETECTED
LORAZEPAM, URINE: NOT DETECTED
MARIJUANA METAB, UR: NOT DETECTED
MDA, UR: NOT DETECTED
MDEA, EVE, UR: NOT DETECTED
MDMA URINE: NOT DETECTED
MEPERIDINE METAB, UR: NOT DETECTED
METHADONE, URINE: NOT DETECTED
METHAMPHETAMINE, URINE: NOT DETECTED
METHYLPHENIDATE: NOT DETECTED
MIDAZOLAM, URINE: NOT DETECTED
MORPHINE URINE: PRESENT
NORBUPRENORPHINE, URINE: NOT DETECTED
NORDIAZEPAM, URINE: NOT DETECTED
NORFENTANYL, URINE: NOT DETECTED
NORHYDROCODONE, URINE: NOT DETECTED
NOROXYCODONE, URINE: PRESENT
NOROXYMORPHONE, URINE: NOT DETECTED
OXAZEPAM, URINE: NOT DETECTED
OXYCODONE URINE: PRESENT
OXYMORPHONE, URINE: PRESENT
PAIN MANAGEMENT DRUG PANEL INTERP, URINE: NORMAL
PAIN MGT DRUG PANEL, HI RES, UR: NORMAL
PCP,URINE: NOT DETECTED
PHENTERMINE, UR: NOT DETECTED
PROPOXYPHENE, URINE: NOT DETECTED
TAPENTADOL, URINE: NOT DETECTED
TAPENTADOL-O-SULFATE, URINE: NOT DETECTED
TEMAZEPAM, URINE: NOT DETECTED
TRAMADOL, URINE: NOT DETECTED
ZOLPIDEM, URINE: NOT DETECTED

## 2020-08-11 ENCOUNTER — OFFICE VISIT (OUTPATIENT)
Dept: INTERNAL MEDICINE CLINIC | Age: 70
End: 2020-08-11
Payer: MEDICARE

## 2020-08-11 ENCOUNTER — HOSPITAL ENCOUNTER (OUTPATIENT)
Age: 70
Setting detail: SPECIMEN
Discharge: HOME | End: 2020-08-11
Payer: MEDICARE

## 2020-08-11 VITALS
SYSTOLIC BLOOD PRESSURE: 159 MMHG | OXYGEN SATURATION: 95 % | BODY MASS INDEX: 35.47 KG/M2 | WEIGHT: 226 LBS | TEMPERATURE: 97.4 F | HEIGHT: 67 IN | HEART RATE: 78 BPM | DIASTOLIC BLOOD PRESSURE: 100 MMHG

## 2020-08-11 PROBLEM — R29.898 WEAKNESS OF LEFT LEG: Status: RESOLVED | Noted: 2018-02-06 | Resolved: 2020-08-11

## 2020-08-11 LAB
-: NORMAL
ALBUMIN SERPL-MCNC: 4.2 G/DL (ref 3.5–5.2)
ALBUMIN/GLOBULIN RATIO: 1.5 (ref 1–2.5)
ALP BLD-CCNC: 52 U/L (ref 40–129)
ALT SERPL-CCNC: 13 U/L (ref 5–41)
AMORPHOUS: NORMAL
ANION GAP SERPL CALCULATED.3IONS-SCNC: 11 MMOL/L (ref 9–17)
AST SERPL-CCNC: 16 U/L
BACTERIA: NORMAL
BILIRUB SERPL-MCNC: 0.44 MG/DL (ref 0.3–1.2)
BILIRUBIN URINE: NEGATIVE
BUN BLDV-MCNC: 15 MG/DL (ref 8–23)
BUN/CREAT BLD: NORMAL (ref 9–20)
CALCIUM SERPL-MCNC: 9.1 MG/DL (ref 8.6–10.4)
CASTS UA: NORMAL /LPF (ref 0–8)
CHLORIDE BLD-SCNC: 101 MMOL/L (ref 98–107)
CHOLESTEROL/HDL RATIO: 5
CHOLESTEROL: 190 MG/DL
CO2: 26 MMOL/L (ref 20–31)
COLOR: YELLOW
CREAT SERPL-MCNC: 1.02 MG/DL (ref 0.7–1.2)
CRYSTALS, UA: NORMAL /HPF
EPITHELIAL CELLS UA: NORMAL /HPF (ref 0–5)
GFR AFRICAN AMERICAN: >60 ML/MIN
GFR NON-AFRICAN AMERICAN: >60 ML/MIN
GFR SERPL CREATININE-BSD FRML MDRD: NORMAL ML/MIN/{1.73_M2}
GFR SERPL CREATININE-BSD FRML MDRD: NORMAL ML/MIN/{1.73_M2}
GLUCOSE BLD-MCNC: 84 MG/DL (ref 70–99)
GLUCOSE URINE: NEGATIVE
HAV IGM SER IA-ACNC: NONREACTIVE
HCT VFR BLD CALC: 38.4 % (ref 40.7–50.3)
HDLC SERPL-MCNC: 38 MG/DL
HEMOGLOBIN: 12.2 G/DL (ref 13–17)
HEPATITIS B CORE IGM ANTIBODY: NONREACTIVE
HEPATITIS B SURFACE ANTIGEN: NONREACTIVE
HEPATITIS C ANTIBODY: NONREACTIVE
KETONES, URINE: NEGATIVE
LDL CHOLESTEROL: 121 MG/DL (ref 0–130)
LEUKOCYTE ESTERASE, URINE: NEGATIVE
MCH RBC QN AUTO: 29 PG (ref 25.2–33.5)
MCHC RBC AUTO-ENTMCNC: 31.8 G/DL (ref 28.4–34.8)
MCV RBC AUTO: 91.2 FL (ref 82.6–102.9)
MUCUS: NORMAL
NITRITE, URINE: NEGATIVE
NRBC AUTOMATED: 0 PER 100 WBC
OTHER OBSERVATIONS UA: NORMAL
PDW BLD-RTO: 12.8 % (ref 11.8–14.4)
PH UA: 5.5 (ref 5–8)
PLATELET # BLD: 178 K/UL (ref 138–453)
PMV BLD AUTO: 10.9 FL (ref 8.1–13.5)
POTASSIUM SERPL-SCNC: 4 MMOL/L (ref 3.7–5.3)
PROSTATE SPECIFIC ANTIGEN: 3.32 UG/L
PROTEIN UA: NEGATIVE
RBC # BLD: 4.21 M/UL (ref 4.21–5.77)
RBC UA: NORMAL /HPF (ref 0–4)
RENAL EPITHELIAL, UA: NORMAL /HPF
SODIUM BLD-SCNC: 138 MMOL/L (ref 135–144)
SPECIFIC GRAVITY UA: 1.02 (ref 1–1.03)
TOTAL PROTEIN: 7 G/DL (ref 6.4–8.3)
TRICHOMONAS: NORMAL
TRIGL SERPL-MCNC: 155 MG/DL
TSH SERPL DL<=0.05 MIU/L-ACNC: 1.4 MIU/L (ref 0.3–5)
TURBIDITY: CLEAR
URINE HGB: NEGATIVE
UROBILINOGEN, URINE: NORMAL
VLDLC SERPL CALC-MCNC: ABNORMAL MG/DL (ref 1–30)
WBC # BLD: 3.4 K/UL (ref 3.5–11.3)
WBC UA: NORMAL /HPF (ref 0–5)
YEAST: NORMAL

## 2020-08-11 PROCEDURE — G0438 PPPS, INITIAL VISIT: HCPCS | Performed by: FAMILY MEDICINE

## 2020-08-11 RX ORDER — SPIRONOLACTONE 50 MG/1
50 TABLET, FILM COATED ORAL DAILY
Qty: 90 TABLET | Refills: 1 | Status: SHIPPED | OUTPATIENT
Start: 2020-08-11 | End: 2021-02-04

## 2020-08-11 RX ORDER — CLONIDINE HYDROCHLORIDE 0.1 MG/1
0.2 TABLET ORAL 2 TIMES DAILY
Qty: 180 TABLET | Refills: 0 | COMMUNITY
Start: 2020-08-11 | End: 2020-09-28

## 2020-08-11 ASSESSMENT — LIFESTYLE VARIABLES: HOW OFTEN DO YOU HAVE A DRINK CONTAINING ALCOHOL: 0

## 2020-08-11 ASSESSMENT — ENCOUNTER SYMPTOMS
BACK PAIN: 1
EYES NEGATIVE: 1
GASTROINTESTINAL NEGATIVE: 1
ALLERGIC/IMMUNOLOGIC NEGATIVE: 1
RESPIRATORY NEGATIVE: 1

## 2020-08-11 ASSESSMENT — PATIENT HEALTH QUESTIONNAIRE - PHQ9
SUM OF ALL RESPONSES TO PHQ QUESTIONS 1-9: 0
SUM OF ALL RESPONSES TO PHQ QUESTIONS 1-9: 0

## 2020-08-11 NOTE — PROGRESS NOTES
Subjective:      Patient ID: Zaida Falcon is a 71 y.o. male. Hypertension   This is a chronic problem. The current episode started more than 1 month ago. The problem has been waxing and waning since onset. The problem is uncontrolled. Associated symptoms include anxiety. Agents associated with hypertension include NSAIDs. Risk factors for coronary artery disease include dyslipidemia, obesity, male gender, stress and sedentary lifestyle. Past treatments include central alpha agonists. The current treatment provides moderate improvement. Compliance problems include psychosocial issues. Identifiable causes of hypertension include a hypertension causing med. Review of Systems   Constitutional: Negative. HENT: Negative. Eyes: Negative. Respiratory: Negative. Cardiovascular: Negative. Gastrointestinal: Negative. Endocrine: Negative. Musculoskeletal: Positive for arthralgias, back pain, gait problem and myalgias. Skin: Negative. Allergic/Immunologic: Negative. Hematological: Negative. Psychiatric/Behavioral: Positive for dysphoric mood. The patient is nervous/anxious. Past family and social history unremarkable. Diagnosis Orders   1. Encounter for Medicare annual wellness exam     2. Uncontrolled hypertension     3. Pre-diabetes  HIV Rapid 1&2    Hepatitis Panel, Acute    CBC    Comprehensive Metabolic Panel    Hemoglobin A1C    Lipid Panel    Urinalysis with Microscopic    TSH without Reflex    Psa screening   4. S/P knee surgery     5. Medication monitoring encounter     6. Use of opiates for therapeutic purposes  HIV Rapid 1&2   7. Dyslipidemia  HIV Rapid 1&2    Hepatitis Panel, Acute    CBC    Comprehensive Metabolic Panel    Hemoglobin A1C    Lipid Panel    Urinalysis with Microscopic    TSH without Reflex    Psa screening   8. Primary osteoarthritis of both knees     9. Psychophysiological insomnia     10. Fibromyalgia     11. Trigger point of extremity     12.  [facility]  HIV Rapid 1&2    Hepatitis Panel, Acute    CBC    Comprehensive Metabolic Panel    Hemoglobin A1C    Lipid Panel    Urinalysis with Microscopic    TSH without Reflex    Psa screening         Objective:   Physical Exam  Vitals signs and nursing note reviewed. Constitutional:       Appearance: He is well-developed. Comments: Obesity   HENT:      Head: Normocephalic and atraumatic. Right Ear: External ear normal.      Left Ear: External ear normal.      Nose: Nose normal.   Eyes:      Conjunctiva/sclera: Conjunctivae normal.      Pupils: Pupils are equal, round, and reactive to light. Neck:      Musculoskeletal: Normal range of motion and neck supple. Cardiovascular:      Rate and Rhythm: Normal rate and regular rhythm. Heart sounds: Normal heart sounds. Comments: Uncontrolled hypertension with underlying history of chronic pain syndrome and mood disorder. He is on clonidine  Hyperlipidemia  Pulmonary:      Effort: Pulmonary effort is normal.      Breath sounds: Normal breath sounds. Abdominal:      General: Bowel sounds are normal.      Palpations: Abdomen is soft. Musculoskeletal: Normal range of motion. Comments: Chronic pain syndrome. He is on Percocet, MS Contin, Motrin 800 mg, gabapentin as provided by pain management   Skin:     General: Skin is warm and dry. Neurological:      Mental Status: He is alert and oriented to person, place, and time. Deep Tendon Reflexes: Reflexes are normal and symmetric. Psychiatric:      Comments: Mood disorder with underlying chronic pain syndrome. He denies suicidality         Assessment:       Diagnosis Orders   1. Encounter for Medicare annual wellness exam     2. Uncontrolled hypertension     3. Pre-diabetes  HIV Rapid 1&2    Hepatitis Panel, Acute    CBC    Comprehensive Metabolic Panel    Hemoglobin A1C    Lipid Panel    Urinalysis with Microscopic    TSH without Reflex    Psa screening   4.  S/P knee surgery 5. Medication monitoring encounter     6. Use of opiates for therapeutic purposes  HIV Rapid 1&2   7. Dyslipidemia  HIV Rapid 1&2    Hepatitis Panel, Acute    CBC    Comprehensive Metabolic Panel    Hemoglobin A1C    Lipid Panel    Urinalysis with Microscopic    TSH without Reflex    Psa screening   8. Primary osteoarthritis of both knees     9. Psychophysiological insomnia     10. Fibromyalgia     11. Trigger point of extremity     12. Mood disorder (H[facility])  HIV Rapid 1&2    Hepatitis Panel, Acute    CBC    Comprehensive Metabolic Panel    Hemoglobin A1C    Lipid Panel    Urinalysis with Microscopic    TSH without Reflex    Psa screening           Plan:      51-year-old -American male is presented for Medicare annual check. Subjective complaint of chronic pain syndrome for which he is established with pain management. He is on MS Contin, Percocet, Depakote, gabapentin, Motrin 800 mg scheduled. He continues to complain of right thumb pain. He is advised to contact his pain management services. Reassurance provided. Uncontrolled hypertension. Increase clonidine from 0.1 mg 2.2 mg p.o. twice daily. I will add Aldactone. He is advised to keep daily blood pressure log for office review, stress reduction, low-fat high-fiber diet and consumption of less than 2 g of salt a day  Mood disorder with underlying chronic pain syndrome. Continue Depakote and clonidine  Insomnia. Continue melatonin. Reassurance provided  Dyslipidemia  Obesity. He will benefit from low-fat high-fiber diet, daily moderate exercise, weight reduction to keep BMI around 25  He denies tobacco, excessive alcohol or illicit drug use  He is updated on influenza, pneumococcal vaccine, Tdap and colonoscopy  Further recommendations to follow  He will be reevaluated in 4 weeks.   Advised to bring blood pressure log for further adjustment of his medications  This note is created with a voice recognition program and while intend to generate a document that accurately reflects the content of the visit, no guarantee can be provided that every mistake has been identified and corrected by editing.           Sandor Stratton MD

## 2020-08-11 NOTE — PATIENT INSTRUCTIONS
Personalized Preventive Plan for Reji Martinez - 8/11/2020  Medicare offers a range of preventive health benefits. Some of the tests and screenings are paid in full while other may be subject to a deductible, co-insurance, and/or copay. Some of these benefits include a comprehensive review of your medical history including lifestyle, illnesses that may run in your family, and various assessments and screenings as appropriate. After reviewing your medical record and screening and assessments performed today your provider may have ordered immunizations, labs, imaging, and/or referrals for you. A list of these orders (if applicable) as well as your Preventive Care list are included within your After Visit Summary for your review. Other Preventive Recommendations:    · A preventive eye exam performed by an eye specialist is recommended every 1-2 years to screen for glaucoma; cataracts, macular degeneration, and other eye disorders. · A preventive dental visit is recommended every 6 months. · Try to get at least 150 minutes of exercise per week or 10,000 steps per day on a pedometer . · Order or download the FREE \"Exercise & Physical Activity: Your Everyday Guide\" from [employer]. Call 2-551.930.9403 or search [employer] online. · You need 8074-6602 mg of calcium and 8388-3661 IU of vitamin D per day. It is possible to meet your calcium requirement with diet alone, but a vitamin D supplement is usually necessary to meet this goal.  · When exposed to the sun, use a sunscreen that protects against both UVA and UVB radiation with an SPF of 30 or greater. Reapply every 2 to 3 hours or after sweating, drying off with a towel, or swimming. · Always wear a seat belt when traveling in a car. Always wear a helmet when riding a bicycle or motorcycle.

## 2020-08-12 LAB
ESTIMATED AVERAGE GLUCOSE: 114 MG/DL
HBA1C MFR BLD: 5.6 % (ref 4–6)

## 2020-08-24 ASSESSMENT — ENCOUNTER SYMPTOMS
NAUSEA: 0
BACK PAIN: 0
GASTROINTESTINAL NEGATIVE: 1
RESPIRATORY NEGATIVE: 1
VOMITING: 0
EYE REDNESS: 0
SORE THROAT: 0
EYES NEGATIVE: 1
ABDOMINAL PAIN: 0
EYE PAIN: 0
CONSTIPATION: 0
ALLERGIC/IMMUNOLOGIC NEGATIVE: 1
DIARRHEA: 0

## 2020-08-24 NOTE — PROGRESS NOTES
Addison Yan is a 71 y.o. male evaluated on 8/26/2020. Modality of virtual service provided -via Video+audio   Consent:  Patient and/or health care decision maker is aware that that patient may receive a bill for this telephone service, depending on one's insurance coverage, and has provided verbal consent to proceed: Yes    Patient identification was verified at the start of the visit: Yes    Chief complaint: Addison Yan is 71 y.o.,  male, with bilateral knee pain. Patient is complaining of pain involving the knees bilaterally. This patient had undergone right total knee joint replacement done in the past but continued to have pain. He apparently also has severe arthritis in the left knee and needs a left total knee joint replacement. Patient reports he had several arthroscopic surgeries in his left knee. Patient reports he is trying to see his surgeon but not successful so far. Knee Pain    Incident onset: Longstanding duration. Injury mechanism: Had several surgeries on the left knee including bilateral joint replacements. The pain is present in the left knee and right knee. The quality of the pain is described as aching, burning and stabbing (Sharp, throbbing). The pain is at a severity of 8/10 (5-10). The pain is severe (Moderate to severe depending on activity). The pain has been constant since onset. Associated symptoms include an inability to bear weight. Pertinent negatives include no muscle weakness, numbness or tingling. Foreign body present: Has bilateral total knee joint replacement done. The symptoms are aggravated by movement, palpation and weight bearing. Alleviating factors:ice   Lifestyle changes experienced with pain: Wakes from sleep, Prevents or limits ADLs, Increases w/activity., Increases w/prolonged sitting/standing/walking  Mood changes,disappointed  Patient currently unemployed. Retired  Physical therapy did not help the pain.     Are you under Known Problems Father        Social History     Socioeconomic History    Marital status:       Spouse name: Not on file    Number of children: Not on file    Years of education: Not on file    Highest education level: Not on file   Occupational History    Occupation: retired   Social Needs    Financial resource strain: Not on file    Food insecurity     Worry: Not on file     Inability: Not on file   [employer] needs     Medical: Not on file     Non-medical: Not on file   Tobacco Use    Smoking status: Never Smoker    Smokeless tobacco: Never Used   Substance and Sexual Activity    Alcohol use: No    Drug use: No    Sexual activity: Never   Lifestyle    Physical activity     Days per week: Not on file     Minutes per session: Not on file    Stress: Not on file   Relationships    Social connections     Talks on phone: Not on file     Gets together: Not on file     Attends [employer] service: Not on file     Active member of club or organization: Not on file     Attends meetings of clubs or organizations: Not on file     Relationship status: Not on file    Intimate partner violence     Fear of current or ex partner: Not on file     Emotionally abused: Not on file     Physically abused: Not on file     Forced sexual activity: Not on file   Other Topics Concern    Not on file   Social History Narrative    Not on file       Allergies   Allergen Reactions    Oxycontin [Oxycodone Hcl] Nausea Only       Current Outpatient Medications on File Prior to Encounter   Medication Sig Dispense Refill    cloNIDine (CATAPRES) 0.1 MG tablet Take 2 tablets by mouth 2 times daily 180 tablet 0    spironolactone (ALDACTONE) 50 MG tablet Take 1 tablet by mouth daily 90 tablet 1    ibuprofen (ADVIL;MOTRIN) 800 MG tablet Take 1 tablet by mouth every 8 hours as needed for Pain 90 tablet 1    divalproex (DEPAKOTE) 125 MG DR tablet TAKE ONE TABLET BY MOUTH TWICE A  tablet 0    gabapentin (NEURONTIN) 600 MG tablet TAKE ONE TABLET BY MOUTH THREE TIMES A DAY 90 tablet 2    melatonin 3 MG TABS tablet Take 6 mg by mouth nightly as needed (insomnia)       No current facility-administered medications on file prior to encounter. Review of Systems   Constitutional: Negative. Negative for activity change, appetite change, fatigue and fever. HENT: Negative. Negative for congestion, hearing loss and sore throat. Eyes: Negative. Negative for pain, redness and visual disturbance. Respiratory: Negative. Cardiovascular: Negative. Gastrointestinal: Negative. Negative for abdominal pain, constipation, diarrhea, nausea and vomiting. Endocrine: Negative. Negative for cold intolerance and polyuria. Genitourinary: Negative. Negative for dysuria and hematuria. Musculoskeletal: Positive for arthralgias. Negative for back pain and neck pain. Bilateral knees   Skin: Negative. Negative for rash. Allergic/Immunologic: Negative. Neurological: Positive for weakness. Negative for dizziness, tingling and numbness. Both legs   Hematological: Negative. Psychiatric/Behavioral: Negative. Negative for self-injury, sleep disturbance and suicidal ideas. The patient is not nervous/anxious. Physical Exam  Skin:         Neurological:      Mental Status: He is alert and oriented to person, place, and time.    Psychiatric:         Mood and Affect: Mood normal.            DATA:  LAB.:  8/10/2020 10:37 PM - [facility] Incoming Lab Results From Inside     Component  Value  Ref Range & Units  Status  Collected  Lab    Pain Management Drug Panel Interp, Urine  Consistent   Final  08/07/2020 11:24 AM  [facility]    (NOTE)   ________________________________________________________________   DRUGS EXPECTED:   OXYCODONE   MORPHINE   ________________________________________________________________   CONSISTENT with medications provided:   OXYCODONE: based on oxycodone, noroxycodone, oxymorphone   MORPHINE: based on morphine        X-Ray reports:  Right knee:    HISTORY:    Right knee arthroplasty. 3 views the right knee were obtained. Patient status post right knee arthroplasty. Alignment is anatomic. No joint effusion. IMPRESSION:    Satisfactory post operative knee. Workstation:[id number]    Finalized by Benny Wie MD on 2018   Clinical  impression:  1. S/P knee surgery    2. Chronic pain of left knee    3. Primary osteoarthritis of both knees    4. Weakness of left leg    5. S/P left knee surgery    6. Use of opiates for therapeutic purposes    7. Encounter for medication monitoring    8. Osteoarthritis of both knees, unspecified osteoarthritis type    9. Medication monitoring encounter        Plan of care: We will continue current pain medications  Current medications are being tolerated without any Adverse side effects. Orders Placed This Encounter   Medications    oxyCODONE-acetaminophen (PERCOCET) 5-325 MG per tablet     Sig: Take 1 tablet by mouth every 8 hours as needed for Pain for up to 30 days. Dispense:  90 tablet     Refill:  0     Reduce doses taken as pain becomes manageable    morphine (MS CONTIN) 30 MG extended release tablet     Sig: Take 1 tablet by mouth every 12 hours for 30 days. Dispense:  60 tablet     Refill:  0     Reduce doses taken as pain becomes manageable    naloxone (NARCAN) 4 MG/0.1ML LIQD nasal spray     Si spray by Nasal route as needed for Opioid Reversal     Dispense:  1 each     Refill:  0     Urine drug screens have been appropriate. No aberrant activity noted. Analgesia is achieved. Activities of daily living are possible because of medications. Safe use of medications explained to patient.     PDMP Monitoring:    Last PDMP Genevieve Crews as Reviewed [facility]):  Review User Review Instant Review Result   Lori Barnard 2020 10:37 AM Reviewed PDMP [1]     Counselling/Preventive measures for pain  Control:    [x]  Spine strengthening exercises are discussed with patient in detail. [x] Ill effects of being on chronic pain medications such as sleep disturbances, hormonal changes, withdrawal symptoms,  chronic opioid dependence and tolerance were discussed with patient. I had asked the patient to minimize medication use and utilize pain medications only for uncontrolled rest pain or pain with exertional activities. I advised patient not to self escalate pain medications without consulting with us. At each of patient's future visits we will try to taper pain medications, while adjusting the adjunct medications, and re-evaluating for Physical Therapy to improve spinal and joint strength. We will continue to have discussions to decrease pain medications as tolerated. I also discussed with the patient regarding the dangers of combining narcotic pain medication with tranquilizers, alcohol or illegal drugs or taking the medication any other than prescribed. The dangers including the respiratory depression and death. Patient was told to tell  to all  physicians regarding the medications he is getting from pain clinic. Patient is warned not to take any unprescribed medications over-the-counter medications that can depress breathing . Patient is advised to talk to the pharmacist or physicians if planning to take any over-the-counter medications before  takeing them. Patient is strongly advised to avoid tranquilizers or  Relaxants for any medications that can depress breathing or recreational drugs. Patient is also advised to tell us if there is any changes in his medications from other physicians. We discussed the same at today's visit and have not been to implement it, as the patient's pain is not under control with current medications. Decision Making Process : Patient's health history and referral records thoroughly reviewed before focused physical examination and discussion with patient. I have spent 25 mins.   Over 50% of today's visit is

## 2020-08-26 ENCOUNTER — HOSPITAL ENCOUNTER (OUTPATIENT)
Dept: PAIN MANAGEMENT | Age: 70
Discharge: HOME | End: 2020-08-26
Payer: MEDICARE

## 2020-08-26 PROCEDURE — 99213 OFFICE O/P EST LOW 20 MIN: CPT

## 2020-08-26 RX ORDER — OXYCODONE HYDROCHLORIDE AND ACETAMINOPHEN 5; 325 MG/1; MG/1
1 TABLET ORAL EVERY 8 HOURS PRN
Qty: 90 TABLET | Refills: 0 | Status: SHIPPED | OUTPATIENT
Start: 2020-09-01 | End: 2020-09-28 | Stop reason: SDUPTHER

## 2020-08-26 RX ORDER — MORPHINE SULFATE 30 MG/1
30 TABLET, FILM COATED, EXTENDED RELEASE ORAL EVERY 12 HOURS
Qty: 60 TABLET | Refills: 0 | Status: SHIPPED | OUTPATIENT
Start: 2020-09-01 | End: 2020-09-28 | Stop reason: SDUPTHER

## 2020-08-26 RX ORDER — NALOXONE HYDROCHLORIDE 4 MG/.1ML
1 SPRAY NASAL PRN
Qty: 1 EACH | Refills: 0 | Status: SHIPPED | OUTPATIENT
Start: 2020-08-26 | End: 2020-12-30

## 2020-09-08 ENCOUNTER — OFFICE VISIT (OUTPATIENT)
Dept: INTERNAL MEDICINE CLINIC | Age: 70
End: 2020-09-08
Payer: MEDICARE

## 2020-09-08 VITALS
RESPIRATION RATE: 15 BRPM | OXYGEN SATURATION: 95 % | TEMPERATURE: 97.1 F | WEIGHT: 225.6 LBS | DIASTOLIC BLOOD PRESSURE: 80 MMHG | BODY MASS INDEX: 35.41 KG/M2 | HEIGHT: 67 IN | HEART RATE: 66 BPM | SYSTOLIC BLOOD PRESSURE: 130 MMHG

## 2020-09-08 PROBLEM — E66.01 MORBIDLY OBESE (HCC): Status: ACTIVE | Noted: 2020-09-08

## 2020-09-08 PROCEDURE — 99214 OFFICE O/P EST MOD 30 MIN: CPT | Performed by: FAMILY MEDICINE

## 2020-09-08 RX ORDER — ATORVASTATIN CALCIUM 40 MG/1
40 TABLET, FILM COATED ORAL DAILY
Qty: 90 TABLET | Refills: 1 | Status: SHIPPED | OUTPATIENT
Start: 2020-09-08 | End: 2021-03-04

## 2020-09-08 ASSESSMENT — PATIENT HEALTH QUESTIONNAIRE - PHQ9
SUM OF ALL RESPONSES TO PHQ QUESTIONS 1-9: 0
SUM OF ALL RESPONSES TO PHQ9 QUESTIONS 1 & 2: 0
1. LITTLE INTEREST OR PLEASURE IN DOING THINGS: 0
SUM OF ALL RESPONSES TO PHQ QUESTIONS 1-9: 0
2. FEELING DOWN, DEPRESSED OR HOPELESS: 0

## 2020-09-08 ASSESSMENT — ENCOUNTER SYMPTOMS
RESPIRATORY NEGATIVE: 1
BACK PAIN: 1
EYES NEGATIVE: 1
GASTROINTESTINAL NEGATIVE: 1
ALLERGIC/IMMUNOLOGIC NEGATIVE: 1

## 2020-09-08 NOTE — PROGRESS NOTES
Subjective:      Patient ID: Jose Moore is a 71 y.o. male. Hyperlipidemia   This is a new problem. This is a new diagnosis. The problem is uncontrolled. Recent lipid tests were reviewed and are variable. Exacerbating diseases include obesity. Factors aggravating his hyperlipidemia include fatty foods. Associated symptoms include myalgias. He is currently on no antihyperlipidemic treatment. The current treatment provides no improvement of lipids. Compliance problems include psychosocial issues. Risk factors for coronary artery disease include stress, a sedentary lifestyle, male sex, dyslipidemia and obesity. Review of Systems   Constitutional: Negative. HENT: Negative. Eyes: Negative. Respiratory: Negative. Cardiovascular: Negative. Gastrointestinal: Negative. Endocrine: Negative. Musculoskeletal: Positive for arthralgias, back pain and myalgias. Skin: Negative. Allergic/Immunologic: Negative. Neurological: Negative. Hematological: Negative. Psychiatric/Behavioral: The patient is nervous/anxious. Past family and social history unremarkable. Diagnosis Orders   1. Dyslipidemia     2. Morbidly obese ([address]     3. Use of opiates for therapeutic purposes     4. Pre-diabetes     5. Primary osteoarthritis of both knees     6. Essential hypertension     7. S/P knee surgery     8. Psychophysiological insomnia     9. Fibromyalgia     10. Trigger point of extremity     11. Mood disorder ([address]           Objective:   Physical Exam  Vitals signs and nursing note reviewed. Constitutional:       Appearance: He is well-developed. Comments: Obesity   HENT:      Head: Normocephalic and atraumatic. Right Ear: External ear normal.      Left Ear: External ear normal.      Nose: Nose normal.   Eyes:      Conjunctiva/sclera: Conjunctivae normal.      Pupils: Pupils are equal, round, and reactive to light. Neck:      Musculoskeletal: Normal range of motion and neck supple. Cardiovascular:      Rate and Rhythm: Normal rate and regular rhythm. Heart sounds: Normal heart sounds. Comments: Hypertension, hyperlipidemia  Pulmonary:      Effort: Pulmonary effort is normal.      Breath sounds: Normal breath sounds. Abdominal:      General: Bowel sounds are normal.      Palpations: Abdomen is soft. Musculoskeletal: Normal range of motion. Comments: Chronic pain syndrome. Opiate dependent as provided by pain management   Skin:     General: Skin is warm and dry. Neurological:      Mental Status: He is alert and oriented to person, place, and time. Deep Tendon Reflexes: Reflexes are normal and symmetric. Psychiatric:      Comments: Mood disorder with chronic pain syndrome. Continue Depakote         Assessment:       Diagnosis Orders   1. Dyslipidemia     2. Morbidly obese ([address]     3. Use of opiates for therapeutic purposes     4. Pre-diabetes     5. Primary osteoarthritis of both knees     6. Essential hypertension     7. S/P knee surgery     8. Psychophysiological insomnia     9. Fibromyalgia     10. Trigger point of extremity     11. Mood disorder [facility])             Plan:      70-year-old -American male with known history of chronic pain syndrome on polypharmacy as provided by pain management. He states that he continued with Percocet, MS Contin, gabapentin, clonidine, Motrin 800 mg that he has been tolerating well. Complaint of nontraumatic right wrist pain. Clinical examination is rather benign. Reassurance provided. Advised to continue current regimen of medications as per pain management. Encourage ambulation as tolerated. Mood disorder with chronic pain syndrome. Continue divalproex  Insomnia on melatonin  Hypertension well-controlled to clonidine, diuretic. Consume less than 2 g of salt a day, stress reduction  Modest renal insufficiency with creatinine 1.02. He uses 800 mg Motrin twice daily as needed.   The fact that he is on polypharmacy for pain control, he is advised to avoid anti-inflammatories. Maintain oral hydration  Recent labs reviewed, discussed with patient, questions answered  New onset hyperlipidemia. Moderate exercise, lifestyle change and weight reduction to keep BMI around 25. He agrees to Lipitor. Caution myalgia and elevated LFTs  Impaired fasting glucose-metabolic syndrome. Risk factor stratification is advised  Med list and available labs reviewed, discussed with patient, questions answered  Further recommendations to follow  Observe and call for any concern  This note is created with a voice recognition program and while intend to generate a document that accurately reflects the content of the visit, no guarantee can be provided that every mistake has been identified and corrected by editing.           Tomeka Retana MD

## 2020-09-28 ENCOUNTER — HOSPITAL ENCOUNTER (OUTPATIENT)
Dept: PAIN MANAGEMENT | Age: 70
Discharge: HOME | End: 2020-09-28
Payer: MEDICARE

## 2020-09-28 PROCEDURE — 99213 OFFICE O/P EST LOW 20 MIN: CPT | Performed by: NURSE PRACTITIONER

## 2020-09-28 PROCEDURE — 99213 OFFICE O/P EST LOW 20 MIN: CPT

## 2020-09-28 RX ORDER — MORPHINE SULFATE 30 MG/1
30 TABLET, FILM COATED, EXTENDED RELEASE ORAL EVERY 12 HOURS
Qty: 60 TABLET | Refills: 0 | Status: SHIPPED | OUTPATIENT
Start: 2020-10-02 | End: 2020-10-28 | Stop reason: SDUPTHER

## 2020-09-28 RX ORDER — CLONIDINE HYDROCHLORIDE 0.1 MG/1
TABLET ORAL
Qty: 180 TABLET | Refills: 1 | Status: SHIPPED | OUTPATIENT
Start: 2020-09-28 | End: 2021-03-29

## 2020-09-28 RX ORDER — OXYCODONE HYDROCHLORIDE AND ACETAMINOPHEN 5; 325 MG/1; MG/1
1 TABLET ORAL EVERY 8 HOURS PRN
Qty: 90 TABLET | Refills: 0 | Status: SHIPPED | OUTPATIENT
Start: 2020-10-02 | End: 2020-10-28 | Stop reason: SDUPTHER

## 2020-09-28 ASSESSMENT — ENCOUNTER SYMPTOMS
RESPIRATORY NEGATIVE: 1
EYES NEGATIVE: 1
GASTROINTESTINAL NEGATIVE: 1

## 2020-09-28 NOTE — PROGRESS NOTES
[address] [facility] PROGRESS NOTE      Patient video visit to   review Medication Agreement    Chief Complaint: knee pain  He c/o bilateral knee pain, He has multiple surgeries on his left knee. He had right knee  arthroplasty surgery 3 years ago but continues to have pain. He saw his Orthopaedic surgeon recently. He is going to start wearing his knee brace again. His pain is unchanged. He goes to the gym 3 times a week. He can not walk far, he has to sit down and rest, He does not sleep well due to pain. No ED visits. Knee Pain    The incident occurred more than 1 week ago. The pain is present in the left knee and right knee. Quality: sharp. The pain is at a severity of 6/10. The pain is moderate. The pain has been constant since onset. Foreign body present: hardware. Exacerbated by: everything. He has tried ice and elevation for the symptoms. Patient denies any new neurological symptoms. No bowel or bladder incontinence, no weakness, and no falling. Any new diagnostic workup: [] no  [] yes [x] Xray [] CT scan [] MRI [] DEXA scan     [] Other            At Orthopaedic surgeon\"s office  Result Narrative   XR KNEE RT 3 VWS: 9/15/2020 8:58 AM    CLINICAL INDICATION: 66-year-old male with right total knee arthroplasty, pain    COMPARISON: 12/17/2018    TECHNIQUE: AP, lateral and sunrise views of the right knee. FINDINGS:    Right knee total arthroplasty appears in unchanged alignment.  No evidence of periprosthetic fracture or loosening.  Patellar articular surface spurring.  The joint effusion.  Chronic foci of ossification projecting along the distal patellar tendon and superior to patella as well as overlying the   patellar tendon.      IMPRESSION:   Right knee total arthroplasty in unchanged alignment. Patellar spurring.       Approved by Resident: Aleyda Chisholm MD  on 9/15/2020 10:31 AM    Indra MARTINEZ MD have personally reviewed the image(s) and agree with and/or edited the report    Workstation:[id number]    Finalized by Serafin Arechiga MD on 9/15/2020 10:45 AM   Other Result Information   Interface - Rad Results/Orders In 1 - 09/15/2020 10:46 AM EDT  XR KNEE RT 3 VWS: 9/15/2020 8:58 AM    CLINICAL INDICATION: 17-year-old male with right total knee arthroplasty, pain    COMPARISON: 12/17/2018    TECHNIQUE: AP, lateral and sunrise views of the right knee. FINDINGS:    Right knee total arthroplasty appears in unchanged alignment. No evidence of periprosthetic fracture or loosening. Patellar articular surface spurring. The joint effusion. Chronic foci of ossification projecting along the distal patellar tendon and superior to patella as well as overlying the   patellar tendon. IMPRESSION:   Right knee total arthroplasty in unchanged alignment. Patellar spurring. Approved by Resident: Johnathon Mohamud MD  on 9/15/2020 10:31 AM    I, Serafin Arechiga MD have personally reviewed the image(s) and agree with and/or edited the report    [id number]    Finalized by Serafin Arechiga MD on 9/15/2020 10:45 AM     Result Narrative   XR KNEE LT 3 VWS: 9/15/2020 8:58 AM    CLINICAL INDICATION: Left knee pain, arthroplasty    COMPARISON: 2/6/2018    TECHNIQUE: AP, lateral and sunrise views of the left knee. FINDINGS:    Left knee total arthroplasty with resection of the distal femur.  Redemonstration of distal femoral heterotopic ossifications.  Unchanged appearance of the periprosthetic lucency at the lateral tibial plateau.  Unchanged distal femoral periprosthetic lucency.  No evidence of periprosthetic fracture.    However, note that the proximal and distal portions of the longstem left knee arthroplasty are not included on this exam.      IMPRESSION:   Unchanged appearance of alignment of the visualized portions of the left knee total arthroplasty.   Periprosthetic lucencies which appear unchanged since 2014.  Note that proximal and distal portions of the arthroplasty are not entirely included on this exam.      Approved by Resident: Gerardo Nunez MD  on 9/15/2020 10:21 AM    Trina MARTINEZ MD have personally reviewed the image(s) and agree with and/or edited the report    [id number]    Finalized by Trina Caldwell MD on 9/15/2020 10:41 AM   Other Result Information   Interface - Rad Results/Orders In 1 - 09/15/2020 10:42 AM EDT  XR KNEE LT 3 VWS: 9/15/2020 8:58 AM    CLINICAL INDICATION: Left knee pain, arthroplasty    COMPARISON: 2/6/2018    TECHNIQUE: AP, lateral and sunrise views of the left knee. FINDINGS:    Left knee total arthroplasty with resection of the distal femur. Redemonstration of distal femoral heterotopic ossifications. Unchanged appearance of the periprosthetic lucency at the lateral tibial plateau. Unchanged distal femoral periprosthetic lucency. No evidence of periprosthetic fracture. However, note that the proximal and distal portions of the longstem left knee arthroplasty are not included on this exam.    IMPRESSION:   Unchanged appearance of alignment of the visualized portions of the left knee total arthroplasty. Periprosthetic lucencies which appear unchanged since 2014.   Note that proximal and distal portions of the arthroplasty are not entirely included on this exam.      Approved by Resident: Gerardo Nunez MD  on 9/15/2020 10:21 AM    Trina MARTINEZ MD have personally reviewed the image(s) and agree with and/or edited the report    [id number]    Finalized by Trina Caldwell MD on 9/15/2020 10:41 AM       Treatment goals:  Functional status: walk more      Aberrancy:   Any alcoholic beverages  no    Any illegal drugs   no      Analgesia:6      Adverse  Effects :none    ADL;s :goes to gym        Pill count: appropriate #13 percocet tabs left and #10 ms contin tabs left so fill date will be 10-2-2020  Morphine equivalent dose as reported on [place]:82.50 has narcan at home  Periodic Controlled Substance Monitoring: Obtaining appropriate analgesic effect of treatment. , Possible medication side effects, risk of tolerance/dependence & alternative treatments discussed., No signs of potential drug abuse or diversion identified. , Assessed functional status. (QUIQUE Stein - CNP)  Chronic Pain > 80 MEDD: Co-prescribed Naloxone., Obtained or confirmed \"Medication Contract\" on file. QUIQUE Stein CNP)  Review of[place] does not show any aberrant prescription behavior. Medication is helping the patient stay active. Patient denies any side effects and reports adequate analgesia. No sign of misuse/abuse. When was thelast UDS:    8-7-2020         Was the UDS appropriate:yes      Record/Diagnostics Review:      As above, I did review the imaging    8/10/2020 10:37 PM - Julius, Estefani Incoming Lab Results From [facility]     Component  Value  Ref Range & Units  Status  Collected  Lab    Pain Management Drug Panel Interp, Urine  Consistent   Final  08/07/2020 11:24 AM  [facility]    (NOTE)   ________________________________________________________________   DRUGS EXPECTED:   OXYCODONE   MORPHINE   ________________________________________________________________   CONSISTENT with medications provided:   OXYCODONE: based on oxycodone, noroxycodone, oxymorphone   MORPHINE: based on morphine   ________________________________________________________________   INTERPRETIVE INFORMATION: Pain Mgt [place], [place] Spec/EMIT, Ur,                            Interp   Interpretation depends on accuracy and completeness of patient   medication information submitted by client.             Past Medical History:   Diagnosis Date    Arthritis     Left knee pain 4/9/2014    Teeth problem     abcessw       Past Surgical History:   Procedure Laterality Date    CARPAL TUNNEL RELEASE Bilateral     DENTAL SURGERY  10/2015    JOINT REPLACEMENT Left     KNEE  X 6 pt unsure of date    JOINT REPLACEMENT Right 07/20/2018    ROTATOR CUFF REPAIR Right Substance and Sexual Activity    Alcohol use: No    Drug use: No    Sexual activity: Never   Lifestyle    Physical activity     Days per week: Not on file     Minutes per session: Not on file    Stress: Not on file   Relationships    Social connections     Talks on phone: Not on file     Gets together: Not on file     Attends Restorationist service: Not on file     Active member of club or organization: Not on file     Attends meetings of clubs or organizations: Not on file     Relationship status: Not on file    Intimate partner violence     Fear of current or ex partner: Not on file     Emotionally abused: Not on file     Physically abused: Not on file     Forced sexual activity: Not on file   Other Topics Concern    Not on file   Social History Narrative    Not on file         Review of Systems:  Review of Systems   Constitution: Negative. HENT: Negative. Eyes: Negative. Cardiovascular: Negative. Respiratory: Negative. Endocrine: Negative. Hematologic/Lymphatic: Negative. Skin: Negative. Musculoskeletal: Positive for joint pain. Gastrointestinal: Negative. Genitourinary: Negative. Neurological:        Gait issue limps, uses a cane    Psychiatric/Behavioral: Negative. Physical Exam:    Physical Exam  HENT:      Head: Normocephalic. Pulmonary:      Effort: Pulmonary effort is normal.   Skin:         Neurological:      Mental Status: He is alert and oriented to person, place, and time. Psychiatric:         Mood and Affect: Mood normal.         Behavior: Behavior normal.         Thought Content:  Thought content normal.           Assessment:    Problem List Items Addressed This Visit     Use of opiates for therapeutic purposes - Primary    Osteoarthritis of both knees    Relevant Medications    oxyCODONE-acetaminophen (PERCOCET) 5-325 MG per tablet (Start on 10/2/2020)    morphine (MS CONTIN) 30 MG extended release tablet (Start on 10/2/2020)    Medication monitoring encounter    Relevant Medications    oxyCODONE-acetaminophen (PERCOCET) 5-325 MG per tablet (Start on 10/2/2020)    Fibromyalgia      Other Visit Diagnoses     Weakness of left leg        Relevant Medications    oxyCODONE-acetaminophen (PERCOCET) 5-325 MG per tablet (Start on 10/2/2020)    Encounter for medication monitoring        Relevant Medications    oxyCODONE-acetaminophen (PERCOCET) 5-325 MG per tablet (Start on 10/2/2020)    S/P left knee surgery        Relevant Medications    oxyCODONE-acetaminophen (PERCOCET) 5-325 MG per tablet (Start on 10/2/2020)              Treatment Plan:  DISCUSSION: Treatment options discussed withpatient and all questions answered to patient's satisfaction. Possible side effects, risk of tolerance and or dependence and alternative treatments discussed    Obtaining appropriate analgesic effect of treatment   No signs of potential drug abuse or diversion identified    [x] Ill effects of being on chronic pain medications such as sleep disturbances, respiratory depression, hormonal changes, withdrawal symptoms, chronic opioid dependence and tolerance as well as risk of taking opioids with Benzodiazepines and taking opioids along with alcohol,  werediscussed with patient. I had asked the patient to minimize medication use and utilize pain medications only for uncontrolled rest pain or pain with exertional activities. I advised patient not to self-escalate painmedications without consulting with us. At each of patient's future visits we will try to taper pain medications, while adjusting the adjunct medications, and re-evaluating for Physical Therapy to improve spinal andjoint strength. We will continue to have discussions to decrease pain medications as tolerated. Counseled patient on effects their pain medication and /or their medical condition mayhave on their  ability to drive or operate machinery.  Instructed not to drive or operate machinery if drowsy     I also discussed with the patient regarding the dangers of combining narcotic pain medication with tranquilizers, alcohol or illegal drugs or taking the medication any way other than prescribed. The dangers were discussed  including respiratory depression and death. Patient was told to tell  all  physicians regarding the medications he is getting from pain clinic. Patient is warned not to take any unprescribed medications over-the-countermedications that can depress breathing . Patient is advised to talk to the pharmacist or physicians if planning to take any over-the-counter medications before  takeing them. Patient is strongly advised to avoid tranquilizers or  relaxants, illegal drugs  or any medications that can depress breathing  Patient is also advised to tell us if there is any changes in their medications from other physicians.     Location:  patient  at home    ,provider working from home    TREATMENT OPTIONS:       Medication Agreement Requirements Met  Continue Opioid therapy  Script written for ms aj andre  Follow up appointment made

## 2020-10-11 ENCOUNTER — HOSPITAL ENCOUNTER (EMERGENCY)
Age: 70
Discharge: HOME | End: 2020-10-11
Attending: EMERGENCY MEDICINE
Payer: MEDICARE

## 2020-10-11 ENCOUNTER — APPOINTMENT (OUTPATIENT)
Dept: GENERAL RADIOLOGY | Age: 70
End: 2020-10-11
Payer: MEDICARE

## 2020-10-11 VITALS
DIASTOLIC BLOOD PRESSURE: 76 MMHG | HEART RATE: 78 BPM | OXYGEN SATURATION: 94 % | HEIGHT: 67 IN | SYSTOLIC BLOOD PRESSURE: 113 MMHG | BODY MASS INDEX: 34.84 KG/M2 | RESPIRATION RATE: 14 BRPM | WEIGHT: 222 LBS | TEMPERATURE: 98.2 F

## 2020-10-11 PROCEDURE — 99283 EMERGENCY DEPT VISIT LOW MDM: CPT

## 2020-10-11 PROCEDURE — 73562 X-RAY EXAM OF KNEE 3: CPT

## 2020-10-11 PROCEDURE — 99282 EMERGENCY DEPT VISIT SF MDM: CPT

## 2020-10-11 ASSESSMENT — ENCOUNTER SYMPTOMS
VOMITING: 0
COLOR CHANGE: 0
COUGH: 0
SINUS PRESSURE: 0
NAUSEA: 0
SHORTNESS OF BREATH: 0

## 2020-10-11 ASSESSMENT — PAIN SCALES - GENERAL: PAINLEVEL_OUTOF10: 8

## 2020-10-11 NOTE — ED PROVIDER NOTES
eMERGENCY dEPARTMENT eNCOUnter   Independent Attestation     Pt Name: Gema Pat  MRN: [id number]  [place] 1950  Date of evaluation: 10/11/20     Gema Pat is a 71 y.o. male with CC: Leg Pain      Based on the medical record the care appears appropriate. I was personally available for consultation in the Emergency Department.     Vivian Becerra MD  Attending Emergency Physician                   Vivian Becerra MD  10/11/20 [id number]

## 2020-10-11 NOTE — ED PROVIDER NOTES
[facility] ED  EMERGENCY DEPARTMENT ENCOUNTER      Pt Name: Luis Daniel Bowers  MRN: [id number]  [place] 1950  Date of evaluation: 10/11/20  CHIEF COMPLAINT       Chief Complaint   Patient presents with    Leg Pain     HISTORY OF PRESENT ILLNESS   Presents emergency room via private auto with right knee pain that started 2 days ago without injury. He had a right total knee replacement 3 years ago. He has had intermittent pain since that time. He currently has pain to the lateral aspect of the right knee. Pain radiates to the lateral aspect of the right thigh. No back or ankle pain. The history is provided by the patient. REVIEW OF SYSTEMS     Review of Systems   Constitutional: Negative for activity change and fever. HENT: Negative for congestion and sinus pressure. Respiratory: Negative for cough and shortness of breath. Cardiovascular: Negative for chest pain and palpitations. Gastrointestinal: Negative for nausea and vomiting. Musculoskeletal: Positive for arthralgias and gait problem. Negative for joint swelling. Skin: Negative for color change and wound. Neurological: Negative for dizziness and headaches. Psychiatric/Behavioral: Negative for confusion and decreased concentration.      PASTMEDICAL HISTORY     Past Medical History:   Diagnosis Date    Arthritis     Left knee pain 4/9/2014    Teeth problem     abcessw     SURGICAL HISTORY       Past Surgical History:   Procedure Laterality Date    CARPAL TUNNEL RELEASE Bilateral     DENTAL SURGERY  10/2015    JOINT REPLACEMENT Left     KNEE  X 6 pt unsure of date    JOINT REPLACEMENT Right 07/20/2018    ROTATOR CUFF REPAIR Right     TONSILLECTOMY       CURRENT MEDICATIONS       Discharge Medication List as of 10/11/2020 12:01 PM      CONTINUE these medications which have NOT CHANGED    Details   cloNIDine (CATAPRES) 0.1 MG tablet TAKE ONE TABLET BY MOUTH TWICE A DAY, Disp-180 tablet,R-1Normal oxyCODONE-acetaminophen (PERCOCET) 5-325 MG per tablet Take 1 tablet by mouth every 8 hours as needed for Pain for up to 30 days. , Disp-90 tablet,R-0Normal      morphine (MS CONTIN) 30 MG extended release tablet Take 1 tablet by mouth every 12 hours for 30 days. , Disp-60 tablet,R-0Normal      atorvastatin (LIPITOR) 40 MG tablet Take 1 tablet by mouth daily, Disp-90 tablet,R-1Normal      naloxone (NARCAN) 4 MG/0.1ML LIQD nasal spray 1 spray by Nasal route as needed for Opioid Reversal, Disp-1 each,R-0Normal      spironolactone (ALDACTONE) 50 MG tablet Take 1 tablet by mouth daily, Disp-90 tablet,R-1Normal      ibuprofen (ADVIL;MOTRIN) 800 MG tablet Take 1 tablet by mouth every 8 hours as needed for Pain, Disp-90 tablet,R-1Normal      divalproex (DEPAKOTE) 125 MG DR tablet TAKE ONE TABLET BY MOUTH TWICE A DAY, Disp-180 tablet,R-0Normal      gabapentin (NEURONTIN) 600 MG tablet TAKE ONE TABLET BY MOUTH THREE TIMES A DAY, Disp-90 tablet,R-2Normal      melatonin 3 MG TABS tablet Take 6 mg by mouth nightly as needed (insomnia)Historical Med           ALLERGIES     is allergic to oxycontin [oxycodone hcl]. FAMILY HISTORY     He indicated that his mother is . He indicated that his father is . SOCIAL HISTORY       Social History     Tobacco Use    Smoking status: Never Smoker    Smokeless tobacco: Never Used   Substance Use Topics    Alcohol use: No    Drug use: No     PHYSICAL EXAM     INITIAL VITALS: /76   Pulse 78   Temp 98.2 °F (36.8 °C)   Resp 14   Ht 5' 7\" (1.702 m)   Wt 222 lb (100.7 kg)   SpO2 94%   BMI 34.77 kg/m²    Physical Exam  Constitutional:       Appearance: Normal appearance. HENT:      Right Ear: External ear normal.      Left Ear: External ear normal.   Eyes:      General:         Right eye: No discharge. Left eye: No discharge. Conjunctiva/sclera: Conjunctivae normal.   Cardiovascular:      Rate and Rhythm: Normal rate and regular rhythm.       Pulses: Normal pulses. Pulmonary:      Effort: Pulmonary effort is normal.      Breath sounds: Normal breath sounds. Musculoskeletal: Normal range of motion. General: Tenderness present. No swelling or deformity. Legs:    Skin:     General: Skin is warm and dry. Capillary Refill: Capillary refill takes less than 2 seconds. Findings: No bruising or erythema. Neurological:      General: No focal deficit present. Mental Status: He is alert and oriented to person, place, and time. Psychiatric:         Mood and Affect: Mood normal.         Thought Content: Thought content normal.         DIAGNOSTIC RESULTS     RADIOLOGY:All plain film, CT, MRI, and formal ultrasound images (except ED bedside ultrasound) are read by the radiologist, see reports below, unless otherwise noted in MDM or here. Interpretation per the Radiologist below, if available at the time of this note:    XR KNEE RIGHT (3 VIEWS)   Final Result   Status post total knee arthroplasty without acute osseous abnormality or   evidence for hardware complication. LABS:  Labs Reviewed - No data to display    All other labs were within normal range or not returned as of this dictation. EMERGENCY DEPARTMENT COURSE and DIFFERENTIAL DIAGNOSIS/MDM:   Vitals:    Vitals:    10/11/20 1057 10/11/20 1100   BP:  113/76   Pulse:  78   Resp:  14   Temp:  98.2 °F (36.8 °C)   SpO2:  94%   Weight: 222 lb (100.7 kg)    Height: 5' 7\" (1.702 m)        Medical Decision Makinyear-old male with knee pain without injury. X-ray does not have any acute findings. He was offered an Ace wrap or knee immobilizer which he declined. He has Percocet at home for pain if needed. He received verbal and written instructions regarding rice therapy. The patient was given the following meds in the ED:  No orders of the defined types were placed in this encounter. FINAL IMPRESSION      1.  Acute pain of right knee DISPOSITION/PLAN   DISPOSITION Decision To Discharge 10/11/2020 12:01:24 PM      PATIENT REFERRED TO:   Otoniel Talley MD  [address]  [phone]            DISCHARGE MEDICATIONS:     Discharge Medication List as of 10/11/2020 12:01 PM          CRITICAL CARE TIME       Please note that portions of this note were completed with a voice recognition program.      STEPHIE HERCULES APRN - CNP            QUIQUE Hopper - CNP  10/11/20 8308

## 2020-10-27 RX ORDER — DIVALPROEX SODIUM 125 MG/1
TABLET, DELAYED RELEASE ORAL
Qty: 180 TABLET | Refills: 0 | Status: SHIPPED | OUTPATIENT
Start: 2020-10-27 | End: 2021-01-26

## 2020-10-27 NOTE — TELEPHONE ENCOUNTER
Dileep Isidro is calling to request a refill on the following medication(s):    Medication Request:    Last filled 7/28/2020 #180 with 0 RF    Requested Prescriptions     Pending Prescriptions Disp Refills    divalproex (DEPAKOTE) 125 MG DR tablet [Pharmacy Med Name: DIVALPROEX SOD  MG TAB] 180 tablet 0     Sig: TAKE ONE TABLET BY MOUTH TWICE A DAY       Last Visit Date (If Applicable):  8/7/3757    Next Visit Date:    1/8/2021

## 2020-10-28 ENCOUNTER — HOSPITAL ENCOUNTER (OUTPATIENT)
Dept: PAIN MANAGEMENT | Age: 70
Discharge: HOME | End: 2020-10-28
Payer: MEDICARE

## 2020-10-28 PROCEDURE — 99213 OFFICE O/P EST LOW 20 MIN: CPT

## 2020-10-28 PROCEDURE — 99213 OFFICE O/P EST LOW 20 MIN: CPT | Performed by: NURSE PRACTITIONER

## 2020-10-28 RX ORDER — OXYCODONE HYDROCHLORIDE AND ACETAMINOPHEN 5; 325 MG/1; MG/1
1 TABLET ORAL EVERY 8 HOURS PRN
Qty: 90 TABLET | Refills: 0 | Status: SHIPPED | OUTPATIENT
Start: 2020-11-01 | End: 2020-11-17 | Stop reason: SDUPTHER

## 2020-10-28 RX ORDER — IBUPROFEN 800 MG/1
800 TABLET ORAL EVERY 8 HOURS PRN
Qty: 90 TABLET | Refills: 1 | Status: SHIPPED | OUTPATIENT
Start: 2020-10-28 | End: 2021-07-01

## 2020-10-28 RX ORDER — MORPHINE SULFATE 30 MG/1
30 TABLET, FILM COATED, EXTENDED RELEASE ORAL EVERY 12 HOURS
Qty: 60 TABLET | Refills: 0 | Status: SHIPPED | OUTPATIENT
Start: 2020-11-01 | End: 2020-11-17 | Stop reason: SDUPTHER

## 2020-10-28 RX ORDER — GABAPENTIN 400 MG/1
400 CAPSULE ORAL 3 TIMES DAILY
Qty: 90 CAPSULE | Refills: 2 | Status: SHIPPED | OUTPATIENT
Start: 2020-10-28 | End: 2020-11-04

## 2020-10-28 ASSESSMENT — ENCOUNTER SYMPTOMS
GASTROINTESTINAL NEGATIVE: 1
RESPIRATORY NEGATIVE: 1

## 2020-10-28 NOTE — PROGRESS NOTES
[address] [facility] PROGRESS NOTE      Patient  Video visit to  review Medication Agreement    Chief Complaint: knee pain    He c/o pain in both knees, He had his right knee replaced over 2 years ago, He had recent ED visit  for right knee pain, states could hardly walk. and had x-rays done and he was given ace wrap. He  followed up with his Orthopaedic surgeon. He has had multiple surgeries on left knee, He does not sleep well. He goes to the gym and exercises in the water. Knee Pain    The incident occurred more than 1 week ago. There was no injury mechanism. The pain is present in the left knee and right knee. The quality of the pain is described as aching (sharp right knee). The pain is at a severity of 7/10. The pain is moderate. The pain has been worsening since onset. Associated symptoms include a loss of motion and muscle weakness. Associated symptoms comments: Decreased ROM left knee. Foreign body present: knee replaced. Nothing aggravates the symptoms. He has tried ice and heat (pain medication) for the symptoms. Patient denies any new neurological symptoms. No bowel or bladder incontinence, no weakness, and no falling. Any new diagnostic workup: [] no  [x] yes [] Xray [] CT scan [] MRI [] DEXA scan     [] Other          EXAMINATION:    THREE XRAY VIEWS OF THE RIGHT KNEE         10/11/2020 11:44 am         COMPARISON:    None.         HISTORY:    ORDERING SYSTEM PROVIDED HISTORY: Pain    TECHNOLOGIST PROVIDED HISTORY:    Pain    Reason for Exam: Pt c/o pain to right knee hx of knee replacement.  Denies    any injury. Acuity: Chronic    Type of Exam: Subsequent/Follow-up         FINDINGS:    Status post total knee arthroplasty.  No evidence for hardware complication.     No acute osseous abnormality or joint effusion identified.  Small areas of    soft tissue ossification are noted about the anterior and medial knee.  No    evidence for loose body.              Impression Status post total knee arthroplasty without acute osseous abnormality or    evidence for hardware complication. Treatment goals:  Functional status: relief from pain    Aberrancy:   Any alcoholic beverages   no    Any illegal drugs   no      Analgesia:7    Adverse  Effects :none    ADL;s :goes to gym      Pill count: appropriate fill date 11-1-2020  82.50 has narcan at home  Periodic Controlled Substance Monitoring: Possible medication side effects, risk of tolerance/dependence & alternative treatments discussed., No signs of potential drug abuse or diversion identified. , Assessed functional status., Obtaining appropriate analgesic effect of treatment. (Chico Ramirez, APRN - CNP)  Chronic Pain > 80 MEDD: Co-prescribed Naloxone., Obtained or confirmed \"Medication Contract\" on file. Chico Ramirez, APRN - CNP)  Review of[place]AR[place] does not show any aberrant prescription behavior. Medication is helping the patient stay active. Patient denies any side effects and reports adequate analgesia. No sign of misuse/abuse.         When was thelast UDS:   8-7-2020          Was the UDS appropriate:yes      Record/Diagnostics Review:      As above, I did review the imaging    8/10/2020 10:37 PM - Julius, Estefani Incoming Lab Results From [facility]     Component  Value  Ref Range & Units  Status  Collected  Lab    Pain Management Drug Panel Interp, Urine  Consistent   Final  08/07/2020 11:24 AM  [facility]    (NOTE)   ________________________________________________________________   DRUGS EXPECTED:   OXYCODONE   MORPHINE   ________________________________________________________________   CONSISTENT with medications provided:   OXYCODONE: based on oxycodone, noroxycodone, oxymorphone   MORPHINE: based on morphine   ________________________________________________________________   INTERPRETIVE INFORMATION: Pain Mgt [place], [place] Spec/EMIT, Ur,                            Interp   Interpretation depends on accuracy and completeness of patient medication information submitted by client. Past Medical History:   Diagnosis Date    Arthritis     Left knee pain 4/9/2014    Teeth problem     abcessw       Past Surgical History:   Procedure Laterality Date    CARPAL TUNNEL RELEASE Bilateral     DENTAL SURGERY  10/2015    JOINT REPLACEMENT Left     KNEE  X 6 pt unsure of date    JOINT REPLACEMENT Right 07/20/2018    ROTATOR CUFF REPAIR Right     TONSILLECTOMY         Allergies   Allergen Reactions    Oxycontin [Oxycodone Hcl] Nausea Only         Current Outpatient Medications:     divalproex (DEPAKOTE) 125 MG DR tablet, TAKE ONE TABLET BY MOUTH TWICE A DAY, Disp: 180 tablet, Rfl: 0    cloNIDine (CATAPRES) 0.1 MG tablet, TAKE ONE TABLET BY MOUTH TWICE A DAY, Disp: 180 tablet, Rfl: 1    oxyCODONE-acetaminophen (PERCOCET) 5-325 MG per tablet, Take 1 tablet by mouth every 8 hours as needed for Pain for up to 30 days. , Disp: 90 tablet, Rfl: 0    morphine (MS CONTIN) 30 MG extended release tablet, Take 1 tablet by mouth every 12 hours for 30 days. , Disp: 60 tablet, Rfl: 0    atorvastatin (LIPITOR) 40 MG tablet, Take 1 tablet by mouth daily, Disp: 90 tablet, Rfl: 1    spironolactone (ALDACTONE) 50 MG tablet, Take 1 tablet by mouth daily, Disp: 90 tablet, Rfl: 1    gabapentin (NEURONTIN) 600 MG tablet, TAKE ONE TABLET BY MOUTH THREE TIMES A DAY, Disp: 90 tablet, Rfl: 2    naloxone (NARCAN) 4 MG/0.1ML LIQD nasal spray, 1 spray by Nasal route as needed for Opioid Reversal, Disp: 1 each, Rfl: 0    ibuprofen (ADVIL;MOTRIN) 800 MG tablet, Take 1 tablet by mouth every 8 hours as needed for Pain, Disp: 90 tablet, Rfl: 1    melatonin 3 MG TABS tablet, Take 6 mg by mouth nightly as needed (insomnia), Disp: , Rfl:     Family History   Problem Relation Age of Onset    Diabetes Mother     No Known Problems Father        Social History     Socioeconomic History    Marital status:       Spouse name: Not on file    Number of children: Not on file    Years of education: Not on file    Highest education level: Not on file   Occupational History    Occupation: retired   Social Needs    Financial resource strain: Not on file    Food insecurity     Worry: Not on file     Inability: Not on file   [employer] needs     Medical: Not on file     Non-medical: Not on file   Tobacco Use    Smoking status: Never Smoker    Smokeless tobacco: Never Used   Substance and Sexual Activity    Alcohol use: No    Drug use: No    Sexual activity: Never   Lifestyle    Physical activity     Days per week: Not on file     Minutes per session: Not on file    Stress: Not on file   Relationships    Social connections     Talks on phone: Not on file     Gets together: Not on file     Attends Denominational service: Not on file     Active member of club or organization: Not on file     Attends meetings of clubs or organizations: Not on file     Relationship status: Not on file    Intimate partner violence     Fear of current or ex partner: Not on file     Emotionally abused: Not on file     Physically abused: Not on file     Forced sexual activity: Not on file   Other Topics Concern    Not on file   Social History Narrative    Not on file         Review of Systems:  Review of Systems   Constitution: Negative. HENT: Negative. Eyes:        Glasses   Cardiovascular: Negative. Respiratory: Negative. Endocrine: Negative. Hematologic/Lymphatic: Negative. Skin: Negative. Musculoskeletal: Positive for joint pain. Gastrointestinal: Negative. Genitourinary: Negative. Neurological: Positive for loss of balance. Limps  Uses a cane    Psychiatric/Behavioral: Negative. Physical Exam:    Physical Exam  HENT:      Head: Normocephalic. Pulmonary:      Effort: Pulmonary effort is normal.   Skin:         Neurological:      Mental Status: He is alert. Psychiatric:         Mood and Affect: Mood normal.         Thought Content:  Thought content normal.           Assessment:      Problem List Items Addressed This Visit     Weakness of left leg    Relevant Medications    oxyCODONE-acetaminophen (PERCOCET) 5-325 MG per tablet (Start on 11/1/2020)    S/P left knee surgery - Primary    Relevant Medications    oxyCODONE-acetaminophen (PERCOCET) 5-325 MG per tablet (Start on 11/1/2020)    ibuprofen (ADVIL;MOTRIN) 800 MG tablet    Osteoarthritis of both knees    Relevant Medications    oxyCODONE-acetaminophen (PERCOCET) 5-325 MG per tablet (Start on 11/1/2020)    morphine (MS CONTIN) 30 MG extended release tablet (Start on 11/1/2020)    ibuprofen (ADVIL;MOTRIN) 800 MG tablet    Other Relevant Orders    MRI KNEE RIGHT WO CONTRAST    MRI KNEE RIGHT WO CONTRAST    Medication monitoring encounter    Relevant Medications    oxyCODONE-acetaminophen (PERCOCET) 5-325 MG per tablet (Start on 11/1/2020)    ibuprofen (ADVIL;MOTRIN) 800 MG tablet    Fibromyalgia    Relevant Medications    oxyCODONE-acetaminophen (PERCOCET) 5-325 MG per tablet (Start on 11/1/2020)    morphine (MS CONTIN) 30 MG extended release tablet (Start on 11/1/2020)    ibuprofen (ADVIL;MOTRIN) 800 MG tablet    gabapentin (NEURONTIN) 400 MG capsule    Encounter for medication monitoring    Relevant Medications    oxyCODONE-acetaminophen (PERCOCET) 5-325 MG per tablet (Start on 11/1/2020)    ibuprofen (ADVIL;MOTRIN) 800 MG tablet            Treatment Plan:  DISCUSSION: Treatment options discussed withpatient and all questions answered to patient's satisfaction.      Possible side effects, risk of tolerance and or dependence and alternative treatments discussed    Obtaining appropriate analgesic effect of treatment   No signs of potential drug abuse or diversion identified    [x] Ill effects of being on chronic pain medications such as sleep disturbances, respiratory depression, hormonal changes, withdrawal symptoms, chronic opioid dependence and tolerance as well as risk of taking opioids with Benzodiazepines and taking opioids along with alcohol,  werediscussed with patient. I had asked the patient to minimize medication use and utilize pain medications only for uncontrolled rest pain or pain with exertional activities. I advised patient not to self-escalate painmedications without consulting with us. At each of patient's future visits we will try to taper pain medications, while adjusting the adjunct medications, and re-evaluating for Physical Therapy to improve spinal andjoint strength. We will continue to have discussions to decrease pain medications as tolerated. Counseled patient on effects their pain medication and /or their medical condition mayhave on their  ability to drive or operate machinery. Instructed not to drive or operate machinery if drowsy     I also discussed with the patient regarding the dangers of combining narcotic pain medication with tranquilizers, alcohol or illegal drugs or taking the medication any way other than prescribed. The dangers were discussed  including respiratory depression and death. Patient was told to tell  all  physicians regarding the medications he is getting from pain clinic. Patient is warned not to take any unprescribed medications over-the-countermedications that can depress breathing . Patient is advised to talk to the pharmacist or physicians if planning to take any over-the-counter medications before  takeing them. Patient is strongly advised to avoid tranquilizers or  relaxants, illegal drugs  or any medications that can depress breathing  Patient is also advised to tell us if there is any changes in their medications from other physicians. Location:  patient  at  home   ,provider working from home    1.  Will order MRI right knee, continuous pain which has worsened despite right knee replacement     TREATMENT OPTIONS:       Medication Agreement Requirements Met  Continue Opioid therapy  Script written for percocet, ms contin, ibuprofen, gabapentin  Follow up appointment made

## 2020-11-04 RX ORDER — GABAPENTIN 600 MG/1
TABLET ORAL
Qty: 90 TABLET | Refills: 0 | Status: SHIPPED | OUTPATIENT
Start: 2020-11-04 | End: 2020-11-17 | Stop reason: SDUPTHER

## 2020-11-07 ENCOUNTER — HOSPITAL ENCOUNTER (OUTPATIENT)
Dept: MRI IMAGING | Age: 70
Discharge: HOME | End: 2020-11-09
Payer: MEDICARE

## 2020-11-07 PROCEDURE — 73721 MRI JNT OF LWR EXTRE W/O DYE: CPT

## 2020-11-17 ENCOUNTER — HOSPITAL ENCOUNTER (OUTPATIENT)
Dept: PAIN MANAGEMENT | Age: 70
Discharge: HOME | End: 2020-11-17
Payer: MEDICARE

## 2020-11-17 PROCEDURE — 99213 OFFICE O/P EST LOW 20 MIN: CPT

## 2020-11-17 PROCEDURE — 99213 OFFICE O/P EST LOW 20 MIN: CPT | Performed by: NURSE PRACTITIONER

## 2020-11-17 RX ORDER — OXYCODONE HYDROCHLORIDE AND ACETAMINOPHEN 5; 325 MG/1; MG/1
1 TABLET ORAL EVERY 8 HOURS PRN
Qty: 90 TABLET | Refills: 0 | Status: SHIPPED | OUTPATIENT
Start: 2020-12-01 | End: 2020-12-18 | Stop reason: SDUPTHER

## 2020-11-17 RX ORDER — MORPHINE SULFATE 30 MG/1
30 TABLET, FILM COATED, EXTENDED RELEASE ORAL EVERY 12 HOURS
Qty: 60 TABLET | Refills: 0 | Status: SHIPPED | OUTPATIENT
Start: 2020-12-01 | End: 2020-12-18 | Stop reason: SDUPTHER

## 2020-11-17 RX ORDER — GABAPENTIN 600 MG/1
TABLET ORAL
Qty: 90 TABLET | Refills: 0 | Status: SHIPPED | OUTPATIENT
Start: 2020-12-01 | End: 2020-12-18 | Stop reason: SDUPTHER

## 2020-11-17 ASSESSMENT — ENCOUNTER SYMPTOMS
SHORTNESS OF BREATH: 0
COUGH: 0
CONSTIPATION: 0

## 2020-11-17 NOTE — PROGRESS NOTES
Patient completed a video visit today to review medication contract. Chief Complaint: bilateral knee pain    [facility]  Pt reports bilat. Knee pain with no known injury, and has had 6 surgeries/procedures on left knee that has not helped the pain. He will be having surgery on his right knee on 6/20/18. He is opioid dependant for pain control. He has had PT in the past with no relief. Knee Pain    The incident occurred more than 1 week ago. There was no injury mechanism. The pain is present in the left knee and right knee. The quality of the pain is described as aching and burning. The pain is at a severity of 7/10. The pain is moderate. The pain has been constant since onset. The symptoms are aggravated by movement and weight bearing. He has tried non-weight bearing, rest, heat and ice for the symptoms. The treatment provided mild relief. Patient denies any new neurological symptoms. No bowel or bladder incontinence, no weakness, and no falling. Pill count: appropriate due 12/1    Morphine equivalent: 82.5    Periodic Controlled Substance Monitoring: Possible medication side effects, risk of tolerance/dependence & alternative treatments discussed., No signs of potential drug abuse or diversion identified., Obtaining appropriate analgesic effect of treatment. Helen Galicia, APRN - CNP)  Chronic Pain > 80 MEDD: Obtained or confirmed \"Medication Contract\" on file., Co-prescribed Naloxone.  Hector Spring, APRN - CNP)      Past Medical History:   Diagnosis Date    Arthritis     Left knee pain 4/9/2014    Teeth problem     abcessw       Past Surgical History:   Procedure Laterality Date    CARPAL TUNNEL RELEASE Bilateral     DENTAL SURGERY  10/2015    JOINT REPLACEMENT Left     KNEE  X 6 pt unsure of date    JOINT REPLACEMENT Right 07/20/2018    ROTATOR CUFF REPAIR Right     TONSILLECTOMY         Allergies   Allergen Reactions    Oxycontin [Oxycodone Hcl] Nausea Only Never Used   Substance and Sexual Activity    Alcohol use: No    Drug use: No    Sexual activity: Never   Lifestyle    Physical activity     Days per week: Not on file     Minutes per session: Not on file    Stress: Not on file   Relationships    Social connections     Talks on phone: Not on file     Gets together: Not on file     Attends Confucianism service: Not on file     Active member of club or organization: Not on file     Attends meetings of clubs or organizations: Not on file     Relationship status: Not on file    Intimate partner violence     Fear of current or ex partner: Not on file     Emotionally abused: Not on file     Physically abused: Not on file     Forced sexual activity: Not on file   Other Topics Concern    Not on file   Social History Narrative    Not on file       Review of Systems:  Review of Systems   Constitution: Negative for chills and fever. Cardiovascular: Negative for chest pain and palpitations. Respiratory: Negative for cough and shortness of breath. Musculoskeletal: Positive for joint pain. Gastrointestinal: Negative for constipation. Neurological: Negative for disturbances in coordination and loss of balance. Physical Exam:  There were no vitals taken for this visit. Physical Exam  HENT:      Head: Normocephalic. Pulmonary:      Effort: Pulmonary effort is normal.   Neurological:      Mental Status: He is alert.    Psychiatric:         Mood and Affect: Mood normal.         Behavior: Behavior normal.         Record/Diagnostics Review:    Last dominga 8/2020 and was appropriate     Assessment:  Problem List Items Addressed This Visit     S/P left knee surgery    Relevant Medications    oxyCODONE-acetaminophen (PERCOCET) 5-325 MG per tablet (Start on 12/1/2020)    gabapentin (NEURONTIN) 600 MG tablet (Start on 12/1/2020)    Medication monitoring encounter    Relevant Medications    oxyCODONE-acetaminophen (PERCOCET) 5-325 MG per tablet (Start on 12/1/2020) gabapentin (NEURONTIN) 600 MG tablet (Start on 12/1/2020)    Encounter for medication monitoring    Relevant Medications    oxyCODONE-acetaminophen (PERCOCET) 5-325 MG per tablet (Start on 12/1/2020)    gabapentin (NEURONTIN) 600 MG tablet (Start on 12/1/2020)    Use of opiates for therapeutic purposes    Relevant Medications    gabapentin (NEURONTIN) 600 MG tablet (Start on 12/1/2020)    Osteoarthritis of both knees    Relevant Medications    oxyCODONE-acetaminophen (PERCOCET) 5-325 MG per tablet (Start on 12/1/2020)    morphine (MS CONTIN) 30 MG extended release tablet (Start on 12/1/2020)    gabapentin (NEURONTIN) 600 MG tablet (Start on 12/1/2020)    Weakness of left leg    Relevant Medications    oxyCODONE-acetaminophen (PERCOCET) 5-325 MG per tablet (Start on 12/1/2020)      Other Visit Diagnoses     Primary osteoarthritis of right knee        Relevant Medications    oxyCODONE-acetaminophen (PERCOCET) 5-325 MG per tablet (Start on 12/1/2020)    morphine (MS CONTIN) 30 MG extended release tablet (Start on 12/1/2020)    gabapentin (NEURONTIN) 600 MG tablet (Start on 12/1/2020)    Primary osteoarthritis of left knee        Relevant Medications    oxyCODONE-acetaminophen (PERCOCET) 5-325 MG per tablet (Start on 12/1/2020)    morphine (MS CONTIN) 30 MG extended release tablet (Start on 12/1/2020)    gabapentin (NEURONTIN) 600 MG tablet (Start on 12/1/2020)    Chronic pain of left knee        Relevant Medications    oxyCODONE-acetaminophen (PERCOCET) 5-325 MG per tablet (Start on 12/1/2020)    morphine (MS CONTIN) 30 MG extended release tablet (Start on 12/1/2020)    gabapentin (NEURONTIN) 600 MG tablet (Start on 12/1/2020)    S/P knee surgery -Multiple        Relevant Medications    gabapentin (NEURONTIN) 600 MG tablet (Start on 12/1/2020)             Treatment Plan:  Patient relates current medications are helping the pain.  Patient reports taking pain medications as prescribed, denies obtaining medications from different sources and denies use of illegal drugs. Patient denies side effects from medications like nausea, vomiting, constipation or drowsiness. Patient reports current activities of daily living are possible due to medications and would like to continue them. As always, we encourage daily stretching and strengthening exercises, and recommend minimizing use of pain medications unless patient cannot get through daily activities due to pain. Contract requirements met. Continue opioid therapy. Script written for percocet and morphine  Discussed genicular block - he would like to think about it  Follow up appointment made for 4 weeks    Francesca Bryant is a 79 y.o. male being evaluated by a Virtual Visit (video visit) encounter to address concerns as mentioned above. A caregiver was present when appropriate. Due to this being a TeleHealth encounter (During TDVTB-48 public health emergency), evaluation of the following organ systems was limited: Vitals/Constitutional/EENT/Resp/CV/GI//MS/Neuro/Skin/Heme-Lymph-Imm. Pursuant to the emergency declaration under the [address] [place] [address] authority and the [employer] and [employer] Act, this Virtual Visit was conducted with patient's (and/or legal guardian's) consent, to reduce the patient's risk of exposure to COVID-19 and provide necessary medical care. The patient (and/or legal guardian) has also been advised to contact this office for worsening conditions or problems, and seek emergency medical treatment and/or call 911 if deemed necessary. Patient identification was verified at the start of the visit: Yes    Total time spent for this encounter: Not billed by time    Services were provided through a video synchronous discussion virtually to substitute for in-person clinic visit. Patient and provider were located at their individual homes.     --Crystal Hernández, APRN - CNP

## 2020-12-18 ENCOUNTER — HOSPITAL ENCOUNTER (OUTPATIENT)
Dept: PAIN MANAGEMENT | Age: 70
Discharge: HOME | End: 2020-12-18
Payer: MEDICARE

## 2020-12-18 PROCEDURE — 99213 OFFICE O/P EST LOW 20 MIN: CPT

## 2020-12-18 PROCEDURE — 99213 OFFICE O/P EST LOW 20 MIN: CPT | Performed by: NURSE PRACTITIONER

## 2020-12-18 RX ORDER — OXYCODONE HYDROCHLORIDE AND ACETAMINOPHEN 5; 325 MG/1; MG/1
1 TABLET ORAL EVERY 8 HOURS PRN
Qty: 90 TABLET | Refills: 0 | Status: SHIPPED | OUTPATIENT
Start: 2020-12-31 | End: 2021-01-27 | Stop reason: SDUPTHER

## 2020-12-18 RX ORDER — GABAPENTIN 600 MG/1
TABLET ORAL
Qty: 90 TABLET | Refills: 0 | Status: SHIPPED | OUTPATIENT
Start: 2020-12-31 | End: 2021-02-24 | Stop reason: SDUPTHER

## 2020-12-18 RX ORDER — MORPHINE SULFATE 30 MG/1
30 TABLET, FILM COATED, EXTENDED RELEASE ORAL EVERY 12 HOURS
Qty: 60 TABLET | Refills: 0 | Status: SHIPPED | OUTPATIENT
Start: 2020-12-31 | End: 2021-01-27 | Stop reason: SDUPTHER

## 2020-12-18 ASSESSMENT — ENCOUNTER SYMPTOMS
COUGH: 0
SHORTNESS OF BREATH: 0
CONSTIPATION: 0

## 2020-12-18 NOTE — PROGRESS NOTES
 Oxycontin [Oxycodone Hcl] Nausea Only         Current Outpatient Medications:     oxyCODONE-acetaminophen (PERCOCET) 5-325 MG per tablet, Take 1 tablet by mouth every 8 hours as needed for Pain for up to 30 days. , Disp: 90 tablet, Rfl: 0    morphine (MS CONTIN) 30 MG extended release tablet, Take 1 tablet by mouth every 12 hours for 30 days. , Disp: 60 tablet, Rfl: 0    gabapentin (NEURONTIN) 600 MG tablet, TAKE ONE TABLET BY MOUTH THREE TIMES A DAY, Disp: 90 tablet, Rfl: 0    ibuprofen (ADVIL;MOTRIN) 800 MG tablet, Take 1 tablet by mouth every 8 hours as needed for Pain, Disp: 90 tablet, Rfl: 1    divalproex (DEPAKOTE) 125 MG DR tablet, TAKE ONE TABLET BY MOUTH TWICE A DAY, Disp: 180 tablet, Rfl: 0    cloNIDine (CATAPRES) 0.1 MG tablet, TAKE ONE TABLET BY MOUTH TWICE A DAY, Disp: 180 tablet, Rfl: 1    atorvastatin (LIPITOR) 40 MG tablet, Take 1 tablet by mouth daily, Disp: 90 tablet, Rfl: 1    naloxone (NARCAN) 4 MG/0.1ML LIQD nasal spray, 1 spray by Nasal route as needed for Opioid Reversal, Disp: 1 each, Rfl: 0    spironolactone (ALDACTONE) 50 MG tablet, Take 1 tablet by mouth daily, Disp: 90 tablet, Rfl: 1    melatonin 3 MG TABS tablet, Take 6 mg by mouth nightly as needed (insomnia), Disp: , Rfl:     Family History   Problem Relation Age of Onset    Diabetes Mother     No Known Problems Father        Social History     Socioeconomic History    Marital status:      Spouse name: Not on file    Number of children: Not on file    Years of education: Not on file    Highest education level: Not on file   Occupational History    Occupation: retired   Social Needs    Financial resource strain: Not on file    Food insecurity     Worry: Not on file     Inability: Not on file   [employer] needs     Medical: Not on file     Non-medical: Not on file   Tobacco Use    Smoking status: Never Smoker    Smokeless tobacco: Never Used   Substance and Sexual Activity    Alcohol use:  No  Drug use: No    Sexual activity: Never   Lifestyle    Physical activity     Days per week: Not on file     Minutes per session: Not on file    Stress: Not on file   Relationships    Social connections     Talks on phone: Not on file     Gets together: Not on file     Attends Baptism service: Not on file     Active member of club or organization: Not on file     Attends meetings of clubs or organizations: Not on file     Relationship status: Not on file    Intimate partner violence     Fear of current or ex partner: Not on file     Emotionally abused: Not on file     Physically abused: Not on file     Forced sexual activity: Not on file   Other Topics Concern    Not on file   Social History Narrative    Not on file       Review of Systems:  Review of Systems   Constitution: Negative for chills and fever. Respiratory: Negative for cough and shortness of breath. Musculoskeletal: Positive for joint pain. Gastrointestinal: Negative for constipation. Neurological: Negative for disturbances in coordination and loss of balance. Physical Exam:  There were no vitals taken for this visit. Physical Exam  HENT:      Head: Normocephalic. Pulmonary:      Effort: Pulmonary effort is normal.   Neurological:      Mental Status: He is alert.    Psychiatric:         Mood and Affect: Mood normal.         Behavior: Behavior normal.         Record/Diagnostics Review:    Last dominga 8/2020 and was appropriate     Assessment:  Problem List Items Addressed This Visit     S/P knee surgery    Relevant Medications    oxyCODONE-acetaminophen (PERCOCET) 5-325 MG per tablet (Start on 12/31/2020)    gabapentin (NEURONTIN) 600 MG tablet (Start on 12/31/2020)    Medication monitoring encounter    Relevant Medications    oxyCODONE-acetaminophen (PERCOCET) 5-325 MG per tablet (Start on 12/31/2020)    gabapentin (NEURONTIN) 600 MG tablet (Start on 12/31/2020)    Encounter for medication monitoring    Relevant Medications oxyCODONE-acetaminophen (PERCOCET) 5-325 MG per tablet (Start on 12/31/2020)    gabapentin (NEURONTIN) 600 MG tablet (Start on 12/31/2020)    Use of opiates for therapeutic purposes    Relevant Medications    gabapentin (NEURONTIN) 600 MG tablet (Start on 12/31/2020)    Chronic pain of left knee    Relevant Medications    oxyCODONE-acetaminophen (PERCOCET) 5-325 MG per tablet (Start on 12/31/2020)    morphine (MS CONTIN) 30 MG extended release tablet (Start on 12/31/2020)    gabapentin (NEURONTIN) 600 MG tablet (Start on 12/31/2020)    Primary osteoarthritis of left knee    Relevant Medications    oxyCODONE-acetaminophen (PERCOCET) 5-325 MG per tablet (Start on 12/31/2020)    morphine (MS CONTIN) 30 MG extended release tablet (Start on 12/31/2020)    gabapentin (NEURONTIN) 600 MG tablet (Start on 12/31/2020)    Primary osteoarthritis of right knee    Relevant Medications    oxyCODONE-acetaminophen (PERCOCET) 5-325 MG per tablet (Start on 12/31/2020)    morphine (MS CONTIN) 30 MG extended release tablet (Start on 12/31/2020)    gabapentin (NEURONTIN) 600 MG tablet (Start on 12/31/2020)    Weakness of left leg    Relevant Medications    oxyCODONE-acetaminophen (PERCOCET) 5-325 MG per tablet (Start on 12/31/2020)      Other Visit Diagnoses     Osteoarthritis of both knees, unspecified osteoarthritis type        Relevant Medications    oxyCODONE-acetaminophen (PERCOCET) 5-325 MG per tablet (Start on 12/31/2020)    morphine (MS CONTIN) 30 MG extended release tablet (Start on 12/31/2020)    gabapentin (NEURONTIN) 600 MG tablet (Start on 12/31/2020)    S/P left knee surgery        Relevant Medications    oxyCODONE-acetaminophen (PERCOCET) 5-325 MG per tablet (Start on 12/31/2020)    gabapentin (NEURONTIN) 600 MG tablet (Start on 12/31/2020)    Primary osteoarthritis of both knees        Relevant Medications    oxyCODONE-acetaminophen (PERCOCET) 5-325 MG per tablet (Start on 12/31/2020) morphine (MS CONTIN) 30 MG extended release tablet (Start on 12/31/2020)    gabapentin (NEURONTIN) 600 MG tablet (Start on 12/31/2020)             Treatment Plan:  Patient relates current medications are helping the pain. Patient reports taking pain medications as prescribed, denies obtaining medications from different sources and denies use of illegal drugs. Patient denies side effects from medications like nausea, vomiting, constipation or drowsiness. Patient reports current activities of daily living are possible due to medications and would like to continue them. As always, we encourage daily stretching and strengthening exercises, and recommend minimizing use of pain medications unless patient cannot get through daily activities due to pain. Contract requirements met. Continue opioid therapy. Script written for percocet and MSER  Discussed genicular blocks and he would like to think about scheduling  Follow up appointment made for 4 weeks    Aj Scott is a 79 y.o. male being evaluated by a Virtual Visit (video visit) encounter to address concerns as mentioned above. A caregiver was present when appropriate. Due to this being a TeleHealth encounter (During [facility]-13 public health emergency), evaluation of the following organ systems was limited: Vitals/Constitutional/EENT/Resp/CV/GI//MS/Neuro/Skin/Heme-Lymph-Imm. Pursuant to the emergency declaration under the [address] [place] [address], [address] [place] [address] authority and the [employer] Act, this Virtual Visit was conducted with patient's (and/or legal guardian's) consent, to reduce the patient's risk of exposure to COVID-19 and provide necessary medical care. The patient (and/or legal guardian) has also been advised to contact this office for worsening conditions or problems, and seek emergency medical treatment and/or call 911 if deemed necessary.

## 2020-12-25 ENCOUNTER — APPOINTMENT (OUTPATIENT)
Dept: GENERAL RADIOLOGY | Age: 70
End: 2020-12-25
Payer: MEDICARE

## 2020-12-25 ENCOUNTER — HOSPITAL ENCOUNTER (EMERGENCY)
Age: 70
Discharge: HOME | End: 2020-12-25
Attending: EMERGENCY MEDICINE
Payer: MEDICARE

## 2020-12-25 VITALS
SYSTOLIC BLOOD PRESSURE: 136 MMHG | HEIGHT: 67 IN | OXYGEN SATURATION: 99 % | WEIGHT: 200 LBS | TEMPERATURE: 97.9 F | DIASTOLIC BLOOD PRESSURE: 81 MMHG | HEART RATE: 66 BPM | RESPIRATION RATE: 16 BRPM | BODY MASS INDEX: 31.39 KG/M2

## 2020-12-25 PROCEDURE — 99283 EMERGENCY DEPT VISIT LOW MDM: CPT

## 2020-12-25 PROCEDURE — 73630 X-RAY EXAM OF FOOT: CPT

## 2020-12-25 ASSESSMENT — PAIN SCALES - GENERAL: PAINLEVEL_OUTOF10: 10

## 2020-12-25 ASSESSMENT — PAIN DESCRIPTION - LOCATION: LOCATION: FOOT

## 2020-12-25 ASSESSMENT — ENCOUNTER SYMPTOMS
COLOR CHANGE: 0
SINUS PRESSURE: 0
BACK PAIN: 0
VOMITING: 0
SHORTNESS OF BREATH: 0
COUGH: 0
NAUSEA: 0

## 2020-12-25 ASSESSMENT — PAIN DESCRIPTION - PAIN TYPE: TYPE: ACUTE PAIN

## 2020-12-25 NOTE — ED PROVIDER NOTES
eMERGENCY dEPARTMENT eNCOUnter   [address] Name: Reyes Bentley  MRN: [id number]  [place] 1950  Date of evaluation: 12/25/20     Reyes Bentley is a 79 y.o. male with CC: Foot Pain (right foot couple days ago)      Based on the medical record the care appears appropriate. I was personally available for consultation in the Emergency Department.     Nisa Benoit MD  Attending Emergency Physician                    Nisa Benoit MD  54/53/28 [id number]

## 2020-12-25 NOTE — ED PROVIDER NOTES
[address] [place] [address] ED  EMERGENCY DEPARTMENT ENCOUNTER      Pt Name: Dileep Isidro  MRN: [id number]  [place] 1950  Date of evaluation: 12/25/20  CHIEF COMPLAINT       Chief Complaint   Patient presents with    Foot Pain     right foot couple days ago     HISTORY OF PRESENT ILLNESS   Presents to the emergency room via private auto with pain to the right foot that started 2 days ago. No injury or trauma. He states that the pain started while he was laying down. He complains of pain to the plantar aspect of the right foot at the distal first and second metatarsal.  Pain is constant and worse with ambulation. He has morphine and Percocet for chronic knee pain which he has been taking without improvement. The history is provided by the patient. REVIEW OF SYSTEMS     Review of Systems   Constitutional: Negative for activity change and fever. HENT: Negative for congestion and sinus pressure. Respiratory: Negative for cough and shortness of breath. Cardiovascular: Negative for chest pain and palpitations. Gastrointestinal: Negative for nausea and vomiting. Musculoskeletal: Positive for arthralgias. Negative for back pain, joint swelling and myalgias. Skin: Negative for color change and wound. Neurological: Negative for dizziness and headaches. Psychiatric/Behavioral: Negative for confusion and decreased concentration.      PASTMEDICAL HISTORY     Past Medical History:   Diagnosis Date    Arthritis     Left knee pain 4/9/2014    Teeth problem     abcessw     SURGICAL HISTORY       Past Surgical History:   Procedure Laterality Date    CARPAL TUNNEL RELEASE Bilateral     DENTAL SURGERY  10/2015    JOINT REPLACEMENT Left     KNEE  X 6 pt unsure of date    JOINT REPLACEMENT Right 07/20/2018    ROTATOR CUFF REPAIR Right     TONSILLECTOMY       CURRENT MEDICATIONS       Previous Medications    ATORVASTATIN (LIPITOR) 40 MG TABLET    Take 1 tablet by mouth daily    CLONIDINE (CATAPRES) 0.1 MG TABLET    TAKE ONE TABLET BY MOUTH TWICE A DAY    DIVALPROEX (DEPAKOTE) 125 MG DR TABLET    TAKE ONE TABLET BY MOUTH TWICE A DAY    GABAPENTIN (NEURONTIN) 600 MG TABLET    TAKE ONE TABLET BY MOUTH THREE TIMES A DAY    IBUPROFEN (ADVIL;MOTRIN) 800 MG TABLET    Take 1 tablet by mouth every 8 hours as needed for Pain    MELATONIN 3 MG TABS TABLET    Take 6 mg by mouth nightly as needed (insomnia)    MORPHINE (MS CONTIN) 30 MG EXTENDED RELEASE TABLET    Take 1 tablet by mouth every 12 hours for 30 days. NALOXONE (NARCAN) 4 MG/0.1ML LIQD NASAL SPRAY    1 spray by Nasal route as needed for Opioid Reversal    OXYCODONE-ACETAMINOPHEN (PERCOCET) 5-325 MG PER TABLET    Take 1 tablet by mouth every 8 hours as needed for Pain for up to 30 days. SPIRONOLACTONE (ALDACTONE) 50 MG TABLET    Take 1 tablet by mouth daily     ALLERGIES     is allergic to oxycontin [oxycodone hcl]. FAMILY HISTORY     He indicated that his mother is . He indicated that his father is . SOCIAL HISTORY       Social History     Tobacco Use    Smoking status: Never Smoker    Smokeless tobacco: Never Used   Substance Use Topics    Alcohol use: No    Drug use: No     PHYSICAL EXAM     INITIAL VITALS: /81   Pulse 66   Temp 97.9 °F (36.6 °C)   Resp 16   Ht 5' 7\" (1.702 m)   Wt 200 lb (90.7 kg)   SpO2 99%   BMI 31.32 kg/m²    Physical Exam  Constitutional:       Appearance: Normal appearance. HENT:      Right Ear: External ear normal.      Left Ear: External ear normal.   Eyes:      General:         Right eye: No discharge. Left eye: No discharge. Conjunctiva/sclera: Conjunctivae normal.   Cardiovascular:      Rate and Rhythm: Normal rate and regular rhythm. Pulmonary:      Effort: Pulmonary effort is normal.      Breath sounds: Normal breath sounds. Abdominal:      Palpations: Abdomen is soft. Tenderness: There is no abdominal tenderness. Musculoskeletal: Normal range of motion. General: Tenderness present. Feet:    Skin:     General: Skin is warm and dry. Capillary Refill: Capillary refill takes less than 2 seconds. Findings: No bruising or erythema. Neurological:      General: No focal deficit present. Mental Status: He is alert and oriented to person, place, and time. Psychiatric:         Mood and Affect: Mood normal.         Thought Content: Thought content normal.         DIAGNOSTIC RESULTS     RADIOLOGY:All plain film, CT, MRI, and formal ultrasound images (except ED bedside ultrasound) are read by the radiologist, see reports below, unless otherwise noted in MDM or here. Interpretation per the Radiologist below, if available at the time of this note:    XR FOOT RIGHT (MIN 3 VIEWS)   Preliminary Result   No acute osseous injury of the right foot. LABS:  Labs Reviewed - No data to display    All other labs were within normal range or not returned as of this dictation. EMERGENCY DEPARTMENT COURSE and DIFFERENTIAL DIAGNOSIS/MDM:   Vitals:    Vitals:    20 1235   BP: 136/81   Pulse: 66   Resp: 16   Temp: 97.9 °F (36.6 °C)   SpO2: 99%   Weight: 200 lb (90.7 kg)   Height: 5' 7\" (1.702 m)       Medical Decision Makin-year-old male who is afebrile and in no distress. Physical exam is benign. X-ray does not have any acute findings. He was placed in a postop shoe. I did check the placement she was noted to be appropriate. He remains neurovascular intact. He already has pain medication at home. He will follow-up with his PCP. The patient was given the following meds in the ED:  No orders of the defined types were placed in this encounter. FINAL IMPRESSION      1.  Right foot pain          DISPOSITION/PLAN   DISPOSITION Decision To Discharge 2020 01:21:25 PM      PATIENT REFERRED TO:   Karyle Gill, MD  [address]  [phone]            DISCHARGE MEDICATIONS:     New Prescriptions    No medications on file       CRITICAL CARE TIME       Please note that portions of this note were completed with a voice recognition program.      QUIQUE ORTIZ - QUIQUE [place] - CNP  12/25/20 [id number]

## 2020-12-30 ENCOUNTER — VIRTUAL VISIT (OUTPATIENT)
Dept: INTERNAL MEDICINE CLINIC | Age: 70
End: 2020-12-30
Payer: MEDICARE

## 2020-12-30 PROBLEM — M79.609 TRIGGER POINT OF EXTREMITY: Status: RESOLVED | Noted: 2019-09-13 | Resolved: 2020-12-30

## 2020-12-30 PROBLEM — M54.16 LUMBAR BACK PAIN WITH RADICULOPATHY AFFECTING LEFT LOWER EXTREMITY: Status: ACTIVE | Noted: 2020-12-30

## 2020-12-30 PROBLEM — R29.898 WEAKNESS OF LEFT LEG: Status: RESOLVED | Noted: 2018-02-06 | Resolved: 2020-12-30

## 2020-12-30 PROCEDURE — 99214 OFFICE O/P EST MOD 30 MIN: CPT | Performed by: FAMILY MEDICINE

## 2020-12-30 NOTE — PROGRESS NOTES
Subjective:      2020    TELEHEALTH EVALUATION -- Audio/Visual (During [facility]-36 public health emergency)    HPI:    Anirudh Aldridge (:  1950) has requested an audio/video evaluation for the following concern(s):    Left lower extremity radiculopathy in L4 dermatome  Chronic pain syndrome. Opiate dependent  Hypertension  Hyperlipidemia  Obesity  Mood disorder    Review of Systems    Prior to Visit Medications    Medication Sig Taking? Authorizing Provider   oxyCODONE-acetaminophen (PERCOCET) 5-325 MG per tablet Take 1 tablet by mouth every 8 hours as needed for Pain for up to 30 days. Yes QUIQUE Dempsey CNP   morphine (MS CONTIN) 30 MG extended release tablet Take 1 tablet by mouth every 12 hours for 30 days.  Yes QUIQUE Dempsey CNP   gabapentin (NEURONTIN) 600 MG tablet TAKE ONE TABLET BY MOUTH THREE TIMES A DAY Yes QUIQUE Chacko CNP   ibuprofen (ADVIL;MOTRIN) 800 MG tablet Take 1 tablet by mouth every 8 hours as needed for Pain Yes QUIQUE Nolan CNP   divalproex (DEPAKOTE) 125 MG DR tablet TAKE ONE TABLET BY MOUTH TWICE A DAY Yes Otoniel Talley MD   cloNIDine (CATAPRES) 0.1 MG tablet TAKE ONE TABLET BY MOUTH TWICE A DAY Yes Otoniel Talley MD   atorvastatin (LIPITOR) 40 MG tablet Take 1 tablet by mouth daily Yes Otoniel Talley MD   spironolactone (ALDACTONE) 50 MG tablet Take 1 tablet by mouth daily Yes Otoniel Talley MD   melatonin 3 MG TABS tablet Take 6 mg by mouth nightly as needed (insomnia) Yes Historical Provider, MD       Social History     Tobacco Use    Smoking status: Never Smoker    Smokeless tobacco: Never Used   Substance Use Topics    Alcohol use: No    Drug use: No        Allergies   Allergen Reactions    Oxycontin [Oxycodone Hcl] Nausea Only       PHYSICAL EXAMINATION:  [ INSTRUCTIONS:  \"[x]\" Indicates a positive item  \"[]\" Indicates a negative item  -- DELETE ALL ITEMS NOT EXAMINED] Vital Signs: (As obtained by patient/caregiver or practitioner observation)    Blood pressure-  Heart rate-    Respiratory rate-    Temperature-  Pulse oximetry-     Constitutional: [x] Appears well-developed and well-nourished [x] No apparent distress      [] Abnormal-   Mental status  [] Alert and awake  [x] Oriented to person/place/time []Able to follow commands      Eyes:  EOM    [x]  Normal  [] Abnormal-  Sclera  [x]  Normal  [] Abnormal -         Discharge []  None visible  [] Abnormal -    HENT:   [x] Normocephalic, atraumatic. [] Abnormal   [x] Mouth/Throat: Mucous membranes are moist.     External Ears [x] Normal  [] Abnormal-     Neck: [x] No visualized mass     Pulmonary/Chest: [] Respiratory effort normal.  [x] No visualized signs of difficulty breathing or respiratory distress        [] Abnormal-      Musculoskeletal:   [] Normal gait with no signs of ataxia lumbar stenosis with left lower extremity radiculopathy. No apparent sign of myelopathy  Chronic pain syndrome        [] Normal range of motion of neck        [] Abnormal-       Neurological:        [x] No Facial Asymmetry (Cranial nerve 7 motor function) (limited exam to video visit)          [x] No gaze palsy        [] Abnormal-         Skin:        [] No significant exanthematous lesions or discoloration noted on facial skin         [] Abnormal-            Psychiatric:       [] Normal Affect [] No Hallucinations mood disorder with chronic pain syndrome       [] Abnormal-     Other pertinent observable physical exam findings-     ASSESSMENT/PLAN:  This is ER follow-up. Patient was seen to have left lower extremity radiculopathy and L5 dermatome. Work-up was negative. Patient is known to have a history of chronic pain syndrome, lumbar stenosis. He is established with pain management on MS Contin, gabapentin, Motrin, clonidine, Depakote. He is advised close follow-up. He is advised to contact his pain management. Consider possibility of epidural injection/physical therapy. Hypertension well-controlled to diuretics, clonidine. Consume less than 2 g of salt a day  Obesity. He would benefit from weight loss to keep BMI around 25, low-fat high-fiber diet, daily moderate exercise and lifestyle change  Hyperlipidemia on statin that he is tolerating well  Insomnia on melatonin  He denies tobacco, excessive alcohol or illicit drug use  Med list and available labs reviewed, discussed with patient, questions answered  He is encouraged to call for any concern  No follow-ups on file. Elkin Hernandez is a 79 y.o. male being evaluated by a Virtual Visit (video visit) encounter to address concerns as mentioned above. A caregiver was present when appropriate. Due to this being a TeleHealth encounter (During ZEFFI-90 public health emergency), evaluation of the following organ systems was limited: Vitals/Constitutional/EENT/Resp/CV/GI//MS/Neuro/Skin/Heme-Lymph-Imm. Pursuant to the emergency declaration under the [facility] [address] authority and the [employer] and [employer] Act, this Virtual Visit was conducted with patient's (and/or legal guardian's) consent, to reduce the patient's risk of exposure to COVID-19 and provide necessary medical care. The patient (and/or legal guardian) has also been advised to contact this office for worsening conditions or problems, and seek emergency medical treatment and/or call 911 if deemed necessary.      Patient identification was verified at the start of the visit: Yes    Total time spent on this encounter: 24 minutes

## 2021-01-08 NOTE — TELEPHONE ENCOUNTER
Last seen for VV 5/8/2020  Last filled 4/30/2020 #180 with 0 RF    Next Visit Date:  Future Appointments   Date Time Provider Rey Westbrooki   7/30/2020  9:30 AM QUIQUE Mckoy - CNP [address]   8/11/2020 10:30 AM Markos Brooks  [facility] Maintenance   Topic Date Due    A1C test (Diabetic or Prediabetic)  05/23/2020    Potassium monitoring  05/23/2020    Creatinine monitoring  05/23/2020    Annual Wellness Visit (AWV)  09/13/2020 (Originally 5/29/2019)    Shingles Vaccine (1 of 2) 09/13/2020 (Originally 11/13/2000)    Flu vaccine (1) 09/01/2020    Colon cancer screen fecal DNA test (Cologuard)  06/01/2022    Lipid screen  05/23/2024    DTaP/Tdap/Td vaccine (2 - Td) 07/01/2025    Pneumococcal 65+ years Vaccine  Completed    Hepatitis C screen  Completed    Hepatitis A vaccine  Aged Out    Hepatitis B vaccine  Aged Out    Hib vaccine  Aged Out    Meningococcal (ACWY) vaccine  Aged Out       Hemoglobin A1C (%)   Date Value   05/23/2019 5.7   05/23/2018 5.8   06/15/2017 5.9             ( goal A1C is < 7)   Microalb/Crt.  Ratio (mcg/mg creat)   Date Value   05/23/2018 CANNOT BE CALCULATED     LDL Cholesterol (mg/dL)   Date Value   05/23/2019 127   05/23/2018 115       (goal LDL is <100)   AST (U/L)   Date Value   05/23/2019 17     ALT (U/L)   Date Value   05/23/2019 14     BUN (mg/dL)   Date Value   05/23/2019 15     BP Readings from Last 3 Encounters:   02/26/20 124/63   01/29/20 132/87   01/08/20 130/80          (goal 120/80)    All Future Testing planned in [facility]  Lab Frequency Next Occurrence               Patient Active Problem List:     S/P knee surgery     Medication monitoring encounter     Encounter for medication monitoring     Use of opiates for therapeutic purposes     Pre-diabetes     Dyslipidemia     Chronic pain of left knee     Osteoarthritis of both knees     Weakness of left leg     Essential hypertension     Psychophysiological insomnia Fibromyalgia     Trigger point of extremity     Mood disorder (HCC) normal...

## 2021-01-11 ENCOUNTER — NURSE TRIAGE (OUTPATIENT)
Dept: OTHER | Facility: CLINIC | Age: 71
End: 2021-01-11

## 2021-01-11 ENCOUNTER — TELEPHONE (OUTPATIENT)
Dept: ADMINISTRATIVE | Age: 71
End: 2021-01-11

## 2021-01-11 NOTE — TELEPHONE ENCOUNTER
Patient called pre-service center [facility] with red flag complaint. Brief description of triage: foot numbness and pain    Triage indicates for patient to See in office today, if no available appointment go to nearby [facility]/[place]. Care advice provided, patient verbalizes understanding; denies any other questions or concerns; instructed to call back for any new or worsening symptoms. Writer provided warm transfer to [facility] for appointment scheduling. Attention Provider: Thank you for allowing me to participate in the care of your patient. The patient was connected to triage in response to information provided to the [facility]. Please do not respond through this encounter as the response is not directed to a shared pool. Reason for Disposition   Numbness in one foot (i.e., loss of sensation)    Answer Assessment - Initial Assessment Questions  1. ONSET: \"When did the pain start? \"       A month ago    2. LOCATION: \"Where is the pain located? \"       Right foot    3. PAIN: \"How bad is the pain? \"    (Scale 1-10; or mild, moderate, severe)    -  MILD (1-3): doesn't interfere with normal activities     -  MODERATE (4-7): interferes with normal activities (e.g., work or school) or awakens from sleep, limping     -  SEVERE (8-10): excruciating pain, unable to do any normal activities, unable to walk      Severe at times - unable to walk when numbness becomes painful - occurs 1-2 times a week, has to sit and wait for pain to subside, currently 7/10 and can walk on foot, states numbness is continuous and doesn't go away. Taking pain medicine prescribed by pain management due to chronic knee pain. Has been seen in ER, has had tests to determine cause but states pain/numbness is worsening    4. WORK OR EXERCISE: \"Has there been any recent work or exercise that involved this part of the body? \"       NA    5. CAUSE: \"What do you think is causing the foot pain? \"      Diagnosed with \"pinched nerve\" in the back by provider at end of last month    6. OTHER SYMPTOMS: \"Do you have any other symptoms? \" (e.g., leg pain, rash, fever, numbness)      Numbness    7. PREGNANCY: \"Is there any chance you are pregnant? \" \"When was your last menstrual period? \"      NA    Protocols used: FOOT PAIN-ADULT-[place]

## 2021-01-14 ENCOUNTER — VIRTUAL VISIT (OUTPATIENT)
Dept: INTERNAL MEDICINE CLINIC | Age: 71
End: 2021-01-14
Payer: MEDICARE

## 2021-01-14 DIAGNOSIS — M79.7 FIBROMYALGIA: ICD-10-CM

## 2021-01-14 DIAGNOSIS — E66.01 MORBIDLY OBESE (HCC): ICD-10-CM

## 2021-01-14 DIAGNOSIS — R73.03 PRE-DIABETES: ICD-10-CM

## 2021-01-14 DIAGNOSIS — Z79.891 USE OF OPIATES FOR THERAPEUTIC PURPOSES: ICD-10-CM

## 2021-01-14 DIAGNOSIS — F39 MOOD DISORDER (HCC): ICD-10-CM

## 2021-01-14 DIAGNOSIS — I10 ESSENTIAL HYPERTENSION: ICD-10-CM

## 2021-01-14 DIAGNOSIS — M54.16 LUMBAR BACK PAIN WITH RADICULOPATHY AFFECTING LEFT LOWER EXTREMITY: Primary | ICD-10-CM

## 2021-01-14 DIAGNOSIS — F51.04 PSYCHOPHYSIOLOGICAL INSOMNIA: ICD-10-CM

## 2021-01-14 DIAGNOSIS — Z98.890 S/P LEFT KNEE SURGERY: ICD-10-CM

## 2021-01-14 DIAGNOSIS — E78.5 DYSLIPIDEMIA: ICD-10-CM

## 2021-01-14 PROCEDURE — 99214 OFFICE O/P EST MOD 30 MIN: CPT | Performed by: FAMILY MEDICINE

## 2021-01-14 ASSESSMENT — PATIENT HEALTH QUESTIONNAIRE - PHQ9
2. FEELING DOWN, DEPRESSED OR HOPELESS: 0
SUM OF ALL RESPONSES TO PHQ QUESTIONS 1-9: 0
SUM OF ALL RESPONSES TO PHQ9 QUESTIONS 1 & 2: 0
SUM OF ALL RESPONSES TO PHQ QUESTIONS 1-9: 0

## 2021-01-14 NOTE — PROGRESS NOTES
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 Authorizing Provider   oxyCODONE-acetaminophen (PERCOCET) 5-325 MG per tablet Take 1 tablet by mouth every 8 hours as needed for Pain for up to 30 days. Yes QUIQUE Hernandez CNP   morphine (MS CONTIN) 30 MG extended release tablet Take 1 tablet by mouth every 12 hours for 30 days.  Yes QUIQUE Hernandez CNP   ibuprofen (ADVIL;MOTRIN) 800 MG tablet Take 1 tablet by mouth every 8 hours as needed for Pain Yes QUIQUE Pinto CNP   divalproex (DEPAKOTE) 125 MG DR tablet TAKE ONE TABLET BY MOUTH TWICE A DAY Yes Franco Azar MD   cloNIDine (CATAPRES) 0.1 MG tablet TAKE ONE TABLET BY MOUTH TWICE A DAY Yes Franco Azar MD   atorvastatin (LIPITOR) 40 MG tablet Take 1 tablet by mouth daily Yes Franco Azar MD   spironolactone (ALDACTONE) 50 MG tablet Take 1 tablet by mouth daily Yes Franco Azar MD   melatonin 3 MG TABS tablet Take 6 mg by mouth nightly as needed (insomnia) Yes Historical Provider, MD   gabapentin (NEURONTIN) 600 MG tablet TAKE ONE TABLET BY MOUTH THREE TIMES A DAY  QUIQUE Chacko CNP       Social History     Tobacco Use    Smoking status: Never Smoker    Smokeless tobacco: Never Used   Substance Use Topics    Alcohol use: No    Drug use: No        Allergies   Allergen Reactions    Oxycontin [Oxycodone Hcl] Nausea Only       PHYSICAL EXAMINATION:  [ INSTRUCTIONS:  \"[x]\" Indicates a positive item  \"[]\" Indicates a negative item  -- DELETE ALL ITEMS NOT EXAMINED]  Vital Signs: (As obtained by patient/caregiver or practitioner observation)    Blood pressure-  Heart rate-    Respiratory rate-    Temperature-  Pulse oximetry-     Constitutional: [x] Appears well-developed and well-nourished [x] No apparent distress      [] Abnormal-   Mental status  [x] Alert and awake  [x] Oriented to person/place/time [x]Able to follow commands      Eyes:  EOM    [x]  Normal  [] Abnormal-  Sclera  [x]  Normal  [] Abnormal -         Discharge []  None visible  [] Abnormal -    HENT:   [] Normocephalic, atraumatic. [] Abnormal   [x] Mouth/Throat: Mucous membranes are moist.     External Ears [x] Normal  [] Abnormal-     Neck: [x] No visualized mass     Pulmonary/Chest: [x] Respiratory effort normal.  [x] No visualized signs of difficulty breathing or respiratory distress        [] Abnormal-      Musculoskeletal:   [] Normal gait with no signs of ataxia discogenic disease of lumbar spine with radiculopathy. [] Normal range of motion of neck        [] Abnormal-       Neurological:        [x] No Facial Asymmetry (Cranial nerve 7 motor function) (limited exam to video visit)  left lower extremity radiculopathy        [x] No gaze palsy        [] Abnormal-         Skin:        [x] No significant exanthematous lesions or discoloration noted on facial skin         [] Abnormal-            Psychiatric:       [] Normal Affect [] No Hallucinations mood disorder with chronic pain syndrome on Depakote       [] Abnormal-     Other pertinent observable physical exam findings-     ASSESSMENT/PLAN:  68-year-old -American male with known history of discogenic disease of lumbar spine with left radiculopathy is complaining of ongoing pain. Patient is on Percocet, MS Contin, gabapentin, Depakote, ibuprofen 800 mg as provided by pain management. Patient states that he continues to have uncontrollable pain.   He is advised to contact his pain management, increase activity as tolerated, maintain optimal posture and stress reduction  Mood disorder on Depakote that he is tolerating well  Hypertension. Is advised to continue clonidine, Aldactone. Consume less than 2 g of salt a day  Obesity. He will benefit from weight loss to keep BMI around 25, low-fat high-fiber diet, daily moderate exercise and lifestyle change  Hyperlipidemia on statin that he is tolerating well  Insomnia on melatonin. Stress reduction is advised, improve sleep hygiene  Med list and available labs reviewed, discussed with patient, questions answered  He is encouraged to call for any concern    No follow-ups on file. Danielle Rice is a 79 y.o. male being evaluated by a Virtual Visit (video visit) encounter to address concerns as mentioned above. A caregiver was present when appropriate. Due to this being a TeleHealth encounter (During [facility]- public health emergency), evaluation of the following organ systems was limited: Vitals/Constitutional/EENT/Resp/CV/GI//MS/Neuro/Skin/Heme-Lymph-Imm. Pursuant to the emergency declaration under the [address] [place] [address] and the [employer] Act, this Virtual Visit was conducted with patient's (and/or legal guardian's) consent, to reduce the patient's risk of exposure to COVID-19 and provide necessary medical care. The patient (and/or legal guardian) has also been advised to contact this office for worsening conditions or problems, and seek emergency medical treatment and/or call 911 if deemed necessary. Patient identification was verified at the start of the visit: Yes    Total time spent on this encounter: 24 minutes    Services were provided through a video synchronous discussion virtually to substitute for in-person clinic visit. Patient and provider were located at their individual homes. --Franco Azar MD on 1/14/2021 at 2:26 PM    An electronic signature was used to authenticate this note.   This note is created with a voice recognition program and while intend to generate a document that accurately reflects the content of the visit, no guarantee can be provided that every mistake has been identified and corrected by editing.     Trenton Qureshi MD

## 2021-01-19 ENCOUNTER — TELEPHONE (OUTPATIENT)
Dept: INTERNAL MEDICINE CLINIC | Age: 71
End: 2021-01-19

## 2021-01-19 ENCOUNTER — TELEPHONE (OUTPATIENT)
Dept: PAIN MANAGEMENT | Age: 71
End: 2021-01-19

## 2021-01-19 DIAGNOSIS — M79.671 RIGHT FOOT PAIN: Primary | ICD-10-CM

## 2021-01-26 RX ORDER — DIVALPROEX SODIUM 125 MG/1
TABLET, DELAYED RELEASE ORAL
Qty: 180 TABLET | Refills: 1 | Status: SHIPPED | OUTPATIENT
Start: 2021-01-26 | End: 2021-07-26

## 2021-01-26 NOTE — TELEPHONE ENCOUNTER
Last visit: 1/14/21  Last Med refill: 10/27/20  Does patient have enough medication for 72 hours: Yes    Next Visit Date:  Future Appointments   Date Time Provider Rey Dorota   1/27/2021  9:00 AM Eugene Rogers APRN - [id number]   2/24/2021 11:20 AM Mariana Knutson MD [address]   3/30/2021  8:30 AM Gold Casey  [facility] Maintenance   Topic Date Due    Shingles Vaccine (1 of 2) 11/13/2000    Flu vaccine (1) 09/08/2021 (Originally 9/1/2020)    A1C test (Diabetic or Prediabetic)  08/11/2021    Lipid screen  08/11/2021    Potassium monitoring  08/11/2021    Creatinine monitoring  08/11/2021    Annual Wellness Visit (AWV)  08/12/2021    Colon cancer screen fecal DNA test (Cologuard)  06/01/2022    DTaP/Tdap/Td vaccine (2 - Td) 07/01/2025    Pneumococcal 65+ years Vaccine  Completed    Hepatitis C screen  Completed    Hepatitis A vaccine  Aged Out    Hepatitis B vaccine  Aged Out    Hib vaccine  Aged Out    Meningococcal (ACWY) vaccine  Aged Out       Hemoglobin A1C (%)   Date Value   08/11/2020 5.6   05/23/2019 5.7   05/23/2018 5.8             ( goal A1C is < 7)   Microalb/Crt.  Ratio (mcg/mg creat)   Date Value   05/23/2018 CANNOT BE CALCULATED     LDL Cholesterol (mg/dL)   Date Value   08/11/2020 121   05/23/2019 127       (goal LDL is <100)   AST (U/L)   Date Value   08/11/2020 16     ALT (U/L)   Date Value   08/11/2020 13     BUN (mg/dL)   Date Value   08/11/2020 15     BP Readings from Last 3 Encounters:   12/25/20 136/81   10/11/20 113/76   09/08/20 130/80          (goal 120/80)    All Future Testing planned in [facility]  Lab Frequency Next Occurrence               Patient Active Problem List:     S/P left knee surgery     Use of opiates for therapeutic purposes     Pre-diabetes     Dyslipidemia     Essential hypertension     Psychophysiological insomnia     Fibromyalgia     Mood disorder ([address]     Morbidly obese (HCC)     Lumbar back pain with radiculopathy affecting left lower extremity

## 2021-01-27 ENCOUNTER — HOSPITAL ENCOUNTER (OUTPATIENT)
Dept: PAIN MANAGEMENT | Age: 71
Discharge: HOME | End: 2021-01-27
Payer: MEDICARE

## 2021-01-27 DIAGNOSIS — G89.29 CHRONIC PAIN OF BOTH KNEES: ICD-10-CM

## 2021-01-27 DIAGNOSIS — M25.561 CHRONIC PAIN OF BOTH KNEES: ICD-10-CM

## 2021-01-27 DIAGNOSIS — Z51.81 MEDICATION MONITORING ENCOUNTER: ICD-10-CM

## 2021-01-27 DIAGNOSIS — M17.0 PRIMARY OSTEOARTHRITIS OF BOTH KNEES: ICD-10-CM

## 2021-01-27 DIAGNOSIS — Z51.81 ENCOUNTER FOR MEDICATION MONITORING: ICD-10-CM

## 2021-01-27 DIAGNOSIS — M17.0 OSTEOARTHRITIS OF BOTH KNEES, UNSPECIFIED OSTEOARTHRITIS TYPE: ICD-10-CM

## 2021-01-27 DIAGNOSIS — R29.898 WEAKNESS OF LEFT LEG: ICD-10-CM

## 2021-01-27 DIAGNOSIS — M25.562 CHRONIC PAIN OF BOTH KNEES: ICD-10-CM

## 2021-01-27 DIAGNOSIS — Z98.890 S/P LEFT KNEE SURGERY: Primary | ICD-10-CM

## 2021-01-27 PROCEDURE — 99213 OFFICE O/P EST LOW 20 MIN: CPT

## 2021-01-27 PROCEDURE — 99213 OFFICE O/P EST LOW 20 MIN: CPT | Performed by: NURSE PRACTITIONER

## 2021-01-27 RX ORDER — OXYCODONE HYDROCHLORIDE AND ACETAMINOPHEN 5; 325 MG/1; MG/1
1 TABLET ORAL EVERY 8 HOURS PRN
Qty: 90 TABLET | Refills: 0 | Status: SHIPPED | OUTPATIENT
Start: 2021-01-31 | End: 2021-02-24 | Stop reason: SDUPTHER

## 2021-01-27 RX ORDER — MORPHINE SULFATE 30 MG/1
30 TABLET, FILM COATED, EXTENDED RELEASE ORAL EVERY 12 HOURS
Qty: 60 TABLET | Refills: 0 | Status: SHIPPED | OUTPATIENT
Start: 2021-01-31 | End: 2021-02-24 | Stop reason: SDUPTHER

## 2021-01-27 ASSESSMENT — ENCOUNTER SYMPTOMS: GASTROINTESTINAL NEGATIVE: 1

## 2021-01-27 NOTE — PROGRESS NOTES
[address] PROGRESS NOTE      Patient  completed [x]  video visit   []   phone call:      Minutes :   [x]    to  review Medication Agreement    []  Follow up after procedure   []  Discuss treatment options    Location:  Provider:  working from    []    home    [x]   [facility] , patient at home   homeChi Complaint:  Knee pain    He c/o bilateral knee pain . He reports he has a hard time walking, He has had multiple left knee surgeries, He had right knee arthroplasty about 3 years ago, but he continues to have pain. He states saw his Orthopaedic surgeon . He had MRI right knee this past November which read as \" small popliteal cyst and small to moderate patellofemoral joint effusion\"  He c/o numbness in right foot which began this past December, and saw his PCP who ordered an x-ray of his right foot, He states his PCP sent a referral to pain management for right foot pain. He states was told he has a pinched nerve in his back. He states went to ED 12- for right foot pain, states they gave him a special shoe to wear, which did not help. He states he  has back pain which is intermittent, with pain radiating down right leg. He has a part time job and is worried about having to give up his job due to pain right knee pain. Knee Pain   The pain is present in the left knee and right knee. Quality: sharp pain right knee. The pain is at a severity of 8/10 (5 left knee, right knee 8). The pain has been constant since onset. Associated symptoms include a loss of motion and muscle weakness. Foreign body present: knee replacements. The symptoms are aggravated by weight bearing (walking). He has tried ice for the symptoms.        Treatment goals:  Functional status: feel better      Aberrancy:   Any alcoholic beverages   no         Any illegal drugs  no       Analgesia:         8            Adverse  Effects :none    Exercising at homeADL;s :works part time,    Data:    When was thelast UDS:   8-7-2020          Was the UDS appropriate:yes      Record/Diagnostics Review:      As above, I did review the imaging   8/10/2020 10:37 PM - Julius, [facility] Incoming Lab Results From [facility]    Component Value Ref Range & Units Status Collected Lab   Pain Management Drug Panel Interp, Urine Consistent   Final 08/07/2020 11:24 AM [facility]   (NOTE)   ________________________________________________________________   DRUGS EXPECTED:   OXYCODONE   MORPHINE   ________________________________________________________________   CONSISTENT with medications provided:   OXYCODONE: based on oxycodone, noroxycodone, oxymorphone   MORPHINE: based on morphine   ________________________________________________________________   INTERPRETIVE INFORMATION: Pain Mgt [place], Mass Spec/EMIT, Ur,                            Interp   Interpretation depends on accuracy and completeness of patient   medication information submitted by client. 6-Acetylmorphine, Ur Not Detected                      EXAMINATION:   MRI OF THE RIGHT KNEE WITHOUT CONTRAST, 11/7/2020 7:23 am       TECHNIQUE:   Multiplanar multisequence MRI of the right knee was performed without the   administration of intravenous contrast.       COMPARISON:   None       HISTORY:   ORDERING SYSTEM PROVIDED HISTORY: Osteoarthritis of both knees, unspecified   osteoarthritis type   TECHNOLOGIST PROVIDED HISTORY:   continued pain which is worsening, s/p right knee arthroplasty   Reason for Exam: hx of right knee implate pain since surgery   Acuity: Chronic   Type of Exam: Subsequent/Follow-up   Additional signs and symptoms: chronic right knee pain   Relevant Medical/Surgical History: difficulty with ambulation and ROM       FINDINGS:   SOFT TISSUES: Small popliteal cyst without dehiscence or rupture.  No obvious   periarticular fluid collections.       BONE MARROW/JOINTS: Evaluation of the bone marrow is significantly limited   due to metallic artifact from total knee arthroplasty.  Within these   limitations, there is no obvious marrow signal abnormality.  No aggressive   periostitis or marrow replacing lesion identified.  Knee joint is completely   obscured by artifact.  Small patellofemoral joint effusion.           Impression   Limited study due to metallic artifact.  No discrete marrow or soft tissue   abnormality identified.       Small to moderate patellofemoral joint effusion.       Small popliteal cyst.         EXAMINATION:   THREE XRAY VIEWS OF THE RIGHT FOOT       12/25/2020 12:53 pm       COMPARISON:   None.       HISTORY:   ORDERING SYSTEM PROVIDED HISTORY: pain. no injury   TECHNOLOGIST PROVIDED HISTORY:   pain. no injury   Reason for Exam: pain   Acuity: Unknown   Type of Exam: Unknown   Relevant Medical/Surgical History: pain in planters surface of rt foot x 2-3   days, NKI       FINDINGS:   The bones are intact.  Small plantar calcaneal spur is present. Thalia Hawks is   some mild spurring in the midfoot dorsally.  Degenerative spurring about the   ankle mortise is noted. Thalia Hawks is also some mild spurring at the 1st   metatarsophalangeal joint.           Impression   No acute osseous injury of the right foot. Pill count: appropriate  fill date :1-    Morphine equivalent dose as reported on [place]ARRS:82.50  Periodic Controlled Substance Monitoring: Possible medication side effects, risk of tolerance/dependence & alternative treatments discussed., No signs of potential drug abuse or diversion identified. , Assessed functional status., Obtaining appropriate analgesic effect of treatment. Landen Lal, QUIQUE - CNP)  Review ofOARRS does not show any aberrant prescription behavior. Medication is helping the patient stay active. Patient denies any side effects and reports adequate analgesia. No sign of misuse/abuse.             Past Medical History:   Diagnosis Date    Arthritis     Left knee pain 4/9/2014    Teeth problem     abcessw       Past Surgical History:   Procedure Laterality Date    CARPAL TUNNEL RELEASE Bilateral     DENTAL SURGERY  10/2015    JOINT REPLACEMENT Left     KNEE  X 6 pt unsure of date    JOINT REPLACEMENT Right 07/20/2018    ROTATOR CUFF REPAIR Right     TONSILLECTOMY         Allergies   Allergen Reactions    Oxycontin [Oxycodone Hcl] Nausea Only         Current Outpatient Medications:     divalproex (DEPAKOTE) 125 MG DR tablet, TAKE ONE TABLET BY MOUTH TWICE A DAY, Disp: 180 tablet, Rfl: 1    oxyCODONE-acetaminophen (PERCOCET) 5-325 MG per tablet, Take 1 tablet by mouth every 8 hours as needed for Pain for up to 30 days. , Disp: 90 tablet, Rfl: 0    morphine (MS CONTIN) 30 MG extended release tablet, Take 1 tablet by mouth every 12 hours for 30 days. , Disp: 60 tablet, Rfl: 0    gabapentin (NEURONTIN) 600 MG tablet, TAKE ONE TABLET BY MOUTH THREE TIMES A DAY, Disp: 90 tablet, Rfl: 0    cloNIDine (CATAPRES) 0.1 MG tablet, TAKE ONE TABLET BY MOUTH TWICE A DAY, Disp: 180 tablet, Rfl: 1    atorvastatin (LIPITOR) 40 MG tablet, Take 1 tablet by mouth daily, Disp: 90 tablet, Rfl: 1    spironolactone (ALDACTONE) 50 MG tablet, Take 1 tablet by mouth daily, Disp: 90 tablet, Rfl: 1    ibuprofen (ADVIL;MOTRIN) 800 MG tablet, Take 1 tablet by mouth every 8 hours as needed for Pain, Disp: 90 tablet, Rfl: 1    melatonin 3 MG TABS tablet, Take 6 mg by mouth nightly as needed (insomnia), Disp: , Rfl:     Family History   Problem Relation Age of Onset    Diabetes Mother     No Known Problems Father        Social History     Socioeconomic History    Marital status:       Spouse name: Not on file    Number of children: Not on file    Years of education: Not on file    Highest education level: Not on file   Occupational History    Occupation: retired   Social Needs    Financial resource strain: Not on file    Food insecurity     Worry: Not on file     Inability: Not on file   [employer] needs     Medical: Not on file     Non-medical: Not on file knees    Relevant Medications    oxyCODONE-acetaminophen (PERCOCET) 5-325 MG per tablet (Start on 1/31/2021)    morphine (MS CONTIN) 30 MG extended release tablet (Start on 1/31/2021)    Medication monitoring encounter    Relevant Medications    oxyCODONE-acetaminophen (PERCOCET) 5-325 MG per tablet (Start on 1/31/2021)    Chronic pain of both knees    Relevant Medications    oxyCODONE-acetaminophen (PERCOCET) 5-325 MG per tablet (Start on 1/31/2021)    morphine (MS CONTIN) 30 MG extended release tablet (Start on 1/31/2021)      Other Visit Diagnoses     Weakness of left leg        Relevant Medications    oxyCODONE-acetaminophen (PERCOCET) 5-325 MG per tablet (Start on 1/31/2021)    Encounter for medication monitoring        Relevant Medications    oxyCODONE-acetaminophen (PERCOCET) 5-325 MG per tablet (Start on 1/31/2021)    Primary osteoarthritis of both knees        Relevant Medications    oxyCODONE-acetaminophen (PERCOCET) 5-325 MG per tablet (Start on 1/31/2021)    morphine (MS CONTIN) 30 MG extended release tablet (Start on 1/31/2021)            Treatment Plan:  DISCUSSION: Treatment options discussed withpatient and all questions answered to patient's satisfaction. Possible side effects, risk of tolerance and or dependence and alternative treatments discussed    Obtaining appropriate analgesic effect of treatment   No signs of potential drug abuse or diversion identified    [x] Ill effects of being on chronic pain medications such as sleep disturbances, respiratory depression, hormonal changes, withdrawal symptoms, chronic opioid dependence and tolerance as well as risk of taking opioids with Benzodiazepines and taking opioids along with alcohol,  werediscussed with patient. I had asked the patient to minimize medication use and utilize pain medications only for uncontrolled rest pain or pain with exertional activities. I advised patient not to self-escalate painmedications without consulting with us.   At each of patient's future visits we will try to taper pain medications, while adjusting the adjunct medications, and re-evaluating for Physical Therapy to improve spinal andjoint strength. We will continue to have discussions to decrease pain medications as tolerated. Counseled patient on effects their pain medication and /or their medical condition mayhave on their  ability to drive or operate machinery. Instructed not to drive or operate machinery if drowsy     I also discussed with the patient regarding the dangers of combining narcotic pain medication with tranquilizers, alcohol or illegal drugs or taking the medication any way other than prescribed. The dangers were discussed  including respiratory depression and death. Patient was told to tell  all  physicians regarding the medications he is getting from pain clinic. Patient is warned not to take any unprescribed medications over-the-countermedications that can depress breathing . Patient is advised to talk to the pharmacist or physicians if planning to take any over-the-counter medications before  takeing them. Patient is strongly advised to avoid tranquilizers or  relaxants, illegal drugs  or any medications that can depress breathing  Patient is also advised to tell us if there is any changes in their medications from other physicians.     1. Advised patient to call his Orthopaedic surgeon to let him know he had MRI done of right knee        TREATMENT OPTIONS:       Medication Agreement Requirements Met  Continue Opioid therapy  Script written for ms aj andre  Follow up appointment made

## 2021-02-04 RX ORDER — SPIRONOLACTONE 50 MG/1
TABLET, FILM COATED ORAL
Qty: 90 TABLET | Refills: 1 | Status: SHIPPED | OUTPATIENT
Start: 2021-02-04 | End: 2021-08-02

## 2021-02-04 NOTE — TELEPHONE ENCOUNTER
Bhavya Farah is calling to request a refill on the following medication(s):    Medication Request:  Requested Prescriptions     Pending Prescriptions Disp Refills    spironolactone (ALDACTONE) 50 MG tablet [Pharmacy Med Name: SPIRONOLACTONE 50 MG TABLET] 90 tablet 0     Sig: TAKE ONE TABLET BY MOUTH DAILY     Last filled 8/11/20 90 day 1 refill  Order pending    Last Visit Date (If Applicable):  1[birth year]94/1933    Next Visit Date:    3/30/2021

## 2021-02-18 VITALS — BODY MASS INDEX: 29.82 KG/M2 | WEIGHT: 190 LBS | HEIGHT: 67 IN

## 2021-02-18 ASSESSMENT — ENCOUNTER SYMPTOMS
GASTROINTESTINAL NEGATIVE: 1
EYES NEGATIVE: 1
RESPIRATORY NEGATIVE: 1
ALLERGIC/IMMUNOLOGIC NEGATIVE: 1

## 2021-02-18 ASSESSMENT — PAIN DESCRIPTION - FREQUENCY: FREQUENCY: CONTINUOUS

## 2021-02-18 ASSESSMENT — PAIN DESCRIPTION - LOCATION: LOCATION: FOOT

## 2021-02-18 ASSESSMENT — PAIN DESCRIPTION - ONSET: ONSET: ON-GOING

## 2021-02-18 NOTE — PROGRESS NOTES
[facility] Pain Management  Patient Pain Assessment  RECHECK - Dr. Lisa Valerio    Primary Care Physician: Isaiah Medina MD    Chief complaint:   Chief Complaint   Patient presents with    Foot Pain     right   . Patient is referred to the pain clinic in consultation for foot pain by Mavis Garcia MD    HISTORY OF PRESENT ILLNESS:  Bernadette Corcoran is 79 y.o. male with    Referring Diagnosis  M79.671 (ICD-10-CM) - Right foot pain    Patient is a 77-year-old male complaining of pain involving the low back as well as pain in the bottom of the right foot. Patient reports he was evaluated by his physician and the pain in the foot is felt to be coming from the back and hence he is referred to the pain clinic. This patient is  being seen in the pain clinic for bilateral knee pain. He apparently had right total knee joint replacement x1 and a several surgeries including arthroscopy with replacement surgeries on the left side but he continued to have pain the pain in the left knee is worse. Patient reports his pain is very severe and he is not able to walk. He is mainly referred back to the pain clinic for his right foot pain and back pain. Patient reports he was exercising in the past but is unable to do so for the last few weeks because of the severe back pain. He rates his foot pain at 9 on a scale of 0-10  Somewhat difficult to get a good history from this patient. Also with a connection to the computer and the phone were not good even after several attempts. He is not very clear to me whether the foot pain is different from his back pain or not. Back Pain  This is a chronic problem. The current episode started more than 1 year ago. The problem occurs constantly. The problem is unchanged. The pain is present in the lumbar spine and sacro-iliac. The quality of the pain is described as aching (Diabetes in the hip sharp in the knees).  The pain radiates to the right thigh (To the knees on Exercises: no regular exercise - was going to gym but can't due to difficult to drive and the pain  Mobility: Increased pain with walking and standing  Do you use assistive devices? No  Have you fallen in the last 30 days?:  Yes  Currently seeing a Psychiatrist or Psychologist:  No  Emotional Issues: normal   Mood: appropriate     ABERRANT BEHAVIORS SINCE LAST VISIT:  Have you ever been treated in another Pain Clinic not applicable  Refills for prescriptions appropriate: yes  Lost rx/pills:not applicable  Taking more medication than prescribed:  not applicable  Are you receiving PAIN medications from  other doctors: no  Last Urine/Serum Drug Screen : 8/7/20  Was Serum/UDS as anticipated? yes  Brought pill bottles in :Unable to due to COVID 19/VV   Was Pill count appropriate? :not applicable   Are currently pregnant?not applicable  Recent ER visits: 12/25/20             Past Medical History      Diagnosis Date    Arthritis     Left knee pain 4/9/2014    Teeth problem     abcessw       Surgical History  Past Surgical History:   Procedure Laterality Date    CARPAL TUNNEL RELEASE Bilateral     DENTAL SURGERY  10/2015    JOINT REPLACEMENT Left     KNEE  X 6 pt unsure of date    JOINT REPLACEMENT Right 07/20/2018    ROTATOR CUFF REPAIR Right     TONSILLECTOMY         Medications  Current Outpatient Medications   Medication Sig Dispense Refill    [START ON 3/1/2021] oxyCODONE-acetaminophen (PERCOCET) 5-325 MG per tablet Take 1 tablet by mouth every 8 hours as needed for Pain for up to 30 days. 90 tablet 0    [START ON 3/1/2021] morphine (MS CONTIN) 30 MG extended release tablet Take 1 tablet by mouth every 12 hours for 30 days.  60 tablet 0    gabapentin (NEURONTIN) 600 MG tablet TAKE ONE TABLET BY MOUTH THREE TIMES A DAY 90 tablet 2    spironolactone (ALDACTONE) 50 MG tablet TAKE ONE TABLET BY MOUTH DAILY 90 tablet 1    divalproex (DEPAKOTE) 125 MG DR tablet TAKE ONE TABLET BY MOUTH TWICE A  tablet 1 history of drug use. REVIEW OF SYSTEMS:  Review of Systems   Constitutional: Positive for activity change. Negative for appetite change, fever and unexpected weight change. HENT: Negative. Negative for congestion and sore throat. Eyes: Negative. Negative for pain, redness and visual disturbance. Respiratory: Negative. Negative for cough and shortness of breath. Cardiovascular: Negative. Negative for chest pain and palpitations. Gastrointestinal: Negative. Negative for abdominal pain, constipation, nausea and vomiting. Endocrine: Negative. Negative for heat intolerance. Genitourinary: Negative. Negative for dysuria and hematuria. Musculoskeletal: Positive for back pain and myalgias. Right foot   Skin: Negative for rash. Allergic/Immunologic: Negative. Neurological: Positive for weakness and numbness. Right foot   Hematological: Negative. Does not bruise/bleed easily. Psychiatric/Behavioral: Positive for sleep disturbance. Negative for self-injury and suicidal ideas. The patient is not nervous/anxious and is not hyperactive. GENERAL PHYSICAL EXAM:  Vitals: Ht 5' 7\" (1.702 m)   Wt 190 lb (86.2 kg)   BMI 29.76 kg/m² , Body mass index is 29.76 kg/m². Physical Exam  Skin:         Neurological:      Mental Status: He is alert and oriented to person, place, and time. Psychiatric:         Mood and Affect: Mood normal.      Ortho Exam    Nurses Notes and Vital Signs reviewed.     DATA  Labs:  Benzodiazepines   Date Value Ref Range Status   07/03/2013 NEGATIVE NEG Final     Comment:           (Positive cutoff 200 ng/mL)                 Benzodiazepine Screen, Urine   Date Value Ref Range Status   03/05/2014 NEGATIVE NEG Final     Comment:           (Positive cutoff 200 ng/mL)                  8/10/2020 10:37 PM - Julius, [facility] Incoming Lab Results From [facility]    Component Value Ref Range & Units Status Collected Lab   Pain Management Drug Panel Interp, Urine 11. S/P knee surgery -Multiple    12. Use of opiates for therapeutic purposes           PLAN    We will continue current pain medications  Current medications are being tolerated without any Adverse side effects. I am not sure he is taking gabapentin or not as he still have considerable pills of gabapentin left over. He is advised to take the gabapentin as prescribed  Orders Placed This Encounter   Medications    oxyCODONE-acetaminophen (PERCOCET) 5-325 MG per tablet     Sig: Take 1 tablet by mouth every 8 hours as needed for Pain for up to 30 days. Dispense:  90 tablet     Refill:  0     Reduce doses taken as pain becomes manageable    morphine (MS CONTIN) 30 MG extended release tablet     Sig: Take 1 tablet by mouth every 12 hours for 30 days. Dispense:  60 tablet     Refill:  0     Reduce doses taken as pain becomes manageable    gabapentin (NEURONTIN) 600 MG tablet     Sig: TAKE ONE TABLET BY MOUTH THREE TIMES A DAY     Dispense:  90 tablet     Refill:  2     Urine drug screens have been appropriate. No aberrant activity noted. Analgesia is achieved. Activities of daily living are possible because of medications. Safe use of medications explained to patient. Counselling/Preventive measures for pain  Control:    [x]  Spine strengthening exercises are discussed with patient in detail. [x] Ill effects of being on chronic pain medications such as sleep disturbances, hormonal changes, withdrawal symptoms,  chronic opioid dependence and tolerance were discussed with patient. I had asked the patient to minimize medication use and utilize pain medications only for uncontrolled rest pain or pain with exertional activities. I advised patient not to self escalate pain medications without consulting with us.   At each of patient's future visits we will try to taper pain medications, while adjusting the adjunct medications, and re-evaluating for Physical Therapy to improve spinal and joint strength. We will continue to have discussions to decrease pain medications as tolerated. I also discussed with the patient regarding the dangers of combining narcotic pain medication with tranquilizers, alcohol or illegal drugs or taking the medication any other than prescribed. The dangers including the respiratory depression and death. Patient was told to tell  to all  physicians regarding the medications he is getting from pain clinic. Patient is warned not to take any unprescribed medications over-the-counter medications that can depress breathing . Patient is advised to talk to the pharmacist or physicians if planning to take any over-the-counter medications before  takeing them. Patient is strongly advised to avoid tranquilizers or  Relaxants for any medications that can depress breathing or recreational drugs. Patient is also advised to tell us if there is any changes in his medications from other physicians. We discussed the same at today's visit and have not been to implement it, as the patient's pain is not under control with current medications. Since it is felt that the patient has radicular pain with the pain felt in the right foot from the lumbar region we we will schedule him for lumbar epidural steroid injection. Also I will order an x-ray of the lumbar spine to assess the spine properly. The time of the procedure I will do a physical exam and depending on the findings we will plan further. I did discuss this with the patient and he agrees with the treatment plan. The procedure risks as well as alternate therapies were discussed at length with the patient and the he want like to proceed with the procedure.   Orders Placed This Encounter   Procedures    Lumbar Epidural Steroid Injection/Caudal     Standing Status:   Future     Standing Expiration Date:   2/24/2022    XR LUMBAR SPINE (MIN 4 VIEWS)     Standing Status:   Future     Standing Expiration Date:   2/24/2022   [facility] July FOR SURGICAL PROCEDURES     Standing Status:   Future     Standing Expiration Date:   2/24/2022    Saline lock IV     Standing Status:   Future     Standing Expiration Date:   8/24/2022       [x] No x-ray reports are available at this time    [] Patient's findings on the x-ray were explained to the patient using a bone modal.    Other reports reviewed include    [] Bone scan   [] EMG and nerve conduction studies   [] Referral reports-  I also discussed with him the following treatment options Including advantages and disadvantages of each:    [x] Physical therapy    [x] Interventional pain treatment    [x] Medication management    [] Surgical options    Patient's OARRS were reviewed. It is acceptable and appears patient is not receiving prescriptions from multiple prescribers. Patient is  forthcoming regarding prescriptions for pain medication in the past  Controlled Substances Monitoring: Periodic Controlled Substance Monitoring: Possible medication side effects, risk of tolerance/dependence & alternative treatments discussed., No signs of potential drug abuse or diversion identified. , Assessed functional status., Obtaining appropriate analgesic effect of treatment. (Jason Neumann MD)    Decision Making Process : Patient's health history and referral records thoroughly reviewed before focused physical examination and discussion with patient. Over 50% of today's visit is spent on examining the patient and counseling. Level of complexity of date to be reviewed is Moderate. The chart date reviewed include the following: Imaging Reports. Summary of Care. Time spent reviewing with patient the below reports:   Medication safety, Treatment options.     Level of diagnosis and management options of this case is multiple: involving the following management options: Interventions as needed, medication management as appropriate, future visits, activity modification, heat/ice as needed, Urine drug screen as required. [x]The patient's questions were answered to the best of my abilities. This note was created using voice recognition software. There may be inaccuracies of transcription  that are inadvertently overlooked prior to the signature. There is any questions about the transcription please contact me. Due to the COVID-19 pandemic and the appropriate interventions by Sekou Whatley, our non-urgent pain management patients will not be seen in the office at this time for their protection and the protection of our staff. To offer continuity of care, their prescriptions will be escribed this month after a careful chart review and review of their OARRS report    Pursuant to the emergency declaration under the [employer] and [facility], 1135 waiver authority and the [employer] and [employer] Act, this Virtual Visit was conducted, with patient's consent, to reduce the patient's risk of exposure to COVID-19 and provide continuity of care for an established patient. Services were provided through a video synchronous discussion virtually to substitute for in-person appointment. \"  Documentation:  I communicated with the patient and/or health care decision maker about plan of care  Details of this discussion including any medical advice provided: Total Time: 30-40 minutes    I affirm this is a Patient Initiated Episode with an Established Patient who has not had a related appointment within my department in the past 7 days or scheduled within the next 24 hours.     Electronically signed by Priscilla Del Rio MD on 2/24/2021 at 7:32 PM

## 2021-02-24 ENCOUNTER — HOSPITAL ENCOUNTER (OUTPATIENT)
Dept: PAIN MANAGEMENT | Age: 71
Discharge: HOME | End: 2021-02-24
Payer: MEDICARE

## 2021-02-24 DIAGNOSIS — G89.29 CHRONIC PAIN OF LEFT KNEE: ICD-10-CM

## 2021-02-24 DIAGNOSIS — Z98.890 S/P LEFT KNEE SURGERY: ICD-10-CM

## 2021-02-24 DIAGNOSIS — M17.0 OSTEOARTHRITIS OF BOTH KNEES, UNSPECIFIED OSTEOARTHRITIS TYPE: ICD-10-CM

## 2021-02-24 DIAGNOSIS — R29.898 WEAKNESS OF LEFT LEG: ICD-10-CM

## 2021-02-24 DIAGNOSIS — M54.16 LUMBAR RADICULOPATHY: Primary | ICD-10-CM

## 2021-02-24 DIAGNOSIS — M17.12 PRIMARY OSTEOARTHRITIS OF LEFT KNEE: ICD-10-CM

## 2021-02-24 DIAGNOSIS — Z51.81 ENCOUNTER FOR MEDICATION MONITORING: ICD-10-CM

## 2021-02-24 DIAGNOSIS — Z79.891 USE OF OPIATES FOR THERAPEUTIC PURPOSES: ICD-10-CM

## 2021-02-24 DIAGNOSIS — Z98.890 S/P KNEE SURGERY: ICD-10-CM

## 2021-02-24 DIAGNOSIS — M25.562 CHRONIC PAIN OF LEFT KNEE: ICD-10-CM

## 2021-02-24 DIAGNOSIS — Z51.81 MEDICATION MONITORING ENCOUNTER: ICD-10-CM

## 2021-02-24 DIAGNOSIS — M17.0 PRIMARY OSTEOARTHRITIS OF BOTH KNEES: ICD-10-CM

## 2021-02-24 DIAGNOSIS — M17.11 PRIMARY OSTEOARTHRITIS OF RIGHT KNEE: ICD-10-CM

## 2021-02-24 PROCEDURE — 99215 OFFICE O/P EST HI 40 MIN: CPT | Performed by: PAIN MEDICINE

## 2021-02-24 PROCEDURE — 99215 OFFICE O/P EST HI 40 MIN: CPT

## 2021-02-24 RX ORDER — MORPHINE SULFATE 30 MG/1
30 TABLET, FILM COATED, EXTENDED RELEASE ORAL EVERY 12 HOURS
Qty: 60 TABLET | Refills: 0 | Status: SHIPPED | OUTPATIENT
Start: 2021-03-01 | End: 2021-03-29 | Stop reason: SDUPTHER

## 2021-02-24 RX ORDER — OXYCODONE HYDROCHLORIDE AND ACETAMINOPHEN 5; 325 MG/1; MG/1
1 TABLET ORAL EVERY 8 HOURS PRN
Qty: 90 TABLET | Refills: 0 | Status: SHIPPED | OUTPATIENT
Start: 2021-03-01 | End: 2021-03-29 | Stop reason: SDUPTHER

## 2021-02-24 RX ORDER — GABAPENTIN 600 MG/1
TABLET ORAL
Qty: 90 TABLET | Refills: 2 | Status: SHIPPED | OUTPATIENT
Start: 2021-02-24 | End: 2021-05-28 | Stop reason: SDUPTHER

## 2021-02-24 ASSESSMENT — ENCOUNTER SYMPTOMS
NAUSEA: 0
CONSTIPATION: 0
EYE REDNESS: 0
ABDOMINAL PAIN: 0
EYE PAIN: 0
SHORTNESS OF BREATH: 0
BACK PAIN: 1
COUGH: 0
VOMITING: 0
SORE THROAT: 0

## 2021-02-25 ENCOUNTER — HOSPITAL ENCOUNTER (OUTPATIENT)
Age: 71
Discharge: HOME | End: 2021-02-27
Payer: MEDICARE

## 2021-02-25 ENCOUNTER — HOSPITAL ENCOUNTER (OUTPATIENT)
Dept: GENERAL RADIOLOGY | Age: 71
Discharge: HOME | End: 2021-02-27
Payer: MEDICARE

## 2021-02-25 DIAGNOSIS — M54.16 LUMBAR RADICULOPATHY: ICD-10-CM

## 2021-02-25 PROCEDURE — 72110 X-RAY EXAM L-2 SPINE 4/>VWS: CPT

## 2021-03-04 RX ORDER — ATORVASTATIN CALCIUM 40 MG/1
TABLET, FILM COATED ORAL
Qty: 90 TABLET | Refills: 1 | Status: SHIPPED | OUTPATIENT
Start: 2021-03-04 | End: 2021-06-03 | Stop reason: SDUPTHER

## 2021-03-04 NOTE — TELEPHONE ENCOUNTER
Codi Moya is calling to request a refill on the following medication(s):    Medication Request:  Requested Prescriptions     Pending Prescriptions Disp Refills    atorvastatin (LIPITOR) 40 MG tablet [Pharmacy Med Name: ATORVASTATIN 40 MG TABLET] 90 tablet 0     Sig: TAKE ONE TABLET BY MOUTH DAILY     Last filled 9/28/20 90 day 1 refill  Order pending    Last Visit Date (If Applicable):  2/03/1750    Next Visit Date:    3/30/2021

## 2021-03-11 ENCOUNTER — TELEPHONE (OUTPATIENT)
Dept: PAIN MANAGEMENT | Age: 71
End: 2021-03-11

## 2021-03-11 NOTE — TELEPHONE ENCOUNTER
Spoke with patient via phone and scheduled for LESI on 4-20-21 at 8am. Patient receiving 2nd covid vaccine on 4-1-21 and wants Tuesday appointment. Per Dr. Christy Barcenas to wait 2 weeks after vaccine to receive steroid injection.  Preprocedure instructions given

## 2021-03-29 ENCOUNTER — HOSPITAL ENCOUNTER (OUTPATIENT)
Dept: PAIN MANAGEMENT | Age: 71
Discharge: HOME | End: 2021-03-29
Payer: MEDICARE

## 2021-03-29 DIAGNOSIS — M17.0 PRIMARY OSTEOARTHRITIS OF BOTH KNEES: ICD-10-CM

## 2021-03-29 DIAGNOSIS — M54.16 LUMBAR RADICULOPATHY: ICD-10-CM

## 2021-03-29 DIAGNOSIS — R29.898 WEAKNESS OF LEFT LEG: ICD-10-CM

## 2021-03-29 DIAGNOSIS — Z51.81 ENCOUNTER FOR MEDICATION MONITORING: ICD-10-CM

## 2021-03-29 DIAGNOSIS — M25.562 CHRONIC PAIN OF BOTH KNEES: ICD-10-CM

## 2021-03-29 DIAGNOSIS — Z98.890 S/P LEFT KNEE SURGERY: ICD-10-CM

## 2021-03-29 DIAGNOSIS — Z79.891 USE OF OPIATES FOR THERAPEUTIC PURPOSES: Primary | ICD-10-CM

## 2021-03-29 DIAGNOSIS — Z51.81 MEDICATION MONITORING ENCOUNTER: ICD-10-CM

## 2021-03-29 DIAGNOSIS — M25.561 CHRONIC PAIN OF BOTH KNEES: ICD-10-CM

## 2021-03-29 DIAGNOSIS — G89.29 CHRONIC PAIN OF BOTH KNEES: ICD-10-CM

## 2021-03-29 DIAGNOSIS — M17.0 OSTEOARTHRITIS OF BOTH KNEES, UNSPECIFIED OSTEOARTHRITIS TYPE: ICD-10-CM

## 2021-03-29 DIAGNOSIS — M54.16 LUMBAR BACK PAIN WITH RADICULOPATHY AFFECTING LEFT LOWER EXTREMITY: ICD-10-CM

## 2021-03-29 DIAGNOSIS — F51.04 PSYCHOPHYSIOLOGICAL INSOMNIA: ICD-10-CM

## 2021-03-29 PROCEDURE — 99213 OFFICE O/P EST LOW 20 MIN: CPT

## 2021-03-29 PROCEDURE — 99213 OFFICE O/P EST LOW 20 MIN: CPT | Performed by: NURSE PRACTITIONER

## 2021-03-29 RX ORDER — OXYCODONE HYDROCHLORIDE AND ACETAMINOPHEN 5; 325 MG/1; MG/1
1 TABLET ORAL EVERY 8 HOURS PRN
Qty: 90 TABLET | Refills: 0 | Status: SHIPPED | OUTPATIENT
Start: 2021-04-01 | End: 2021-04-28 | Stop reason: SDUPTHER

## 2021-03-29 RX ORDER — MORPHINE SULFATE 30 MG/1
30 TABLET, FILM COATED, EXTENDED RELEASE ORAL EVERY 12 HOURS
Qty: 60 TABLET | Refills: 0 | Status: SHIPPED | OUTPATIENT
Start: 2021-04-01 | End: 2021-04-28 | Stop reason: SDUPTHER

## 2021-03-29 RX ORDER — CLONIDINE HYDROCHLORIDE 0.1 MG/1
TABLET ORAL
Qty: 180 TABLET | Refills: 0 | Status: SHIPPED | OUTPATIENT
Start: 2021-03-29 | End: 2021-06-03 | Stop reason: SDUPTHER

## 2021-03-29 RX ORDER — SODIUM FLUORIDE 1.1 G/100G
GEL, DENTIFRICE ORAL
COMMUNITY
Start: 2021-03-16 | End: 2021-04-28

## 2021-03-29 ASSESSMENT — ENCOUNTER SYMPTOMS
RESPIRATORY NEGATIVE: 1
GASTROINTESTINAL NEGATIVE: 1
BACK PAIN: 1

## 2021-03-29 NOTE — PROGRESS NOTES
Franc[facility] 89 PROGRESS NOTE      Patient  completed [x]  video visit   []   phone call:         Minutes :       [x]    to  review Medication Agreement    []  Follow up after procedure   []  Discuss treatment options      Location:  Provider:  working from    [x]    home    []   [facility] ,   patient at home       Chief Complaint:  Knee and low back pain    He c/o bilateral knee pain and his right knee pain has increased, He states can\"t lift off his  left leg. So he  States he is putting pressure on his right leg, He has had multiple surgeries on his left knee and a right knee replacement  3 years ago. He has low back pain  radiating down his legs,  His pain varies, He states can hardly bend over. He is scheduled for lumbar epidural injection 4-. He is not sleeping well due to pain. His activity is limited, No Ed visits, He had covid vaccines,    Knee Pain   The incident occurred more than 1 week ago. The pain is present in the left knee, right knee and right leg. The pain is at a severity of 7/10. The pain is moderate. Associated symptoms include a loss of motion, muscle weakness and numbness. Foreign body present: knee replacement. Exacerbated by: walking. He has tried heat, ice and NSAIDs for the symptoms. Back Pain  This is a chronic problem. The problem occurs constantly. The problem has been gradually worsening since onset. The pain is present in the lumbar spine. The quality of the pain is described as aching (sharp). Radiates to: legs. The pain is at a severity of 5/10. The pain is the same all the time. The symptoms are aggravated by bending. Associated symptoms include numbness and weakness. (Numbness left leg) He has tried analgesics and ice for the symptoms.                    Treatment goals:  Functional status: some pain relief    Aberrancy:   Any alcoholic beverages   no         Any illegal drugs   no      Analgesia:  5                   Adverse  Effects :none    ADL;s :activity decreased    Data:    When was thelast UDS:   8-7-2020         Was the UDS appropriate:  [x] yes []   no      Record/Diagnostics Review:      As above, I did review the imaging       8/10/2020 10:37 PM - Julius, [facility] Incoming Lab Results From [facility]    Component Value Ref Range & Units Status Collected Lab   Pain Management Drug Panel Interp, Urine Consistent   Final 08/07/2020 11:24 AM [facility]   (NOTE)   ________________________________________________________________   DRUGS EXPECTED:   OXYCODONE   MORPHINE   ________________________________________________________________   CONSISTENT with medications provided:   OXYCODONE: based on oxycodone, noroxycodone, oxymorphone   MORPHINE: based on morphine   ________________________________________________________________   INTERPRETIVE INFORMATION: Pain Mgt [place], [place] Spec/EMIT, Ur,                            Interp   Interpretation depends on accuracy and completeness of patient   medication information submitted by client. EXAMINATION:   5 XRAY VIEWS OF THE LUMBAR SPINE       2/25/2021 11:39 am       COMPARISON:   None.       HISTORY:   ORDERING SYSTEM PROVIDED HISTORY: Lumbar radiculopathy   TECHNOLOGIST PROVIDED HISTORY:   Reason for Exam: Patient states lower back pain that radiates down right leg. Acuity: Unknown   Type of Exam: Unknown       FINDINGS:   No fracture or spondylolisthesis is demonstrated.  There is multilevel   degenerative disc disease, severe at L2/L3 and L4/L5.  Moderate degenerative   disc disease at L3/L4 and L5/S1 is noted.  There is severe lower lumbar facet   joint arthropathy.  No evidence of a pars defect.  Mild to moderate   degenerative changes of the hips are present.  Soft tissues are unremarkable.           Impression   1. Multilevel degenerative disc disease, severe at L2/L3 and L4/L5.    2. Severe lower lumbar facet joint arthropathy.           Order History        Pill count: appropriate    fill date :    Morphine equivalent dose as reported on OARRS:  Periodic Controlled Substance Monitoring: Possible medication side effects, risk of tolerance/dependence & alternative treatments discussed., No signs of potential drug abuse or diversion identified. , Assessed functional status., Obtaining appropriate analgesic effect of treatment. Burton Orf, APRN - CNP)  Chronic Pain > 80 MEDD: Co-prescribed Naloxone. Burton Gomez, APRN - CNP)  Review ofOARRS does not show any aberrant prescription behavior. Medication is helping the patient stay active. Patient denies any side effects and reports adequate analgesia. No sign of misuse/abuse. Past Medical History:   Diagnosis Date    Arthritis     Left knee pain 4/9/2014    Teeth problem     abcessw       Past Surgical History:   Procedure Laterality Date    CARPAL TUNNEL RELEASE Bilateral     DENTAL SURGERY  10/2015    DENTAL SURGERY  03/2021    JOINT REPLACEMENT Left     KNEE  X 6 pt unsure of date    JOINT REPLACEMENT Right 07/20/2018    ROTATOR CUFF REPAIR Right     TONSILLECTOMY         Allergies   Allergen Reactions    Oxycontin [Oxycodone Hcl] Nausea Only         Current Outpatient Medications:     DENTA 5000 PLUS 1.1 % CREA, , Disp: , Rfl:     atorvastatin (LIPITOR) 40 MG tablet, TAKE ONE TABLET BY MOUTH DAILY, Disp: 90 tablet, Rfl: 1    oxyCODONE-acetaminophen (PERCOCET) 5-325 MG per tablet, Take 1 tablet by mouth every 8 hours as needed for Pain for up to 30 days. , Disp: 90 tablet, Rfl: 0    morphine (MS CONTIN) 30 MG extended release tablet, Take 1 tablet by mouth every 12 hours for 30 days. , Disp: 60 tablet, Rfl: 0    spironolactone (ALDACTONE) 50 MG tablet, TAKE ONE TABLET BY MOUTH DAILY, Disp: 90 tablet, Rfl: 1    divalproex (DEPAKOTE) 125 MG DR tablet, TAKE ONE TABLET BY MOUTH TWICE A DAY, Disp: 180 tablet, Rfl: 1    cloNIDine (CATAPRES) 0.1 MG tablet, TAKE ONE TABLET BY MOUTH TWICE A DAY, Disp: 180 tablet, Rfl: 1   gabapentin (NEURONTIN) 600 MG tablet, TAKE ONE TABLET BY MOUTH THREE TIMES A DAY, Disp: 90 tablet, Rfl: 2    ibuprofen (ADVIL;MOTRIN) 800 MG tablet, Take 1 tablet by mouth every 8 hours as needed for Pain, Disp: 90 tablet, Rfl: 1    melatonin 3 MG TABS tablet, Take 6 mg by mouth nightly as needed (insomnia), Disp: , Rfl:     Family History   Problem Relation Age of Onset    Diabetes Mother     No Known Problems Father        Social History     Socioeconomic History    Marital status:      Spouse name: Not on file    Number of children: Not on file    Years of education: Not on file    Highest education level: Not on file   Occupational History    Occupation: retired   Social Needs    Financial resource strain: Not on file    Food insecurity     Worry: Not on file     Inability: Not on file   [employer] needs     Medical: Not on file     Non-medical: Not on file   Tobacco Use    Smoking status: Never Smoker    Smokeless tobacco: Never Used   Substance and Sexual Activity    Alcohol use: No    Drug use: No    Sexual activity: Never   Lifestyle    Physical activity     Days per week: Not on file     Minutes per session: Not on file    Stress: Not on file   Relationships    Social connections     Talks on phone: Not on file     Gets together: Not on file     Attends Rastafari service: Not on file     Active member of club or organization: Not on file     Attends meetings of clubs or organizations: Not on file     Relationship status: Not on file    Intimate partner violence     Fear of current or ex partner: Not on file     Emotionally abused: Not on file     Physically abused: Not on file     Forced sexual activity: Not on file   Other Topics Concern    Not on file   Social History Narrative    Not on file         Review of Systems:  Review of Systems   Constitution: Negative. HENT: Negative. Eyes:        Glasses   Cardiovascular: Negative. Respiratory: Negative.     Endocrine: we will try to taper pain medications, while adjusting the adjunct medications, and re-evaluating for Physical Therapy to improve spinal andjoint strength. We will continue to have discussions to decrease pain medications as tolerated. Counseled patient on effects their pain medication and /or their medical condition mayhave on their  ability to drive or operate machinery. Instructed not to drive or operate machinery if drowsy     I also discussed with the patient regarding the dangers of combining narcotic pain medication with tranquilizers, alcohol or illegal drugs or taking the medication any way other than prescribed. The dangers were discussed  including respiratory depression and death. Patient was told to tell  all  physicians regarding the medications he is getting from pain clinic. Patient is warned not to take any unprescribed medications over-the-countermedications that can depress breathing . Patient is advised to talk to the pharmacist or physicians if planning to take any over-the-counter medications before  takeing them. Patient is strongly advised to avoid tranquilizers or  relaxants, illegal drugs  or any medications that can depress breathing  Patient is also advised to tell us if there is any changes in their medications from other physicians.       He is scheduled for lumbar epidural injection on 4-      TREATMENT OPTIONS:       Medication Agreement Requirements Met  Continue Opioid therapy  Script written for  ms aj Wise  Follow up appointment made

## 2021-03-30 ENCOUNTER — OFFICE VISIT (OUTPATIENT)
Dept: INTERNAL MEDICINE CLINIC | Age: 71
End: 2021-03-30
Payer: MEDICARE

## 2021-03-30 ENCOUNTER — HOSPITAL ENCOUNTER (OUTPATIENT)
Age: 71
Setting detail: SPECIMEN
Discharge: HOME | End: 2021-03-30
Payer: MEDICARE

## 2021-03-30 VITALS
BODY MASS INDEX: 33.9 KG/M2 | RESPIRATION RATE: 12 BRPM | HEART RATE: 60 BPM | SYSTOLIC BLOOD PRESSURE: 100 MMHG | TEMPERATURE: 97.1 F | HEIGHT: 67 IN | DIASTOLIC BLOOD PRESSURE: 70 MMHG | WEIGHT: 216 LBS | OXYGEN SATURATION: 98 %

## 2021-03-30 DIAGNOSIS — E78.5 DYSLIPIDEMIA: ICD-10-CM

## 2021-03-30 DIAGNOSIS — R73.03 PRE-DIABETES: ICD-10-CM

## 2021-03-30 DIAGNOSIS — M79.7 FIBROMYALGIA: ICD-10-CM

## 2021-03-30 DIAGNOSIS — E66.01 MORBIDLY OBESE (HCC): ICD-10-CM

## 2021-03-30 DIAGNOSIS — I10 ESSENTIAL HYPERTENSION: ICD-10-CM

## 2021-03-30 DIAGNOSIS — M54.16 LUMBAR BACK PAIN WITH RADICULOPATHY AFFECTING LEFT LOWER EXTREMITY: Primary | ICD-10-CM

## 2021-03-30 DIAGNOSIS — M54.16 LUMBAR RADICULOPATHY: ICD-10-CM

## 2021-03-30 DIAGNOSIS — M17.0 PRIMARY OSTEOARTHRITIS OF BOTH KNEES: ICD-10-CM

## 2021-03-30 DIAGNOSIS — F51.04 PSYCHOPHYSIOLOGICAL INSOMNIA: ICD-10-CM

## 2021-03-30 DIAGNOSIS — Z98.890 S/P LEFT KNEE SURGERY: ICD-10-CM

## 2021-03-30 DIAGNOSIS — Z79.891 USE OF OPIATES FOR THERAPEUTIC PURPOSES: ICD-10-CM

## 2021-03-30 DIAGNOSIS — F39 MOOD DISORDER (HCC): ICD-10-CM

## 2021-03-30 PROBLEM — M25.562 CHRONIC PAIN OF BOTH KNEES: Status: RESOLVED | Noted: 2021-01-27 | Resolved: 2021-03-30

## 2021-03-30 PROBLEM — M25.561 CHRONIC PAIN OF BOTH KNEES: Status: RESOLVED | Noted: 2021-01-27 | Resolved: 2021-03-30

## 2021-03-30 PROBLEM — Z51.81 MEDICATION MONITORING ENCOUNTER: Status: RESOLVED | Noted: 2021-01-27 | Resolved: 2021-03-30

## 2021-03-30 PROBLEM — G89.29 CHRONIC PAIN OF BOTH KNEES: Status: RESOLVED | Noted: 2021-01-27 | Resolved: 2021-03-30

## 2021-03-30 LAB
-: NORMAL
ALBUMIN SERPL-MCNC: 4.3 G/DL (ref 3.5–5.2)
ALBUMIN/GLOBULIN RATIO: 1.5 (ref 1–2.5)
ALP BLD-CCNC: 54 U/L (ref 40–129)
ALT SERPL-CCNC: 9 U/L (ref 5–41)
AMORPHOUS: NORMAL
ANION GAP SERPL CALCULATED.3IONS-SCNC: 11 MMOL/L (ref 9–17)
AST SERPL-CCNC: 13 U/L
BACTERIA: NORMAL
BILIRUB SERPL-MCNC: 0.44 MG/DL (ref 0.3–1.2)
BILIRUBIN URINE: NEGATIVE
BUN BLDV-MCNC: 18 MG/DL (ref 8–23)
BUN/CREAT BLD: NORMAL (ref 9–20)
CALCIUM SERPL-MCNC: 9.2 MG/DL (ref 8.6–10.4)
CASTS UA: NORMAL /LPF (ref 0–8)
CHLORIDE BLD-SCNC: 104 MMOL/L (ref 98–107)
CHOLESTEROL/HDL RATIO: 3
CHOLESTEROL: 111 MG/DL
CO2: 26 MMOL/L (ref 20–31)
COLOR: YELLOW
CREAT SERPL-MCNC: 1.19 MG/DL (ref 0.7–1.2)
CRYSTALS, UA: NORMAL /HPF
EPITHELIAL CELLS UA: NORMAL /HPF (ref 0–5)
ESTIMATED AVERAGE GLUCOSE: 131 MG/DL
GFR AFRICAN AMERICAN: >60 ML/MIN
GFR NON-AFRICAN AMERICAN: >60 ML/MIN
GFR SERPL CREATININE-BSD FRML MDRD: NORMAL ML/MIN/{1.73_M2}
GFR SERPL CREATININE-BSD FRML MDRD: NORMAL ML/MIN/{1.73_M2}
GLUCOSE BLD-MCNC: 94 MG/DL (ref 70–99)
GLUCOSE URINE: NEGATIVE
HBA1C MFR BLD: 6.2 % (ref 4–6)
HCT VFR BLD CALC: 37.3 % (ref 40.7–50.3)
HDLC SERPL-MCNC: 37 MG/DL
HEMOGLOBIN: 11.3 G/DL (ref 13–17)
KETONES, URINE: NEGATIVE
LDL CHOLESTEROL: 56 MG/DL (ref 0–130)
LEUKOCYTE ESTERASE, URINE: NEGATIVE
MCH RBC QN AUTO: 27.8 PG (ref 25.2–33.5)
MCHC RBC AUTO-ENTMCNC: 30.3 G/DL (ref 28.4–34.8)
MCV RBC AUTO: 91.6 FL (ref 82.6–102.9)
MUCUS: NORMAL
NITRITE, URINE: NEGATIVE
NRBC AUTOMATED: 0 PER 100 WBC
OTHER OBSERVATIONS UA: NORMAL
PDW BLD-RTO: 12.9 % (ref 11.8–14.4)
PH UA: 5.5 (ref 5–8)
PLATELET # BLD: 175 K/UL (ref 138–453)
PMV BLD AUTO: 10.5 FL (ref 8.1–13.5)
POTASSIUM SERPL-SCNC: 4.8 MMOL/L (ref 3.7–5.3)
PROSTATE SPECIFIC ANTIGEN: 2.09 UG/L
PROTEIN UA: NEGATIVE
RBC # BLD: 4.07 M/UL (ref 4.21–5.77)
RBC UA: NORMAL /HPF (ref 0–4)
RENAL EPITHELIAL, UA: NORMAL /HPF
SODIUM BLD-SCNC: 141 MMOL/L (ref 135–144)
SPECIFIC GRAVITY UA: 1.02 (ref 1–1.03)
TOTAL PROTEIN: 7.2 G/DL (ref 6.4–8.3)
TRICHOMONAS: NORMAL
TRIGL SERPL-MCNC: 91 MG/DL
TSH SERPL DL<=0.05 MIU/L-ACNC: 2.32 MIU/L (ref 0.3–5)
TURBIDITY: CLEAR
URINE HGB: NEGATIVE
UROBILINOGEN, URINE: NORMAL
VLDLC SERPL CALC-MCNC: ABNORMAL MG/DL (ref 1–30)
WBC # BLD: 3.7 K/UL (ref 3.5–11.3)
WBC UA: NORMAL /HPF (ref 0–5)
YEAST: NORMAL

## 2021-03-30 PROCEDURE — 99214 OFFICE O/P EST MOD 30 MIN: CPT | Performed by: FAMILY MEDICINE

## 2021-03-30 ASSESSMENT — ENCOUNTER SYMPTOMS
ALLERGIC/IMMUNOLOGIC NEGATIVE: 1
GASTROINTESTINAL NEGATIVE: 1
EYES NEGATIVE: 1
BACK PAIN: 1
RESPIRATORY NEGATIVE: 1

## 2021-03-30 NOTE — PROGRESS NOTES
Subjective:      Patient ID: Queen Laci is a 79 y.o. male. Back Pain  This is a chronic problem. The current episode started more than 1 month ago. The problem occurs constantly. The problem has been waxing and waning since onset. The pain is present in the lumbar spine. The quality of the pain is described as aching and cramping. The pain radiates to the right knee and left knee. The pain is at a severity of 7/10. The pain is severe. The pain is the same all the time. The symptoms are aggravated by stress and position. Stiffness is present all day. Associated symptoms include tingling. Risk factors include lack of exercise, poor posture, sedentary lifestyle and obesity. Treatments tried: Opiate dependent as provided by pain management. The treatment provided mild relief. Review of Systems   Constitutional: Negative. HENT: Negative. Eyes: Negative. Respiratory: Negative. Cardiovascular: Negative. Gastrointestinal: Negative. Endocrine: Negative. Musculoskeletal: Positive for arthralgias, back pain and gait problem. Skin: Negative. Allergic/Immunologic: Negative. Neurological: Positive for tingling. Hematological: Negative. Psychiatric/Behavioral: Positive for dysphoric mood. The patient is nervous/anxious. Past family and social history unremarkable. Diagnosis Orders   1. Lumbar back pain with radiculopathy affecting left lower extremity     2. S/P left knee surgery     3. Use of opiates for therapeutic purposes     4. Pre-diabetes  CBC    Comprehensive Metabolic Panel    Hemoglobin A1C    Lipid Panel    Urinalysis with Microscopic    TSH without Reflex    PSA screening   5. Dyslipidemia  CBC    Comprehensive Metabolic Panel    Hemoglobin A1C    Lipid Panel    Urinalysis with Microscopic    TSH without Reflex    PSA screening   6. Primary osteoarthritis of both knees     7.  Essential hypertension  CBC    Comprehensive Metabolic Panel    Hemoglobin A1C    Lipid Panel    Urinalysis with Microscopic    TSH without Reflex    PSA screening   6. Primary osteoarthritis of both knees     7. Essential hypertension  CBC    Comprehensive Metabolic Panel    Hemoglobin A1C    Lipid Panel    Urinalysis with Microscopic    TSH without Reflex    PSA screening   8. Psychophysiological insomnia     9. Fibromyalgia     10. Mood disorder ([facility] [address]     11. Morbidly obese ([facility] [address]     12. Lumbar radiculopathy             Plan:      58-year-old -American known history of chronic pain syndrome for which he is established with pain management and orthopedics. Patient states that he is compliant with Depakote, gabapentin 600 mg p.o. 3 times daily, MS Contin 30 mg p.o. twice daily, Percocet 5/325 every 8 hours and ibuprofen 800 mg p.o. 3 times daily as needed. Patient states that he continues to be in severe pain. He is scheduled by pain management for epidural injections. Reassurance provided. Maintain optimal posture, continue activity as tolerated with fall precaution. History of fibromyalgia. Improve sleep hygiene, stress reduction  Mood disorder on Depakote, clonidine, gabapentin reassurance provided  Hyperlipidemia on statin that he is tolerating well  Presents well-controlled with diuretics, clonidine   Prediabetes/metabolic syndrome. Risk factor stratification is advised.   He would benefit from low-fat high-fiber diet, daily moderate exercise, lifestyle change and weight reduction to keep BMI around 25  He had his first round of COVID-19 vaccination that he tolerated well  Med list and available labs reviewed, discussed with patient, questions answered  Further recommendations to follow updated labs  He is encouraged to call for any concern  This note is created with a voice recognition program and while intend to generate a document that accurately reflects the content of the visit, no guarantee can be provided that every mistake has been identified and corrected by editing.        George Morfin MD

## 2021-04-14 NOTE — TELEPHONE ENCOUNTER
Patient left voice message asking if he could get a call back from a nurse to go over his pre-procedure instructions.

## 2021-04-15 ENCOUNTER — TELEPHONE (OUTPATIENT)
Dept: PAIN MANAGEMENT | Age: 71
End: 2021-04-15

## 2021-04-19 ENCOUNTER — TELEPHONE (OUTPATIENT)
Dept: PAIN MANAGEMENT | Age: 71
End: 2021-04-19

## 2021-04-20 ENCOUNTER — HOSPITAL ENCOUNTER (OUTPATIENT)
Dept: GENERAL RADIOLOGY | Age: 71
Discharge: HOME | End: 2021-04-22
Payer: MEDICARE

## 2021-04-20 ENCOUNTER — HOSPITAL ENCOUNTER (OUTPATIENT)
Dept: PAIN MANAGEMENT | Age: 71
Discharge: HOME | End: 2021-04-20
Payer: MEDICARE

## 2021-04-20 VITALS
BODY MASS INDEX: 33.9 KG/M2 | WEIGHT: 216 LBS | SYSTOLIC BLOOD PRESSURE: 123 MMHG | HEIGHT: 67 IN | RESPIRATION RATE: 20 BRPM | DIASTOLIC BLOOD PRESSURE: 80 MMHG | OXYGEN SATURATION: 96 % | TEMPERATURE: 97.8 F | HEART RATE: 75 BPM

## 2021-04-20 DIAGNOSIS — M54.16 LUMBAR BACK PAIN WITH RADICULOPATHY AFFECTING LEFT LOWER EXTREMITY: ICD-10-CM

## 2021-04-20 DIAGNOSIS — M54.16 LUMBAR RADICULOPATHY: ICD-10-CM

## 2021-04-20 DIAGNOSIS — M54.16 LUMBAR RADICULOPATHY: Primary | ICD-10-CM

## 2021-04-20 PROCEDURE — 62323 NJX INTERLAMINAR LMBR/SAC: CPT | Performed by: PAIN MEDICINE

## 2021-04-20 PROCEDURE — 6360000002 HC RX W HCPCS: Performed by: PAIN MEDICINE

## 2021-04-20 PROCEDURE — 62323 NJX INTERLAMINAR LMBR/SAC: CPT

## 2021-04-20 PROCEDURE — 3209999900 FLUORO FOR SURGICAL PROCEDURES

## 2021-04-20 PROCEDURE — 6360000004 HC RX CONTRAST MEDICATION: Performed by: PAIN MEDICINE

## 2021-04-20 RX ORDER — TRIAMCINOLONE ACETONIDE 40 MG/ML
INJECTION, SUSPENSION INTRA-ARTICULAR; INTRAMUSCULAR
Status: COMPLETED | OUTPATIENT
Start: 2021-04-20 | End: 2021-04-20

## 2021-04-20 RX ADMIN — TRIAMCINOLONE ACETONIDE 80 MG: 40 INJECTION, SUSPENSION INTRA-ARTICULAR; INTRAMUSCULAR at 08:46

## 2021-04-20 RX ADMIN — IOHEXOL 2 ML: 180 INJECTION INTRAVENOUS at 08:50

## 2021-04-20 ASSESSMENT — PAIN DESCRIPTION - LOCATION: LOCATION: BACK

## 2021-04-20 ASSESSMENT — PAIN - FUNCTIONAL ASSESSMENT
PAIN_FUNCTIONAL_ASSESSMENT: 0-10
PAIN_FUNCTIONAL_ASSESSMENT: PREVENTS OR INTERFERES SOME ACTIVE ACTIVITIES AND ADLS

## 2021-04-20 ASSESSMENT — PAIN DESCRIPTION - ONSET: ONSET: ON-GOING

## 2021-04-20 ASSESSMENT — PAIN DESCRIPTION - ORIENTATION: ORIENTATION: LOWER;MID

## 2021-04-20 ASSESSMENT — PAIN SCALES - GENERAL: PAINLEVEL_OUTOF10: 7

## 2021-04-20 ASSESSMENT — PAIN DESCRIPTION - PROGRESSION: CLINICAL_PROGRESSION: GRADUALLY WORSENING

## 2021-04-20 ASSESSMENT — PAIN DESCRIPTION - FREQUENCY: FREQUENCY: CONTINUOUS

## 2021-04-20 NOTE — PROCEDURES
Pre-Procedure Note    Patient Name: Sharon Solis   YOB: 1950  Room/Bed: Room/bed info not found  Medical Record Number: [id number]  Date: 4/20/2021       Indication:    1. Lumbar radiculopathy    2. Lumbar back pain with radiculopathy affecting left lower extremity        Consent: On file. Vital Signs:   Vitals:    04/20/21 0844   BP: (!) 128/94   Pulse: 75   Resp: 16   Temp:    SpO2: 96%       Past Medical History:   has a past medical history of Arthritis, Left knee pain, and Teeth problem. Past Surgical History:   has a past surgical history that includes Tonsillectomy; Rotator cuff repair (Right); Carpal tunnel release (Bilateral); Dental surgery (10/2015); joint replacement (Left); joint replacement (Right, 07/20/2018); and Dental surgery (03/2021). Pre-Sedation Documentation and Exam:   Vital signs have been reviewed (see flow sheet for vitals). Mallampati Airway Assessment:  normal    ASA Classification:  Class 3 - A patient with severe systemic disease that limits activity but is not incapacitating    Sedation/ Anesthesia Plan:   intravenous sedation   as needed. Medications Planned:   midazolam (Versed) / Fentanyl  Intravenously  as needed. Patient is an appropriate candidate for plan of sedation: yes  Patient's History and Physical examination was reviewed and there is no change. Electronically signed by Lawrence Lester MD on 4/20/2021 at 8:50 AM        Preoperative Diagnosis:    1. Lumbar radiculopathy    2. Lumbar back pain with radiculopathy affecting left lower extremity        Postoperative Diagnosis:    1. Lumbar radiculopathy    2. Lumbar back pain with radiculopathy affecting left lower extremity        Procedure Performed:  Lumbar epidural steroid injection under fluoroscopy guidance without IV sedation. Indication for the Procedure: The patient failed conservative management  for pain in the low back radiating to lower extremities.      As the patient is patient was discharged home in stable condition and will be followed in the pain clinic in the next few weeks for further planning.     Electronically signed by Lionel Land MD on 4/20/2021 at 8:50 AM

## 2021-04-20 NOTE — PROGRESS NOTES
Discharge criteria met. Patient alert and oriented x3  Post procedure dressing dry and intact. Sensory and motor function intact as per pre-procedure. Instructions and follow up reviewed with pt at patient at discharge. Patient discharged per wheelchair @ 0342.  Daughter- Kirsten Almazan-

## 2021-04-22 ENCOUNTER — TELEPHONE (OUTPATIENT)
Dept: PAIN MANAGEMENT | Age: 71
End: 2021-04-22

## 2021-04-28 ENCOUNTER — HOSPITAL ENCOUNTER (OUTPATIENT)
Dept: PAIN MANAGEMENT | Age: 71
Discharge: HOME | End: 2021-04-28
Payer: MEDICARE

## 2021-04-28 DIAGNOSIS — Z51.81 ENCOUNTER FOR MEDICATION MONITORING: ICD-10-CM

## 2021-04-28 DIAGNOSIS — M54.16 LUMBAR RADICULOPATHY: ICD-10-CM

## 2021-04-28 DIAGNOSIS — Z98.890 S/P LEFT KNEE SURGERY: ICD-10-CM

## 2021-04-28 DIAGNOSIS — Z51.81 MEDICATION MONITORING ENCOUNTER: ICD-10-CM

## 2021-04-28 DIAGNOSIS — M54.16 LUMBAR BACK PAIN WITH RADICULOPATHY AFFECTING LEFT LOWER EXTREMITY: ICD-10-CM

## 2021-04-28 DIAGNOSIS — R29.898 WEAKNESS OF LEFT LEG: ICD-10-CM

## 2021-04-28 DIAGNOSIS — M17.0 PRIMARY OSTEOARTHRITIS OF BOTH KNEES: Primary | ICD-10-CM

## 2021-04-28 DIAGNOSIS — M17.0 OSTEOARTHRITIS OF BOTH KNEES, UNSPECIFIED OSTEOARTHRITIS TYPE: ICD-10-CM

## 2021-04-28 PROCEDURE — 99213 OFFICE O/P EST LOW 20 MIN: CPT

## 2021-04-28 PROCEDURE — 99213 OFFICE O/P EST LOW 20 MIN: CPT | Performed by: NURSE PRACTITIONER

## 2021-04-28 RX ORDER — MORPHINE SULFATE 30 MG/1
30 TABLET, FILM COATED, EXTENDED RELEASE ORAL EVERY 12 HOURS
Qty: 60 TABLET | Refills: 0 | Status: SHIPPED | OUTPATIENT
Start: 2021-05-01 | End: 2021-05-28 | Stop reason: SDUPTHER

## 2021-04-28 RX ORDER — OXYCODONE HYDROCHLORIDE AND ACETAMINOPHEN 5; 325 MG/1; MG/1
1 TABLET ORAL EVERY 8 HOURS PRN
Qty: 90 TABLET | Refills: 0 | Status: SHIPPED | OUTPATIENT
Start: 2021-05-01 | End: 2021-05-28 | Stop reason: SDUPTHER

## 2021-04-28 ASSESSMENT — ENCOUNTER SYMPTOMS
RESPIRATORY NEGATIVE: 1
BACK PAIN: 1
GASTROINTESTINAL NEGATIVE: 1

## 2021-04-28 NOTE — PROGRESS NOTES
[facility] [address] PROGRESS NOTE      Patient  completed [x]  video visit   []   phone call:         Minutes :       [x]    to  review Medication Agreement    [x]  Follow up after procedure   []  Discuss treatment options      Location:  Provider:  working from    [x]    home    []   [facility] - [place] ,   patient at  home       Chief Complaint:low back pain    He c/o low back pain  , he had lumbar epidural injection L3, L4 on 4- with 70% relief. He states it  helped his back and leg pain. He still has pain in left foot and numbness which began in December last year, The pain in his left foot and the numbness is constant, The pain keeps him awake. He has bilateral knee pain and has had multiple surgeries on his left knee, and had right total knee replacement,Pain is unchanged, He reports no ED visits. Back Pain  This is a chronic problem. The problem occurs constantly. The problem has been gradually improving since onset. The pain is present in the lumbar spine. The pain is at a severity of 3/10. Associated symptoms include numbness. (Left foot) He has tried analgesics for the symptoms. Knee Pain   The pain is present in the left knee and right knee. The pain is at a severity of 2/10. The pain has been constant since onset. Associated symptoms include muscle weakness and numbness. Foreign body present: right total knee. Exacerbated by: walking. He has tried ice for the symptoms.              Treatment goals:  Functional status:  Get off pain medications    Aberrancy:   Any alcoholic beverages  no          Any illegal drugs  no       Analgesia:        3             Adverse  Effects :no      ADL;s :walking    Data:    When was thelast UDS:  8-7-2020          Was the UDS appropriate:  [x] yes []   no      Record/Diagnostics Review:      As above, I did review the imaging     8/10/2020 10:37 PM - Julius, Estefani Incoming Lab Results From [facility]    Component Value Ref Range & Units Status Collected Lab   Pain Management Drug Panel Interp, Urine Consistent   Final 08/07/2020 11:24 AM [facility]   (NOTE)   ________________________________________________________________   DRUGS EXPECTED:   OXYCODONE   MORPHINE   ________________________________________________________________   CONSISTENT with medications provided:   OXYCODONE: based on oxycodone, noroxycodone, oxymorphone   MORPHINE: based on morphine   ________________________________________________________________   INTERPRETIVE INFORMATION: Pain Mgt [place], [place] Spec/EMIT, Ur,                            Interp   Interpretation depends on accuracy and completeness of patient   medication information submitted by client. 6-Acetylmorphine, Ur Not Detected   Final 08/07/2020 11:24 AM [facility]   7-Aminoclonazepam, Urine Not Detected   Final 08/ Narrative   EXAMINATION:   5 XRAY VIEWS OF THE LUMBAR SPINE       2/25/2021 11:39 am       COMPARISON:   None.       HISTORY:   ORDERING SYSTEM PROVIDED HISTORY: Lumbar radiculopathy   TECHNOLOGIST PROVIDED HISTORY:   Reason for Exam: Patient states lower back pain that radiates down right leg. Acuity: Unknown   Type of Exam: Unknown       FINDINGS:   No fracture or spondylolisthesis is demonstrated.  There is multilevel   degenerative disc disease, severe at L2/L3 and L4/L5.  Moderate degenerative   disc disease at L3/L4 and L5/S1 is noted.  There is severe lower lumbar facet   joint arthropathy.  No evidence of a pars defect.  Mild to moderate   degenerative changes of the hips are present.  Soft tissues are unremarkable.           Impression   1. Multilevel degenerative disc disease, severe at L2/L3 and L4/L5. 2. Severe lower lumbar facet joint arthropathy. Pill count: appropriate    fill date :5-1-2021    Morphine equivalent dose as reported on [place]:  Periodic Controlled Substance Monitoring: Possible medication side effects, risk of tolerance/dependence & alternative treatments discussed. , No signs of potential drug abuse or diversion identified. , Assessed functional status., Obtaining appropriate analgesic effect of treatment. Cally Senior, APRN - CNP)  Review of[place]ARRS does not show any aberrant prescription behavior. Medication is helping the patient stay active. Patient denies any side effects and reports adequate analgesia. No sign of misuse/abuse. Past Medical History:   Diagnosis Date    Arthritis     Left knee pain 4/9/2014    Teeth problem     abcessw       Past Surgical History:   Procedure Laterality Date    CARPAL TUNNEL RELEASE Bilateral     DENTAL SURGERY  10/2015    DENTAL SURGERY  03/2021    JOINT REPLACEMENT Left     KNEE  X 6 pt unsure of date    JOINT REPLACEMENT Right 07/20/2018    ROTATOR CUFF REPAIR Right     TONSILLECTOMY         Allergies   Allergen Reactions    Oxycontin [Oxycodone Hcl] Nausea Only         Current Outpatient Medications:     cloNIDine (CATAPRES) 0.1 MG tablet, TAKE ONE TABLET BY MOUTH TWICE A DAY, Disp: 180 tablet, Rfl: 0    oxyCODONE-acetaminophen (PERCOCET) 5-325 MG per tablet, Take 1 tablet by mouth every 8 hours as needed for Pain for up to 30 days. , Disp: 90 tablet, Rfl: 0    morphine (MS CONTIN) 30 MG extended release tablet, Take 1 tablet by mouth every 12 hours for 30 days. , Disp: 60 tablet, Rfl: 0    atorvastatin (LIPITOR) 40 MG tablet, TAKE ONE TABLET BY MOUTH DAILY, Disp: 90 tablet, Rfl: 1    gabapentin (NEURONTIN) 600 MG tablet, TAKE ONE TABLET BY MOUTH THREE TIMES A DAY, Disp: 90 tablet, Rfl: 2    spironolactone (ALDACTONE) 50 MG tablet, TAKE ONE TABLET BY MOUTH DAILY, Disp: 90 tablet, Rfl: 1    divalproex (DEPAKOTE) 125 MG DR tablet, TAKE ONE TABLET BY MOUTH TWICE A DAY, Disp: 180 tablet, Rfl: 1    ibuprofen (ADVIL;MOTRIN) 800 MG tablet, Take 1 tablet by mouth every 8 hours as needed for Pain, Disp: 90 tablet, Rfl: 1    melatonin 3 MG TABS tablet, Take 6 mg by mouth nightly as needed (insomnia), Disp: , Rfl: Family History   Problem Relation Age of Onset    Diabetes Mother     No Known Problems Father        Social History     Socioeconomic History    Marital status:      Spouse name: Not on file    Number of children: Not on file    Years of education: Not on file    Highest education level: Not on file   Occupational History    Occupation: retired   Social Needs    Financial resource strain: Not on file    Food insecurity     Worry: Not on file     Inability: Not on file   [employer] needs     Medical: Not on file     Non-medical: Not on file   Tobacco Use    Smoking status: Never Smoker    Smokeless tobacco: Never Used   Substance and Sexual Activity    Alcohol use: No    Drug use: No    Sexual activity: Never   Lifestyle    Physical activity     Days per week: Not on file     Minutes per session: Not on file    Stress: Not on file   Relationships    Social connections     Talks on phone: Not on file     Gets together: Not on file     Attends Protestant service: Not on file     Active member of club or organization: Not on file     Attends meetings of clubs or organizations: Not on file     Relationship status: Not on file    Intimate partner violence     Fear of current or ex partner: Not on file     Emotionally abused: Not on file     Physically abused: Not on file     Forced sexual activity: Not on file   Other Topics Concern    Not on file   Social History Narrative    Not on file         Review of Systems:  Review of Systems   Constitution: Negative. HENT: Negative. Eyes:        Glasses   Cardiovascular: Negative. Respiratory: Negative. Endocrine: Negative. Hematologic/Lymphatic: Negative. Skin: Negative. Musculoskeletal: Positive for back pain and joint pain. Gastrointestinal: Negative. Genitourinary: Negative. Neurological: Positive for numbness. Psychiatric/Behavioral: Negative.           Physical Exam:  There were no vitals taken for this visit.    Physical Exam  HENT:      Head: Normocephalic. Pulmonary:      Effort: Pulmonary effort is normal.   Skin:         Neurological:      Mental Status: He is alert and oriented to person, place, and time. Psychiatric:         Mood and Affect: Mood normal.         Thought Content: Thought content normal.           Assessment:  Problem List Items Addressed This Visit     S/P left knee surgery    Osteoarthritis of both knees - Primary    Lumbar radiculopathy    Lumbar back pain with radiculopathy affecting left lower extremity    Encounter for medication monitoring              Treatment Plan:  DISCUSSION: Treatment options discussed withpatient and all questions answered to patient's satisfaction. Possible side effects, risk of tolerance and or dependence and alternative treatments discussed    Obtaining appropriate analgesic effect of treatment   No signs of potential drug abuse or diversion identified    [x] Ill effects of being on chronic pain medications such as sleep disturbances, respiratory depression, hormonal changes, withdrawal symptoms, chronic opioid dependence and tolerance as well as risk of taking opioids with Benzodiazepines and taking opioids along with alcohol,  werediscussed with patient. I had asked the patient to minimize medication use and utilize pain medications only for uncontrolled rest pain or pain with exertional activities. I advised patient not to self-escalate painmedications without consulting with us. At each of patient's future visits we will try to taper pain medications, while adjusting the adjunct medications, and re-evaluating for Physical Therapy to improve spinal andjoint strength. We will continue to have discussions to decrease pain medications as tolerated. Counseled patient on effects their pain medication and /or their medical condition mayhave on their  ability to drive or operate machinery.  Instructed not to drive or operate machinery if drowsy     I also discussed with the patient regarding the dangers of combining narcotic pain medication with tranquilizers, alcohol or illegal drugs or taking the medication any way other than prescribed. The dangers were discussed  including respiratory depression and death. Patient was told to tell  all  physicians regarding the medications he is getting from pain clinic. Patient is warned not to take any unprescribed medications over-the-countermedications that can depress breathing . Patient is advised to talk to the pharmacist or physicians if planning to take any over-the-counter medications before  takeing them. Patient is strongly advised to avoid tranquilizers or  relaxants, illegal drugs  or any medications that can depress breathing  Patient is also advised to tell us if there is any changes in their medications from other physicians. 1. Schedule LESI L3, L4, still has numbness and pain left foot  Given pre-procedure instructions  The patient was counseled about the risks of elin Covid-19 during their procedure period and any recovery window from their procedure. The patient was made aware that elin Covid-19 may worsen their prognosis for recovering from their procedure and lend to a higher morbidity and/or mortality risk. All material risks, benefits, and reasonable alternatives including postponing the procedure were discussed. The patient (DOES   wish) to proceed with their procedure at this time.     TREATMENT OPTIONS:     LESI x1  Medication Agreement Requirements Met  Continue Opioid therapy  Script written for  Percocet, ms contin  Follow up appointment made

## 2021-05-05 ENCOUNTER — TELEPHONE (OUTPATIENT)
Dept: PAIN MANAGEMENT | Age: 71
End: 2021-05-05

## 2021-05-10 ENCOUNTER — HOSPITAL ENCOUNTER (OUTPATIENT)
Dept: GENERAL RADIOLOGY | Age: 71
Discharge: HOME | End: 2021-05-12
Payer: MEDICARE

## 2021-05-10 ENCOUNTER — HOSPITAL ENCOUNTER (OUTPATIENT)
Dept: PAIN MANAGEMENT | Age: 71
Discharge: HOME | End: 2021-05-10
Payer: MEDICARE

## 2021-05-10 VITALS
SYSTOLIC BLOOD PRESSURE: 108 MMHG | RESPIRATION RATE: 12 BRPM | TEMPERATURE: 98 F | DIASTOLIC BLOOD PRESSURE: 72 MMHG | HEART RATE: 78 BPM | OXYGEN SATURATION: 97 %

## 2021-05-10 DIAGNOSIS — M54.16 LUMBAR BACK PAIN WITH RADICULOPATHY AFFECTING LEFT LOWER EXTREMITY: ICD-10-CM

## 2021-05-10 DIAGNOSIS — M54.16 LUMBAR RADICULOPATHY: ICD-10-CM

## 2021-05-10 DIAGNOSIS — G89.29 CHRONIC BILATERAL LOW BACK PAIN WITH RIGHT-SIDED SCIATICA: Chronic | ICD-10-CM

## 2021-05-10 DIAGNOSIS — M54.41 CHRONIC BILATERAL LOW BACK PAIN WITH RIGHT-SIDED SCIATICA: Chronic | ICD-10-CM

## 2021-05-10 DIAGNOSIS — M54.16 LUMBAR BACK PAIN WITH RADICULOPATHY AFFECTING LEFT LOWER EXTREMITY: Primary | ICD-10-CM

## 2021-05-10 PROCEDURE — 62323 NJX INTERLAMINAR LMBR/SAC: CPT

## 2021-05-10 PROCEDURE — 3209999900 FLUORO FOR SURGICAL PROCEDURES

## 2021-05-10 PROCEDURE — 6360000002 HC RX W HCPCS: Performed by: ANESTHESIOLOGY

## 2021-05-10 PROCEDURE — 6360000004 HC RX CONTRAST MEDICATION: Performed by: ANESTHESIOLOGY

## 2021-05-10 PROCEDURE — 2500000003 HC RX 250 WO HCPCS: Performed by: ANESTHESIOLOGY

## 2021-05-10 PROCEDURE — 62323 NJX INTERLAMINAR LMBR/SAC: CPT | Performed by: ANESTHESIOLOGY

## 2021-05-10 RX ORDER — LIDOCAINE HYDROCHLORIDE 10 MG/ML
INJECTION, SOLUTION EPIDURAL; INFILTRATION; INTRACAUDAL; PERINEURAL
Status: COMPLETED | OUTPATIENT
Start: 2021-05-10 | End: 2021-05-10

## 2021-05-10 RX ORDER — DEXAMETHASONE SODIUM PHOSPHATE 10 MG/ML
INJECTION, SOLUTION INTRAMUSCULAR; INTRAVENOUS
Status: COMPLETED | OUTPATIENT
Start: 2021-05-10 | End: 2021-05-10

## 2021-05-10 RX ADMIN — IOHEXOL 2 ML: 180 INJECTION INTRAVENOUS at 11:11

## 2021-05-10 RX ADMIN — LIDOCAINE HYDROCHLORIDE 5 ML: 10 INJECTION, SOLUTION EPIDURAL; INFILTRATION; INTRACAUDAL; PERINEURAL at 11:12

## 2021-05-10 RX ADMIN — DEXAMETHASONE SODIUM PHOSPHATE 10 MG: 10 INJECTION, SOLUTION INTRAMUSCULAR; INTRAVENOUS at 11:12

## 2021-05-10 ASSESSMENT — PAIN - FUNCTIONAL ASSESSMENT
PAIN_FUNCTIONAL_ASSESSMENT: PREVENTS OR INTERFERES SOME ACTIVE ACTIVITIES AND ADLS
PAIN_FUNCTIONAL_ASSESSMENT: 0-10

## 2021-05-10 NOTE — PRE SEDATION
Sedation Pre-Procedure Note    Patient Name: Familia Frausto   YOB: 1950  Room/Bed: Room/bed info not found  Medical Record Number: [id number]  Date: 5/10/2021   Time: 11:15 AM       Indication:    Chronic lower back pain with right-sided sciatica    Consent: I have discussed with the patient and/or the patient representative the indication, alternatives, and the possible risks and/or complications of the planned procedure and the anesthesia methods. The patient and/or patient representative appear to understand and agree to proceed. Vital Signs:   Vitals:    05/10/21 1110   BP: (!) 142/80   Pulse: 64   Resp: 16   Temp:    SpO2: 97%       Past Medical History:   has a past medical history of Arthritis, Left knee pain, and Teeth problem. Past Surgical History:   has a past surgical history that includes Tonsillectomy; Rotator cuff repair (Right); Carpal tunnel release (Bilateral); Dental surgery (10/2015); joint replacement (Left); joint replacement (Right, 07/20/2018); and Dental surgery (03/2021). Medications:   Scheduled Meds:   Continuous Infusions:   PRN Meds:   Home Meds:   Prior to Admission medications    Medication Sig Start Date End Date Taking? Authorizing Provider   oxyCODONE-acetaminophen (PERCOCET) 5-325 MG per tablet Take 1 tablet by mouth every 8 hours as needed for Pain for up to 30 days. 5/1/21 5/31/21 Yes QUIQUE Boyce CNP   morphine (MS CONTIN) 30 MG extended release tablet Take 1 tablet by mouth every 12 hours for 30 days.  5/1/21 5/31/21 Yes QUIQUE Boyce CNP   cloNIDine (CATAPRES) 0.1 MG tablet TAKE ONE TABLET BY MOUTH TWICE A DAY 3/29/21  Yes Sky Best MD   atorvastatin (LIPITOR) 40 MG tablet TAKE ONE TABLET BY MOUTH DAILY 3/4/21  Yes Sky Best MD   spironolactone (ALDACTONE) 50 MG tablet TAKE ONE TABLET BY MOUTH DAILY 2/4/21  Yes Sky Best MD   divalproex (DEPAKOTE) 125 MG DR tablet TAKE ONE TABLET BY MOUTH TWICE A DAY 1/26/21  Yes Sky Best MD ibuprofen (ADVIL;MOTRIN) 800 MG tablet Take 1 tablet by mouth every 8 hours as needed for Pain 10/28/20  Yes QUIQUE Randall CNP   melatonin 3 MG TABS tablet Take 6 mg by mouth nightly as needed (insomnia)   Yes Historical Provider, MD   gabapentin (NEURONTIN) 600 MG tablet TAKE ONE TABLET BY MOUTH THREE TIMES A DAY 2/24/21 4/28/21  Reddy Crawford MD     Coumadin Use Last 7 Days:  no  Antiplatelet drug therapy use last 7 days: no  Other anticoagulant use last 7 days: no  Additional Medication Information:  N/A      Pre-Sedation Documentation and Exam:   I have personally completed a history, physical exam & review of systems for this patient (see notes). Vital signs have been reviewed (see flow sheet for vitals). I have reviewed the patient's history and review of systems.   Lungs: clear, Cardiovascular: normal.    Mallampati Airway Assessment:  normal, normal neck range of motion, Mallampati Class II - (soft palate, fauces & uvula are visible)    Prior History of Anesthesia Complications:   none    ASA Classification:  Class 2 - A normal healthy patient with mild systemic disease    Sedation/ Anesthesia Plan:   regional    Medications Planned:   NONE    Patient is an appropriate candidate for plan of sedation: yes    Electronically signed by Tianna Sanches MD on 5/10/2021 at 11:15 AM

## 2021-05-10 NOTE — OP NOTE
Operative Note      Patient: Arnav Lynne  YOB: 1950  MRN: [id number]    Date of Procedure:     Pre-Op Diagnosis:   Lower back pain with right-sided sciatica  Lumbar degenerative disc disease      Post-Op Diagnosis: Same           * Surgery not found *    Assistant:   * Surgery not found *    Anesthesia: * No surgery found *    Estimated Blood Loss (mL): Minimal    Complications: None    Specimens:   * Cannot find log *    Implants:  * No surgical log found *      Drains: * No LDAs found *    Findings: N/A    Detailed Description of Procedure:     Patient Name: Arnav Lynne   YOB: 1950  Room/Bed: Room/bed info not found  Medical Record Number: [id number]  Date: 5/10/2021       Sedation/ Anesthesia Plan:   intravenous sedation   as needed. Medications Planned:   midazolam (Versed) / Fentanyl  Intravenously  as needed. Preoperative Diagnosis:    Lower back pain with right-sided sciatica  Lumbar degenerative disc disease    Postoperative Diagnosis:    Lower back pain with right-sided sciatica  Lumbar degenerative disc disease    Procedure Performed:  Lumbar epidural steroid injection under fluoroscopy guidance  AT L4-5  Blood Loss: None    Procedure: The Patient was seen in the preop area, chart was reviewed, informed consent was obtained. Patient was taken to procedure room and was placed in prone position. Vital signs were monitored through out the  Procedure. A time out was completed. The skin over the back was prepped and draped in sterile manner. The target point was marked at The interlaminar space at L4-5 . Skin and deep tissues were anesthetized with 1 % lidocaine. A 20-gauge Tuohy epidural needlele was advanced  under fluoroscopy guidance in AP view. Epidural space was identified using MOSHE technique. Position ws confirmed in Lateral view.    Then after negative aspiration contrast dye Omnipaque-180 was injected with live fluoroscopy in AP views that showed  spread of the contrast in the epidural space  and no vascular runoff or intrathecal spread. Finally 10 ml of treatment solution containing 5 ml of  1 % PF lidocaine and 4 ml of PF NS and 1 ml of kenalog 40 mg / ml was injected. The needle was removed and a Band-Aid was placed over the needle  insertion site. The patient's vital signs remained stable and the patient tolerated the procedure well.       Electronically signed by Evens Polk MD on 5/10/2021 at 11:14 AM

## 2021-05-10 NOTE — H&P
Office visit note April 28, 2021 in [facility] with all required elements of H&P  Patient seen in the preoperative area  Chart reviewed  Informed consent obtained  Risk and benefit for the procedure discussed with patient  No interim changes in health history

## 2021-05-11 ENCOUNTER — TELEPHONE (OUTPATIENT)
Dept: PAIN MANAGEMENT | Age: 71
End: 2021-05-11

## 2021-05-11 DIAGNOSIS — G89.29 CHRONIC BILATERAL LOW BACK PAIN WITH RIGHT-SIDED SCIATICA: ICD-10-CM

## 2021-05-11 DIAGNOSIS — M54.41 CHRONIC BILATERAL LOW BACK PAIN WITH RIGHT-SIDED SCIATICA: ICD-10-CM

## 2021-05-28 ENCOUNTER — HOSPITAL ENCOUNTER (OUTPATIENT)
Dept: PAIN MANAGEMENT | Age: 71
Discharge: HOME | End: 2021-05-28
Payer: MEDICARE

## 2021-05-28 DIAGNOSIS — M54.16 LUMBAR BACK PAIN WITH RADICULOPATHY AFFECTING LEFT LOWER EXTREMITY: ICD-10-CM

## 2021-05-28 DIAGNOSIS — M25.562 CHRONIC PAIN OF LEFT KNEE: ICD-10-CM

## 2021-05-28 DIAGNOSIS — M54.41 CHRONIC BILATERAL LOW BACK PAIN WITH RIGHT-SIDED SCIATICA: Chronic | ICD-10-CM

## 2021-05-28 DIAGNOSIS — M54.16 LUMBAR RADICULOPATHY: ICD-10-CM

## 2021-05-28 DIAGNOSIS — G89.29 CHRONIC PAIN OF LEFT KNEE: ICD-10-CM

## 2021-05-28 DIAGNOSIS — M17.0 PRIMARY OSTEOARTHRITIS OF BOTH KNEES: ICD-10-CM

## 2021-05-28 DIAGNOSIS — M17.11 PRIMARY OSTEOARTHRITIS OF RIGHT KNEE: ICD-10-CM

## 2021-05-28 DIAGNOSIS — Z98.890 S/P LEFT KNEE SURGERY: ICD-10-CM

## 2021-05-28 DIAGNOSIS — Z51.81 MEDICATION MONITORING ENCOUNTER: ICD-10-CM

## 2021-05-28 DIAGNOSIS — M17.0 OSTEOARTHRITIS OF BOTH KNEES, UNSPECIFIED OSTEOARTHRITIS TYPE: ICD-10-CM

## 2021-05-28 DIAGNOSIS — G89.29 CHRONIC BILATERAL LOW BACK PAIN WITH RIGHT-SIDED SCIATICA: Chronic | ICD-10-CM

## 2021-05-28 DIAGNOSIS — M17.12 PRIMARY OSTEOARTHRITIS OF LEFT KNEE: ICD-10-CM

## 2021-05-28 DIAGNOSIS — R29.898 WEAKNESS OF LEFT LEG: ICD-10-CM

## 2021-05-28 DIAGNOSIS — Z79.891 USE OF OPIATES FOR THERAPEUTIC PURPOSES: Primary | ICD-10-CM

## 2021-05-28 DIAGNOSIS — Z51.81 ENCOUNTER FOR MEDICATION MONITORING: ICD-10-CM

## 2021-05-28 DIAGNOSIS — Z98.890 S/P KNEE SURGERY: ICD-10-CM

## 2021-05-28 PROCEDURE — 99213 OFFICE O/P EST LOW 20 MIN: CPT | Performed by: NURSE PRACTITIONER

## 2021-05-28 PROCEDURE — 99213 OFFICE O/P EST LOW 20 MIN: CPT

## 2021-05-28 RX ORDER — OXYCODONE HYDROCHLORIDE AND ACETAMINOPHEN 5; 325 MG/1; MG/1
1 TABLET ORAL EVERY 8 HOURS PRN
Qty: 90 TABLET | Refills: 0 | Status: SHIPPED | OUTPATIENT
Start: 2021-05-30 | End: 2021-06-30 | Stop reason: SDUPTHER

## 2021-05-28 RX ORDER — GABAPENTIN 600 MG/1
TABLET ORAL
Qty: 90 TABLET | Refills: 2 | Status: SHIPPED | OUTPATIENT
Start: 2021-05-28 | End: 2021-06-01

## 2021-05-28 RX ORDER — MORPHINE SULFATE 30 MG/1
30 TABLET, FILM COATED, EXTENDED RELEASE ORAL EVERY 12 HOURS
Qty: 60 TABLET | Refills: 0 | Status: SHIPPED | OUTPATIENT
Start: 2021-05-31 | End: 2021-06-30 | Stop reason: SDUPTHER

## 2021-05-28 ASSESSMENT — ENCOUNTER SYMPTOMS
RESPIRATORY NEGATIVE: 1
GASTROINTESTINAL NEGATIVE: 1
BACK PAIN: 1

## 2021-05-28 NOTE — PROGRESS NOTES
Francisca 89 PROGRESS NOTE      Patient  completed [x]  video visit   []   phone call:         Minutes :       [x]    to  review Medication Agreement    [x]  Follow up after procedure   []  Discuss treatment options      Location:  Provider:  working from    [x]    home    []   [facility] - ME[facility]O NICOLE ,   patient at home       Chief Complaint:   Low back pain    He had lumbar epidural injection L4, L5, on 5- with 60% relief. It helped with his back pain. He still has numbness and pain left foot. Juanell Petr He has some weakness left leg. He has right knee pain, he had right knee arthroplasty a few years ago. He is not sleeping well. He goes to the gym in the [place]. It is hard for him to exercise. No Ed visits. He said it was mentioned to him about getting lumbar MRI. Back Pain  This is a chronic problem. The problem occurs intermittently. The problem has been gradually improving since onset. The pain is present in the lumbar spine. Quality: dull. The pain is at a severity of 4/10. The pain is moderate. The pain is the same all the time. The symptoms are aggravated by standing. Associated symptoms include numbness. (Left foot) He has tried analgesics for the symptoms. The treatment provided mild relief.            Treatment goals:  Functional status: to walk more      Aberrancy:   Any alcoholic beverages   no         Any illegal drugs  no       Analgesia:    4                 Adverse  Effects :no      ADL;s :actiivty is decreased    Data:    When was thelast UDS:   8-         Was the UDS appropriate:  [x] yes []   no      Record/Diagnostics Review:      As above, I did review the imaging       8/10/2020 10:37 PM - Julius, Estefani Incoming Lab Results From [facility]    Component Value Ref Range & Units Status Collected Lab   Pain Management Drug Panel Interp, Urine Consistent   Final 08/07/2020 11:24 AM [facility]   (NOTE)   ________________________________________________________________   DRUGS EXPECTED:   OXYCODONE   MORPHINE   ________________________________________________________________   CONSISTENT with medications provided:   OXYCODONE: based on oxycodone, noroxycodone, oxymorphone   MORPHINE: based on morphine   ________________________________________________________________   INTERPRETIVE INFORMATION: Pain Mgt [place], [place] Spec/EMIT, Ur,                            Interp   Interpretation depends on accuracy and completeness of patient   medication information submitted by client. EXAMINATION:   5 XRAY VIEWS OF THE LUMBAR SPINE       2/25/2021 11:39 am       COMPARISON:   None.       HISTORY:   ORDERING SYSTEM PROVIDED HISTORY: Lumbar radiculopathy   TECHNOLOGIST PROVIDED HISTORY:   Reason for Exam: Patient states lower back pain that radiates down right leg. Acuity: Unknown   Type of Exam: Unknown       FINDINGS:   No fracture or spondylolisthesis is demonstrated.  There is multilevel   degenerative disc disease, severe at L2/L3 and L4/L5.  Moderate degenerative   disc disease at L3/L4 and L5/S1 is noted.  There is severe lower lumbar facet   joint arthropathy.  No evidence of a pars defect.  Mild to moderate   degenerative changes of the hips are present.  Soft tissues are unremarkable.           Impression   1. Multilevel degenerative disc disease, severe at L2/L3 and L4/L5. 2. Severe lower lumbar facet joint arthropathy. Pill count: appropriate    fill date :5-  Morphine equivalent dose as reported on OARRS:82.50 has narcan at home  Periodic Controlled Substance Monitoring: Possible medication side effects, risk of tolerance/dependence & alternative treatments discussed., No signs of potential drug abuse or diversion identified. , Assessed functional status., Obtaining appropriate analgesic effect of treatment. (Emily Andrade, QUIQUE - CNP)  Chronic Pain > 80 MEDD: Co-prescribed Naloxone., Obtained or confirmed \"Medication Contract\" on file.  QUIQUE Neri - CNP)  Review ofOARRS does not show any aberrant prescription behavior. Medication is helping the patient stay active. Patient denies any side effects and reports adequate analgesia. No sign of misuse/abuse. Past Medical History:   Diagnosis Date    Arthritis     Left knee pain 4/9/2014    Teeth problem     abcessw       Past Surgical History:   Procedure Laterality Date    CARPAL TUNNEL RELEASE Bilateral     DENTAL SURGERY  10/2015    DENTAL SURGERY  03/2021    JOINT REPLACEMENT Left     KNEE  X 6 pt unsure of date    JOINT REPLACEMENT Right 07/20/2018    ROTATOR CUFF REPAIR Right     TONSILLECTOMY         Allergies   Allergen Reactions    Oxycontin [Oxycodone Hcl] Nausea Only         Current Outpatient Medications:     oxyCODONE-acetaminophen (PERCOCET) 5-325 MG per tablet, Take 1 tablet by mouth every 8 hours as needed for Pain for up to 30 days. , Disp: 90 tablet, Rfl: 0    morphine (MS CONTIN) 30 MG extended release tablet, Take 1 tablet by mouth every 12 hours for 30 days. , Disp: 60 tablet, Rfl: 0    cloNIDine (CATAPRES) 0.1 MG tablet, TAKE ONE TABLET BY MOUTH TWICE A DAY, Disp: 180 tablet, Rfl: 0    atorvastatin (LIPITOR) 40 MG tablet, TAKE ONE TABLET BY MOUTH DAILY, Disp: 90 tablet, Rfl: 1    gabapentin (NEURONTIN) 600 MG tablet, TAKE ONE TABLET BY MOUTH THREE TIMES A DAY, Disp: 90 tablet, Rfl: 2    spironolactone (ALDACTONE) 50 MG tablet, TAKE ONE TABLET BY MOUTH DAILY, Disp: 90 tablet, Rfl: 1    divalproex (DEPAKOTE) 125 MG DR tablet, TAKE ONE TABLET BY MOUTH TWICE A DAY, Disp: 180 tablet, Rfl: 1    ibuprofen (ADVIL;MOTRIN) 800 MG tablet, Take 1 tablet by mouth every 8 hours as needed for Pain, Disp: 90 tablet, Rfl: 1    melatonin 3 MG TABS tablet, Take 6 mg by mouth nightly as needed (insomnia), Disp: , Rfl:     Family History   Problem Relation Age of Onset    Diabetes Mother     No Known Problems Father        Social History     Socioeconomic History    Marital status:      Spouse name: Not on file    Number of children: Not on file    Years of education: Not on file    Highest education level: Not on file   Occupational History    Occupation: retired   Tobacco Use    Smoking status: Never Smoker    Smokeless tobacco: Never Used   Vaping Use    Vaping Use: Never used   Substance and Sexual Activity    Alcohol use: No    Drug use: No    Sexual activity: Never   Other Topics Concern    Not on file   Social History Narrative    Not on file     Social Determinants of Health     Financial Resource Strain:     Difficulty of Paying Living Expenses:    Food Insecurity:     Worried About [address] [employer] [employer] in the Last Year:     [address] [employer] in the Last Year:    Transportation Needs:     Lack of Transportation (Medical):  Lack of Transportation (Non-Medical):    Physical Activity:     Days of Exercise per Week:     Minutes of Exercise per Session:    Stress:     Feeling of Stress :    Social Connections:     Frequency of Communication with Friends and Family:     Frequency of Social Gatherings with Friends and Family:     Attends Orthodox Services:     Active Member of Clubs or Organizations:     Attends Club or Organization Meetings:     Marital Status:    Intimate Partner Violence:     Fear of Current or Ex-Partner:     Emotionally Abused:     Physically Abused:     Sexually Abused:          Review of Systems:  Review of Systems   Constitutional: Negative. HENT: Negative. Eyes:        Glasses   Cardiovascular: Negative. Respiratory: Negative. Endocrine: Negative. Hematologic/Lymphatic: Negative. Skin: Negative. Musculoskeletal: Positive for back pain and joint pain. Right knee   Gastrointestinal: Negative. Genitourinary: Negative. Neurological: Positive for loss of balance and numbness. Psychiatric/Behavioral: Negative. Physical Exam:  There were no vitals taken for this visit.     Physical Exam  HENT: Head: Normocephalic. Pulmonary:      Effort: Pulmonary effort is normal.   Skin:         Neurological:      Mental Status: He is alert and oriented to person, place, and time. Psychiatric:         Mood and Affect: Mood normal.         Thought Content: Thought content normal.           Assessment:      Problem List Items Addressed This Visit     Use of opiates for therapeutic purposes - Primary    S/P left knee surgery    Medication monitoring encounter    Lumbar radiculopathy    Lumbar back pain with radiculopathy affecting left lower extremity    Encounter for medication monitoring    Chronic bilateral low back pain with right-sided sciatica (Chronic)          Treatment Plan:  DISCUSSION: Treatment options discussed withpatient and all questions answered to patient's satisfaction. Possible side effects, risk of tolerance and or dependence and alternative treatments discussed    Obtaining appropriate analgesic effect of treatment   No signs of potential drug abuse or diversion identified    [x] Ill effects of being on chronic pain medications such as sleep disturbances, respiratory depression, hormonal changes, withdrawal symptoms, chronic opioid dependence and tolerance as well as risk of taking opioids with Benzodiazepines and taking opioids along with alcohol,  werediscussed with patient. I had asked the patient to minimize medication use and utilize pain medications only for uncontrolled rest pain or pain with exertional activities. I advised patient not to self-escalate painmedications without consulting with us. At each of patient's future visits we will try to taper pain medications, while adjusting the adjunct medications, and re-evaluating for Physical Therapy to improve spinal andjoint strength. We will continue to have discussions to decrease pain medications as tolerated.       Counseled patient on effects their pain medication and /or their medical condition mayhave on their  ability to

## 2021-05-30 DIAGNOSIS — Z98.890 S/P LEFT KNEE SURGERY: ICD-10-CM

## 2021-05-30 DIAGNOSIS — M17.0 PRIMARY OSTEOARTHRITIS OF BOTH KNEES: ICD-10-CM

## 2021-05-30 DIAGNOSIS — G89.29 CHRONIC PAIN OF LEFT KNEE: ICD-10-CM

## 2021-05-30 DIAGNOSIS — M25.562 CHRONIC PAIN OF LEFT KNEE: ICD-10-CM

## 2021-05-30 DIAGNOSIS — Z98.890 S/P KNEE SURGERY: ICD-10-CM

## 2021-05-30 DIAGNOSIS — Z51.81 ENCOUNTER FOR MEDICATION MONITORING: ICD-10-CM

## 2021-05-30 DIAGNOSIS — M17.0 OSTEOARTHRITIS OF BOTH KNEES, UNSPECIFIED OSTEOARTHRITIS TYPE: ICD-10-CM

## 2021-05-30 DIAGNOSIS — M17.11 PRIMARY OSTEOARTHRITIS OF RIGHT KNEE: ICD-10-CM

## 2021-05-30 DIAGNOSIS — Z51.81 MEDICATION MONITORING ENCOUNTER: ICD-10-CM

## 2021-05-30 DIAGNOSIS — Z79.891 USE OF OPIATES FOR THERAPEUTIC PURPOSES: ICD-10-CM

## 2021-05-30 DIAGNOSIS — M17.12 PRIMARY OSTEOARTHRITIS OF LEFT KNEE: ICD-10-CM

## 2021-06-01 ENCOUNTER — HOSPITAL ENCOUNTER (OUTPATIENT)
Dept: MRI IMAGING | Age: 71
Discharge: HOME | End: 2021-06-03
Payer: MEDICARE

## 2021-06-01 DIAGNOSIS — M54.16 LUMBAR BACK PAIN WITH RADICULOPATHY AFFECTING LEFT LOWER EXTREMITY: ICD-10-CM

## 2021-06-01 DIAGNOSIS — M54.16 LUMBAR RADICULOPATHY: ICD-10-CM

## 2021-06-01 PROCEDURE — 72148 MRI LUMBAR SPINE W/O DYE: CPT

## 2021-06-01 RX ORDER — GABAPENTIN 600 MG/1
TABLET ORAL
Qty: 90 TABLET | Refills: 1 | Status: SHIPPED | OUTPATIENT
Start: 2021-06-01 | End: 2021-07-27 | Stop reason: SDUPTHER

## 2021-06-03 RX ORDER — ATORVASTATIN CALCIUM 40 MG/1
TABLET, FILM COATED ORAL
Qty: 90 TABLET | Refills: 0 | Status: SHIPPED | OUTPATIENT
Start: 2021-06-03 | End: 2021-11-23

## 2021-06-03 RX ORDER — CLONIDINE HYDROCHLORIDE 0.1 MG/1
TABLET ORAL
Qty: 180 TABLET | Refills: 0 | Status: SHIPPED | OUTPATIENT
Start: 2021-06-03 | End: 2021-09-07 | Stop reason: ALTCHOICE

## 2021-06-03 NOTE — TELEPHONE ENCOUNTER
Ernst Christy is calling to request a refill on the following medication(s):    Medication Request:    Last filled 3/2021 90 days with 1 RF of lipitor and 0 rf on clonidine.       Requested Prescriptions     Pending Prescriptions Disp Refills    cloNIDine (CATAPRES) 0.1 MG tablet 180 tablet 0     Sig: TAKE ONE TABLET BY MOUTH TWICE A DAY    atorvastatin (LIPITOR) 40 MG tablet 90 tablet 1       Last Visit Date (If Applicable):  3/72/4821    Next Visit Date:    7/30/2021

## 2021-06-30 ENCOUNTER — HOSPITAL ENCOUNTER (OUTPATIENT)
Dept: PAIN MANAGEMENT | Age: 71
Discharge: HOME | End: 2021-06-30
Payer: MEDICARE

## 2021-06-30 DIAGNOSIS — M79.7 FIBROMYALGIA: ICD-10-CM

## 2021-06-30 DIAGNOSIS — Z51.81 MEDICATION MONITORING ENCOUNTER: ICD-10-CM

## 2021-06-30 DIAGNOSIS — M17.0 OSTEOARTHRITIS OF BOTH KNEES, UNSPECIFIED OSTEOARTHRITIS TYPE: ICD-10-CM

## 2021-06-30 DIAGNOSIS — M54.41 CHRONIC BILATERAL LOW BACK PAIN WITH RIGHT-SIDED SCIATICA: Chronic | ICD-10-CM

## 2021-06-30 DIAGNOSIS — G89.29 CHRONIC BILATERAL LOW BACK PAIN WITH RIGHT-SIDED SCIATICA: Chronic | ICD-10-CM

## 2021-06-30 DIAGNOSIS — M54.16 LUMBAR BACK PAIN WITH RADICULOPATHY AFFECTING LEFT LOWER EXTREMITY: ICD-10-CM

## 2021-06-30 DIAGNOSIS — Z51.81 ENCOUNTER FOR MEDICATION MONITORING: ICD-10-CM

## 2021-06-30 DIAGNOSIS — G89.29 CHRONIC PAIN OF LEFT KNEE: ICD-10-CM

## 2021-06-30 DIAGNOSIS — R29.898 WEAKNESS OF LEFT LEG: ICD-10-CM

## 2021-06-30 DIAGNOSIS — M54.16 LUMBAR RADICULOPATHY: ICD-10-CM

## 2021-06-30 DIAGNOSIS — M25.562 CHRONIC PAIN OF LEFT KNEE: ICD-10-CM

## 2021-06-30 DIAGNOSIS — Z98.890 S/P LEFT KNEE SURGERY: ICD-10-CM

## 2021-06-30 DIAGNOSIS — Z98.890 S/P LEFT KNEE SURGERY: Primary | ICD-10-CM

## 2021-06-30 DIAGNOSIS — M17.0 PRIMARY OSTEOARTHRITIS OF BOTH KNEES: ICD-10-CM

## 2021-06-30 PROCEDURE — 99213 OFFICE O/P EST LOW 20 MIN: CPT

## 2021-06-30 PROCEDURE — 99213 OFFICE O/P EST LOW 20 MIN: CPT | Performed by: NURSE PRACTITIONER

## 2021-06-30 RX ORDER — OXYCODONE HYDROCHLORIDE AND ACETAMINOPHEN 5; 325 MG/1; MG/1
1 TABLET ORAL EVERY 6 HOURS PRN
Qty: 120 TABLET | Refills: 0 | Status: SHIPPED | OUTPATIENT
Start: 2021-06-30 | End: 2021-07-27 | Stop reason: SDUPTHER

## 2021-06-30 RX ORDER — MORPHINE SULFATE 30 MG/1
30 TABLET, FILM COATED, EXTENDED RELEASE ORAL EVERY 12 HOURS
Qty: 60 TABLET | Refills: 0 | Status: SHIPPED | OUTPATIENT
Start: 2021-06-30 | End: 2021-07-27 | Stop reason: SDUPTHER

## 2021-06-30 ASSESSMENT — ENCOUNTER SYMPTOMS
RESPIRATORY NEGATIVE: 1
GASTROINTESTINAL NEGATIVE: 1
BACK PAIN: 1

## 2021-06-30 NOTE — PROGRESS NOTES
Francisca 89 PROGRESS NOTE      Patient  completed [x]  video visit   []   phone call:         Minutes :       [x]    to  review Medication Agreement    []  Follow up after procedure   []  Discuss treatment options      Location:  Provider:  working from    [x]    home    []   [facility] - ME[facility]O NICOLE ,   patient at home         Chief Complaint:  Low back pain    He c./o pain low back pain  , he has pain  Down both legs. He c/o pain in right foot. He has trouble going up steps with hsi right foot. He still has pain in his right knee, he had surgery 3 years ago. He had lumbar epidural injections x2 which helped,he had lumbar epidural injection L3, L4 on 4- with 70%and He had lumbar epidural injection L4, L5, on 5- with 60% relief    forhis back pain but he did not get relief from his foot pain. He can not exercise as much as he used to. He has not done PT for his back. He does not sleep well due to the pain. Back Pain  This is a chronic problem. The problem occurs constantly. The problem is unchanged. The pain is present in the lumbar spine. The quality of the pain is described as aching. Radiates to: right leg and foot. The pain is at a severity of 7/10. The pain is moderate. The pain is worse during the night. Exacerbated by: walking, steps. Associated symptoms include numbness and weakness. He has tried analgesics and heat for the symptoms.                  Treatment goals:  Functional status: get better      Aberrancy:   Any alcoholic beverages   no         Any illegal drugs   no      Analgesia:        7             Adverse  Effects :no    ADL;s  Goes in pool 3 times aweek      Data:    When was thelast UDS:   8-7-2020         Was the UDS appropriate:  [x] yes []   no      Record/Diagnostics Review:      As above, I did review the imaging   8/10/2020 10:37 PM - Julius, Estefani Incoming Lab Results From [facility]    Component Value Ref Range & Units Status Collected Lab   Pain Management Drug Panel Interp, Urine Consistent   Final 08/07/2020 11:24 AM [facility]   (NOTE)   ________________________________________________________________   DRUGS EXPECTED:   OXYCODONE   MORPHINE   ________________________________________________________________   CONSISTENT with medications provided:   OXYCODONE: based on oxycodone, noroxycodone, oxymorphone   MORPHINE: based on morphine   ________________________________________________________________   INTERPRETIVE INFORMATION: Pain Mgt [place], Mass Spec/EMIT, Ur,                            Interp   Interpretation depends on accuracy and completeness of patient   medication information submitted by client. 6-Acetylmorphine, Ur Not Detected   Final 08/07/2020 11:24 AM [facility]   7-Aminoclonazepam, Urine Not Detected   Final 08/07/2020 11:24 AM [facility]   Alpha-OH-Alpraz, Urine Not Detected   Final 08/07/2020 11:24 AM [facility]   Alprazolam, Urine Not Detected   Final 08/07/2020 11:24 AM [facility]   Amphetamines, urine Not Detected   Final 08/07/2020 11:24 AM [facility]   Barbiturates, Ur Not Detected   Final 08/07/2020 11:24 AM [facility]   Benzoylecgonine, Ur Not Detected   Final 08/07/2020 11:24 AM [facility]   Buprenorphine Urine Not Detected   Final 08/07/2020 11:24 AM [facility]   Carisoprodol, Ur Not Detected   Final 08/07/2020 11:24 AM [facility]   (NOTE)   The carisoprodol immunoassay has cross-reactivity to carisoprodol   and meprobamate.     Clonazepam, Urine Not Detected   Final 08/07/2020 11:24 AM [facility]   Codeine, Urine Not Detected   Final 08/07/2020 11:24 AM [facility]   MDA, Ur Not Detected   Final 08/07/2020 11:24 AM [facility]   Diazepam, Urine Not Detected   Final 08/07/2020 11:24 AM [facility]   Ethyl Glucuronide Ur Not Detected   Final 08/07/2020 11:24 AM [facility]   Fentanyl, Ur Not Detected   Final 08/07/2020 11:24 AM [facility]   Hydrocodone, Urine Not Detected   Final 08/07/2020 11:24 AM [facility]   Hydromorphone, Urine Not Detected   Final 08/07/2020 11:24 AM [facility]   Lorazepam, Urine Not Detected   Final 08/07/2020 11:24 AM [facility] Marijuana Metab, Ur Not Detected   Final 08/07/2020 11:24 AM [facility]   MDEA, GRACIELA, Ur Not Detected   Final 08/07/2020 11:24 AM [facility]   MDMA, Urine Not Detected   Final 08/07/2020 11:24 AM [facility]   Meperidine Metab, Ur Not Detected   Final 08/07/2020 11:24 AM [facility]   Methadone, Urine Not Detected   Final 08/07/2020 11:24 AM [facility]   Methamphetamine, Urine Not Detected   Final 08/07/2020 11:24 AM [facility]   Methylphenidate Not Detected   Final 08/07/2020 11:24 AM [facility]   Midazolam, Urine Not Detected   Final 08/07/2020 11:24 AM [facility]   Morphine Urine Present   Final 08/07/2020 11:24 AM [facility]   Norbuprenorphine, Urine Not Detected   Final 08/07/2020 11:24 AM [facility]   Nordiazepam, Urine Not Detected   Final 08/07/2020 11:24 AM [facility]   Norfentanyl, Urine Not Detected   Final 08/07/2020 11:24 AM [facility]   NORHYDROCODONE, URINE Not Detected   Final 08/07/2020 11:24 AM [facility]   Noroxycodone, Urine Present   Final 08/07/2020 11:24 AM [facility]   NOROXYMORPHONE, URINE Not Detected   Final 08/07/2020 11:24 AM [facility]   Oxazepam, Urine Not Detected   Final 08/07/2020 11:24 AM [facility]   Oxycodone Urine Present   Final 08/07/2020 11:24 AM [facility]   Oxymorphone, Urine Present   Final 08/07/2020 11:24 AM [facility]   PCP, Urine Not Detected   Final 08/07/2020 11:24 AM [facility]   Phentermine, Ur Not Detected   Final 08/07/2020 11:24 AM [facility]   Propoxyphene, Urine Not Detected   Final 08/07/2020 11:24 AM [facility]   Tapentadol-O-Sulfate, Urine Not Detected   Final 08/07/2020 11:24 AM [facility]   Tapentadol, Urine Not Detected   Final 08/07/2020 11:24 AM [facility]   Temazepam, Urine Not Detected   Final 08/07/2020 11:24 AM [facility]   Tramadol, Urine Not Detected   Final 08/07/2020 11:24 AM [facility]   Zolpidem, Urine Not Detected   Final 08/07/2020 11:24 AM [facility]   Drugs Expected, Ur   Final 08/07/2020 11:24 AM [facility] [address]  Lab   OXYCODONE AND MORPHINE USE    Creatinine, Ur 206.9  20.0 - 400.0 mg/dL Final 08/07/2020 11:24 AM [facility]   Pain Mgt Drug Panel, Hi Res, Ur See Below   Final 08/07/2020 11:24 AM [facility]   (NOTE)   Methodology: Qualitative Enzyme Immunoassay and Qualitative Liquid   Chromatography-Time of Flight-Mass Spectrometry or Tandem Mass   Spectrometry, Quantitative Spectrophotometry   The absence of expected drug(s) and/or drug metabolite(s) may   indicate non-compliance, inappropriate timing of specimen   collection relative to drug administration, poor drug absorption,   diluted/adulterated urine, or limitations of testing. The   concentration must be greater than or equal to the cutoff to be   reported as present.  If specific drug concentrations are   required, contact the laboratory within two weeks of specimen   collection to request quantification by a second analytical   technique. Interpretive questions should be directed to the   laboratory. Results based on immunoassay detection that do not match clinical   expectations should be   interpreted with caution. Confirmatory testing by mass   spectrometry for immunoassay-based results is available, if   ordered within two weeks of specimen collection. Additional   charges apply. For medical purposes only; not valid for forensic use. This test was developed and its performance characteristics   determined by [facility]. The U.S. Food and Drug   Administration has not approved or cleared this test; however, FDA   clearance or approval is not currently required for clinical use. The results are not intended to be used as the sole means for   clinical diagnosis or patient management decisions. EER Hi Res Interp Ur See Note   Final 08/07/2020 11:24 AM [facility]   (NOTE)   Access [email or web address] Enhanced Report using either link below:   -Direct access: [email or web address]   -Enter Username, Password: [email or web address]   Username: [email or web address]   Password: [email or web address]   Performed By: [facility] [address]   [place], [address] [facility]   : Liana Mackay.  Genaro Clemente MD, [place] [address] EXAMINATION:   MRI OF THE LUMBAR SPINE WITHOUT CONTRAST, 6/1/2021 2:02 pm       TECHNIQUE:   Multiplanar multisequence MRI of the lumbar spine was performed without the   administration of intravenous contrast.       COMPARISON:   None.       HISTORY:   ORDERING SYSTEM PROVIDED HISTORY: Lumbar radiculopathy   TECHNOLOGIST PROVIDED HISTORY:   numbness and pain left foot, low back pain   Reason for Exam: Lumbar radiculopathy M54.16 (ICD-10-CM);  Lumbar back pain   with radiculopathy affecting left lower extremity   Additional signs and symptoms: numbness and pain left foot, low back pain       FINDINGS:   BONES/ALIGNMENT: The vertebral body heights are maintained.  There is   age-appropriate bone marrow signal.  There is multilevel degenerative disc   disease with loss of disc signal.  There is disc space narrowing at the L2-3,   L3-4, and L4-5 levels.  There is no spondylolisthesis.       SPINAL CORD: The conus medullaris is normal in caliber and signal and   terminates at the L1 level.  The cauda equina is unremarkable.       SOFT TISSUES: The posterior paraspinal soft tissues are unremarkable.  The   visualized abdominal structures demonstrate bilateral renal cysts.       L1-L2: There is a circumferential disc bulge with facet and ligamentous   hypertrophy as well as prominent posterior epidural fat.  There is no canal   stenosis or foraminal narrowing.       L2-L3: There is a circumferential disc bulge with facet and ligamentous   hypertrophy as well as prominent posterior epidural fat.  There is canal   stenosis measuring 9 mm in AP dimension.  There is moderate left foraminal   narrowing and no significant right foraminal narrowing.       L3-L4: There is a circumferential disc bulge with facet and ligamentous   hypertrophy as well as prominent posterior epidural fat.  There is canal   stenosis measuring 8 mm in AP dimension.  There is moderate bilateral   foraminal narrowing.       L4-L5: There is a circumferential disc bulge with facet and ligamentous   hypertrophy as well as prominent posterior epidural fat.  There is canal   stenosis measuring 9 mm in AP dimension.  There is moderate to severe   bilateral foraminal narrowing.       L5-S1: There is a circumferential disc bulge with facet and ligamentous   hypertrophy.  There is no canal stenosis or significant foraminal narrowing.           Impression   Multilevel degenerative disc disease with associated multilevel degenerative   facet hypertrophy resulting in canal stenosis at L2-3, L3-4, and L4-5.       Bilateral foraminal narrowing as described above. Pill count: appropriate    fill date :    Morphine equivalent dose as reported on [place]ARRS:  Periodic Controlled Substance Monitoring: Possible medication side effects, risk of tolerance/dependence & alternative treatments discussed., No signs of potential drug abuse or diversion identified. , Assessed functional status., Obtaining appropriate analgesic effect of treatment. Esdras Saldana, APRN - CNP)  Review ofOARRS does not show any aberrant prescription behavior. Medication is helping the patient stay active. Patient denies any side effects and reports adequate analgesia. No sign of misuse/abuse.             Past Medical History:   Diagnosis Date    Arthritis     Left knee pain 4/9/2014    Teeth problem     abcessw       Past Surgical History:   Procedure Laterality Date    CARPAL TUNNEL RELEASE Bilateral     DENTAL SURGERY  10/2015    DENTAL SURGERY  03/2021    JOINT REPLACEMENT Left     KNEE  X 6 pt unsure of date    JOINT REPLACEMENT Right 07/20/2018    ROTATOR CUFF REPAIR Right     TONSILLECTOMY         Allergies   Allergen Reactions    Oxycontin [Oxycodone Hcl] Nausea Only         Current Outpatient Medications:     cloNIDine (CATAPRES) 0.1 MG tablet, TAKE ONE TABLET BY MOUTH TWICE A DAY, Disp: 180 tablet, Rfl: 0    atorvastatin (LIPITOR) 40 MG tablet, TAKE ONE TABLET BY MOUTH DAILY, Disp: 90 tablet, Rfl: 0    gabapentin (NEURONTIN) 600 MG tablet, TAKE ONE TABLET BY MOUTH THREE TIMES A DAY, Disp: 90 tablet, Rfl: 1    morphine (MS CONTIN) 30 MG extended release tablet, Take 1 tablet by mouth every 12 hours for 30 days. , Disp: 60 tablet, Rfl: 0    spironolactone (ALDACTONE) 50 MG tablet, TAKE ONE TABLET BY MOUTH DAILY, Disp: 90 tablet, Rfl: 1    divalproex (DEPAKOTE) 125 MG DR tablet, TAKE ONE TABLET BY MOUTH TWICE A DAY, Disp: 180 tablet, Rfl: 1    ibuprofen (ADVIL;MOTRIN) 800 MG tablet, Take 1 tablet by mouth every 8 hours as needed for Pain, Disp: 90 tablet, Rfl: 1    melatonin 3 MG TABS tablet, Take 6 mg by mouth nightly as needed (insomnia), Disp: , Rfl:     Family History   Problem Relation Age of Onset    Diabetes Mother     No Known Problems Father        Social History     Socioeconomic History    Marital status:      Spouse name: Not on file    Number of children: Not on file    Years of education: Not on file    Highest education level: Not on file   Occupational History    Occupation: retired   Tobacco Use    Smoking status: Never Smoker    Smokeless tobacco: Never Used   Vaping Use    Vaping Use: Never used   Substance and Sexual Activity    Alcohol use: No    Drug use: No    Sexual activity: Never   Other Topics Concern    Not on file   Social History Narrative    Not on file     Social Determinants of Health     Financial Resource Strain:     Difficulty of Paying Living Expenses:    Food Insecurity:     Worried About [address] in the Last Year:     [address] in the Last Year:    Transportation Needs:     Lack of Transportation (Medical):      Lack of Transportation (Non-Medical):    Physical Activity:     Days of Exercise per Week:     Minutes of Exercise per Session:    Stress:     Feeling of Stress :    Social Connections:     Frequency of Communication with Friends and Family:     Frequency of Social Gatherings with Friends and Family:     Attends Sabianist Services:     Active Member of Clubs or Organizations:     Attends Club or Organization Meetings:     Marital Status:    Intimate Partner Violence:     Fear of Current or Ex-Partner:     Emotionally Abused:     Physically Abused:     Sexually Abused:          Review of Systems:  Review of Systems   Constitutional: Negative. HENT: Negative. Eyes:        Glasses   Cardiovascular: Negative. Respiratory: Negative. Endocrine: Negative. Hematologic/Lymphatic: Negative. Skin: Negative. Musculoskeletal: Positive for back pain and joint pain. Gastrointestinal: Negative. Genitourinary: Negative. Neurological: Positive for loss of balance, numbness and weakness. Stumbles   Psychiatric/Behavioral: Negative. Physical Exam:  There were no vitals taken for this visit. Physical Exam  HENT:      Head: Normocephalic. Pulmonary:      Effort: Pulmonary effort is normal.   Skin:         Neurological:      Mental Status: He is alert and oriented to person, place, and time. Psychiatric:         Mood and Affect: Mood normal.         Thought Content: Thought content normal.           Assessment:    Problem List Items Addressed This Visit     S/P left knee surgery - Primary    Medication monitoring encounter    Lumbar radiculopathy    Lumbar back pain with radiculopathy affecting left lower extremity    Fibromyalgia    Encounter for medication monitoring    Chronic pain of left knee    Chronic bilateral low back pain with right-sided sciatica (Chronic)            Treatment Plan:  DISCUSSION: Treatment options discussed withpatient and all questions answered to patient's satisfaction.      Possible side effects, risk of tolerance and or dependence and alternative treatments discussed    Obtaining appropriate analgesic effect of treatment   No signs of potential drug abuse or diversion identified    [x] Ill effects of being on chronic pain injection, still has right leg weakness and pain right leg and foot, discussed MRI results,will increase percocet to every 6 hour prn to better control pain  He declines seeing a surgeon, will order Physical therapy  2.  PT referral     TREATMENT OPTIONS:     PT  Medication Agreement Requirements Met  Continue Opioid therapy  Script written for  Ms Billie contin  Follow up appointment made

## 2021-07-01 RX ORDER — IBUPROFEN 800 MG/1
TABLET ORAL
Qty: 90 TABLET | Refills: 1 | Status: SHIPPED | OUTPATIENT
Start: 2021-07-01 | End: 2021-07-08 | Stop reason: ALTCHOICE

## 2021-07-08 ENCOUNTER — OFFICE VISIT (OUTPATIENT)
Dept: INTERNAL MEDICINE CLINIC | Age: 71
End: 2021-07-08
Payer: MEDICARE

## 2021-07-08 VITALS
BODY MASS INDEX: 34.06 KG/M2 | HEIGHT: 67 IN | WEIGHT: 217 LBS | HEART RATE: 82 BPM | TEMPERATURE: 97.7 F | DIASTOLIC BLOOD PRESSURE: 68 MMHG | OXYGEN SATURATION: 98 % | RESPIRATION RATE: 16 BRPM | SYSTOLIC BLOOD PRESSURE: 112 MMHG

## 2021-07-08 DIAGNOSIS — R73.03 PRE-DIABETES: ICD-10-CM

## 2021-07-08 DIAGNOSIS — F39 MOOD DISORDER (HCC): ICD-10-CM

## 2021-07-08 DIAGNOSIS — N18.30 STAGE 3 CHRONIC KIDNEY DISEASE, UNSPECIFIED WHETHER STAGE 3A OR 3B CKD (HCC): ICD-10-CM

## 2021-07-08 DIAGNOSIS — Z98.890 S/P LEFT KNEE SURGERY: ICD-10-CM

## 2021-07-08 DIAGNOSIS — Z79.891 USE OF OPIATES FOR THERAPEUTIC PURPOSES: ICD-10-CM

## 2021-07-08 DIAGNOSIS — E78.5 DYSLIPIDEMIA: ICD-10-CM

## 2021-07-08 DIAGNOSIS — Z51.81 ENCOUNTER FOR MEDICATION MONITORING: ICD-10-CM

## 2021-07-08 DIAGNOSIS — M23.91 INTERNAL DERANGEMENT OF MULTIPLE SITES OF RIGHT KNEE: Primary | ICD-10-CM

## 2021-07-08 DIAGNOSIS — M54.16 LUMBAR BACK PAIN WITH RADICULOPATHY AFFECTING LEFT LOWER EXTREMITY: ICD-10-CM

## 2021-07-08 DIAGNOSIS — I10 ESSENTIAL HYPERTENSION: ICD-10-CM

## 2021-07-08 DIAGNOSIS — M79.671 RIGHT FOOT PAIN: ICD-10-CM

## 2021-07-08 DIAGNOSIS — M79.7 FIBROMYALGIA: ICD-10-CM

## 2021-07-08 PROBLEM — M54.41 CHRONIC BILATERAL LOW BACK PAIN WITH RIGHT-SIDED SCIATICA: Chronic | Status: RESOLVED | Noted: 2021-05-10 | Resolved: 2021-07-08

## 2021-07-08 PROBLEM — E66.01 MORBIDLY OBESE (HCC): Status: RESOLVED | Noted: 2020-09-08 | Resolved: 2021-07-08

## 2021-07-08 PROBLEM — G89.29 CHRONIC BILATERAL LOW BACK PAIN WITH RIGHT-SIDED SCIATICA: Chronic | Status: RESOLVED | Noted: 2021-05-10 | Resolved: 2021-07-08

## 2021-07-08 PROCEDURE — 99214 OFFICE O/P EST MOD 30 MIN: CPT | Performed by: FAMILY MEDICINE

## 2021-07-08 PROCEDURE — 20610 DRAIN/INJ JOINT/BURSA W/O US: CPT | Performed by: FAMILY MEDICINE

## 2021-07-08 RX ADMIN — LIDOCAINE HYDROCHLORIDE 10 ML: 10 INJECTION, SOLUTION INFILTRATION; PERINEURAL at 11:22

## 2021-07-08 RX ADMIN — TRIAMCINOLONE ACETONIDE 40 MG: 40 INJECTION, SUSPENSION INTRA-ARTICULAR; INTRAMUSCULAR at 11:23

## 2021-07-08 RX ADMIN — TRIAMCINOLONE ACETONIDE 40 MG: 40 INJECTION, SUSPENSION INTRA-ARTICULAR; INTRAMUSCULAR at 11:24

## 2021-07-08 SDOH — ECONOMIC STABILITY: FOOD INSECURITY: WITHIN THE PAST 12 MONTHS, THE FOOD YOU BOUGHT JUST DIDN'T LAST AND YOU DIDN'T HAVE MONEY TO GET MORE.: NEVER TRUE

## 2021-07-08 SDOH — ECONOMIC STABILITY: FOOD INSECURITY: WITHIN THE PAST 12 MONTHS, YOU WORRIED THAT YOUR FOOD WOULD RUN OUT BEFORE YOU GOT MONEY TO BUY MORE.: NEVER TRUE

## 2021-07-08 ASSESSMENT — ENCOUNTER SYMPTOMS
EYES NEGATIVE: 1
GASTROINTESTINAL NEGATIVE: 1
BACK PAIN: 1
RESPIRATORY NEGATIVE: 1
ALLERGIC/IMMUNOLOGIC NEGATIVE: 1

## 2021-07-08 ASSESSMENT — SOCIAL DETERMINANTS OF HEALTH (SDOH): HOW HARD IS IT FOR YOU TO PAY FOR THE VERY BASICS LIKE FOOD, HOUSING, MEDICAL CARE, AND HEATING?: NOT HARD AT ALL

## 2021-07-08 NOTE — PROGRESS NOTES
Subjective:      Patient ID: Sarita Rees is a 79 y.o. male. Knee Pain   The incident occurred more than 1 week ago. There was no injury mechanism. Pain location: Right knee. The quality of the pain is described as cramping. The pain is at a severity of 5/10. The pain is moderate. The pain has been constant since onset. Associated symptoms include a loss of motion and muscle weakness. The symptoms are aggravated by movement, palpation and weight bearing. Treatments tried: Opiate dependent as provided by pain management. The treatment provided no relief. Review of Systems   Constitutional: Negative. HENT: Negative. Eyes: Negative. Respiratory: Negative. Cardiovascular: Negative. Gastrointestinal: Negative. Endocrine: Negative. Musculoskeletal: Positive for arthralgias, back pain, gait problem and myalgias. Skin: Negative. Allergic/Immunologic: Negative. Hematological: Negative. Psychiatric/Behavioral: The patient is nervous/anxious. Past family and social history unremarkable. Diagnosis Orders   1. Internal derangement of multiple sites of right knee     2. Stage 3 chronic kidney disease, unspecified whether stage 3a or 3b CKD ([facility] [address]     3. Mood disorder ([facility] [address]     4. Essential hypertension     5. S/P left knee surgery     6. Encounter for medication monitoring     7. Use of opiates for therapeutic purposes     8. Pre-diabetes     9. Dyslipidemia     10. Fibromyalgia     11. Lumbar back pain with radiculopathy affecting left lower extremity     12. Right foot pain  [facility]         Objective:   Physical Exam  Vitals and nursing note reviewed. Constitutional:       Appearance: He is well-developed. HENT:      Head: Normocephalic and atraumatic.       Right Ear: External ear normal.      Left Ear: External ear normal.      Nose: Nose normal.   Eyes:      Conjunctiva/sclera: Conjunctivae normal.      Pupils: Pupils are equal, round, and reactive to light. Cardiovascular:      Rate and Rhythm: Normal rate and regular rhythm. Heart sounds: Normal heart sounds. Comments: Hypertension  Dyslipidemia  Impaired fasting glucose  Pulmonary:      Effort: Pulmonary effort is normal.      Breath sounds: Normal breath sounds. Abdominal:      General: Bowel sounds are normal.      Palpations: Abdomen is soft. Genitourinary:     Comments: CKD 3  Musculoskeletal:         General: Normal range of motion. Cervical back: Normal range of motion and neck supple. Comments: Degenerative polyarthralgia right knee. Positive Jose's maneuver. Patellofemoral grind. Patient wished to have intra-articular cortisone injection. He clearly voices understanding of associated possibility of drug reaction/anaphylaxis, pain, infection, no relief to worsening of underlying symptoms. He also understand that it is not a substitute for surface replacement procedure or surgical debridement. He wished to proceed  Aseptic technique, 80 mg of Kenalog mixed with 10 mL 1% lidocaine injected through anterior lateral approach that he tolerated well  Bilateral pes planus with subjective complaint of pain at the ball of the right big toe. No skin lesion/corn or callus. Hyperpronated foot. Neurologically intact. Achilles tendon intact  Chronic pain syndrome. Opiate dependent as provided by pain management. History of multiple surgery x8 done on the left knee. History of knee replacement left   Skin:     General: Skin is warm and dry. Neurological:      Mental Status: He is alert and oriented to person, place, and time. Deep Tendon Reflexes: Reflexes are normal and symmetric. Psychiatric:      Comments: Anxiety/depression         Assessment:       Diagnosis Orders   1. Internal derangement of multiple sites of right knee     2. Stage 3 chronic kidney disease, unspecified whether stage 3a or 3b CKD ([address]     3. Mood disorder ([address]     4.  Essential hypertension 5. S/P left knee surgery     6. Encounter for medication monitoring     7. Use of opiates for therapeutic purposes     8. Pre-diabetes     9. Dyslipidemia     10. Fibromyalgia     11. Lumbar back pain with radiculopathy affecting left lower extremity     12. Right foot pain  [facility]           Plan:      79-year-old -American male with known history of chronic pain syndrome opiate dependent as provided by pain management. He is presented with increasing symptom with this degenerative arthropathy of right knee. He underwent cortisone injection that he tolerated well  Bilateral pes planus with hyperpronated foot and calcaneal valgus. He would benefit from weight loss. He will be scheduled with podiatry. Degenerative spondylosis without any sign of myelopathy. He had 2 rounds of epidural injection with modest relief. Continue gabapentin and clonidine  CKD 3 with creatinine 1.12. He is advised to stop all anti-inflammatories including Motrin 800 mg that he is currently taking. Maintain oral hydration. Insomnia continue melatonin  Mood disorder with chronic pain syndrome. Continue Depakote  Hypertension well-controlled to diuretic. He denies tobacco, excessive alcohol or illicit drug use  Med list and available labs reviewed, discussed with patient, questions answered  He is encouraged to call for any concern  This note is created with a voice recognition program and while intend to generate a document that accurately reflects the content of the visit, no guarantee can be provided that every mistake has been identified and corrected by editing.        Johana Marsh MD

## 2021-07-09 ENCOUNTER — HOSPITAL ENCOUNTER (OUTPATIENT)
Dept: GENERAL RADIOLOGY | Age: 71
Discharge: HOME | End: 2021-07-11
Payer: MEDICARE

## 2021-07-09 ENCOUNTER — HOSPITAL ENCOUNTER (OUTPATIENT)
Age: 71
Discharge: HOME | End: 2021-07-11
Payer: MEDICARE

## 2021-07-09 DIAGNOSIS — M23.91 INTERNAL DERANGEMENT OF MULTIPLE SITES OF RIGHT KNEE: ICD-10-CM

## 2021-07-09 PROCEDURE — 73560 X-RAY EXAM OF KNEE 1 OR 2: CPT

## 2021-07-09 RX ORDER — LIDOCAINE HYDROCHLORIDE 10 MG/ML
10 INJECTION, SOLUTION INFILTRATION; PERINEURAL ONCE
Status: COMPLETED | OUTPATIENT
Start: 2021-07-09 | End: 2021-07-08

## 2021-07-09 RX ORDER — TRIAMCINOLONE ACETONIDE 40 MG/ML
40 INJECTION, SUSPENSION INTRA-ARTICULAR; INTRAMUSCULAR ONCE
Status: COMPLETED | OUTPATIENT
Start: 2021-07-09 | End: 2021-07-08

## 2021-07-13 ENCOUNTER — TELEPHONE (OUTPATIENT)
Dept: INTERNAL MEDICINE CLINIC | Age: 71
End: 2021-07-13

## 2021-07-13 DIAGNOSIS — M79.671 RIGHT FOOT PAIN: Primary | ICD-10-CM

## 2021-07-13 NOTE — TELEPHONE ENCOUNTER
Patient states that the foot doctor cant get him in until 8/5/21. But they tell him if we call they may be able to get him in sooner.     Please advise

## 2021-07-26 RX ORDER — DIVALPROEX SODIUM 125 MG/1
TABLET, DELAYED RELEASE ORAL
Qty: 180 TABLET | Refills: 0 | Status: SHIPPED | OUTPATIENT
Start: 2021-07-26 | End: 2021-10-25

## 2021-07-27 ENCOUNTER — HOSPITAL ENCOUNTER (OUTPATIENT)
Dept: PAIN MANAGEMENT | Age: 71
Discharge: HOME | End: 2021-07-27
Payer: MEDICARE

## 2021-07-27 DIAGNOSIS — Z98.890 S/P KNEE SURGERY: ICD-10-CM

## 2021-07-27 DIAGNOSIS — Z79.891 USE OF OPIATES FOR THERAPEUTIC PURPOSES: Primary | ICD-10-CM

## 2021-07-27 DIAGNOSIS — Z51.81 MEDICATION MONITORING ENCOUNTER: ICD-10-CM

## 2021-07-27 DIAGNOSIS — M17.12 PRIMARY OSTEOARTHRITIS OF LEFT KNEE: ICD-10-CM

## 2021-07-27 DIAGNOSIS — Z51.81 ENCOUNTER FOR MEDICATION MONITORING: ICD-10-CM

## 2021-07-27 DIAGNOSIS — R29.898 WEAKNESS OF LEFT LEG: ICD-10-CM

## 2021-07-27 DIAGNOSIS — M54.16 LUMBAR BACK PAIN WITH RADICULOPATHY AFFECTING LEFT LOWER EXTREMITY: ICD-10-CM

## 2021-07-27 DIAGNOSIS — M25.562 CHRONIC PAIN OF LEFT KNEE: ICD-10-CM

## 2021-07-27 DIAGNOSIS — Z98.890 S/P LEFT KNEE SURGERY: ICD-10-CM

## 2021-07-27 DIAGNOSIS — G89.29 CHRONIC PAIN OF LEFT KNEE: ICD-10-CM

## 2021-07-27 DIAGNOSIS — M17.11 PRIMARY OSTEOARTHRITIS OF RIGHT KNEE: ICD-10-CM

## 2021-07-27 DIAGNOSIS — M17.0 OSTEOARTHRITIS OF BOTH KNEES, UNSPECIFIED OSTEOARTHRITIS TYPE: ICD-10-CM

## 2021-07-27 DIAGNOSIS — M79.7 FIBROMYALGIA: ICD-10-CM

## 2021-07-27 DIAGNOSIS — M17.0 PRIMARY OSTEOARTHRITIS OF BOTH KNEES: ICD-10-CM

## 2021-07-27 PROCEDURE — 99213 OFFICE O/P EST LOW 20 MIN: CPT

## 2021-07-27 PROCEDURE — 99213 OFFICE O/P EST LOW 20 MIN: CPT | Performed by: NURSE PRACTITIONER

## 2021-07-27 RX ORDER — OXYCODONE HYDROCHLORIDE AND ACETAMINOPHEN 5; 325 MG/1; MG/1
1 TABLET ORAL EVERY 6 HOURS PRN
Qty: 120 TABLET | Refills: 0 | Status: SHIPPED | OUTPATIENT
Start: 2021-07-30 | End: 2021-08-26 | Stop reason: SDUPTHER

## 2021-07-27 RX ORDER — GABAPENTIN 600 MG/1
TABLET ORAL
Qty: 90 TABLET | Refills: 2 | Status: SHIPPED | OUTPATIENT
Start: 2021-07-30 | End: 2021-09-24 | Stop reason: SDUPTHER

## 2021-07-27 RX ORDER — MORPHINE SULFATE 30 MG/1
30 TABLET, FILM COATED, EXTENDED RELEASE ORAL EVERY 12 HOURS
Qty: 60 TABLET | Refills: 0 | Status: SHIPPED | OUTPATIENT
Start: 2021-07-30 | End: 2021-08-26 | Stop reason: SDUPTHER

## 2021-07-27 ASSESSMENT — ENCOUNTER SYMPTOMS
BACK PAIN: 1
RESPIRATORY NEGATIVE: 1
GASTROINTESTINAL NEGATIVE: 1

## 2021-07-27 NOTE — PROGRESS NOTES
Francisca 89 PROGRESS NOTE      Patient  completed [x]  video visit   []   phone call:         Minutes :       [x]    to  review Medication Agreement    []  Follow up after procedure   []  Discuss treatment options      Location:  Provider:  working from    [x]    home    []   [facility] - ME[facility]O NICOLE ,   patient at home       Chief Complaint: right knee pain and low back pain    He c/o right knee pain, he states his PCP gave him an injection in his right knee which helped some. He had total right knee arthroplasty about 3 years ago and multiple surgeries on left knee. He is in PT for his back pain, 3 times a week and it is helping. He had lumbar epidural injection 5- L4, L5 with 60% relief. He goes in the pool a few times a week. He states saw podiatrist for right foot pain and will have MRI  Done right foot. He has had pain in his right foot since last year and has difficulty walking on it,   Knee Pain   There was no injury mechanism. The pain is present in the right knee. Quality: sharp. The pain is at a severity of 6/10. The pain has been constant since onset. Associated symptoms include muscle weakness and numbness. Foreign body present: total knee replacement. Exacerbated by: walking, sitting too long. He has tried ice and heat for the symptoms. Back Pain  This is a chronic problem. The problem occurs constantly. The problem is unchanged. The pain is present in the lumbar spine. Quality: sharp. The pain does not radiate. The pain is at a severity of 3/10. The symptoms are aggravated by bending. Associated symptoms include numbness. (Left leg and weakness right leg) He has tried analgesics for the symptoms.        Treatment goals:  Functional status: get pain relief    Aberrancy:   Any alcoholic beverages   no         Any illegal drugs   no      Analgesia:      3,6               Adverse  Effects :none    ADL;s :in PT, goes in pool    Data:    When was thelast UDS:    8-7-2020 Was the UDS appropriate:  [x] yes []   no      Record/Diagnostics Review:      As above, I did review the imaging     8/10/2020 10:37 PM - Julius, [facility] Incoming Lab Results From [employer] [facility]    Component Value Ref Range & Units Status Collected Lab   Pain Management Drug Panel Interp, Urine Consistent   Final 08/07/2020 11:24 AM [facility]   (NOTE)   ________________________________________________________________   DRUGS EXPECTED:   OXYCODONE   MORPHINE   ________________________________________________________________   CONSISTENT with medications provided:   OXYCODONE: based on oxycodone, noroxycodone, oxymorphone   MORPHINE: based on morphine   ________________________________________________________________   INTERPRETIVE INFORMATION: Pain Mgt [place], Mass Spec/EMIT, Ur,                            Interp   Interpretation depends on accuracy and completeness of patient   medication information submitted by client.     6-Acetylmorphine, Ur Not Detected   Final 08/07/      EXAMINATION:   TWO XRAY VIEWS OF THE RIGHT KNEE       7/9/2021 12:02 pm       COMPARISON:   11 October 2020       HISTORY:   ORDERING SYSTEM PROVIDED HISTORY: Internal derangement of multiple sites of   right knee   TECHNOLOGIST PROVIDED HISTORY:   Acuity: Chronic   Type of Exam: Ongoing       FINDINGS:   A total knee arthroplasty is noted in satisfactory alignment position without   acute fracture, dislocation or effusion.           Impression   Status post total knee arthroplasty without hardware complication or acute   osseous abnormality.           EXAMINATION:   MRI OF THE LUMBAR SPINE WITHOUT CONTRAST, 6/1/2021 2:02 pm       TECHNIQUE:   Multiplanar multisequence MRI of the lumbar spine was performed without the   administration of intravenous contrast.       COMPARISON:   None.       HISTORY:   ORDERING SYSTEM PROVIDED HISTORY: Lumbar radiculopathy   TECHNOLOGIST PROVIDED HISTORY:   numbness and pain left foot, low back pain   Reason for Exam: Lumbar radiculopathy M54.16 (ICD-10-CM);  Lumbar back pain   with radiculopathy affecting left lower extremity   Additional signs and symptoms: numbness and pain left foot, low back pain       FINDINGS:   BONES/ALIGNMENT: The vertebral body heights are maintained.  There is   age-appropriate bone marrow signal.  There is multilevel degenerative disc   disease with loss of disc signal.  There is disc space narrowing at the L2-3,   L3-4, and L4-5 levels.  There is no spondylolisthesis.       SPINAL CORD: The conus medullaris is normal in caliber and signal and   terminates at the L1 level.  The cauda equina is unremarkable.       SOFT TISSUES: The posterior paraspinal soft tissues are unremarkable.  The   visualized abdominal structures demonstrate bilateral renal cysts.       L1-L2: There is a circumferential disc bulge with facet and ligamentous   hypertrophy as well as prominent posterior epidural fat.  There is no canal   stenosis or foraminal narrowing.       L2-L3: There is a circumferential disc bulge with facet and ligamentous   hypertrophy as well as prominent posterior epidural fat.  There is canal   stenosis measuring 9 mm in AP dimension.  There is moderate left foraminal   narrowing and no significant right foraminal narrowing.       L3-L4: There is a circumferential disc bulge with facet and ligamentous   hypertrophy as well as prominent posterior epidural fat.  There is canal   stenosis measuring 8 mm in AP dimension.  There is moderate bilateral   foraminal narrowing.       L4-L5: There is a circumferential disc bulge with facet and ligamentous   hypertrophy as well as prominent posterior epidural fat.  There is canal   stenosis measuring 9 mm in AP dimension.  There is moderate to severe   bilateral foraminal narrowing.       L5-S1: There is a circumferential disc bulge with facet and ligamentous   hypertrophy.  There is no canal stenosis or significant foraminal narrowing.           Impression   Multilevel degenerative disc disease with associated multilevel degenerative   facet hypertrophy resulting in canal stenosis at L2-3, L3-4, and L4-5.       Bilateral foraminal narrowing as described above.                     Pill count: appropriate    fill date :7-  Morphine equivalent dose as reported on OARRS:90 has narcan at home  Periodic Controlled Substance Monitoring: Possible medication side effects, risk of tolerance/dependence & alternative treatments discussed., No signs of potential drug abuse or diversion identified. , Assessed functional status., Obtaining appropriate analgesic effect of treatment. (Eden Boewns, QUIQUE - CNP)  Chronic Pain > 80 MEDD: Co-prescribed Naloxone., Obtained or confirmed \"Medication Contract\" on file. QUIQUE Contreras - CNP)  Review of[place]ARRS does not show any aberrant prescription behavior. Medication is helping the patient stay active. Patient denies any side effects and reports adequate analgesia. No sign of misuse/abuse. Past Medical History:   Diagnosis Date    Arthritis     Left knee pain 4/9/2014    Teeth problem     abcessw       Past Surgical History:   Procedure Laterality Date    CARPAL TUNNEL RELEASE Bilateral     DENTAL SURGERY  10/2015    DENTAL SURGERY  03/2021    JOINT REPLACEMENT Left     KNEE  X 6 pt unsure of date    JOINT REPLACEMENT Right 07/20/2018    ROTATOR CUFF REPAIR Right     TONSILLECTOMY         Allergies   Allergen Reactions    Oxycontin [Oxycodone Hcl] Nausea Only         Current Outpatient Medications:     divalproex (DEPAKOTE) 125 MG DR tablet, TAKE ONE TABLET BY MOUTH TWICE A DAY, Disp: 180 tablet, Rfl: 0    morphine (MS CONTIN) 30 MG extended release tablet, Take 1 tablet by mouth every 12 hours for 30 days. , Disp: 60 tablet, Rfl: 0    oxyCODONE-acetaminophen (PERCOCET) 5-325 MG per tablet, Take 1 tablet by mouth every 6 hours as needed for Pain for up to 30 days. , Disp: 120 tablet, Rfl: 0    cloNIDine (CATAPRES) 0.1 MG tablet, TAKE ONE TABLET BY MOUTH TWICE A DAY, Disp: 180 tablet, Rfl: 0    atorvastatin (LIPITOR) 40 MG tablet, TAKE ONE TABLET BY MOUTH DAILY, Disp: 90 tablet, Rfl: 0    gabapentin (NEURONTIN) 600 MG tablet, TAKE ONE TABLET BY MOUTH THREE TIMES A DAY, Disp: 90 tablet, Rfl: 1    spironolactone (ALDACTONE) 50 MG tablet, TAKE ONE TABLET BY MOUTH DAILY, Disp: 90 tablet, Rfl: 1    melatonin 3 MG TABS tablet, Take 6 mg by mouth nightly as needed (insomnia), Disp: , Rfl:     Family History   Problem Relation Age of Onset    Diabetes Mother     No Known Problems Father        Social History     Socioeconomic History    Marital status:      Spouse name: Not on file    Number of children: Not on file    Years of education: Not on file    Highest education level: Not on file   Occupational History    Occupation: retired   Tobacco Use    Smoking status: Never Smoker    Smokeless tobacco: Never Used   Vaping Use    Vaping Use: Never used   Substance and Sexual Activity    Alcohol use: No    Drug use: No    Sexual activity: Never   Other Topics Concern    Not on file   Social History Narrative    Not on file     Social Determinants of Health     Financial Resource Strain: Low Risk     Difficulty of Paying Living Expenses: Not hard at all   Food Insecurity: No Food Insecurity    Worried About [address] in the Last Year: Never true    [address]  in the Last Year: Never true   Transportation Needs:     Lack of Transportation (Medical):      Lack of Transportation (Non-Medical):    Physical Activity:     Days of Exercise per Week:     Minutes of Exercise per Session:    Stress:     Feeling of Stress :    Social Connections:     Frequency of Communication with Friends and Family:     Frequency of Social Gatherings with Friends and Family:     Attends Caodaism Services:     Active Member of Clubs or Organizations:     Attends Club or Organization Meetings:     Marital Status: Intimate Partner Violence:     Fear of Current or Ex-Partner:     Emotionally Abused:     Physically Abused:     Sexually Abused:          Review of Systems:  Review of Systems   Constitutional: Negative. HENT: Negative. Eyes:        Glasses   Cardiovascular: Negative. Respiratory: Negative. Endocrine: Negative. Hematologic/Lymphatic: Negative. Skin: Negative. Musculoskeletal: Positive for back pain and joint pain. Gastrointestinal: Negative. Genitourinary: Negative. Neurological: Positive for numbness. Psychiatric/Behavioral: Negative. Physical Exam:  There were no vitals taken for this visit. Physical Exam  HENT:      Head: Normocephalic. Pulmonary:      Effort: Pulmonary effort is normal.   Skin:         Neurological:      Mental Status: He is alert. Psychiatric:         Mood and Affect: Mood normal.         Thought Content: Thought content normal.           Assessment:        Problem List Items Addressed This Visit     Use of opiates for therapeutic purposes - Primary    Osteoarthritis of both knees    Medication monitoring encounter    Lumbar back pain with radiculopathy affecting left lower extremity    Fibromyalgia    Encounter for medication monitoring    Chronic pain of left knee          Treatment Plan:  DISCUSSION: Treatment options discussed withpatient and all questions answered to patient's satisfaction. Possible side effects, risk of tolerance and or dependence and alternative treatments discussed    Obtaining appropriate analgesic effect of treatment   No signs of potential drug abuse or diversion identified    [x] Ill effects of being on chronic pain medications such as sleep disturbances, respiratory depression, hormonal changes, withdrawal symptoms, chronic opioid dependence and tolerance as well as risk of taking opioids with Benzodiazepines and taking opioids along with alcohol,  werediscussed with patient.  I had asked the patient to minimize medication use and utilize pain medications only for uncontrolled rest pain or pain with exertional activities. I advised patient not to self-escalate painmedications without consulting with us. At each of patient's future visits we will try to taper pain medications, while adjusting the adjunct medications, and re-evaluating for Physical Therapy to improve spinal andjoint strength. We will continue to have discussions to decrease pain medications as tolerated. Counseled patient on effects their pain medication and /or their medical condition mayhave on their  ability to drive or operate machinery. Instructed not to drive or operate machinery if drowsy     I also discussed with the patient regarding the dangers of combining narcotic pain medication with tranquilizers, alcohol or illegal drugs or taking the medication any way other than prescribed. The dangers were discussed  including respiratory depression and death. Patient was told to tell  all  physicians regarding the medications he is getting from pain clinic. Patient is warned not to take any unprescribed medications over-the-countermedications that can depress breathing . Patient is advised to talk to the pharmacist or physicians if planning to take any over-the-counter medications before  takeing them. Patient is strongly advised to avoid tranquilizers or  relaxants, illegal drugs  or any medications that can depress breathing  Patient is also advised to tell us if there is any changes in their medications from other physicians.             TREATMENT OPTIONS:       Medication Agreement Requirements Met  Continue Opioid therapy  Script written for  Ms contin, percocet. gabapentin  Follow up appointment made

## 2021-08-02 RX ORDER — SPIRONOLACTONE 50 MG/1
TABLET, FILM COATED ORAL
Qty: 90 TABLET | Refills: 1 | Status: SHIPPED | OUTPATIENT
Start: 2021-08-02 | End: 2022-02-01

## 2021-08-02 NOTE — TELEPHONE ENCOUNTER
Ray Alberto is calling to request a refill on the following medication(s):    Medication Request:  Requested Prescriptions     Pending Prescriptions Disp Refills    spironolactone (ALDACTONE) 50 MG tablet [Pharmacy Med Name: SPIRONOLACTONE 50 MG TABLET] 90 tablet 0     Sig: TAKE ONE TABLET BY MOUTH DAILY     Last filled 2/4/21 90 day 1 refill  Order pending    Last Visit Date (If Applicable):  4/1/9301    Next Visit Date:    10/8/2021

## 2021-08-22 ASSESSMENT — ENCOUNTER SYMPTOMS
NAUSEA: 0
COUGH: 0
EYES NEGATIVE: 1
ALLERGIC/IMMUNOLOGIC NEGATIVE: 1
GASTROINTESTINAL NEGATIVE: 1
SORE THROAT: 0
ABDOMINAL PAIN: 0
EYE REDNESS: 0
SHORTNESS OF BREATH: 0
CONSTIPATION: 0
EYE PAIN: 0
VOMITING: 0
RESPIRATORY NEGATIVE: 1
BACK PAIN: 1

## 2021-08-23 NOTE — PROGRESS NOTES
Marilee Uriarte is a 79 y.o. male evaluated on 8/26/2021. Modality of virtual service provided -via Video+audio   Consent:  Patient and/or health care decision maker is aware that that patient may receive a bill for this telephone service, depending on one's insurance coverage, and has provided verbal consent to proceed: Yes    Patient identification was verified at the start of the visit: Yes    Chief complaint: Marilee Uriarte is 79 y.o.,  male, with  with chief complaint of pain involving low back bilateral knees and right foot. .    Patient is complaining of pain involving the low back as well as in the knee and in the right foot. This patient had left knee joint replacement done long time ago but continued to have pain. He was seen by his surgeon who told him there is nothing much surgically done as everything appears to be in place on x-ray. He was evaluated by his primary care physician and had an injection in the knee and an MRI was ordered according to the patient. Patient also has severe pain in his right foot for which he is being followed by podiatrist.  He apparently had some bone spurs in the foot. Patient reports he has undergone physical therapy which helped his back pain considerably. His back pain is under good control with medications. He is 1 knee and foot pain or problems in. He reports his mobility has increased since the physical therapy for the low back. Back Pain  This is a chronic problem. The current episode started more than 1 year ago. The problem occurs constantly. The problem is unchanged. The pain is present in the lumbar spine and sacro-iliac. The quality of the pain is described as aching (Diabetes in the hip sharp in the knees). The pain radiates to the right thigh (To the knees on the right side). The pain is at a severity of 3/10 (1-6). The pain is mild. The pain is the same all the time.  The symptoms are aggravated by bending, standing and Constipation: no-Undercontrol-: yes  Dizziness/drowsy/sleepy--no  Urinary Retention: no    ABERRANT BEHAVIORS SINCE LAST VISIT  Lost rx/pills:------------------------------------------ no  Taking  medication as prescribed: ----------- yes  Urine Drug Screen ---------------------------------  yes             Date------------------------------------------------- 8/10/2020              Results as expected ---------------------yes    Recent ER visits: -------------------------------------No  Pill count is appropriate: ---------------------------yes   Refills for prescriptions appropriate:---------- yes      Past Medical History:   Diagnosis Date    Arthritis     Left knee pain 4/9/2014    Teeth problem     abcessw       Past Surgical History:   Procedure Laterality Date    CARPAL TUNNEL RELEASE Bilateral     DENTAL SURGERY  10/2015    DENTAL SURGERY  03/2021    JOINT REPLACEMENT Left     KNEE  X 6 pt unsure of date    JOINT REPLACEMENT Right 07/20/2018    ROTATOR CUFF REPAIR Right     TONSILLECTOMY         Family History   Problem Relation Age of Onset    Diabetes Mother     No Known Problems Father        Social History     Socioeconomic History    Marital status:       Spouse name: Not on file    Number of children: Not on file    Years of education: Not on file    Highest education level: Not on file   Occupational History    Occupation: retired   Tobacco Use    Smoking status: Never Smoker    Smokeless tobacco: Never Used   Vaping Use    Vaping Use: Never used   Substance and Sexual Activity    Alcohol use: No    Drug use: No    Sexual activity: Never   Other Topics Concern    Not on file   Social History Narrative    Not on file     Social Determinants of Health     Financial Resource Strain: Low Risk     Difficulty of Paying Living Expenses: Not hard at all   Food Insecurity: No Food Insecurity    Worried About [address] [facility] in the Last Year: Never true    [address] [facility] [address] in the Last Year: Never true   Transportation Needs:     Lack of Transportation (Medical):  Lack of Transportation (Non-Medical):    Physical Activity:     Days of Exercise per Week:     Minutes of Exercise per Session:    Stress:     Feeling of Stress :    Social Connections:     Frequency of Communication with Friends and Family:     Frequency of Social Gatherings with Friends and Family:     Attends [employer] Services:     Active Member of Clubs or Organizations:     Attends Club or Organization Meetings:     Marital Status:    Intimate Partner Violence:     Fear of Current or Ex-Partner:     Emotionally Abused:     Physically Abused:     Sexually Abused: Allergies   Allergen Reactions    Oxycontin [Oxycodone Hcl] Nausea Only       Current Outpatient Medications on File Prior to Encounter   Medication Sig Dispense Refill    spironolactone (ALDACTONE) 50 MG tablet TAKE ONE TABLET BY MOUTH DAILY 90 tablet 1    gabapentin (NEURONTIN) 600 MG tablet 1 tab  tid 90 tablet 2    divalproex (DEPAKOTE) 125 MG DR tablet TAKE ONE TABLET BY MOUTH TWICE A  tablet 0    cloNIDine (CATAPRES) 0.1 MG tablet TAKE ONE TABLET BY MOUTH TWICE A  tablet 0    atorvastatin (LIPITOR) 40 MG tablet TAKE ONE TABLET BY MOUTH DAILY 90 tablet 0    melatonin 3 MG TABS tablet Take 6 mg by mouth nightly as needed (insomnia)       No current facility-administered medications on file prior to encounter. Review of Systems   Constitutional: Positive for activity change. Negative for appetite change, fever and unexpected weight change. HENT: Negative. Negative for congestion, ear pain and sore throat. Eyes: Negative. Negative for pain, redness and visual disturbance. Respiratory: Negative. Negative for cough and shortness of breath. Cardiovascular: Negative. Negative for chest pain and palpitations. Gastrointestinal: Negative. Negative for abdominal pain, constipation, nausea and vomiting. Endocrine: Negative. Negative for heat intolerance. Genitourinary: Negative. Negative for dysuria and hematuria. Musculoskeletal: Positive for back pain and myalgias. Right foot   Skin: Negative for rash. Allergic/Immunologic: Negative. Neurological: Positive for weakness and numbness. Right foot   Hematological: Negative. Does not bruise/bleed easily. Psychiatric/Behavioral: Positive for sleep disturbance. Negative for self-injury and suicidal ideas. The patient is not nervous/anxious and is not hyperactive. Patient denies any changes in the review of systems since his last visit    Physical Exam  Constitutional:       Appearance: He is obese. Skin:     Findings: No rash. Neurological:      Mental Status: He is alert and oriented to person, place, and time. Psychiatric:         Mood and Affect: Mood normal.         Behavior: Behavior normal.         Thought Content:  Thought content normal.        Ortho Exam   DATA:  LAB.:  8/10/2020 10:37 PM - [facility] Incoming Lab Results From [facility]    Component Value Ref Range & Units Status Collected Lab   Pain Management Drug Panel Interp, Urine Consistent   Final 08/07/2020 11:24 AM [facility]   (NOTE)   ________________________________________________________________   DRUGS EXPECTED:   OXYCODONE   MORPHINE   ________________________________________________________________   CONSISTENT with medications provided:   OXYCODONE: based on oxycodone, noroxycodone, oxymorphone   MORPHINE: based on morphine        X-Ray reports:    EXAMINATION:   TWO XRAY VIEWS OF THE RIGHT KNEE       7/9/2021 12:02 pm       COMPARISON:   11 October 2020       HISTORY:   ORDERING SYSTEM PROVIDED HISTORY: Internal derangement of multiple sites of   right knee   TECHNOLOGIST PROVIDED HISTORY:   Acuity: Chronic   Type of Exam: Ongoing       FINDINGS:   A total knee arthroplasty is noted in satisfactory alignment position without   acute fracture, dislocation hypertrophy as well as prominent posterior epidural fat.  There is canal   stenosis measuring 9 mm in AP dimension.  There is moderate left foraminal   narrowing and no significant right foraminal narrowing.       L3-L4: There is a circumferential disc bulge with facet and ligamentous   hypertrophy as well as prominent posterior epidural fat.  There is canal   stenosis measuring 8 mm in AP dimension.  There is moderate bilateral   foraminal narrowing.       L4-L5: There is a circumferential disc bulge with facet and ligamentous   hypertrophy as well as prominent posterior epidural fat.  There is canal   stenosis measuring 9 mm in AP dimension.  There is moderate to severe   bilateral foraminal narrowing.       L5-S1: There is a circumferential disc bulge with facet and ligamentous   hypertrophy.  There is no canal stenosis or significant foraminal narrowing.           Impression   Multilevel degenerative disc disease with associated multilevel degenerative   facet hypertrophy resulting in canal stenosis at L2-3, L3-4, and L4-5.       Bilateral foraminal narrowing as described above. Narrative   EXAMINATION:   MRI OF THE LUMBAR SPINE WITHOUT CONTRAST, 6/1/2021 2:02 pm       TECHNIQUE:   Multiplanar multisequence MRI of the lumbar spine was performed without the   administration of intravenous contrast.       COMPARISON:   None.       HISTORY:   ORDERING SYSTEM PROVIDED HISTORY: Lumbar radiculopathy   TECHNOLOGIST PROVIDED HISTORY:   numbness and pain left foot, low back pain   Reason for Exam: Lumbar radiculopathy M54.16 (ICD-10-CM);  Lumbar back pain   with radiculopathy affecting left lower extremity   Additional signs and symptoms: numbness and pain left foot, low back pain       FINDINGS:   BONES/ALIGNMENT: The vertebral body heights are maintained.  There is   age-appropriate bone marrow signal. Chazmendez Beck is multilevel degenerative disc   disease with loss of disc signal.  There is disc space narrowing at the L2-3,   L3-4, and L4-5 levels.  There is no spondylolisthesis.       SPINAL CORD: The conus medullaris is normal in caliber and signal and   terminates at the L1 level.  The cauda equina is unremarkable.       SOFT TISSUES: The posterior paraspinal soft tissues are unremarkable.  The   visualized abdominal structures demonstrate bilateral renal cysts.       L1-L2: There is a circumferential disc bulge with facet and ligamentous   hypertrophy as well as prominent posterior epidural fat.  There is no canal   stenosis or foraminal narrowing.       L2-L3: There is a circumferential disc bulge with facet and ligamentous   hypertrophy as well as prominent posterior epidural fat.  There is canal   stenosis measuring 9 mm in AP dimension.  There is moderate left foraminal   narrowing and no significant right foraminal narrowing.       L3-L4: There is a circumferential disc bulge with facet and ligamentous   hypertrophy as well as prominent posterior epidural fat.  There is canal   stenosis measuring 8 mm in AP dimension.  There is moderate bilateral   foraminal narrowing.       L4-L5: There is a circumferential disc bulge with facet and ligamentous   hypertrophy as well as prominent posterior epidural fat.  There is canal   stenosis measuring 9 mm in AP dimension.  There is moderate to severe   bilateral foraminal narrowing.       L5-S1: There is a circumferential disc bulge with facet and ligamentous   hypertrophy.  There is no canal stenosis or significant foraminal narrowing.           Impression   Multilevel degenerative disc disease with associated multilevel degenerative   facet hypertrophy resulting in canal stenosis at L2-3, L3-4, and L4-5.       Bilateral foraminal narrowing as described above. Clinical  impression:  1. Primary osteoarthritis of both knees    2. Lumbar back pain with radiculopathy affecting left lower extremity    3. S/P left knee surgery    4. Weakness of left leg    5.  Primary activities. I advised patient not to self escalate pain medications without consulting with us. At each of patient's future visits we will try to taper pain medications, while adjusting the adjunct medications, and re-evaluating for Physical Therapy to improve spinal and joint strength. We will continue to have discussions to decrease pain medications as tolerated. I also discussed with the patient regarding the dangers of combining narcotic pain medication with tranquilizers, alcohol or illegal drugs or taking the medication any other than prescribed. The dangers including the respiratory depression and death. Patient was told to tell  to all  physicians regarding the medications he is getting from pain clinic. Patient is warned not to take any unprescribed medications over-the-counter medications that can depress breathing . Patient is advised to talk to the pharmacist or physicians if planning to take any over-the-counter medications before  takeing them. Patient is strongly advised to avoid tranquilizers or  Relaxants for any medications that can depress breathing or recreational drugs. Patient is also advised to tell us if there is any changes in his medications from other physicians. We discussed the same at today's visit and have not been to implement it, as the patient's pain is not under control with current medications. Decision Making Process : Patient's health history and referral records thoroughly reviewed before focused physical examination and discussion with patient. Over 50% of today's visit is spent on examining the patient and counseling and coordinating the care. Level of complexity of date to be reviewed is Moderate. The chart date reviewed include the following: Imaging Reports. Summary of Care. Time spent reviewing with patient the below reports:   Medication safety, Treatment options.     Level of diagnosis and management options of this case is multiple: involving the following management options: Interventions as needed, medication management as appropriate, future visits, activity modification, heat/ice as needed, Urine drug screen as required. [x]The patient's questions were answered to the best of my abilities. This note was created using voice recognition software. There may be inaccuracies of transcription  that are inadvertently overlooked prior to the signature. There is any questions about the transcription please contact me. Return in  4 weeks  with physician / CNP  for further plan of treatment. Due to the COVID-19 pandemic and the appropriate interventions by Sekou Whatley, our non-urgent pain management patients will not be seen in the office at this time for their protection and the protection of our staff. To offer continuity of care, their prescriptions will be escribed this month after a careful chart review and review of their OARRS report  Pursuant to the emergency declaration under the [employer] and [facility], [facility] waiver authority and the "[employer] and [employer] Act, this Virtual Visit was conducted, with patient's consent, to reduce the patient's risk of exposure to COVID-19 and provide continuity of care for an established patient. Services were provided through a video synchronous discussion virtually to substitute for in-person appointment. \"  Documentation:  I communicated with the patient and/or health care decision maker about plan of care  Details of this discussion including any medical advice provided: Total Time: minutes: 21-30 minutes    I affirm this is a Patient Initiated Episode with an Established Patient who has not had a related appointment within my department in the past 7 days or scheduled within the next 24 hours.     Electronically signed by Nate Fofana MD on 8/26/2021 at 9:45 AM ``````````````````````````````````````````

## 2021-08-26 ENCOUNTER — HOSPITAL ENCOUNTER (OUTPATIENT)
Dept: PAIN MANAGEMENT | Age: 71
Discharge: HOME | End: 2021-08-26
Payer: MEDICARE

## 2021-08-26 DIAGNOSIS — R29.898 WEAKNESS OF LEFT LEG: ICD-10-CM

## 2021-08-26 DIAGNOSIS — Z98.890 S/P KNEE SURGERY: ICD-10-CM

## 2021-08-26 DIAGNOSIS — Z98.890 S/P LEFT KNEE SURGERY: ICD-10-CM

## 2021-08-26 DIAGNOSIS — M17.0 PRIMARY OSTEOARTHRITIS OF BOTH KNEES: Primary | ICD-10-CM

## 2021-08-26 DIAGNOSIS — M17.11 PRIMARY OSTEOARTHRITIS OF RIGHT KNEE: ICD-10-CM

## 2021-08-26 DIAGNOSIS — Z51.81 MEDICATION MONITORING ENCOUNTER: ICD-10-CM

## 2021-08-26 DIAGNOSIS — Z51.81 ENCOUNTER FOR MEDICATION MONITORING: ICD-10-CM

## 2021-08-26 DIAGNOSIS — M54.16 LUMBAR BACK PAIN WITH RADICULOPATHY AFFECTING LEFT LOWER EXTREMITY: ICD-10-CM

## 2021-08-26 DIAGNOSIS — M17.0 OSTEOARTHRITIS OF BOTH KNEES, UNSPECIFIED OSTEOARTHRITIS TYPE: ICD-10-CM

## 2021-08-26 DIAGNOSIS — M17.12 PRIMARY OSTEOARTHRITIS OF LEFT KNEE: ICD-10-CM

## 2021-08-26 DIAGNOSIS — M54.16 LUMBAR RADICULOPATHY: ICD-10-CM

## 2021-08-26 PROCEDURE — 99213 OFFICE O/P EST LOW 20 MIN: CPT

## 2021-08-26 PROCEDURE — 99214 OFFICE O/P EST MOD 30 MIN: CPT | Performed by: PAIN MEDICINE

## 2021-08-26 RX ORDER — MORPHINE SULFATE 30 MG/1
30 TABLET, FILM COATED, EXTENDED RELEASE ORAL EVERY 12 HOURS
Qty: 60 TABLET | Refills: 0 | Status: SHIPPED | OUTPATIENT
Start: 2021-08-22 | End: 2021-08-26 | Stop reason: SDUPTHER

## 2021-08-26 RX ORDER — MORPHINE SULFATE 30 MG/1
30 TABLET, FILM COATED, EXTENDED RELEASE ORAL EVERY 12 HOURS
Qty: 60 TABLET | Refills: 0 | Status: SHIPPED | OUTPATIENT
Start: 2021-08-26 | End: 2021-09-24 | Stop reason: SDUPTHER

## 2021-08-26 RX ORDER — OXYCODONE HYDROCHLORIDE AND ACETAMINOPHEN 5; 325 MG/1; MG/1
1 TABLET ORAL EVERY 6 HOURS PRN
Qty: 120 TABLET | Refills: 0 | Status: SHIPPED | OUTPATIENT
Start: 2021-08-29 | End: 2021-09-24 | Stop reason: SDUPTHER

## 2021-08-26 NOTE — ED TRIAGE NOTES
Pt to ED c/o R upper leg pain since yesterday. Pt AOx4. Denies trauma to leg. States if he is \"up walking on leg the pain spreads down to foot\". Patent airway, no dyspnea or cyanosis noted. Oxybutynin Counseling:  I discussed with the patient the risks of oxybutynin including but not limited to skin rash, drowsiness, dry mouth, difficulty urinating, and blurred vision.

## 2021-09-03 ENCOUNTER — TELEPHONE (OUTPATIENT)
Dept: FAMILY MEDICINE CLINIC | Age: 71
End: 2021-09-03

## 2021-09-03 NOTE — TELEPHONE ENCOUNTER
Yasmin Collado was contacted as a part of [employer]. A voicemail message was left reminding the patient to schedule an AWV with their PCP.

## 2021-09-07 ENCOUNTER — OFFICE VISIT (OUTPATIENT)
Dept: INTERNAL MEDICINE CLINIC | Age: 71
End: 2021-09-07
Payer: MEDICARE

## 2021-09-07 ENCOUNTER — HOSPITAL ENCOUNTER (OUTPATIENT)
Age: 71
Setting detail: SPECIMEN
Discharge: HOME | End: 2021-09-07
Payer: MEDICARE

## 2021-09-07 VITALS
WEIGHT: 214.4 LBS | TEMPERATURE: 97.2 F | HEART RATE: 69 BPM | DIASTOLIC BLOOD PRESSURE: 80 MMHG | RESPIRATION RATE: 18 BRPM | BODY MASS INDEX: 33.65 KG/M2 | OXYGEN SATURATION: 98 % | HEIGHT: 67 IN | SYSTOLIC BLOOD PRESSURE: 100 MMHG

## 2021-09-07 DIAGNOSIS — Z98.890 S/P KNEE SURGERY: ICD-10-CM

## 2021-09-07 DIAGNOSIS — N18.30 STAGE 3 CHRONIC KIDNEY DISEASE, UNSPECIFIED WHETHER STAGE 3A OR 3B CKD (HCC): Primary | ICD-10-CM

## 2021-09-07 DIAGNOSIS — F39 MOOD DISORDER (HCC): ICD-10-CM

## 2021-09-07 DIAGNOSIS — E78.5 DYSLIPIDEMIA: ICD-10-CM

## 2021-09-07 DIAGNOSIS — M54.16 LUMBAR BACK PAIN WITH RADICULOPATHY AFFECTING LEFT LOWER EXTREMITY: ICD-10-CM

## 2021-09-07 DIAGNOSIS — N18.30 STAGE 3 CHRONIC KIDNEY DISEASE, UNSPECIFIED WHETHER STAGE 3A OR 3B CKD (HCC): ICD-10-CM

## 2021-09-07 DIAGNOSIS — R73.03 PRE-DIABETES: ICD-10-CM

## 2021-09-07 DIAGNOSIS — M79.7 FIBROMYALGIA: ICD-10-CM

## 2021-09-07 DIAGNOSIS — F51.04 PSYCHOPHYSIOLOGICAL INSOMNIA: ICD-10-CM

## 2021-09-07 DIAGNOSIS — Z79.891 USE OF OPIATES FOR THERAPEUTIC PURPOSES: ICD-10-CM

## 2021-09-07 DIAGNOSIS — I10 ESSENTIAL HYPERTENSION: ICD-10-CM

## 2021-09-07 PROBLEM — R29.898 WEAKNESS OF LEFT LEG: Status: RESOLVED | Noted: 2018-02-06 | Resolved: 2021-09-07

## 2021-09-07 LAB
ANION GAP SERPL CALCULATED.3IONS-SCNC: 13 MMOL/L (ref 9–17)
BUN BLDV-MCNC: 11 MG/DL (ref 8–23)
BUN/CREAT BLD: ABNORMAL (ref 9–20)
CALCIUM SERPL-MCNC: 9 MG/DL (ref 8.6–10.4)
CHLORIDE BLD-SCNC: 101 MMOL/L (ref 98–107)
CHOLESTEROL/HDL RATIO: 3.5
CHOLESTEROL: 104 MG/DL
CO2: 25 MMOL/L (ref 20–31)
CREAT SERPL-MCNC: 1.09 MG/DL (ref 0.7–1.2)
GFR AFRICAN AMERICAN: >60 ML/MIN
GFR NON-AFRICAN AMERICAN: >60 ML/MIN
GFR SERPL CREATININE-BSD FRML MDRD: ABNORMAL ML/MIN/{1.73_M2}
GFR SERPL CREATININE-BSD FRML MDRD: ABNORMAL ML/MIN/{1.73_M2}
GLUCOSE BLD-MCNC: 116 MG/DL (ref 70–99)
HDLC SERPL-MCNC: 30 MG/DL
LDL CHOLESTEROL: 44 MG/DL (ref 0–130)
POTASSIUM SERPL-SCNC: 4.3 MMOL/L (ref 3.7–5.3)
SODIUM BLD-SCNC: 139 MMOL/L (ref 135–144)
TRIGL SERPL-MCNC: 149 MG/DL
TSH SERPL DL<=0.05 MIU/L-ACNC: 1.12 MIU/L (ref 0.3–5)
VLDLC SERPL CALC-MCNC: ABNORMAL MG/DL (ref 1–30)

## 2021-09-07 PROCEDURE — 99214 OFFICE O/P EST MOD 30 MIN: CPT | Performed by: FAMILY MEDICINE

## 2021-09-07 ASSESSMENT — PATIENT HEALTH QUESTIONNAIRE - PHQ9
SUM OF ALL RESPONSES TO PHQ QUESTIONS 1-9: 0
SUM OF ALL RESPONSES TO PHQ9 QUESTIONS 1 & 2: 0
1. LITTLE INTEREST OR PLEASURE IN DOING THINGS: 0
SUM OF ALL RESPONSES TO PHQ QUESTIONS 1-9: 0
2. FEELING DOWN, DEPRESSED OR HOPELESS: 0
SUM OF ALL RESPONSES TO PHQ QUESTIONS 1-9: 0

## 2021-09-07 ASSESSMENT — ENCOUNTER SYMPTOMS
BACK PAIN: 1
RESPIRATORY NEGATIVE: 1
GASTROINTESTINAL NEGATIVE: 1
EYES NEGATIVE: 1
ALLERGIC/IMMUNOLOGIC NEGATIVE: 1

## 2021-09-07 NOTE — PROGRESS NOTES
Subjective:      Patient ID: Lottie Ruth is a 79 y.o. male. Back Pain  This is a chronic problem. The current episode started more than 1 month ago. The problem occurs constantly. The problem has been waxing and waning since onset. The pain is present in the lumbar spine. The quality of the pain is described as cramping. The pain is at a severity of 6/10. The pain is severe. The pain is the same all the time. The symptoms are aggravated by stress, position and standing. Risk factors include sedentary lifestyle, poor posture and obesity. Treatments tried: Opiates, gabapentin as provided by pain management. The treatment provided moderate relief. Review of Systems   Constitutional: Negative. HENT: Negative. Eyes: Negative. Respiratory: Negative. Cardiovascular: Negative. Gastrointestinal: Negative. Endocrine: Negative. Musculoskeletal: Positive for arthralgias, back pain, gait problem and myalgias. Skin: Negative. Allergic/Immunologic: Negative. Hematological: Negative. Psychiatric/Behavioral: Positive for dysphoric mood. The patient is nervous/anxious. Past family and social history unremarkable. Diagnosis Orders   1. Stage 3 chronic kidney disease, unspecified whether stage 3a or 3b CKD (HCC)  Basic Metabolic Panel    Lipid Panel    TSH without Reflex   2. S/P knee surgery     3. Use of opiates for therapeutic purposes     4. Pre-diabetes  Basic Metabolic Panel    Lipid Panel    TSH without Reflex   5. Dyslipidemia  Basic Metabolic Panel    Lipid Panel    TSH without Reflex   6. Essential hypertension     7. Psychophysiological insomnia     8. Fibromyalgia     9. Mood disorder ([facility] [address]     10. Lumbar back pain with radiculopathy affecting left lower extremity         Objective:   Physical Exam  Vitals and nursing note reviewed. Constitutional:       Appearance: He is well-developed. HENT:      Head: Normocephalic and atraumatic.       Right Ear: External ear normal.      Left Ear: External ear normal.      Nose: Nose normal.   Eyes:      Conjunctiva/sclera: Conjunctivae normal.      Pupils: Pupils are equal, round, and reactive to light. Cardiovascular:      Rate and Rhythm: Normal rate and regular rhythm. Heart sounds: Normal heart sounds. Comments: Hypertension  Impaired fasting glucose  Hyperlipidemia  Pulmonary:      Effort: Pulmonary effort is normal.      Breath sounds: Normal breath sounds. Abdominal:      General: Bowel sounds are normal.      Palpations: Abdomen is soft. Genitourinary:     Comments: CKD 3  Musculoskeletal:         General: Normal range of motion. Cervical back: Normal range of motion and neck supple. Comments: Degenerative spondylosis without any sign of myelopathy  Status post bilateral total knee replacement. Chronic pain syndrome. Opiate and gabapentin dependent   Skin:     General: Skin is warm and dry. Neurological:      Mental Status: He is alert and oriented to person, place, and time. Deep Tendon Reflexes: Reflexes are normal and symmetric. Comments: Fibromyalgia   Psychiatric:      Comments: Anxiety/depression         Assessment:       Diagnosis Orders   1. Stage 3 chronic kidney disease, unspecified whether stage 3a or 3b CKD (HCC)  Basic Metabolic Panel    Lipid Panel    TSH without Reflex   2. S/P knee surgery     3. Use of opiates for therapeutic purposes     4. Pre-diabetes  Basic Metabolic Panel    Lipid Panel    TSH without Reflex   5. Dyslipidemia  Basic Metabolic Panel    Lipid Panel    TSH without Reflex   6. Essential hypertension     7. Psychophysiological insomnia     8. Fibromyalgia     9. Mood disorder ([address]     10. Lumbar back pain with radiculopathy affecting left lower extremity             Plan:      59-year-old -American male with underlying history significant for degenerative spondylosis, chronic bilateral knee pain status post bilateral total knee arthroplasty.   He is on Percocet, gabapentin as provided by pain management. He continues to complain of ongoing pain. Reassurance provided. He is advised to follow-up with pain management and orthopedic. I offered a walking cane that he declined. Fall precaution is advised  Right plantar fasciitis. Patient states that he is seeing a podiatrist who is ordered an MRI. Wear comfortable shoes  Impaired fasting glucose with A1c of 6.2. He would benefit from low-fat high-fiber diet, daily moderate exercise and lifestyle change and weight reduction to keep BMI around 25  CKD 3 with creatinine of 1.29. He is advised to stop all anti-inflammatory prescription or over-the-counter. May take over-the-counter Tylenol/Percocet and gabapentin  Hyperlipidemia on statin that he is tolerating well. Anxiety/depression with underlying history of chronic pain syndrome. Continue Depakote  Hypertension well-controlled to Aldactone. He is advised to stop clonidine. Maintain oral hydration  Medically stable advised to continue  Further recommendations to follow updated labs  Call for any concern  This note is created with a voice recognition program and while intend to generate a document that accurately reflects the content of the visit, no guarantee can be provided that every mistake has been identified and corrected by editing.           Karyle Gill, MD

## 2021-09-22 PROBLEM — G89.29 BILATERAL CHRONIC KNEE PAIN: Status: ACTIVE | Noted: 2021-09-22

## 2021-09-22 PROBLEM — M25.561 BILATERAL CHRONIC KNEE PAIN: Status: ACTIVE | Noted: 2021-09-22

## 2021-09-22 PROBLEM — M25.562 BILATERAL CHRONIC KNEE PAIN: Status: ACTIVE | Noted: 2021-09-22

## 2021-09-22 PROBLEM — M17.0 OSTEOARTHRITIS OF BOTH KNEES: Status: ACTIVE | Noted: 2021-09-22

## 2021-09-24 ENCOUNTER — HOSPITAL ENCOUNTER (OUTPATIENT)
Dept: PAIN MANAGEMENT | Age: 71
Discharge: HOME | End: 2021-09-24
Payer: MEDICARE

## 2021-09-24 DIAGNOSIS — M54.16 LUMBAR BACK PAIN WITH RADICULOPATHY AFFECTING LEFT LOWER EXTREMITY: ICD-10-CM

## 2021-09-24 DIAGNOSIS — G89.29 BILATERAL CHRONIC KNEE PAIN: ICD-10-CM

## 2021-09-24 DIAGNOSIS — M25.562 BILATERAL CHRONIC KNEE PAIN: ICD-10-CM

## 2021-09-24 DIAGNOSIS — M17.12 PRIMARY OSTEOARTHRITIS OF LEFT KNEE: ICD-10-CM

## 2021-09-24 DIAGNOSIS — M79.7 FIBROMYALGIA: ICD-10-CM

## 2021-09-24 DIAGNOSIS — M17.0 PRIMARY OSTEOARTHRITIS OF BOTH KNEES: ICD-10-CM

## 2021-09-24 DIAGNOSIS — M25.562 CHRONIC PAIN OF LEFT KNEE: ICD-10-CM

## 2021-09-24 DIAGNOSIS — Z98.890 S/P KNEE SURGERY: Primary | ICD-10-CM

## 2021-09-24 DIAGNOSIS — Z98.890 S/P KNEE SURGERY: ICD-10-CM

## 2021-09-24 DIAGNOSIS — G89.29 CHRONIC PAIN OF LEFT KNEE: ICD-10-CM

## 2021-09-24 DIAGNOSIS — M25.561 BILATERAL CHRONIC KNEE PAIN: ICD-10-CM

## 2021-09-24 DIAGNOSIS — M17.11 PRIMARY OSTEOARTHRITIS OF RIGHT KNEE: ICD-10-CM

## 2021-09-24 DIAGNOSIS — Z51.81 MEDICATION MONITORING ENCOUNTER: ICD-10-CM

## 2021-09-24 DIAGNOSIS — Z79.891 USE OF OPIATES FOR THERAPEUTIC PURPOSES: ICD-10-CM

## 2021-09-24 DIAGNOSIS — Z98.890 S/P LEFT KNEE SURGERY: ICD-10-CM

## 2021-09-24 DIAGNOSIS — R29.898 WEAKNESS OF LEFT LEG: ICD-10-CM

## 2021-09-24 DIAGNOSIS — Z51.81 ENCOUNTER FOR MEDICATION MONITORING: ICD-10-CM

## 2021-09-24 DIAGNOSIS — M17.0 OSTEOARTHRITIS OF BOTH KNEES, UNSPECIFIED OSTEOARTHRITIS TYPE: ICD-10-CM

## 2021-09-24 PROCEDURE — 99213 OFFICE O/P EST LOW 20 MIN: CPT

## 2021-09-24 PROCEDURE — 99213 OFFICE O/P EST LOW 20 MIN: CPT | Performed by: NURSE PRACTITIONER

## 2021-09-24 RX ORDER — GABAPENTIN 600 MG/1
TABLET ORAL
Qty: 90 TABLET | Refills: 2 | Status: SHIPPED | OUTPATIENT
Start: 2021-09-27 | End: 2021-12-22 | Stop reason: SDUPTHER

## 2021-09-24 RX ORDER — MORPHINE SULFATE 30 MG/1
30 TABLET, FILM COATED, EXTENDED RELEASE ORAL EVERY 12 HOURS
Qty: 60 TABLET | Refills: 0 | Status: SHIPPED | OUTPATIENT
Start: 2021-09-28 | End: 2021-10-22 | Stop reason: SDUPTHER

## 2021-09-24 RX ORDER — OXYCODONE HYDROCHLORIDE AND ACETAMINOPHEN 5; 325 MG/1; MG/1
1 TABLET ORAL EVERY 6 HOURS PRN
Qty: 120 TABLET | Refills: 0 | Status: SHIPPED | OUTPATIENT
Start: 2021-09-28 | End: 2021-10-22 | Stop reason: SDUPTHER

## 2021-09-24 ASSESSMENT — ENCOUNTER SYMPTOMS
EYES NEGATIVE: 1
GASTROINTESTINAL NEGATIVE: 1
RESPIRATORY NEGATIVE: 1

## 2021-09-24 NOTE — PROGRESS NOTES
Francisca 89 PROGRESS NOTE      Patient  completed [x]  video visit   []   phone call:         Minutes :       [x]    to  review Medication Agreement    []  Follow up after procedure   []  Discuss treatment options      Location:  Provider:  working from    [x]    home    []   [facility] - ME[facility]O NICOLE ,   patient at home       Chief Complaint: knee pain    He c/o bilateral knee pain. He has history of multiple surgeries on left knee  And anarthroplasty and he had  right knee arthroplsty about 3 years ago. He has upcoming  appt with PA at [facility] because of right knee pain increase. He is now using a cane. He states goes to the gym,. He states has right foot pain and is seeing a podiatrist.He states his podiatrist ordered an mRI of his right foot but the insurance co denied it. He is not sleeping well due to the pain. He has to take a nap during the day. He is frustrated about the pain in his right knee  Knee Pain   The incident occurred more than 1 week ago. There was no injury mechanism. The pain is present in the left knee and right knee. Quality: sharp  The pain is at a severity of 8/10. The pain is severe. The pain has been worsening since onset. Associated symptoms include a loss of motion and muscle weakness. Associated symptoms comments: Loss of ROM left knee. Foreign body present: left and right knee arthroplasty. Exacerbated by: steps. He has tried ice and elevation for the symptoms.        Treatment goals:  Functional status: get some comfort    Aberrancy:   Any alcoholic beverages  no          Any illegal drugs no        Analgesia:      8              Adverse  Effects :no      ADL;s :goes to gym       Data:    When was thelast UDS:   8-2020         Was the UDS appropriate:  [] yes []   no      Record/Diagnostics Review:      As above, I did review the imaging       /10/2020 10:37 PM - Julius, Estefani Incoming Lab Results From [facility]    Component Value Ref Range & Units Status Collected Lab   Pain Management Drug Panel Interp, Urine Consistent   Final 08/07/2020 11:24 AM [facility]   (NOTE)   ________________________________________________________________   DRUGS EXPECTED:   OXYCODONE   MORPHINE   ________________________________________________________________   CONSISTENT with medications provided:   OXYCODONE: based on oxycodone, noroxycodone, oxymorphone   MORPHINE: based on morphine   ________________________________________________________________   INTERPRETIVE INFORMATION: Pain Mgt [place], Mass Spec/EMIT, Ur,                            Interp   Interpretation depends on accuracy and completeness of patient   medication information submitted by client. 6-Acetylmorphine, Ur Not Detected   Final         EXAMINATION:   TWO XRAY VIEWS OF THE RIGHT KNEE       7/9/2021 12:02 pm       COMPARISON:   11 October 2020       HISTORY:   ORDERING SYSTEM PROVIDED HISTORY: Internal derangement of multiple sites of   right knee   TECHNOLOGIST PROVIDED HISTORY:   Acuity: Chronic   Type of Exam: Ongoing       FINDINGS:   A total knee arthroplasty is noted in satisfactory alignment position without   acute fracture, dislocation or effusion.           Impression   Status post total knee arthroplasty without hardware complication or acute   osseous abnormality.                     Pill count: appropriate    fill date : 9-    Morphine equivalent dose as reported on OARRS:  89 has narcan at home  Periodic Controlled Substance Monitoring: Possible medication side effects, risk of tolerance/dependence & alternative treatments discussed., No signs of potential drug abuse or diversion identified. , Assessed functional status., Obtaining appropriate analgesic effect of treatment. (QUIQUE Crwaley CNP)  Chronic Pain > 80 MEDD: Co-prescribed Naloxone., Obtained or confirmed \"Medication Contract\" on file. Kidd Finch, APRN - CNP)  Review ofOARRS does not show any aberrant prescription behavior.  Medication is Activity    Alcohol use: No    Drug use: No    Sexual activity: Never   Other Topics Concern    Not on file   Social History Narrative    Not on file     Social Determinants of Health     Financial Resource Strain: Low Risk     Difficulty of Paying Living Expenses: Not hard at all   Food Insecurity: No Food Insecurity    Worried About Running Out of Food in the Last Year: Never true    [address] in the Last Year: Never true   Transportation Needs:     Lack of Transportation (Medical):  Lack of Transportation (Non-Medical):    Physical Activity:     Days of Exercise per Week:     Minutes of Exercise per Session:    Stress:     Feeling of Stress :    Social Connections:     Frequency of Communication with Friends and Family:     Frequency of Social Gatherings with Friends and Family:     Attends [employer] Services:     Active Member of Clubs or Organizations:     Attends Club or Organization Meetings:     Marital Status:    Intimate Partner Violence:     Fear of Current or Ex-Partner:     Emotionally Abused:     Physically Abused:     Sexually Abused:          Review of Systems:  Review of Systems   Constitutional: Negative. HENT: Negative. Eyes: Negative. Glasses   Cardiovascular: Negative. Respiratory: Negative. Endocrine: Negative. Hematologic/Lymphatic: Negative. Skin: Negative. Musculoskeletal: Positive for joint pain. Gastrointestinal: Negative. Genitourinary: Negative. Neurological: Positive for loss of balance. Uses a cane    Psychiatric/Behavioral: Negative. Physical Exam:  There were no vitals taken for this visit. Physical Exam  Skin:         Neurological:      Mental Status: He is alert and oriented to person, place, and time. Psychiatric:         Mood and Affect: Mood normal.         Thought Content:  Thought content normal.           Assessment:    Problem List Items Addressed This Visit     Use of opiates for death. Patient was told to tell  all  physicians regarding the medications he is getting from pain clinic. Patient is warned not to take any unprescribed medications over-the-countermedications that can depress breathing . Patient is advised to talk to the pharmacist or physicians if planning to take any over-the-counter medications before  takeing them. Patient is strongly advised to avoid tranquilizers or  relaxants, illegal drugs  or any medications that can depress breathing  Patient is also advised to tell us if there is any changes in their medications from other physicians.     1. UDT, instructed to get done by tomorrow        TREATMENT OPTIONS:     UDT  Medication Agreement Requirements Met  Continue Opioid therapy  Script written for  Ms contin, percocet, gabapentin  Follow up appointment made

## 2021-09-27 RX ORDER — CLONIDINE HYDROCHLORIDE 0.1 MG/1
TABLET ORAL
Qty: 180 TABLET | Refills: 0 | OUTPATIENT
Start: 2021-09-27

## 2021-09-27 NOTE — TELEPHONE ENCOUNTER
Mike Martinez is calling to request a refill on the following medication(s):    Medication Request:  Requested Prescriptions     Pending Prescriptions Disp Refills    cloNIDine (CATAPRES) 0.1 MG tablet [Pharmacy Med Name: cloNIDine HCL 0.1MG TABLET] 180 tablet 0     Sig: TAKE ONE TABLET BY MOUTH TWICE A DAY     Told at last terese pt was not on    Last Visit Date (If Applicable):  6/2/9118    Next Visit Date:    10/8/2021

## 2021-10-08 ENCOUNTER — OFFICE VISIT (OUTPATIENT)
Dept: INTERNAL MEDICINE CLINIC | Age: 71
End: 2021-10-08
Payer: MEDICARE

## 2021-10-08 VITALS
WEIGHT: 221 LBS | TEMPERATURE: 97.3 F | HEART RATE: 72 BPM | DIASTOLIC BLOOD PRESSURE: 78 MMHG | HEIGHT: 67 IN | BODY MASS INDEX: 34.69 KG/M2 | OXYGEN SATURATION: 98 % | SYSTOLIC BLOOD PRESSURE: 122 MMHG | RESPIRATION RATE: 16 BRPM

## 2021-10-08 DIAGNOSIS — I10 ESSENTIAL HYPERTENSION: ICD-10-CM

## 2021-10-08 DIAGNOSIS — F39 MOOD DISORDER (HCC): ICD-10-CM

## 2021-10-08 DIAGNOSIS — F51.04 PSYCHOPHYSIOLOGICAL INSOMNIA: ICD-10-CM

## 2021-10-08 DIAGNOSIS — N18.30 STAGE 3 CHRONIC KIDNEY DISEASE, UNSPECIFIED WHETHER STAGE 3A OR 3B CKD (HCC): ICD-10-CM

## 2021-10-08 DIAGNOSIS — Z23 NEED FOR INFLUENZA VACCINATION: ICD-10-CM

## 2021-10-08 DIAGNOSIS — Z98.890 S/P KNEE SURGERY: ICD-10-CM

## 2021-10-08 DIAGNOSIS — M54.16 LUMBAR BACK PAIN WITH RADICULOPATHY AFFECTING LEFT LOWER EXTREMITY: Primary | ICD-10-CM

## 2021-10-08 DIAGNOSIS — E78.5 DYSLIPIDEMIA: ICD-10-CM

## 2021-10-08 DIAGNOSIS — M79.7 FIBROMYALGIA: ICD-10-CM

## 2021-10-08 DIAGNOSIS — R73.03 PRE-DIABETES: ICD-10-CM

## 2021-10-08 DIAGNOSIS — Z79.891 USE OF OPIATES FOR THERAPEUTIC PURPOSES: ICD-10-CM

## 2021-10-08 PROBLEM — M25.561 BILATERAL CHRONIC KNEE PAIN: Status: RESOLVED | Noted: 2021-09-22 | Resolved: 2021-10-08

## 2021-10-08 PROBLEM — M25.562 BILATERAL CHRONIC KNEE PAIN: Status: RESOLVED | Noted: 2021-09-22 | Resolved: 2021-10-08

## 2021-10-08 PROBLEM — M17.0 OSTEOARTHRITIS OF BOTH KNEES: Status: RESOLVED | Noted: 2021-09-22 | Resolved: 2021-10-08

## 2021-10-08 PROBLEM — G89.29 BILATERAL CHRONIC KNEE PAIN: Status: RESOLVED | Noted: 2021-09-22 | Resolved: 2021-10-08

## 2021-10-08 PROCEDURE — 90694 VACC AIIV4 NO PRSRV 0.5ML IM: CPT | Performed by: FAMILY MEDICINE

## 2021-10-08 PROCEDURE — 99214 OFFICE O/P EST MOD 30 MIN: CPT | Performed by: FAMILY MEDICINE

## 2021-10-08 PROCEDURE — G0008 ADMIN INFLUENZA VIRUS VAC: HCPCS | Performed by: FAMILY MEDICINE

## 2021-10-08 RX ORDER — DICLOFENAC SODIUM 1 MG/ML
1 SOLUTION/ DROPS OPHTHALMIC 4 TIMES DAILY
COMMUNITY
End: 2021-10-22

## 2021-10-08 ASSESSMENT — ENCOUNTER SYMPTOMS
RESPIRATORY NEGATIVE: 1
GASTROINTESTINAL NEGATIVE: 1
BACK PAIN: 1
ALLERGIC/IMMUNOLOGIC NEGATIVE: 1
EYES NEGATIVE: 1

## 2021-10-08 NOTE — PROGRESS NOTES
Subjective:      Patient ID: Nicholas Hurley is a 79 y.o. male. Back Pain  This is a chronic problem. The current episode started more than 1 month ago. The problem occurs constantly. The problem has been waxing and waning since onset. The pain is present in the lumbar spine. The quality of the pain is described as aching and cramping. The pain radiates to the right thigh. The pain is at a severity of 6/10. The pain is severe. The pain is the same all the time. The symptoms are aggravated by position and standing. Associated symptoms include weakness. Risk factors include obesity, poor posture and sedentary lifestyle. Treatments tried: Opiates, gabapentin, topical anti-inflammatory. The treatment provided moderate relief. Review of Systems   Constitutional: Negative. HENT: Negative. Eyes: Negative. Respiratory: Negative. Cardiovascular: Negative. Gastrointestinal: Negative. Endocrine: Negative. Musculoskeletal: Positive for arthralgias, back pain and gait problem. Skin: Negative. Allergic/Immunologic: Negative. Neurological: Positive for weakness. Hematological: Negative. Psychiatric/Behavioral: Positive for dysphoric mood. The patient is nervous/anxious. Past family and social history unremarkable. Diagnosis Orders   1. Lumbar back pain with radiculopathy affecting left lower extremity     2. S/P knee surgery     3. Use of opiates for therapeutic purposes     4. Pre-diabetes     5. Dyslipidemia     6. Essential hypertension     7. Psychophysiological insomnia     8. Fibromyalgia     9. Mood disorder ([address]     10. Stage 3 chronic kidney disease, unspecified whether stage 3a or 3b CKD ([facility] [address]           Objective:   Physical Exam  Vitals and nursing note reviewed. Constitutional:       Appearance: He is well-developed. HENT:      Head: Normocephalic and atraumatic.       Right Ear: External ear normal.      Left Ear: External ear normal.      Nose: Nose normal.   Eyes: Conjunctiva/sclera: Conjunctivae normal.      Pupils: Pupils are equal, round, and reactive to light. Cardiovascular:      Rate and Rhythm: Normal rate and regular rhythm. Heart sounds: Normal heart sounds. Comments: Hypertension  Hyperlipidemia  Pulmonary:      Effort: Pulmonary effort is normal.      Breath sounds: Normal breath sounds. Abdominal:      General: Bowel sounds are normal.      Palpations: Abdomen is soft. Genitourinary:     Comments: CKD 3  Musculoskeletal:         General: Normal range of motion. Cervical back: Normal range of motion and neck supple. Comments: Degenerative spondylosis. He is on gabapentin opiate and Voltaren gel as provided by pain management and orthopedic services   Skin:     General: Skin is warm and dry. Neurological:      Mental Status: He is alert and oriented to person, place, and time. Deep Tendon Reflexes: Reflexes are normal and symmetric. Comments: Fibromyalgia   Psychiatric:      Comments: Anxiety/depression         Assessment:       Diagnosis Orders   1. Lumbar back pain with radiculopathy affecting left lower extremity     2. S/P knee surgery     3. Use of opiates for therapeutic purposes     4. Pre-diabetes     5. Dyslipidemia     6. Essential hypertension     7. Psychophysiological insomnia     8. Fibromyalgia     9. Mood disorder ([facility] [address]     10. Stage 3 chronic kidney disease, unspecified whether stage 3a or 3b CKD ([facility] [address]             Plan:      77-year-old -American male with known history of chronic back pain who is established with pain management on opiates and gabapentin. He is also on Voltaren gel as provided by his orthopedic service. He ambulates with a cane. Fall precautions advised. Encouragement provided. Prediabetes with A1c of 6.2 which is modest worsening over baseline of 5.6. He would benefit from low fat high-fiber diet, daily moderate exercise, weight reduction  Fibromyalgia.   Improved stress reduction, improve sleep hygiene. Continue gabapentin  Hyperlipidemia/metabolic syndrome. Continue Lipitor  CKD with creatinine of 1.09 which is progressive improvement. Avoid nephrotoxic drugs, anti-inflammatory radiocontrast.  Maintain oral hydration  Hemodynamically stable  Insomnia. Continue melatonin  Hypertension well-controlled to Aldactone. Consume less than 2 g of salt a day  Anxiety/depression with underlying chronic pain syndrome. Continue Depakote  He is updated on COVID-19 vaccination. He will be provided with influenza vaccination  Med list and available labs reviewed, discussed with patient, questions answered  He is encouraged to call for any concern  This note is created with a voice recognition program and while intend to generate a document that accurately reflects the content of the visit, no guarantee can be provided that every mistake has been identified and corrected by editing.           Usama Solano MD

## 2021-10-22 ENCOUNTER — HOSPITAL ENCOUNTER (OUTPATIENT)
Dept: PAIN MANAGEMENT | Age: 71
Discharge: HOME | End: 2021-10-22
Payer: MEDICARE

## 2021-10-22 DIAGNOSIS — M17.0 OSTEOARTHRITIS OF BOTH KNEES, UNSPECIFIED OSTEOARTHRITIS TYPE: ICD-10-CM

## 2021-10-22 DIAGNOSIS — M17.0 PRIMARY OSTEOARTHRITIS OF BOTH KNEES: ICD-10-CM

## 2021-10-22 DIAGNOSIS — M54.16 LUMBAR BACK PAIN WITH RADICULOPATHY AFFECTING LEFT LOWER EXTREMITY: Primary | ICD-10-CM

## 2021-10-22 DIAGNOSIS — Z51.81 ENCOUNTER FOR MEDICATION MONITORING: ICD-10-CM

## 2021-10-22 DIAGNOSIS — R29.898 WEAKNESS OF LEFT LEG: ICD-10-CM

## 2021-10-22 DIAGNOSIS — Z51.81 MEDICATION MONITORING ENCOUNTER: ICD-10-CM

## 2021-10-22 DIAGNOSIS — Z98.890 S/P LEFT KNEE SURGERY: ICD-10-CM

## 2021-10-22 PROCEDURE — 99213 OFFICE O/P EST LOW 20 MIN: CPT | Performed by: NURSE PRACTITIONER

## 2021-10-22 PROCEDURE — 99213 OFFICE O/P EST LOW 20 MIN: CPT

## 2021-10-22 RX ORDER — OXYCODONE HYDROCHLORIDE AND ACETAMINOPHEN 5; 325 MG/1; MG/1
1 TABLET ORAL EVERY 6 HOURS PRN
Qty: 120 TABLET | Refills: 0 | Status: SHIPPED | OUTPATIENT
Start: 2021-10-28 | End: 2021-11-23 | Stop reason: SDUPTHER

## 2021-10-22 RX ORDER — MORPHINE SULFATE 30 MG/1
30 TABLET, FILM COATED, EXTENDED RELEASE ORAL EVERY 12 HOURS
Qty: 60 TABLET | Refills: 0 | Status: SHIPPED | OUTPATIENT
Start: 2021-10-28 | End: 2021-11-23 | Stop reason: SDUPTHER

## 2021-10-22 ASSESSMENT — ENCOUNTER SYMPTOMS
CONSTIPATION: 0
SHORTNESS OF BREATH: 0
COUGH: 0
BACK PAIN: 1

## 2021-10-22 NOTE — PROGRESS NOTES
Patient completed a video visit today to review medication contract. Chief Complaint: bilateral knee pain, back pain    PMH  Pt reports bilat. Knee pain with no known injury, and has had multiple surgeries/procedures on left knee but pain continues. He is opioid dependant for pain control. He has had PT in the past with no relief. Discussed genicular blocks and he is thinking about it. He also complains of back pain. MRI lumbar with multilevel degenerative disc disease. He has had LESI with 60% relief - last injection was 5/2020. He completed PT for his back with some benefit. He has been using Voltaren gel with some relief. Back Pain  This is a chronic problem. The current episode started more than 1 year ago. The problem occurs constantly. The problem is unchanged. The pain is present in the lumbar spine. The quality of the pain is described as aching. The pain is mild. The symptoms are aggravated by position. Pertinent negatives include no chest pain or fever. He has tried analgesics (PT) for the symptoms. The treatment provided significant relief. Knee Pain   The incident occurred more than 1 week ago. There was no injury mechanism. The pain is present in the left knee and right knee. The quality of the pain is described as aching and burning. The pain is at a severity of 7/10. The pain is moderate. The pain has been constant since onset. The symptoms are aggravated by weight bearing. He has tried non-weight bearing and rest for the symptoms. The treatment provided mild relief. Patient denies any new neurological symptoms. No bowel or bladder incontinence, no weakness, and no falling.     Pill count: appropriate due 10/28    Morphine equivalent: 90    Periodic Controlled Substance Monitoring: Possible medication side effects, risk of tolerance/dependence & alternative treatments discussed., No signs of potential drug abuse or diversion identified., Obtaining appropriate analgesic effect of treatment. Westley Galicia, APRN - CNP)  Chronic Pain > 80 MEDD: Co-prescribed Naloxone. Sariah Shelton APRN - CNP)      Past Medical History:   Diagnosis Date    Arthritis     Left knee pain 4/9/2014    Teeth problem     abcessw       Past Surgical History:   Procedure Laterality Date    CARPAL TUNNEL RELEASE Bilateral     DENTAL SURGERY  10/2015    DENTAL SURGERY  03/2021    JOINT REPLACEMENT Left     KNEE  X 6 pt unsure of date    JOINT REPLACEMENT Right 07/20/2018    ROTATOR CUFF REPAIR Right     TONSILLECTOMY         Allergies   Allergen Reactions    Oxycontin [Oxycodone Hcl] Nausea Only         Current Outpatient Medications:     diclofenac (VOLTAREN) 0.1 % ophthalmic solution, 1 drop 4 times daily, Disp: , Rfl:     oxyCODONE-acetaminophen (PERCOCET) 5-325 MG per tablet, Take 1 tablet by mouth every 6 hours as needed for Pain for up to 30 days. , Disp: 120 tablet, Rfl: 0    morphine (MS CONTIN) 30 MG extended release tablet, Take 1 tablet by mouth every 12 hours for 30 days. , Disp: 60 tablet, Rfl: 0    gabapentin (NEURONTIN) 600 MG tablet, 1 tab  tid, Disp: 90 tablet, Rfl: 2    spironolactone (ALDACTONE) 50 MG tablet, TAKE ONE TABLET BY MOUTH DAILY, Disp: 90 tablet, Rfl: 1    divalproex (DEPAKOTE) 125 MG DR tablet, TAKE ONE TABLET BY MOUTH TWICE A DAY, Disp: 180 tablet, Rfl: 0    atorvastatin (LIPITOR) 40 MG tablet, TAKE ONE TABLET BY MOUTH DAILY, Disp: 90 tablet, Rfl: 0    melatonin 3 MG TABS tablet, Take 6 mg by mouth nightly as needed (insomnia), Disp: , Rfl:     Family History   Problem Relation Age of Onset    Diabetes Mother     No Known Problems Father        Social History     Socioeconomic History    Marital status:       Spouse name: Not on file    Number of children: Not on file    Years of education: Not on file    Highest education level: Not on file   Occupational History    Occupation: retired   Tobacco Use    Smoking status: Never Smoker    Smokeless tobacco: Never Used   Vaping Use    Vaping Use: Never used   Substance and Sexual Activity    Alcohol use: No    Drug use: No    Sexual activity: Never   Other Topics Concern    Not on file   Social History Narrative    Not on file     Social Determinants of Health     Financial Resource Strain: Low Risk     Difficulty of Paying Living Expenses: Not hard at all   Food Insecurity: No Food Insecurity    Worried About Running Out of Food in the Last Year: Never true    Priscilla of Food in the Last Year: Never true   Transportation Needs:     Lack of Transportation (Medical):  Lack of Transportation (Non-Medical):    Physical Activity:     Days of Exercise per Week:     Minutes of Exercise per Session:    Stress:     Feeling of Stress :    Social Connections:     Frequency of Communication with Friends and Family:     Frequency of Social Gatherings with Friends and Family:     Attends Confucianism Services:     Active Member of Clubs or Organizations:     Attends Club or Organization Meetings:     Marital Status:    Intimate Partner Violence:     Fear of Current or Ex-Partner:     Emotionally Abused:     Physically Abused:     Sexually Abused:        Review of Systems:  Review of Systems   Constitutional: Negative for chills and fever. Cardiovascular: Negative for chest pain and palpitations. Respiratory: Negative for cough and shortness of breath. Musculoskeletal: Positive for back pain and joint pain. Gastrointestinal: Negative for constipation. Neurological: Negative for disturbances in coordination and loss of balance. Physical Exam:  There were no vitals taken for this visit. Physical Exam  HENT:      Head: Normocephalic. Pulmonary:      Effort: Pulmonary effort is normal.   Neurological:      Mental Status: He is alert.    Psychiatric:         Mood and Affect: Mood normal.         Behavior: Behavior normal.         Record/Diagnostics Review:    Last dominga 8/2020 and was appropriate - UDS ordered last month but not completed    ABERRANT BEHAVIORS SINCE LAST VISIT  Lost rx/pills:------------------------------------------ no  Taking  medication as prescribed: ----------- yes  Urine Drug Screen ---------------------------------  yes             Date------------------------------------------------- 8/10/2020              Results as expected ---------------------yes     Recent ER visits: -------------------------------------No  Pill count is appropriate: ---------------------------yes   Refills for prescriptions appropriate:---------- yes    Assessment:  Problem List Items Addressed This Visit     Lumbar back pain with radiculopathy affecting left lower extremity - Primary    Relevant Medications    oxyCODONE-acetaminophen (PERCOCET) 5-325 MG per tablet (Start on 10/28/2021)    morphine (MS CONTIN) 30 MG extended release tablet (Start on 10/28/2021)      Other Visit Diagnoses     Primary osteoarthritis of both knees        Relevant Medications    oxyCODONE-acetaminophen (PERCOCET) 5-325 MG per tablet (Start on 10/28/2021)    morphine (MS CONTIN) 30 MG extended release tablet (Start on 10/28/2021)    S/P left knee surgery        Relevant Medications    oxyCODONE-acetaminophen (PERCOCET) 5-325 MG per tablet (Start on 10/28/2021)    Weakness of left leg        Relevant Medications    oxyCODONE-acetaminophen (PERCOCET) 5-325 MG per tablet (Start on 10/28/2021)    Medication monitoring encounter        Relevant Medications    oxyCODONE-acetaminophen (PERCOCET) 5-325 MG per tablet (Start on 10/28/2021)    Osteoarthritis of both knees, unspecified osteoarthritis type        Relevant Medications    oxyCODONE-acetaminophen (PERCOCET) 5-325 MG per tablet (Start on 10/28/2021)    morphine (MS CONTIN) 30 MG extended release tablet (Start on 10/28/2021)    Encounter for medication monitoring        Relevant Medications    oxyCODONE-acetaminophen (PERCOCET) 5-325 MG per tablet (Start on 10/28/2021)             Treatment Plan:  Patient relates current medications are helping the pain. Patient reports taking pain medications as prescribed, denies obtaining medications from different sources and denies use of illegal drugs. Patient denies side effects from medications like nausea, vomiting, constipation or drowsiness. Patient reports current activities of daily living are possible due to medications and would like to continue them. As always, we encourage daily stretching and strengthening exercises, and recommend minimizing use of pain medications unless patient cannot get through daily activities due to pain. Contract requirements met. Continue opioid therapy. Script written for percocet and MSER  Pt needs to complete UDS  Follow up appointment made for 4 weeks    Judie Cruz, was evaluated through a synchronous (real-time) audio-video encounter. The patient (or guardian if applicable) is aware that this is a billable service. Verbal consent to proceed has been obtained within the past 12 months. The visit was conducted pursuant to the emergency declaration under the [address] [place] [address], [address] [place] [address] authority and the [employer] Act. Patient identification was verified, and a caregiver was present when appropriate. The patient was located in a state where the provider was credentialed to provide care. Total time spent for this encounter: Not billed by time    --QUIQUE Bailey CNP on 10/22/2021 at 10:39 AM    An electronic signature was used to authenticate this note.

## 2021-10-25 RX ORDER — DIVALPROEX SODIUM 125 MG/1
TABLET, DELAYED RELEASE ORAL
Qty: 180 TABLET | Refills: 1 | Status: SHIPPED | OUTPATIENT
Start: 2021-10-25 | End: 2022-09-06

## 2021-10-25 NOTE — TELEPHONE ENCOUNTER
Nicholas Hurley is calling to request a refill on the following medication(s):    Medication Request:    Last filled 7/26/21 90 days with 0 RF    Requested Prescriptions     Pending Prescriptions Disp Refills    divalproex (DEPAKOTE) 125 MG DR tablet [Pharmacy Med Name: DIVALPROEX SOD  MG TAB] 180 tablet 0     Sig: TAKE ONE TABLET BY MOUTH TWICE A DAY       Last Visit Date (If Applicable):  32/4/5093    Next Visit Date:    2/8/2022

## 2021-10-27 ENCOUNTER — TELEPHONE (OUTPATIENT)
Dept: PAIN MANAGEMENT | Age: 71
End: 2021-10-27

## 2021-10-27 NOTE — TELEPHONE ENCOUNTER
Patient leaves message requesting information on GAbapentin. Returned patients call and he states he has 2 boattles of Neurontin with 45 pills in each bottle. Wanted to make sure that he was supposed to have 90 pills. States he takes them when he takes his Percocet. Reviewed Percocet and he should be taking his Percocet every 6 hours and the Gabapentin TID. Should lnot be taking the Gabapentin as much as the Percocet. Explained the difference in the times he should be taking his medications. He received 2 bottles of 45 pills for Gabapentin and that is correct. It's months supply. Patient verbalized understanding.

## 2021-10-28 ENCOUNTER — HOSPITAL ENCOUNTER (OUTPATIENT)
Dept: MRI IMAGING | Age: 71
Discharge: HOME | End: 2021-10-30
Payer: MEDICARE

## 2021-10-28 DIAGNOSIS — R22.41 FOOT MASS, RIGHT: ICD-10-CM

## 2021-10-28 PROCEDURE — 73718 MRI LOWER EXTREMITY W/O DYE: CPT

## 2021-11-23 ENCOUNTER — HOSPITAL ENCOUNTER (OUTPATIENT)
Dept: PAIN MANAGEMENT | Age: 71
Discharge: HOME | End: 2021-11-23
Payer: MEDICARE

## 2021-11-23 DIAGNOSIS — Z98.890 S/P LEFT KNEE SURGERY: ICD-10-CM

## 2021-11-23 DIAGNOSIS — Z51.81 ENCOUNTER FOR MEDICATION MONITORING: ICD-10-CM

## 2021-11-23 DIAGNOSIS — M17.0 OSTEOARTHRITIS OF BOTH KNEES, UNSPECIFIED OSTEOARTHRITIS TYPE: ICD-10-CM

## 2021-11-23 DIAGNOSIS — R29.898 WEAKNESS OF LEFT LEG: ICD-10-CM

## 2021-11-23 DIAGNOSIS — M17.0 PRIMARY OSTEOARTHRITIS OF BOTH KNEES: ICD-10-CM

## 2021-11-23 DIAGNOSIS — Z51.81 MEDICATION MONITORING ENCOUNTER: ICD-10-CM

## 2021-11-23 PROCEDURE — 99213 OFFICE O/P EST LOW 20 MIN: CPT

## 2021-11-23 PROCEDURE — 99213 OFFICE O/P EST LOW 20 MIN: CPT | Performed by: NURSE PRACTITIONER

## 2021-11-23 RX ORDER — ATORVASTATIN CALCIUM 40 MG/1
TABLET, FILM COATED ORAL
Qty: 90 TABLET | Refills: 1 | Status: SHIPPED | OUTPATIENT
Start: 2021-11-23 | End: 2022-03-22

## 2021-11-23 RX ORDER — MORPHINE SULFATE 30 MG/1
30 TABLET, FILM COATED, EXTENDED RELEASE ORAL EVERY 12 HOURS
Qty: 60 TABLET | Refills: 0 | Status: SHIPPED | OUTPATIENT
Start: 2021-11-28 | End: 2021-12-22 | Stop reason: SDUPTHER

## 2021-11-23 RX ORDER — OXYCODONE HYDROCHLORIDE AND ACETAMINOPHEN 5; 325 MG/1; MG/1
1 TABLET ORAL EVERY 6 HOURS PRN
Qty: 120 TABLET | Refills: 0 | Status: SHIPPED | OUTPATIENT
Start: 2021-11-28 | End: 2021-12-22 | Stop reason: SDUPTHER

## 2021-11-23 ASSESSMENT — ENCOUNTER SYMPTOMS
BACK PAIN: 1
COUGH: 0
CONSTIPATION: 0
SHORTNESS OF BREATH: 0

## 2021-11-23 NOTE — PROGRESS NOTES
Patient completed a video visit today to review medication contract. Chief Complaint: bilateral knee pain, back pain    [facility]  Pt reports bilat. Knee pain with no known injury, and has had multiple surgeries/procedures on left knee but pain continues. He is opioid dependant for pain control. He has had PT in the past with no relief. Discussed genicular blocks and he is thinking about it. He also complains of back pain. MRI lumbar with multilevel degenerative disc disease. He has had LESI with 60% relief - last injection was 5/2020. He completed PT for his back with some benefit. He has been using Voltaren gel with some relief. Knee Pain   The incident occurred more than 1 week ago. There was no injury mechanism. The pain is present in the left knee and right knee. The quality of the pain is described as aching. The pain is at a severity of 7/10. The pain is moderate. The pain has been constant since onset. Associated symptoms include numbness. The symptoms are aggravated by movement and weight bearing. He has tried non-weight bearing and rest for the symptoms. The treatment provided mild relief. Back Pain  This is a chronic problem. The current episode started more than 1 year ago. The problem occurs constantly. The problem is unchanged. The pain is present in the lumbar spine. The quality of the pain is described as aching. The pain does not radiate. The pain is at a severity of 2/10. The pain is mild. The symptoms are aggravated by position and standing (walking). Associated symptoms include numbness. Pertinent negatives include no chest pain, fever or paresthesias. He has tried analgesics and bed rest for the symptoms. The treatment provided mild relief. Patient denies any new neurological symptoms. No bowel or bladder incontinence, no weakness, and no falling.     Pill count: appropriate due 11/28    Morphine equivalent: 90    Periodic Controlled Substance Monitoring: Possible medication side  Years of education: Not on file    Highest education level: Not on file   Occupational History    Occupation: retired   Tobacco Use    Smoking status: Never Smoker    Smokeless tobacco: Never Used   Vaping Use    Vaping Use: Never used   Substance and Sexual Activity    Alcohol use: No    Drug use: No    Sexual activity: Never   Other Topics Concern    Not on file   Social History Narrative    Not on file     Social Determinants of Health     Financial Resource Strain: Low Risk     Difficulty of Paying Living Expenses: Not hard at all   Food Insecurity: No Food Insecurity    Worried About [address] in the Last Year: Never true    [facility] in the Last Year: Never true   Transportation Needs:     Lack of Transportation (Medical): Not on file    Lack of Transportation (Non-Medical): Not on file   Physical Activity:     Days of Exercise per Week: Not on file    Minutes of Exercise per Session: Not on file   Stress:     Feeling of Stress : Not on file   Social Connections:     Frequency of Communication with Friends and Family: Not on file    Frequency of Social Gatherings with Friends and Family: Not on file    Attends Jainism Services: Not on file    Active Member of [address] [place] [address] or Organizations: Not on file    Attends Club or Organization Meetings: Not on file    Marital Status: Not on file   Intimate Partner Violence:     Fear of Current or Ex-Partner: Not on file    Emotionally Abused: Not on file    Physically Abused: Not on file    Sexually Abused: Not on file   Housing Stability:     Unable to Pay for Housing in the Last Year: Not on file    Number of [place] in the Last Year: Not on file    Unstable Housing in the Last Year: Not on file       Review of Systems:  Review of Systems   Constitutional: Negative for chills and fever. Cardiovascular: Negative for chest pain and palpitations. Respiratory: Negative for cough and shortness of breath. Musculoskeletal: Positive for back pain and joint pain. Gastrointestinal: Negative for constipation. Neurological: Positive for numbness. Negative for disturbances in coordination, loss of balance and paresthesias. Physical Exam:  There were no vitals taken for this visit. Physical Exam  HENT:      Head: Normocephalic. Pulmonary:      Effort: Pulmonary effort is normal.   Neurological:      Mental Status: He is alert. Psychiatric:         Mood and Affect: Mood normal.         Behavior: Behavior normal.         Record/Diagnostics Review:    Last dominga  and was appropriate     Assessment:  Problem List Items Addressed This Visit     S/P left knee surgery    Relevant Medications    oxyCODONE-acetaminophen (PERCOCET) 5-325 MG per tablet (Start on 11/28/2021)    Medication monitoring encounter    Relevant Medications    oxyCODONE-acetaminophen (PERCOCET) 5-325 MG per tablet (Start on 11/28/2021)    Encounter for medication monitoring    Relevant Medications    oxyCODONE-acetaminophen (PERCOCET) 5-325 MG per tablet (Start on 11/28/2021)    Primary osteoarthritis of both knees    Relevant Medications    oxyCODONE-acetaminophen (PERCOCET) 5-325 MG per tablet (Start on 11/28/2021)    morphine (MS CONTIN) 30 MG extended release tablet (Start on 11/28/2021)    Osteoarthritis of both knees    Relevant Medications    oxyCODONE-acetaminophen (PERCOCET) 5-325 MG per tablet (Start on 11/28/2021)    morphine (MS CONTIN) 30 MG extended release tablet (Start on 11/28/2021)    Weakness of left leg    Relevant Medications    oxyCODONE-acetaminophen (PERCOCET) 5-325 MG per tablet (Start on 11/28/2021)             Treatment Plan:  Patient relates current medications are helping the pain. Patient reports taking pain medications as prescribed, denies obtaining medications from different sources and denies use of illegal drugs. Patient denies side effects from medications like nausea, vomiting, constipation or drowsiness. Patient reports current activities of daily living are possible due to medications and would like to continue them. As always, we encourage daily stretching and strengthening exercises, and recommend minimizing use of pain medications unless patient cannot get through daily activities due to pain. Contract requirements met. Continue opioid therapy. Script written for percocet  Must complete UDS today  Pt will be having foot surgery 95/72 - Policy regarding pain medications from surgeon is discussed. Patient understands to call pain clinic to inform what is prescribed and not to take pain medication from pain clinic and from surgeon at the same time. Consider genicular blocks in the furture  Follow up appointment made for 4 weeks    Eric Hui, was evaluated through a synchronous (real-time) audio-video encounter. The patient (or guardian if applicable) is aware that this is a billable service. Verbal consent to proceed has been obtained within the past 12 months. The visit was conducted pursuant to the emergency declaration under the [address] [place] [address], [address] [place] [address] authority and the [employer] General Act. Patient identification was verified, and a caregiver was present when appropriate. The patient was located in a state where the provider was credentialed to provide care. Total time spent for this encounter: Not billed by time    --Severo Booze, APRN - CNP on 11/23/2021 at 9:39 AM    An electronic signature was used to authenticate this note.

## 2021-11-23 NOTE — TELEPHONE ENCOUNTER
Francisco Parks is calling to request a refill on the following medication(s):    Medication Request:    Last filled 6/3/21 #90 with 0 RF    Requested Prescriptions     Pending Prescriptions Disp Refills    atorvastatin (LIPITOR) 40 MG tablet [Pharmacy Med Name: ATORVASTATIN 40 MG TABLET] 90 tablet 0     Sig: TAKE ONE TABLET BY MOUTH DAILY       Last Visit Date (If Applicable):  10/3/4230    Next Visit Date:    2/8/2022

## 2021-12-07 ENCOUNTER — HOSPITAL ENCOUNTER (OUTPATIENT)
Dept: PREADMISSION TESTING | Age: 71
Discharge: HOME | End: 2021-12-11
Payer: MEDICARE

## 2021-12-07 VITALS
HEIGHT: 67 IN | HEART RATE: 91 BPM | WEIGHT: 224 LBS | RESPIRATION RATE: 16 BRPM | TEMPERATURE: 97.3 F | OXYGEN SATURATION: 95 % | DIASTOLIC BLOOD PRESSURE: 78 MMHG | SYSTOLIC BLOOD PRESSURE: 125 MMHG | BODY MASS INDEX: 35.16 KG/M2

## 2021-12-07 LAB
ANION GAP SERPL CALCULATED.3IONS-SCNC: 11 MMOL/L (ref 9–17)
BUN BLDV-MCNC: 13 MG/DL (ref 8–23)
BUN/CREAT BLD: 11 (ref 9–20)
CALCIUM SERPL-MCNC: 8.8 MG/DL (ref 8.6–10.4)
CHLORIDE BLD-SCNC: 99 MMOL/L (ref 98–107)
CO2: 25 MMOL/L (ref 20–31)
CREAT SERPL-MCNC: 1.2 MG/DL (ref 0.7–1.2)
EKG ATRIAL RATE: 87 BPM
EKG P AXIS: 44 DEGREES
EKG P-R INTERVAL: 172 MS
EKG Q-T INTERVAL: 366 MS
EKG QRS DURATION: 86 MS
EKG QTC CALCULATION (BAZETT): 440 MS
EKG R AXIS: 40 DEGREES
EKG T AXIS: 61 DEGREES
EKG VENTRICULAR RATE: 87 BPM
GFR AFRICAN AMERICAN: >60 ML/MIN
GFR NON-AFRICAN AMERICAN: 60 ML/MIN
GFR SERPL CREATININE-BSD FRML MDRD: ABNORMAL ML/MIN/{1.73_M2}
GFR SERPL CREATININE-BSD FRML MDRD: ABNORMAL ML/MIN/{1.73_M2}
GLUCOSE BLD-MCNC: 119 MG/DL (ref 70–99)
HCT VFR BLD CALC: 40 % (ref 40.7–50.3)
HEMOGLOBIN: 12.4 G/DL (ref 13–17)
MCH RBC QN AUTO: 28.1 PG (ref 25.2–33.5)
MCHC RBC AUTO-ENTMCNC: 31 G/DL (ref 28.4–34.8)
MCV RBC AUTO: 90.7 FL (ref 82.6–102.9)
NRBC AUTOMATED: 0 PER 100 WBC
PDW BLD-RTO: 13.1 % (ref 11.8–14.4)
PLATELET # BLD: 185 K/UL (ref 138–453)
PMV BLD AUTO: 9.3 FL (ref 8.1–13.5)
POTASSIUM SERPL-SCNC: 3.7 MMOL/L (ref 3.7–5.3)
RBC # BLD: 4.41 M/UL (ref 4.21–5.77)
SODIUM BLD-SCNC: 135 MMOL/L (ref 135–144)
WBC # BLD: 4.8 K/UL (ref 3.5–11.3)

## 2021-12-07 PROCEDURE — 85027 COMPLETE CBC AUTOMATED: CPT

## 2021-12-07 PROCEDURE — 93005 ELECTROCARDIOGRAM TRACING: CPT

## 2021-12-07 PROCEDURE — 36415 COLL VENOUS BLD VENIPUNCTURE: CPT

## 2021-12-07 PROCEDURE — 80048 BASIC METABOLIC PNL TOTAL CA: CPT

## 2021-12-07 ASSESSMENT — PAIN DESCRIPTION - PAIN TYPE: TYPE: CHRONIC PAIN

## 2021-12-07 ASSESSMENT — PAIN DESCRIPTION - LOCATION: LOCATION: FOOT;KNEE

## 2021-12-07 ASSESSMENT — PAIN DESCRIPTION - FREQUENCY: FREQUENCY: CONTINUOUS

## 2021-12-07 ASSESSMENT — PAIN DESCRIPTION - ORIENTATION: ORIENTATION: RIGHT

## 2021-12-07 ASSESSMENT — PAIN SCALES - GENERAL: PAINLEVEL_OUTOF10: 8

## 2021-12-07 NOTE — PRE-PROCEDURE INSTRUCTIONS
[facility] Wednesday, [facility] 15, 2021 at 0720 AM    Once you enter the hospital lobby, take the elevators to the second floor. Check-In is at the surgery registration desk. Continue to take your home medications as you normally do up to and including the night before surgery with the exception of any blood thinning medications. Please stop any blood thinning medications as directed by your surgeon or prescribing physician. Failure to stop certain medications may interfere with your scheduled surgery. These may include:  Aspirin, Warfarin (Coumadin), Clopidogrel (Plavix), Ibuprofen (Motrin, Advil), Naproxen (Aleve), Meloxicam (Mobic), Celecoxib (Celebrex), Eliquis, Pradaxa, Xarelto, Effient, Fish Oil, Herbal supplements. Stop all blood thinning medications 7 days before surgery as directed by physician       Please take the following medication(s) the day of surgery with a small sip of water:  Morphine, percocet, depakote, gabpentin         PREPARING FOR YOUR SURGERY:     Before surgery, you can play an important role in your own health. Because skin is not sterile, we need to be sure that your skin is as free of germs as possible before surgery by carefully washing before surgery. Preparing or prepping skin before surgery can reduce the risk of a surgical site infection.   Do not shave the area of your body where your surgery will be performed unless you received specific permission from your physician. You will need to shower at home the night before surgery and the morning of surgery with a special soap called chlorhexidine gluconate (CHG*). *Not to be used by people allergic to Chlorhexidine Gluconate (CHG). Following these instructions will help you be sure that your skin is clean before surgery. Instructions on cleaning your skin before surgery: The night before your surgery:      You will need to shower with warm water (not hot) and the CHG soap.  Use a clean wash cloth and a clean towel. Have clean clothes available to put on after the shower.   First wash your hair with regular shampoo. Rinse your hair and body thoroughly to remove the shampoo.  Wash your face and genital area (private parts) with your regular soap or water only. Thoroughly rinse your body with warm water from the neck down.  Turn water off to prevent rinsing the soap off too soon.  With a clean wet washcloth and half of the CHG soap in the bottle, lather your entire body from the neck down. Do not use CHG soap near your eyes or ears to avoid injury to those areas.  Wash thoroughly, paying special attention to the area where your surgery will be performed.  Wash your body gently for five (5) minutes. Avoid scrubbing your skin too hard.  Turn the water back on and rinse your body thoroughly.  Pat yourself dry with a clean, soft towel. Do not apply lotion, cream or powder.  Dress with clean freshly washed clothes. The morning of surgery:     Repeat shower following steps above - using remaining half of CHG soap in bottle. Patient Instructions:    Salima Adrian If you are having any type of anesthesia you are to have nothing to eat or drink after midnight the night before your surgery. This includes gum, hard candy, mints, water or smoking or chewing tobacco.  The only exception to this is a small sip of water to take with any morning dose of heart, blood pressure, or seizure medications. No alcoholic beverages for 24 hours prior to surgery.  Brush your teeth but do not swallow water.  Bring your eye glasses and case with you. No contacts are to be worn the day of surgery. You also may bring your hearing aids. Most surgical procedures involving anesthesia will require that you remove your dentures prior to surgery. · Do not wear any jewelry or body piercings day of surgery.   Also, NO lotion, perfume or deodorant to be used the day of surgery. No nail polish on the operative extremity (arm/leg surgeries)    · If you are staying overnight with us, please bring a small bag of necessary personal items.  Please wear loose, comfortable clothing. If you are potentially going to have a cast or brace bring clothing that will fit over them.  In case of illness  If you have cold or flu like symptoms (high fever, runny nose, sore throat, cough, etc.) rash, nausea, vomiting, loose stools, and/or recent contact with someone who has a contagious disease (chicken pox, measles, etc.) Please call your doctor before coming to the hospital.         Day of Surgery/Procedure:    As a patient at [facility] IN[facility] you can expect quality medical and nursing care that is centered on your individual needs. Our goal is to make your surgical experience as comfortable as possible    . Transportation After Your Surgery/Procedure: You will need a friend or family member to drive you home after your procedure. Your  must be 25years of age or older and able to sign off on your discharge instructions. A taxi cab or any other form of public transportation is not acceptable. Your friend or family member must stay at the hospital throughout your procedure. Someone must remain with you for the first 24 hours after your surgery if you receive anesthesia or medication. If you do not have someone to stay with you, your procedure may be cancelled.       If you have any other questions regarding your procedure or the day of surgery, please call [phone]      _________________________  ____________________________  Signature (Patient)              Signature (Provider)               Date

## 2021-12-07 NOTE — H&P
History and Physical Service   [facility]    HISTORY AND PHYSICAL EXAMINATION            Date of Evaluation: 12/7/2021  Patient name:  Fredis Jin  MRN:   [id number]  YOB: 1950  PCP:    Herb Rogers MD    History Obtained From:     Patient, medical records    History of Present Illness: This is Fredis Jin a 70 y.o. male who presents for a pre-admission testing appointment for an upcoming right modified fabiola bunionectomy possible akin with I&D plantar 1st MPJ right by Dr. Praneeth Coronado scheduled on 12/15/2021 at 0920 due to hallux valgus right, possible soft tissue mass plantar right. The patient's chief complaint is 8/10 right foot pain that has progressively worsened over the past 1 year. Right foot pain is aggravated by walking, sleeping, standing and is minimally relieved with rest, treatment with pain management. Patient uses cane for ambulation. Denies recent falls and injuries. Functional Capacity per pt:  1) Pt is able to walk 2 city blocks on level ground due to foot and bilateral knee pain. Denies SOB. 2) Pt is not able to climb 2 flights of stairs without SOB. 3) Pt is not able to walk up a hill for 1-2 city blocks without SOB.     Past Medical History:     Past Medical History:   Diagnosis Date    Arthritis     History of blood transfusion     Hx of blood clots     after one of his knee surgeries     Hyperlipidemia     Left knee pain 4/9/2014    Neuropathy     Teeth problem     abcessw        Past Surgical History:     Past Surgical History:   Procedure Laterality Date    CARPAL TUNNEL RELEASE Bilateral     DENTAL SURGERY  10/2015    DENTAL SURGERY  03/2021    EYE SURGERY      bilat cataracts     JOINT REPLACEMENT Left     KNEE  X 6 pt unsure of date    JOINT REPLACEMENT Right 07/20/2018    ROTATOR CUFF REPAIR Right     TONSILLECTOMY          Medications Prior to Admission:     Prior to Admission medications    Medication Sig Start Date End Date Taking? Authorizing Provider   atorvastatin (LIPITOR) 40 MG tablet TAKE ONE TABLET BY MOUTH DAILY 11/23/21  Yes Paola Jones MD   oxyCODONE-acetaminophen (PERCOCET) 5-325 MG per tablet Take 1 tablet by mouth every 6 hours as needed for Pain for up to 30 days. 11/28/21 12/28/21 Yes QUIQUE Chacko - CNP   morphine (MS CONTIN) 30 MG extended release tablet Take 1 tablet by mouth every 12 hours for 30 days. 11/28/21 12/28/21 Yes QUIQUE Pascual - CNP   diclofenac sodium (VOLTAREN) 1 % GEL Apply 2 g topically 2 times daily 11/23/21  Yes QUIQUE Pascual CNP   divalproex (DEPAKOTE) 125 MG DR tablet TAKE ONE TABLET BY MOUTH TWICE A DAY 10/25/21  Yes Paola Jones MD   gabapentin (NEURONTIN) 600 MG tablet 1 tab  tid  Patient taking differently: Take 600 mg by mouth 3 times daily. 1 tab  tid 9/27/21 12/7/21 Yes QUIQUE Daley - CNP   spironolactone (ALDACTONE) 50 MG tablet TAKE ONE TABLET BY MOUTH DAILY 8/2/21  Yes Paola Jones MD   melatonin 3 MG TABS tablet Take 6 mg by mouth nightly as needed (insomnia)   Yes Historical Provider, MD        Allergies:     Oxycontin [oxycodone hcl]    Social History:     Tobacco:    reports that he has never smoked. He has never used smokeless tobacco.  Alcohol:      reports no history of alcohol use. Drug Use:  reports no history of drug use. Family History:     Family History   Problem Relation Age of Onset    Diabetes Mother     No Known Problems Father        Review of Systems:     Positive and Negative as described in HPI. CONSTITUTIONAL: Negative for fevers, chills, sweats, fatigue, and weight loss. HEENT: Bilateral cataract removal. Wears glasses. Negative for hearing changes, rhinorrhea, and throat pain. RESPIRATORY: Negative for shortness of breath, cough, congestion, and wheezing. CARDIOVASCULAR: Hx blood clots - during left knee surgery. HLD.  Negative for chest pain, irregular heartbeat, and palpitations. GASTROINTESTINAL: Negative for reflux, nausea, vomiting, diarrhea, constipation, change in bowel habits, and abdominal pain. GENITOURINARY: Negative for difficulty of urination, burning with urination, and frequency. INTEGUMENT: Negative for rash, skin lesions, and easy bruising. HEMATOLOGIC/LYMPHATIC: Negative for swelling/edema. ALLERGIC/IMMUNOLOGIC: Negative for urticaria and itching. ENDOCRINE:  Negative for increase in thirst, increase in urination, and heat or cold intolerance. MUSCULOSKELETAL: See HPI Bilateral knee pain. Negative muscle aches, and swelling of joints. NEUROLOGICAL: Neuropathy bilateral knees and feet. Negative for headaches, dizziness, lightheadedness  BEHAVIOR/PSYCH: Negative for depression and anxiety. Physical Exam:   /78   Pulse 91   Temp 97.3 °F (36.3 °C) (Temporal)   Resp 16   Ht 5' 7\" (1.702 m)   Wt 224 lb (101.6 kg)   SpO2 95%   BMI 35.08 kg/m²    No results for input(s): POCGLU in the last 72 hours. General Appearance:  Alert, well appearing, and in no acute distress. Mental status:  Oriented to person, place, and time. Head:  Normocephalic and atraumatic. Eye: Wearing glasses. No icterus, redness, pupils equal and reactive, extraocular eye movements intact, and conjunctiva clear. Ear:  Hearing grossly intact. Nose:  No drainage noted. Mouth:  Mucous membranes moist.  Neck:  Supple and no carotid bruits noted. Lungs:  Bilateral equal air entry, clear to auscultation, no wheezing, rales or rhonchi, and normal effort. Cardiovascular:  Normal rate, regular rhythm, no murmur, gallop, or rub. Abdomen:  Soft, nontender, nondistended, and active bowel sounds. Neurologic: Antalgic gait. Normal speech and cranial nerves II through XII grossly intact. Strength 5/5 bilaterally. Skin:  No gross lesions, rashes, bruising, or bleeding on exposed skin area. Extremities:  Posterior tibial pulses 2+ bilaterally. No pedal edema.   No calf tenderness with palpation. Psych:  Normal affect. Investigations:      Laboratory Testing:  Recent Results (from the past 24 hour(s))   Basic Metabolic Panel    Collection Time: 21  3:21 PM   Result Value Ref Range    Glucose 119 (H) 70 - 99 mg/dL    BUN 13 8 - 23 mg/dL    CREATININE 1.20 0.70 - 1.20 mg/dL    Bun/Cre Ratio 11 9 - 20    Calcium 8.8 8.6 - 10.4 mg/dL    Sodium 135 135 - 144 mmol/L    Potassium 3.7 3.7 - 5.3 mmol/L    Chloride 99 98 - 107 mmol/L    CO2 25 20 - 31 mmol/L    Anion Gap 11 9 - 17 mmol/L    GFR Non-African American 60 (L) >60 mL/min    GFR African American >60 >60 mL/min    GFR Comment          GFR Staging NOT REPORTED    CBC    Collection Time: 21  3:21 PM   Result Value Ref Range    WBC 4.8 3.5 - 11.3 k/uL    RBC 4.41 4.21 - 5.77 m/uL    Hemoglobin 12.4 (L) 13.0 - 17.0 g/dL    Hematocrit 40.0 (L) 40.7 - 50.3 %    MCV 90.7 82.6 - 102.9 fL    MCH 28.1 25.2 - 33.5 pg    MCHC 31.0 28.4 - 34.8 g/dL    RDW 13.1 11.8 - 14.4 %    Platelets 982 073 - 637 k/uL    MPV 9.3 8.1 - 13.5 fL    NRBC Automated 0.0 0.0 per 100 WBC       Recent Labs     21  1521   HGB 12.4*   HCT 40.0*   WBC 4.8   MCV 90.7      K 3.7   CL 99   CO2 25   BUN 13   CREATININE 1.20   GLUCOSE 119*       No results for input(s): COVID19 in the last 720 hours. Imaging/Diagnostics:      No results found. EK2021: See Epic. Diagnosis:      1. Hallux valgus right, possible soft tissue mass plantar right    Plans:     1.  Right modified fabiola bunionectomy possible akin with I&D plantar 1st MPJ right      Jeanne Devlin, APRN - CNP  2021  3:39 PM

## 2021-12-15 ENCOUNTER — ANESTHESIA (OUTPATIENT)
Dept: OPERATING ROOM | Age: 71
End: 2021-12-15
Payer: MEDICARE

## 2021-12-15 ENCOUNTER — ANESTHESIA EVENT (OUTPATIENT)
Dept: OPERATING ROOM | Age: 71
End: 2021-12-15
Payer: MEDICARE

## 2021-12-15 ENCOUNTER — APPOINTMENT (OUTPATIENT)
Dept: GENERAL RADIOLOGY | Age: 71
End: 2021-12-15
Attending: PODIATRIST
Payer: MEDICARE

## 2021-12-15 ENCOUNTER — HOSPITAL ENCOUNTER (OUTPATIENT)
Age: 71
Setting detail: OUTPATIENT SURGERY
Discharge: HOME | End: 2021-12-15
Attending: PODIATRIST | Admitting: PODIATRIST
Payer: MEDICARE

## 2021-12-15 VITALS
DIASTOLIC BLOOD PRESSURE: 101 MMHG | OXYGEN SATURATION: 98 % | RESPIRATION RATE: 10 BRPM | TEMPERATURE: 90.9 F | SYSTOLIC BLOOD PRESSURE: 138 MMHG

## 2021-12-15 VITALS
BODY MASS INDEX: 35.16 KG/M2 | SYSTOLIC BLOOD PRESSURE: 121 MMHG | OXYGEN SATURATION: 99 % | TEMPERATURE: 97.2 F | RESPIRATION RATE: 12 BRPM | DIASTOLIC BLOOD PRESSURE: 87 MMHG | HEART RATE: 72 BPM | HEIGHT: 67 IN | WEIGHT: 224 LBS

## 2021-12-15 DIAGNOSIS — G89.18 POST-OP PAIN: Primary | ICD-10-CM

## 2021-12-15 PROCEDURE — 7100000001 HC PACU RECOVERY - ADDTL 15 MIN: Performed by: PODIATRIST

## 2021-12-15 PROCEDURE — 3700000001 HC ADD 15 MINUTES (ANESTHESIA): Performed by: PODIATRIST

## 2021-12-15 PROCEDURE — 2580000003 HC RX 258: Performed by: ANESTHESIOLOGY

## 2021-12-15 PROCEDURE — C1776 JOINT DEVICE (IMPLANTABLE): HCPCS | Performed by: PODIATRIST

## 2021-12-15 PROCEDURE — 6360000002 HC RX W HCPCS: Performed by: NURSE ANESTHETIST, CERTIFIED REGISTERED

## 2021-12-15 PROCEDURE — 2500000003 HC RX 250 WO HCPCS: Performed by: NURSE ANESTHETIST, CERTIFIED REGISTERED

## 2021-12-15 PROCEDURE — 3600000002 HC SURGERY LEVEL 2 BASE: Performed by: PODIATRIST

## 2021-12-15 PROCEDURE — 7100000011 HC PHASE II RECOVERY - ADDTL 15 MIN: Performed by: PODIATRIST

## 2021-12-15 PROCEDURE — 3700000000 HC ANESTHESIA ATTENDED CARE: Performed by: PODIATRIST

## 2021-12-15 PROCEDURE — 2720000010 HC SURG SUPPLY STERILE: Performed by: PODIATRIST

## 2021-12-15 PROCEDURE — 2500000003 HC RX 250 WO HCPCS: Performed by: PODIATRIST

## 2021-12-15 PROCEDURE — 7100000000 HC PACU RECOVERY - FIRST 15 MIN: Performed by: PODIATRIST

## 2021-12-15 PROCEDURE — 7100000010 HC PHASE II RECOVERY - FIRST 15 MIN: Performed by: PODIATRIST

## 2021-12-15 PROCEDURE — 73630 X-RAY EXAM OF FOOT: CPT

## 2021-12-15 PROCEDURE — 6360000002 HC RX W HCPCS: Performed by: PODIATRIST

## 2021-12-15 PROCEDURE — 6360000002 HC RX W HCPCS: Performed by: ANESTHESIOLOGY

## 2021-12-15 PROCEDURE — 2709999900 HC NON-CHARGEABLE SUPPLY: Performed by: PODIATRIST

## 2021-12-15 PROCEDURE — 2580000003 HC RX 258: Performed by: NURSE ANESTHETIST, CERTIFIED REGISTERED

## 2021-12-15 PROCEDURE — 3600000012 HC SURGERY LEVEL 2 ADDTL 15MIN: Performed by: PODIATRIST

## 2021-12-15 DEVICE — OSTEOSYNTHESIS COMPRESSION STAPLE
Type: IMPLANTABLE DEVICE | Site: FOOT | Status: FUNCTIONAL
Brand: EASY CLIP

## 2021-12-15 RX ORDER — SODIUM CHLORIDE, SODIUM LACTATE, POTASSIUM CHLORIDE, CALCIUM CHLORIDE 600; 310; 30; 20 MG/100ML; MG/100ML; MG/100ML; MG/100ML
INJECTION, SOLUTION INTRAVENOUS CONTINUOUS PRN
Status: DISCONTINUED | OUTPATIENT
Start: 2021-12-15 | End: 2021-12-15 | Stop reason: SDUPTHER

## 2021-12-15 RX ORDER — BUPIVACAINE HYDROCHLORIDE 5 MG/ML
INJECTION, SOLUTION EPIDURAL; INTRACAUDAL PRN
Status: DISCONTINUED | OUTPATIENT
Start: 2021-12-15 | End: 2021-12-15 | Stop reason: ALTCHOICE

## 2021-12-15 RX ORDER — HYDROMORPHONE HYDROCHLORIDE 1 MG/ML
0.25 INJECTION, SOLUTION INTRAMUSCULAR; INTRAVENOUS; SUBCUTANEOUS EVERY 5 MIN PRN
Status: DISCONTINUED | OUTPATIENT
Start: 2021-12-15 | End: 2021-12-15 | Stop reason: HOSPADM

## 2021-12-15 RX ORDER — SODIUM CHLORIDE, SODIUM LACTATE, POTASSIUM CHLORIDE, CALCIUM CHLORIDE 600; 310; 30; 20 MG/100ML; MG/100ML; MG/100ML; MG/100ML
INJECTION, SOLUTION INTRAVENOUS CONTINUOUS
Status: DISCONTINUED | OUTPATIENT
Start: 2021-12-15 | End: 2021-12-15 | Stop reason: HOSPADM

## 2021-12-15 RX ORDER — FENTANYL CITRATE 50 UG/ML
INJECTION, SOLUTION INTRAMUSCULAR; INTRAVENOUS PRN
Status: DISCONTINUED | OUTPATIENT
Start: 2021-12-15 | End: 2021-12-15 | Stop reason: SDUPTHER

## 2021-12-15 RX ORDER — FENTANYL CITRATE 50 UG/ML
25 INJECTION, SOLUTION INTRAMUSCULAR; INTRAVENOUS EVERY 5 MIN PRN
Status: DISCONTINUED | OUTPATIENT
Start: 2021-12-15 | End: 2021-12-15 | Stop reason: HOSPADM

## 2021-12-15 RX ORDER — MIDAZOLAM HYDROCHLORIDE 1 MG/ML
INJECTION INTRAMUSCULAR; INTRAVENOUS PRN
Status: DISCONTINUED | OUTPATIENT
Start: 2021-12-15 | End: 2021-12-15 | Stop reason: SDUPTHER

## 2021-12-15 RX ORDER — BUPIVACAINE HYDROCHLORIDE 5 MG/ML
INJECTION, SOLUTION EPIDURAL; INTRACAUDAL
Status: DISCONTINUED
Start: 2021-12-15 | End: 2021-12-15 | Stop reason: HOSPADM

## 2021-12-15 RX ORDER — PROPOFOL 10 MG/ML
INJECTION, EMULSION INTRAVENOUS CONTINUOUS PRN
Status: DISCONTINUED | OUTPATIENT
Start: 2021-12-15 | End: 2021-12-15 | Stop reason: SDUPTHER

## 2021-12-15 RX ORDER — LIDOCAINE HYDROCHLORIDE 10 MG/ML
INJECTION, SOLUTION INFILTRATION; PERINEURAL
Status: DISCONTINUED
Start: 2021-12-15 | End: 2021-12-15 | Stop reason: HOSPADM

## 2021-12-15 RX ORDER — ONDANSETRON 2 MG/ML
4 INJECTION INTRAMUSCULAR; INTRAVENOUS
Status: DISCONTINUED | OUTPATIENT
Start: 2021-12-15 | End: 2021-12-15 | Stop reason: HOSPADM

## 2021-12-15 RX ORDER — LIDOCAINE HYDROCHLORIDE 20 MG/ML
INJECTION, SOLUTION EPIDURAL; INFILTRATION; INTRACAUDAL; PERINEURAL PRN
Status: DISCONTINUED | OUTPATIENT
Start: 2021-12-15 | End: 2021-12-15 | Stop reason: SDUPTHER

## 2021-12-15 RX ADMIN — SODIUM CHLORIDE, POTASSIUM CHLORIDE, SODIUM LACTATE AND CALCIUM CHLORIDE: 600; 310; 30; 20 INJECTION, SOLUTION INTRAVENOUS at 09:34

## 2021-12-15 RX ADMIN — FENTANYL CITRATE 25 MCG: 50 INJECTION, SOLUTION INTRAMUSCULAR; INTRAVENOUS at 12:01

## 2021-12-15 RX ADMIN — Medication 25 MCG: at 09:42

## 2021-12-15 RX ADMIN — Medication 25 MCG: at 09:41

## 2021-12-15 RX ADMIN — LIDOCAINE HYDROCHLORIDE 100 MG: 20 INJECTION, SOLUTION EPIDURAL; INFILTRATION; INTRACAUDAL; PERINEURAL at 09:41

## 2021-12-15 RX ADMIN — SODIUM CHLORIDE, POTASSIUM CHLORIDE, SODIUM LACTATE AND CALCIUM CHLORIDE: 600; 310; 30; 20 INJECTION, SOLUTION INTRAVENOUS at 08:04

## 2021-12-15 RX ADMIN — SODIUM CHLORIDE, POTASSIUM CHLORIDE, SODIUM LACTATE AND CALCIUM CHLORIDE: 600; 310; 30; 20 INJECTION, SOLUTION INTRAVENOUS at 11:16

## 2021-12-15 RX ADMIN — PROPOFOL 50 MCG/KG/MIN: 10 INJECTION, EMULSION INTRAVENOUS at 09:41

## 2021-12-15 RX ADMIN — MIDAZOLAM 1 MG: 1 INJECTION INTRAMUSCULAR; INTRAVENOUS at 09:34

## 2021-12-15 RX ADMIN — CEFAZOLIN SODIUM 2000 MG: 10 INJECTION, POWDER, FOR SOLUTION INTRAVENOUS at 09:49

## 2021-12-15 ASSESSMENT — PULMONARY FUNCTION TESTS
PIF_VALUE: 2
PIF_VALUE: 1
PIF_VALUE: 2
PIF_VALUE: 1
PIF_VALUE: 2
PIF_VALUE: 1
PIF_VALUE: 2
PIF_VALUE: 1
PIF_VALUE: 2
PIF_VALUE: 1
PIF_VALUE: 2
PIF_VALUE: 1
PIF_VALUE: 1
PIF_VALUE: 2
PIF_VALUE: 1
PIF_VALUE: 2
PIF_VALUE: 1
PIF_VALUE: 2
PIF_VALUE: 1
PIF_VALUE: 1
PIF_VALUE: 2
PIF_VALUE: 1
PIF_VALUE: 2
PIF_VALUE: 1
PIF_VALUE: 1
PIF_VALUE: 2
PIF_VALUE: 1
PIF_VALUE: 2
PIF_VALUE: 1
PIF_VALUE: 2
PIF_VALUE: 1
PIF_VALUE: 1
PIF_VALUE: 2

## 2021-12-15 ASSESSMENT — PAIN SCALES - GENERAL
PAINLEVEL_OUTOF10: 2
PAINLEVEL_OUTOF10: 2
PAINLEVEL_OUTOF10: 6

## 2021-12-15 ASSESSMENT — PAIN DESCRIPTION - LOCATION: LOCATION: FOOT

## 2021-12-15 ASSESSMENT — PAIN - FUNCTIONAL ASSESSMENT: PAIN_FUNCTIONAL_ASSESSMENT: 0-10

## 2021-12-15 ASSESSMENT — PAIN DESCRIPTION - PAIN TYPE: TYPE: SURGICAL PAIN

## 2021-12-15 ASSESSMENT — PAIN DESCRIPTION - DESCRIPTORS
DESCRIPTORS: PINS AND NEEDLES
DESCRIPTORS: ACHING;SORE

## 2021-12-15 NOTE — BRIEF OP NOTE
PODIATRY BRIEF OP NOTE    PATIENT NAME: Salvador Sotelo  YOB: 1950  -  70 y.o. male  MRN: [id number]  DATE: 12/15/2021  BILLING #: [id number]    Surgeon(s):  Monalisa Ricardo DPM     ASSISTANTS: Norma Guzman DPM PGY-1    PRE-OP DIAGNOSIS:   1. Hallux abductovalgus deformity, right foot    POST-OP DIAGNOSIS: Same as above. PROCEDURE:   1. Cheilectomy of 1st metatarsal head, right foot  2. Proximal akin of proximal phalanx, right foot    ANESTHESIA: MAC with local block of 10 ml of a 1:1 racemic mixture of 0.5% marcaine plain and 1% lidocaine plain    HEMOSTASIS: Pneumatic ankle tourniquet @ 250 mmHg for 107 minutes. ESTIMATED BLOOD LOSS: Less than 10 cc. MATERIALS:   Implant Name Type Inv. Item Serial No.  Lot No. LRB No. Used Action   STAPLE INT [id number] THK1.5X1.2MM DRL DIA2MM COMPR FOR  STAPLE INT [id number] THK1.5X1.2MM DRL DIA2MM COMPR FOR  [employer] [facility] [id number] Right 1 Implanted       INJECTABLES: 10 cc of 0.5% Marcaine plain    SPECIMEN: None  * No specimens in log *    COMPLICATIONS: None    FINDINGS: Rigid lateral deviation of proximal phalanx relative to first metatarsal along with dorsal exostosis about the metatarsal head. Findings consistent with pre-operative diagnoses. See full op report for details. The patient was counseled at length about the risks of elin Covid-19 during their perioperative period and any recovery window from their procedure. The patient was made aware that elin Covid-19  may worsen their prognosis for recovering from their procedure  and lend to a higher morbidity and/or mortality risk. All material risks, benefits, and reasonable alternatives including postponing the procedure were discussed. The patient does wish to proceed with the procedure at this time.     Norma Guzman DPM   Podiatric Medicine & Surgery   12/15/2021 at 11:14 AM

## 2021-12-15 NOTE — OP NOTE
PODIATRY BRIEF OP NOTE    PATIENT NAME: Karrie Piña  YOB: 1950  -  70 y.o. male  MRN: [id number]  DATE: 12/15/2021  BILLING #: [id number]    Surgeon(s):  Ayden Sarmiento DPM     ASSISTANTS: Sharmila Amado DPM PGY-1    PRE-OP DIAGNOSIS:   1. Hallux abductovalgus deformity, right foot    POST-OP DIAGNOSIS: Same as above. PROCEDURE:   1. Cheilectomy of 1st metatarsal head, right foot  2. Proximal akin of proximal phalanx, right foot    ANESTHESIA: MAC with local block of 10 ml of a 1:1 racemic mixture of 0.5% marcaine plain and 1% lidocaine plain    HEMOSTASIS: Pneumatic ankle tourniquet @ 250 mmHg for 107 minutes. ESTIMATED BLOOD LOSS: Less than 10 cc. MATERIALS:   Implant Name Type Inv. Item Serial No.  Lot No. LRB No. Used Action   STAPLE INT [id number] THK1.5X1.2MM DRL DIA2MM COMPR FOR  STAPLE INT [id number] THK1.5X1.2MM DRL DIA2MM COMPR FOR  [employer] [facility] [id number] Right 1 Implanted       INJECTABLES: 10 cc of 0.5% Marcaine plain    SPECIMEN: None    COMPLICATIONS: None    INDICATIONS FOR PROCEDURE: Karrie Piña is a 70 y.o. male well known to Dr. Miky Mohamud with a CC of right bunion deformity pain. Patient has failed conservative treatment and surgical treatment has been recommended to which the patient is amenable. In pre-op all risks and benefits  of the procedure were discussed at length prior to the patient signing the consent form . Consent is signed, witnessed, and placed in chart. The right foot was marked, abx hung, labs and images reviewed, NPO status confirmed. No guarantees were given or implied. PROCEDURE IN DETAIL: The patient was brought to the operating room and placed on the operating table in the supine position with a safety strap across the lap. A well-padded pneumatic ankle tourniquet was applied to the right ankle.  After adequate sedation was given by the anesthesia team, a local block of 10 cc of 1:1 mixture of 1% lidocaine plain and 0.5% Marcaine plain was injected to the right foot preoperatively. The surgical site was then scrubbed, prepped, and draped in the usual aseptic manner. A timeout was performed confirming the patient's identity, correct site, correct procedure, allergies, and preoperative antibiotics. An Esmarch bandage was then used to exsanguinate right foot and the tourniquet was inflated to 250mmHg. Attention was directed to the right 1st MTPJ. A dorsal medial incision was created over the first metatarsophalangeal joint with a #15 blade. The incision was then deepened. Care was taken to protect and retract all vital neurovascular structures. The skin and subcutaneous tissue were then undermined off of the capsule medially. A dorsal linear capsular incision was then created over the first metatarsophalangeal joint. The periosteum and capsule were then reflected off of the first metatarsal head and base of the proximal phalanx. The articular cartilage appeared healthy for the patient's age. A sagittal saw was used to resect the dorsal, dorsomedial, and dorsolateral osteophytes from the 1st metatarsal head. A rongeur was used to resect the remaining osteophytes from the base of the proximal phalanx. A power rasp was then used to smooth any remaining prominences. Attention was then directed to the base of the proximal phalanx. The periosteum was incised with the #15 blade and reflected medially and laterally. A medially based closing wedge osteotomy with lateral cortical hinge was created, and fixated with a compression staple. The lateral cortical hinge remained intact. At this time the foot was loaded and the 1st ray was noted to be in a good rectus alignment. Upon completion of the procedure, a total of 10 cc of 0.5% Marcaine was injected around the surgical sites. The incision was dressed with betadine soaked adaptic and covered with sterile compressive dressing such as 4x4s, kerlix, and ACE.  The tourniquet was then deflated and immediate hyperemia returned to all digits. The patient tolerated the procedure and anesthesia well and was transferred to the PACU with all vital signs stable and vascular status intact to the right foot and ankle. Following a period of postoperative monitoring, the patient will be discharged to home and given instructions and prescriptions which were discussed prior to the surgery. Patient will be NWB to right foot in CAM boot. Instructed to keep dressing clean, dry, and intact until follow up with Dr. Miky Mohamud.

## 2021-12-15 NOTE — ANESTHESIA PRE PROCEDURE
Department of Anesthesiology  Preprocedure Note       Name:  Misa Major   Age:  70 y.o.  :  1950                                          MRN:  [id number]         Date:  12/15/2021      Surgeon: Kunal Carpio):  Cindy Mina DPM    Procedure: Procedure(s):  RIGHT MODIFIED WILLIAMS  BUNIONECTOMY POSSIBLE AKIN WITH  I& D PLANTAR 1ST MPJ RIGHT- [employer]    Medications prior to admission:   Prior to Admission medications    Medication Sig Start Date End Date Taking? Authorizing Provider   atorvastatin (LIPITOR) 40 MG tablet TAKE ONE TABLET BY MOUTH DAILY 21  Yes Emerson Steiner MD   oxyCODONE-acetaminophen (PERCOCET) 5-325 MG per tablet Take 1 tablet by mouth every 6 hours as needed for Pain for up to 30 days. 21 Yes QUIQUE Chacko CNP   morphine (MS CONTIN) 30 MG extended release tablet Take 1 tablet by mouth every 12 hours for 30 days. 21 Yes QUIQUE Chacko CNP   divalproex (DEPAKOTE) 125 MG DR tablet TAKE ONE TABLET BY MOUTH TWICE A DAY 10/25/21  Yes Emerson Steiner MD   gabapentin (NEURONTIN) 600 MG tablet 1 tab  tid  Patient taking differently: Take 600 mg by mouth 3 times daily.  1 tab  tid 9/27/21 12/15/21 Yes QUIQUE Kennedy CNP   spironolactone (ALDACTONE) 50 MG tablet TAKE ONE TABLET BY MOUTH DAILY 21  Yes Emerson Steiner MD   melatonin 3 MG TABS tablet Take 6 mg by mouth nightly as needed (insomnia)   Yes Historical Provider, MD   diclofenac sodium (VOLTAREN) 1 % GEL Apply 2 g topically 2 times daily 21   QUIQUE Biswas CNP       Current medications:    Current Facility-Administered Medications   Medication Dose Route Frequency Provider Last Rate Last Admin    lactated ringers infusion   IntraVENous Continuous Ndal Oseas Reddy  mL/hr at 12/15/21 0804 New Bag at 12/15/21 0804    ceFAZolin (ANCEF) 2000 mg in dextrose 5 % 50 mL IVPB  2,000 mg IntraVENous Once Cindy Mina DPM Allergies: Allergies   Allergen Reactions    Oxycontin [Oxycodone Hcl] Nausea Only       Problem List:    Patient Active Problem List   Diagnosis Code    S/P left knee surgery Z98.890    Medication monitoring encounter Z51.81    Encounter for medication monitoring Z51.81    Primary osteoarthritis of both knees M17.0    Use of opiates for therapeutic purposes Z79.891    Pre-diabetes R73.03    Dyslipidemia E78.5    Osteoarthritis of both knees M17.0    Weakness of left leg R29.898    Essential hypertension I10    Psychophysiological insomnia F51.04    Fibromyalgia M79.7    Mood disorder ([facility] [address] F39    Lumbar back pain with radiculopathy affecting left lower extremity M54.16    Stage 3 chronic kidney disease ([facility] [address] N18.30       Past Medical History:        Diagnosis Date    Arthritis     History of blood transfusion     Hx of blood clots     after one of his knee surgeries     Hyperlipidemia     Left knee pain 4/9/2014    Neuropathy     Teeth problem     abcessw       Past Surgical History:        Procedure Laterality Date    CARPAL TUNNEL RELEASE Bilateral     DENTAL SURGERY  10/2015    DENTAL SURGERY  03/2021    EYE SURGERY      bilat cataracts     JOINT REPLACEMENT Left     KNEE  X 6 pt unsure of date    JOINT REPLACEMENT Right 07/20/2018    ROTATOR CUFF REPAIR Right     TONSILLECTOMY         Social History:    Social History     Tobacco Use    Smoking status: Never Smoker    Smokeless tobacco: Never Used   Substance Use Topics    Alcohol use:  No                                Counseling given: Not Answered      Vital Signs (Current):   Vitals:    12/15/21 0738 12/15/21 0744   BP: 139/87    Pulse: 81    Resp: 16    Temp: 95.9 °F (35.5 °C)    TempSrc: Temporal    SpO2: 96%    Weight:  224 lb (101.6 kg)   Height:  5' 7\" (1.702 m)                                              BP Readings from Last 3 Encounters:   12/15/21 139/87   12/07/21 125/78   10/08/21 122/78       NPO Status: Time of last liquid consumption: 2200                        Time of last solid consumption: 2200                        Date of last liquid consumption: 12/14/21                        Date of last solid food consumption: 12/14/21    BMI:   Wt Readings from Last 3 Encounters:   12/15/21 224 lb (101.6 kg)   12/07/21 224 lb (101.6 kg)   10/08/21 221 lb (100.2 kg)     Body mass index is 35.08 kg/m². CBC:   Lab Results   Component Value Date    WBC 4.8 12/07/2021    RBC 4.41 12/07/2021    RBC 4.10 11/28/2011    HGB 12.4 12/07/2021    HCT 40.0 12/07/2021    MCV 90.7 12/07/2021    RDW 13.1 12/07/2021     12/07/2021     11/28/2011       CMP:   Lab Results   Component Value Date     12/07/2021    K 3.7 12/07/2021    CL 99 12/07/2021    CO2 25 12/07/2021    BUN 13 12/07/2021    CREATININE 1.20 12/07/2021    GFRAA >60 12/07/2021    LABGLOM 60 12/07/2021    GLUCOSE 119 12/07/2021    GLUCOSE 115 11/28/2011    PROT 7.2 03/30/2021    CALCIUM 8.8 12/07/2021    BILITOT 0.44 03/30/2021    ALKPHOS 54 03/30/2021    AST 13 03/30/2021    ALT 9 03/30/2021       POC Tests: No results for input(s): POCGLU, POCNA, POCK, POCCL, POCBUN, POCHEMO, POCHCT in the last 72 hours.     Coags: No results found for: PROTIME, INR, APTT    HCG (If Applicable): No results found for: PREGTESTUR, PREGSERUM, HCG, HCGQUANT     ABGs: No results found for: PHART, PO2ART, AVY7BZX, YNL0KBF, BEART, B9KMODWM     Type & Screen (If Applicable):  No results found for: LABABO, LABRH    Drug/Infectious Status (If Applicable):  Lab Results   Component Value Date    HEPCAB NONREACTIVE 08/11/2020       COVID-19 Screening (If Applicable): No results found for: COVID19        Anesthesia Evaluation   no history of anesthetic complications:   Airway: Mallampati: III  TM distance: >3 FB   Neck ROM: full  Mouth opening: > = 3 FB Dental:          Pulmonary:Negative Pulmonary ROS and normal exam                               Cardiovascular:    (+) hypertension:,     (-)  angina                Neuro/Psych:   (+) neuromuscular disease (fibromyalgia, chronic pain):, psychiatric history:            GI/Hepatic/Renal:   (+) renal disease (stage 3 ckd): CRI, morbid obesity     (-) GERD       Endo/Other:    (+) Diabetes (pre), : arthritis: OA., .                 Abdominal:             Vascular:   + DVT, . Other Findings:           Anesthesia Plan      general and TIVA     ASA 3       Induction: intravenous. MIPS: Postoperative opioids intended and Prophylactic antiemetics administered. Anesthetic plan and risks discussed with patient. Plan discussed with CRNA.     Attending anesthesiologist reviewed and agrees with Preprocedure content            Tahir Guillermo MD   12/15/2021

## 2021-12-15 NOTE — H&P
Interval H&P Note    Pt Name: Sweta Somers  MRN: [id number]  YOB: 1950  Date of evaluation: 12/15/2021      [x] I have reviewed in Epic the H&P by Meri JANG dated 12/7/2021 attached below which meets the criteria for an Interval History and Physical note. [x] I have examined  Duane J Garland  There are no changes to the patient who is scheduled for a  right modified fabiola bunionectomy possible akin with I&D plantar 1st MPJ right by Dr. Devyn Trejo for hallux valgus right, possible soft tissue mass plantar right. The patient denies new health changes, fever, chills, wheezing, cough, increased SOB, chest pain, open sores or wounds. Denies hx diabetes. Denies taking any blood thinning medications in the last 10 days. Vital signs: /87   Pulse 81   Temp 95.9 °F (35.5 °C) (Temporal)   Resp 16   Ht 5' 7\" (1.702 m)   Wt 224 lb (101.6 kg)   SpO2 96%   BMI 35.08 kg/m²     Allergies:  Oxycontin [oxycodone hcl]    Medications:    Prior to Admission medications    Medication Sig Start Date End Date Taking? Authorizing Provider   atorvastatin (LIPITOR) 40 MG tablet TAKE ONE TABLET BY MOUTH DAILY 11/23/21  Yes Ingrid Donald MD   oxyCODONE-acetaminophen (PERCOCET) 5-325 MG per tablet Take 1 tablet by mouth every 6 hours as needed for Pain for up to 30 days. 11/28/21 12/28/21 Yes QUIQUE Chacko CNP   morphine (MS CONTIN) 30 MG extended release tablet Take 1 tablet by mouth every 12 hours for 30 days. 11/28/21 12/28/21 Yes QUIQUE Chacko CNP   divalproex (DEPAKOTE) 125 MG DR tablet TAKE ONE TABLET BY MOUTH TWICE A DAY 10/25/21  Yes Ingrid Donald MD   gabapentin (NEURONTIN) 600 MG tablet 1 tab  tid  Patient taking differently: Take 600 mg by mouth 3 times daily.  1 tab  tid 9/27/21 12/15/21 Yes QUIQUE Monreal CNP   spironolactone (ALDACTONE) 50 MG tablet TAKE ONE TABLET BY MOUTH DAILY 8/2/21  Yes Ingrid Donald MD   melatonin 3 MG TABS tablet Take 6 mg by mouth nightly as needed (insomnia)   Yes Historical Provider, MD   diclofenac sodium (VOLTAREN) 1 % GEL Apply 2 g topically 2 times daily 11/23/21   QUIQUE Biswas CNP         This is a 70 y.o. male who is pleasant, cooperative, alert and oriented x3, in no acute distress. Heart: Heart sounds are normal.  HR 81 regular rate and rhythm without murmur, gallop or rub. Lungs: Normal respiratory effort with equal expansion, good air exchange, unlabored and clear to auscultation without wheezes or rales bilaterally   Abdomen: soft, nontender, nondistended with bowel sounds active. Extremities: bilateral lower extremity strength equal 5/5. Pedal pulses palpable. No calf tenderness. Labs:  Recent Labs     12/07/21  1521   HGB 12.4*   HCT 40.0*   WBC 4.8   MCV 90.7         K 3.7   CL 99   CO2 25   BUN 13   CREATININE 1.20   GLUCOSE 119*       No results for input(s): COVID19 in the last 720 hours. QUIQUE Mascorro CNP  Electronically signed 12/15/2021 at 8:20 AM      QUIQUE Booth CNP   Nurse Practitioner   General Surgery   H&P       Signed   Date of Service:  12/7/2021  3:00 PM         Related encounter: Pre-Admission Testing Visit from 12/7/2021 in [place] [id number] PRE-ADMIT TESTING           Signed        Expand All Collapse All          Show:Clear all  [x]Manual[x]Template[]Copied    Added by:  [x]QUIQUE Manley CNP      []Abdirahman for details    History and Physical Service   [facility]            Date of Evaluation:     12/7/2021  Patient name:              Misa Major  MRN:                           [id number]  YOB: 1950  PCP:                            Emerson Steiner MD     History Obtained From:      Patient, medical records     History of Present Illness:       This is Misa Major a 70 y.o. male who presents for a pre-admission testing appointment for an upcoming right modified fabiola bunionectomy possible akin with I&D plantar 1st MPJ right by Dr. Isaías Torres scheduled on 12/15/2021 at 0920 due to hallux valgus right, possible soft tissue mass plantar right. The patient's chief complaint is 8/10 right foot pain that has progressively worsened over the past 1 year. Right foot pain is aggravated by walking, sleeping, standing and is minimally relieved with rest, treatment with pain management. Patient uses cane for ambulation. Denies recent falls and injuries. Functional Capacity per pt:  1) Pt is able to walk 2 city blocks on level ground due to foot and bilateral knee pain. Denies SOB. 2) Pt is not able to climb 2 flights of stairs without SOB. 3) Pt is not able to walk up a hill for 1-2 city blocks without SOB. Past Medical History:      Past Medical History        Past Medical History:   Diagnosis Date    Arthritis      History of blood transfusion      Hx of blood clots       after one of his knee surgeries     Hyperlipidemia      Left knee pain 4/9/2014    Neuropathy      Teeth problem       abcessw            Past Surgical History:      Past Surgical History         Past Surgical History:   Procedure Laterality Date    CARPAL TUNNEL RELEASE Bilateral      DENTAL SURGERY   10/2015    DENTAL SURGERY   03/2021    EYE SURGERY         bilat cataracts     JOINT REPLACEMENT Left       KNEE  X 6 pt unsure of date    JOINT REPLACEMENT Right 07/20/2018    ROTATOR CUFF REPAIR Right      TONSILLECTOMY                Medications Prior to Admission:      Home Medications           Prior to Admission medications    Medication Sig Start Date End Date Taking? Authorizing Provider   atorvastatin (LIPITOR) 40 MG tablet TAKE ONE TABLET BY MOUTH DAILY 11/23/21   Yes Raine Hughes MD   oxyCODONE-acetaminophen (PERCOCET) 5-325 MG per tablet Take 1 tablet by mouth every 6 hours as needed for Pain for up to 30 days.  11/28/21 12/28/21 Yes QUIQUE Mg - CNP morphine (MS CONTIN) 30 MG extended release tablet Take 1 tablet by mouth every 12 hours for 30 days. 11/28/21 12/28/21 Yes QUIQUE Morales CNP   diclofenac sodium (VOLTAREN) 1 % GEL Apply 2 g topically 2 times daily 11/23/21   Yes Selah Mariella QUIQUE Galicia CNP   divalproex (DEPAKOTE) 125 MG DR tablet TAKE ONE TABLET BY MOUTH TWICE A DAY 10/25/21   Yes Sohail Bright MD   gabapentin (NEURONTIN) 600 MG tablet 1 tab  tid  Patient taking differently: Take 600 mg by mouth 3 times daily. 1 tab  tid 9/27/21 12/7/21 Yes QUIQUE Brewster CNP   spironolactone (ALDACTONE) 50 MG tablet TAKE ONE TABLET BY MOUTH DAILY 8/2/21   Yes Sohail Bright MD   melatonin 3 MG TABS tablet Take 6 mg by mouth nightly as needed (insomnia)     Yes Historical Provider, MD            Allergies:      Oxycontin [oxycodone hcl]     Social History:      Tobacco:    reports that he has never smoked. He has never used smokeless tobacco.  Alcohol:      reports no history of alcohol use. Drug Use:  reports no history of drug use. Family History:      Family History         Family History   Problem Relation Age of Onset    Diabetes Mother      No Known Problems Father              Review of Systems:      Positive and Negative as described in HPI. CONSTITUTIONAL: Negative for fevers, chills, sweats, fatigue, and weight loss. HEENT: Bilateral cataract removal. Wears glasses. Negative for hearing changes, rhinorrhea, and throat pain. RESPIRATORY: Negative for shortness of breath, cough, congestion, and wheezing. CARDIOVASCULAR: Hx blood clots - during left knee surgery. HLD. Negative for chest pain, irregular heartbeat, and palpitations. GASTROINTESTINAL: Negative for reflux, nausea, vomiting, diarrhea, constipation, change in bowel habits, and abdominal pain. GENITOURINARY: Negative for difficulty of urination, burning with urination, and frequency. INTEGUMENT: Negative for rash, skin lesions, and easy bruising. mg/dL     CREATININE 1.20 0.70 - 1.20 mg/dL     Bun/Cre Ratio 11 9 - 20     Calcium 8.8 8.6 - 10.4 mg/dL     Sodium 135 135 - 144 mmol/L     Potassium 3.7 3.7 - 5.3 mmol/L     Chloride 99 98 - 107 mmol/L     CO2 25 20 - 31 mmol/L     Anion Gap 11 9 - 17 mmol/L     GFR Non-African American 60 (L) >60 mL/min     GFR African American >60 >60 mL/min     GFR Comment            GFR Staging NOT REPORTED     CBC     Collection Time: 21  3:21 PM   Result Value Ref Range     WBC 4.8 3.5 - 11.3 k/uL     RBC 4.41 4.21 - 5.77 m/uL     Hemoglobin 12.4 (L) 13.0 - 17.0 g/dL     Hematocrit 40.0 (L) 40.7 - 50.3 %     MCV 90.7 82.6 - 102.9 fL     MCH 28.1 25.2 - 33.5 pg     MCHC 31.0 28.4 - 34.8 g/dL     RDW 13.1 11.8 - 14.4 %     Platelets 028 079 - 050 k/uL     MPV 9.3 8.1 - 13.5 fL     NRBC Automated 0.0 0.0 per 100 WBC            Recent Labs     21  1521   HGB 12.4*   HCT 40.0*   WBC 4.8   MCV 90.7      K 3.7   CL 99   CO2 25   BUN 13   CREATININE 1.20   GLUCOSE 119*         No results for input(s): COVID19 in the last 720 hours. Imaging/Diagnostics:        No results found. EK2021: See Epic. Diagnosis:       1. Hallux valgus right, possible soft tissue mass plantar right     Plans:      1.  Right modified fabiola bunionectomy possible akin with I&D plantar 1st MPJ right        Marlys Rangel, QUIQUE - CNP  2021  3:39 PM               Cosigned by: Michele Urban DPM at 2021  8:17 AM       Revision History                            Routing History

## 2021-12-16 NOTE — ANESTHESIA POSTPROCEDURE EVALUATION
Department of Anesthesiology  Postprocedure Note    Patient: Misa Major  MRN: [id number]  YOB: 1950  Date of evaluation: 12/15/2021  Time:  9:09 PM     Procedure Summary     Date: 12/15/21 Room / Location: [address] [place] [address]    Anesthesia Start: 0253 Anesthesia Stop: 4012    Procedure: AKIN OSTEOTOMY WITH BONE STAPLE CHEILECTOMY RIGHT FOOT (N/A Foot) Diagnosis: (DX HALLUX VALGUS RIGHT , POSSIBLE SOFT TISSUE MASS PLANTAR RIGHT)    Surgeons: Cindy Mina DPM Responsible Provider: Sandor Bowling MD    Anesthesia Type: general ASA Status: 3          Anesthesia Type: general    Pamela Phase I:      Pamela Phase II:      Last vitals: Reviewed and per EMR flowsheets.        Anesthesia Post Evaluation    Patient location during evaluation: PACU  Patient participation: complete - patient participated  Level of consciousness: awake  Airway patency: patent  Nausea & Vomiting: no nausea  Complications: no  Cardiovascular status: blood pressure returned to baseline  Respiratory status: acceptable  Hydration status: euvolemic  Comments: Multimodal analgesia pain management as indicated by procedure  Multimodal analgesia pain management approach

## 2021-12-20 NOTE — PROGRESS NOTES
Francisca 89 PROGRESS NOTE      Patient  completed []  video visit   [x]   phone call:    9     Minutes :   [] in person visit to pain clinic    [x]    to  review Medication Agreement    []  Follow up after procedure   []  Discuss treatment options      Location:  Provider:  working from    [x]    home    []   [facility] - MEÑO NICOLE ,   patient at   [] pain clinic     [x]  home         Chief Complaint: knee pain      He c/o bilateral knee pain. He has history of multiple surgeries to left knee and had arthroplasty 3 years ago but continues to have pain in both knees,He reports his pain is unchanged. He had lumbar epidural injection L4, L5, on 5- with 60% relief. He reports he had surgery on his right foot last week. Knee Pain   The incident occurred more than 1 week ago. There was no injury mechanism. The pain is present in the left knee and right knee. The quality of the pain is described as aching (sharp ). The pain is at a severity of 6/10. The pain is moderate. The pain has been constant since onset. Associated symptoms include muscle weakness. Associated symptoms comments: Loss of motion left knee. Foreign body present: left and right knee replacement. The symptoms are aggravated by weight bearing (walking, steps). He has tried heat and ice (pain meds) for the symptoms. Treatment goals:  Functional status: be comfortable    Aberrancy:   Any alcoholic beverages       no     Any illegal drugs no        Analgesia:      6               Adverse  Effects :no      ADL;s :limited  Foot surgery      Data:    When was thelast UDS: 8-7-2020           Was the UDS appropriate:  [] yes []   no      Record/Diagnostics Review:      As above, I did review the imaging     DRUG SCREEN, PAIN  Order: [id number]  Status: Final result    Visible to patient: No (not released)    Next appt: 12/21/2021 at 08:40 AM in Pain Management Soniya MERRITT, APRN - CNP)    Dx:  Medication monitoring encounter; Prim...    0 Result Notes     Ref Range & Units 8/7/20 1124 Resulting Agency   Pain Management Drug Panel Interp, Urine  Consistent  [facility]   Comment: (NOTE)   ________________________________________________________________   DRUGS EXPECTED:   OXYCODONE   MORPHINE   ________________________________________________________________   CONSISTENT with medications provided:   OXYCODONE: based on oxycodone, noroxycodone, oxymorphone   MORPHINE: based on morphine   ________________________________________________________________   INTERPRETIVE INFORMATION: Pain Mgt [place], Mass Spec/EMIT, Ur,                            Interp   Interpretation depends on accuracy and completeness of patient   medication information submitted by client. EXAMINATION:   MRI OF THE LUMBAR SPINE WITHOUT CONTRAST, 6/1/2021 2:02 pm       TECHNIQUE:   Multiplanar multisequence MRI of the lumbar spine was performed without the   administration of intravenous contrast.       COMPARISON:   None.       HISTORY:   ORDERING SYSTEM PROVIDED HISTORY: Lumbar radiculopathy   TECHNOLOGIST PROVIDED HISTORY:   numbness and pain left foot, low back pain   Reason for Exam: Lumbar radiculopathy M54.16 (ICD-10-CM);  Lumbar back pain   with radiculopathy affecting left lower extremity   Additional signs and symptoms: numbness and pain left foot, low back pain       FINDINGS:   BONES/ALIGNMENT: The vertebral body heights are maintained.  There is   age-appropriate bone marrow signal.  There is multilevel degenerative disc   disease with loss of disc signal.  There is disc space narrowing at the L2-3,   L3-4, and L4-5 levels.  There is no spondylolisthesis.       SPINAL CORD: The conus medullaris is normal in caliber and signal and   terminates at the L1 level.  The cauda equina is unremarkable.       SOFT TISSUES: The posterior paraspinal soft tissues are unremarkable.  The   visualized abdominal structures demonstrate bilateral renal cysts.       L1-L2: There is a circumferential disc bulge with facet and ligamentous   hypertrophy as well as prominent posterior epidural fat.  There is no canal   stenosis or foraminal narrowing.       L2-L3: There is a circumferential disc bulge with facet and ligamentous   hypertrophy as well as prominent posterior epidural fat.  There is canal   stenosis measuring 9 mm in AP dimension.  There is moderate left foraminal   narrowing and no significant right foraminal narrowing.       L3-L4: There is a circumferential disc bulge with facet and ligamentous   hypertrophy as well as prominent posterior epidural fat.  There is canal   stenosis measuring 8 mm in AP dimension.  There is moderate bilateral   foraminal narrowing.       L4-L5: There is a circumferential disc bulge with facet and ligamentous   hypertrophy as well as prominent posterior epidural fat.  There is canal   stenosis measuring 9 mm in AP dimension.  There is moderate to severe   bilateral foraminal narrowing.       L5-S1: There is a circumferential disc bulge with facet and ligamentous   hypertrophy.  There is no canal stenosis or significant foraminal narrowing.           Impression   Multilevel degenerative disc disease with associated multilevel degenerative   facet hypertrophy resulting in canal stenosis at L2-3, L3-4, and L4-5.       Bilateral foraminal narrowing as described above.           EXAMINATION:   TWO XRAY VIEWS OF THE RIGHT KNEE       7/9/2021 12:02 pm       COMPARISON:   11 October 2020       HISTORY:   ORDERING SYSTEM PROVIDED HISTORY: Internal derangement of multiple sites of   right knee   TECHNOLOGIST PROVIDED HISTORY:   Acuity: Chronic   Type of Exam: Ongoing       FINDINGS:   A total knee arthroplasty is noted in satisfactory alignment position without   acute fracture, dislocation or effusion.           Impression   Status post total knee arthroplasty without hardware complication or acute   osseous abnormality.            Pill count: appropriate    fill date : 12-    Morphine equivalent dose as reported on OARRS:90 has narcan at home  Review ofOARRS does not show any aberrant prescription behavior. Medication is helping the patient stay active. Patient denies any side effects and reports adequate analgesia. No sign of misuse/abuse. Past Medical History:   Diagnosis Date    Arthritis     History of blood transfusion     Hx of blood clots     after one of his knee surgeries     Hyperlipidemia     Left knee pain 4/9/2014    Neuropathy     Teeth problem     abcessw       Past Surgical History:   Procedure Laterality Date    BUNIONECTOMY N/A 12/15/2021    AKIN OSTEOTOMY WITH BONE STAPLE CHEILECTOMY RIGHT FOOT performed by Marky Reno DPM at [facility] Bilateral     DENTAL SURGERY  10/2015    DENTAL SURGERY  03/2021    EYE SURGERY      bilat cataracts     FOOT SURGERY Right 12/15/2021    AKIN OSTEOTOMY WITH BONE STAPLE CHEILECTOMY RIGHT FOOT (N/A Foot)    JOINT REPLACEMENT Left     KNEE  X 6 pt unsure of date    JOINT REPLACEMENT Right 07/20/2018    ROTATOR CUFF REPAIR Right     TONSILLECTOMY         Allergies   Allergen Reactions    Oxycontin [Oxycodone Hcl] Nausea Only         Current Outpatient Medications:     atorvastatin (LIPITOR) 40 MG tablet, TAKE ONE TABLET BY MOUTH DAILY, Disp: 90 tablet, Rfl: 1    oxyCODONE-acetaminophen (PERCOCET) 5-325 MG per tablet, Take 1 tablet by mouth every 6 hours as needed for Pain for up to 30 days. , Disp: 120 tablet, Rfl: 0    morphine (MS CONTIN) 30 MG extended release tablet, Take 1 tablet by mouth every 12 hours for 30 days. , Disp: 60 tablet, Rfl: 0    diclofenac sodium (VOLTAREN) 1 % GEL, Apply 2 g topically 2 times daily, Disp: 120 g, Rfl: 0    divalproex (DEPAKOTE) 125 MG DR tablet, TAKE ONE TABLET BY MOUTH TWICE A DAY, Disp: 180 tablet, Rfl: 1    gabapentin (NEURONTIN) 600 MG tablet, 1 tab  tid (Patient taking differently: Take 600 mg by mouth 3 times daily. 1 tab  tid), Disp: 90 tablet, Rfl: 2    spironolactone (ALDACTONE) 50 MG tablet, TAKE ONE TABLET BY MOUTH DAILY, Disp: 90 tablet, Rfl: 1    melatonin 3 MG TABS tablet, Take 6 mg by mouth nightly as needed (insomnia), Disp: , Rfl:     Family History   Problem Relation Age of Onset    Diabetes Mother     No Known Problems Father        Social History     Socioeconomic History    Marital status:      Spouse name: Not on file    Number of children: Not on file    Years of education: Not on file    Highest education level: Not on file   Occupational History    Occupation: retired   Tobacco Use    Smoking status: Never Smoker    Smokeless tobacco: Never Used   Vaping Use    Vaping Use: Never used   Substance and Sexual Activity    Alcohol use: No    Drug use: No    Sexual activity: Never   Other Topics Concern    Not on file   Social History Narrative    Not on file     Social Determinants of Health     Financial Resource Strain: Low Risk     Difficulty of Paying Living Expenses: Not hard at all   Food Insecurity: No Food Insecurity    Worried About [address] in the Last Year: Never true    [address] in the Last Year: Never true   Transportation Needs:     Lack of Transportation (Medical): Not on file    Lack of Transportation (Non-Medical):  Not on file   Physical Activity:     Days of Exercise per Week: Not on file    Minutes of Exercise per Session: Not on file   Stress:     Feeling of Stress : Not on file   Social Connections:     Frequency of Communication with Friends and Family: Not on file    Frequency of Social Gatherings with Friends and Family: Not on file    Attends Nondenominational Services: Not on file    Active Member of Clubs or Organizations: Not on file    Attends Club or Organization Meetings: Not on file    Marital Status: Not on file   Intimate Partner Violence:     Fear of Current or Ex-Partner: Not on file    Emotionally Abused: Not on file    Physically Abused: Not on file    Sexually Abused: Not on file   Housing Stability:     Unable to Pay for Housing in the Last Year: Not on file    Number of Places Lived in the Last Year: Not on file    Unstable Housing in the Last Year: Not on file         Review of Systems:  Review of Systems   Constitutional: Negative. HENT: Negative. Eyes:        Glasses   Cardiovascular: Negative. Respiratory: Negative. Endocrine: Negative. Skin: Negative. Musculoskeletal: Positive for joint pain. Gastrointestinal: Negative. Genitourinary: Negative. Neurological: Positive for loss of balance. Uses a cane    Psychiatric/Behavioral: Negative. Physical Exam:  There were no vitals taken for this visit. Physical Exam  Musculoskeletal:        Feet:    Skin:         Neurological:      Mental Status: He is alert and oriented to person, place, and time. Psychiatric:         Mood and Affect: Mood normal.         Thought Content: Thought content normal.           Assessment:    Problem List Items Addressed This Visit     Use of opiates for therapeutic purposes - Primary    Primary osteoarthritis of both knees    Medication monitoring encounter    Lumbar back pain with radiculopathy affecting left lower extremity    Fibromyalgia            Treatment Plan:  DISCUSSION: Treatment options discussed withpatient and all questions answered to patient's satisfaction. Possible side effects, risk of tolerance and or dependence and alternative treatments discussed    Obtaining appropriate analgesic effect of treatment   No signs of potential drug abuse or diversion identified    [x] Ill effects of being on chronic pain medications such as sleep disturbances, respiratory depression, hormonal changes, withdrawal symptoms, chronic opioid dependence and tolerance as well as risk of taking opioids with Benzodiazepines and taking opioids along with alcohol,  werediscussed with patient. I had asked the patient to minimize medication use and utilize pain medications only for uncontrolled rest pain or pain with exertional activities. I advised patient not to self-escalate painmedications without consulting with us. At each of patient's future visits we will try to taper pain medications, while adjusting the adjunct medications, and re-evaluating for Physical Therapy to improve spinal andjoint strength. We will continue to have discussions to decrease pain medications as tolerated. Counseled patient on effects their pain medication and /or their medical condition mayhave on their  ability to drive or operate machinery. Instructed not to drive or operate machinery if drowsy     I also discussed with the patient regarding the dangers of combining narcotic pain medication with tranquilizers, alcohol or illegal drugs or taking the medication any way other than prescribed. The dangers were discussed  including respiratory depression and death. Patient was told to tell  all  physicians regarding the medications he is getting from pain clinic. Patient is warned not to take any unprescribed medications over-the-countermedications that can depress breathing . Patient is advised to talk to the pharmacist or physicians if planning to take any over-the-counter medications before  takeing them. Patient is strongly advised to avoid tranquilizers or  relaxants, illegal drugs  or any medications that can depress breathing  Patient is also advised to tell us if there is any changes in their medications from other physicians.       UDT from 2021 not done, patient had been instructed at his previous visits to get done  He states forgot had a lot going on, his sister , he states is going to get done today, I told him I would not do a script for him until he gets done    TREATMENT OPTIONS:       Medication Agreement Requirements Met  Continue Opioid therapy  Follow up appointment made

## 2021-12-21 ENCOUNTER — HOSPITAL ENCOUNTER (OUTPATIENT)
Age: 71
Setting detail: SPECIMEN
Discharge: HOME | End: 2021-12-21
Payer: MEDICARE

## 2021-12-21 ENCOUNTER — TELEPHONE (OUTPATIENT)
Dept: PAIN MANAGEMENT | Age: 71
End: 2021-12-21

## 2021-12-21 ENCOUNTER — HOSPITAL ENCOUNTER (OUTPATIENT)
Dept: PAIN MANAGEMENT | Age: 71
Discharge: HOME | End: 2021-12-21
Payer: MEDICARE

## 2021-12-21 DIAGNOSIS — M25.561 BILATERAL CHRONIC KNEE PAIN: ICD-10-CM

## 2021-12-21 DIAGNOSIS — G89.29 BILATERAL CHRONIC KNEE PAIN: ICD-10-CM

## 2021-12-21 DIAGNOSIS — M54.16 LUMBAR BACK PAIN WITH RADICULOPATHY AFFECTING LEFT LOWER EXTREMITY: ICD-10-CM

## 2021-12-21 DIAGNOSIS — M17.0 PRIMARY OSTEOARTHRITIS OF BOTH KNEES: ICD-10-CM

## 2021-12-21 DIAGNOSIS — Z79.891 USE OF OPIATES FOR THERAPEUTIC PURPOSES: Primary | ICD-10-CM

## 2021-12-21 DIAGNOSIS — M79.7 FIBROMYALGIA: ICD-10-CM

## 2021-12-21 DIAGNOSIS — M25.562 BILATERAL CHRONIC KNEE PAIN: ICD-10-CM

## 2021-12-21 DIAGNOSIS — Z79.891 USE OF OPIATES FOR THERAPEUTIC PURPOSES: ICD-10-CM

## 2021-12-21 DIAGNOSIS — Z51.81 MEDICATION MONITORING ENCOUNTER: ICD-10-CM

## 2021-12-21 PROCEDURE — 99213 OFFICE O/P EST LOW 20 MIN: CPT

## 2021-12-21 PROCEDURE — 99441 PR PHYS/QHP TELEPHONE EVALUATION 5-10 MIN: CPT | Performed by: NURSE PRACTITIONER

## 2021-12-21 PROCEDURE — 80307 DRUG TEST PRSMV CHEM ANLYZR: CPT

## 2021-12-21 ASSESSMENT — ENCOUNTER SYMPTOMS
GASTROINTESTINAL NEGATIVE: 1
RESPIRATORY NEGATIVE: 1

## 2021-12-21 NOTE — TELEPHONE ENCOUNTER
Patient calls [facility] and states his foot doctor (Dr. Thu Bruce) gave him a 7-day supply of Percocet, #28 5/325mg.

## 2021-12-22 DIAGNOSIS — Z51.81 ENCOUNTER FOR MEDICATION MONITORING: ICD-10-CM

## 2021-12-22 DIAGNOSIS — Z51.81 MEDICATION MONITORING ENCOUNTER: ICD-10-CM

## 2021-12-22 DIAGNOSIS — M17.0 PRIMARY OSTEOARTHRITIS OF BOTH KNEES: ICD-10-CM

## 2021-12-22 DIAGNOSIS — R29.898 WEAKNESS OF LEFT LEG: ICD-10-CM

## 2021-12-22 DIAGNOSIS — G89.29 CHRONIC PAIN OF LEFT KNEE: ICD-10-CM

## 2021-12-22 DIAGNOSIS — M17.0 OSTEOARTHRITIS OF BOTH KNEES, UNSPECIFIED OSTEOARTHRITIS TYPE: ICD-10-CM

## 2021-12-22 DIAGNOSIS — Z79.891 USE OF OPIATES FOR THERAPEUTIC PURPOSES: ICD-10-CM

## 2021-12-22 DIAGNOSIS — Z98.890 S/P LEFT KNEE SURGERY: ICD-10-CM

## 2021-12-22 DIAGNOSIS — Z98.890 S/P KNEE SURGERY: ICD-10-CM

## 2021-12-22 DIAGNOSIS — M17.12 PRIMARY OSTEOARTHRITIS OF LEFT KNEE: ICD-10-CM

## 2021-12-22 DIAGNOSIS — M25.562 CHRONIC PAIN OF LEFT KNEE: ICD-10-CM

## 2021-12-22 DIAGNOSIS — M17.11 PRIMARY OSTEOARTHRITIS OF RIGHT KNEE: ICD-10-CM

## 2021-12-22 RX ORDER — GABAPENTIN 600 MG/1
TABLET ORAL
Qty: 90 TABLET | Refills: 0 | Status: SHIPPED | OUTPATIENT
Start: 2021-12-26 | End: 2022-01-25 | Stop reason: SDUPTHER

## 2021-12-22 RX ORDER — OXYCODONE HYDROCHLORIDE AND ACETAMINOPHEN 5; 325 MG/1; MG/1
1 TABLET ORAL EVERY 6 HOURS PRN
Qty: 120 TABLET | Refills: 0 | Status: SHIPPED | OUTPATIENT
Start: 2021-12-28 | End: 2021-12-23 | Stop reason: SDUPTHER

## 2021-12-22 RX ORDER — MORPHINE SULFATE 30 MG/1
30 TABLET, FILM COATED, EXTENDED RELEASE ORAL EVERY 12 HOURS
Qty: 60 TABLET | Refills: 0 | Status: SHIPPED | OUTPATIENT
Start: 2021-12-28 | End: 2021-12-23 | Stop reason: SDUPTHER

## 2021-12-23 DIAGNOSIS — M17.0 OSTEOARTHRITIS OF BOTH KNEES, UNSPECIFIED OSTEOARTHRITIS TYPE: ICD-10-CM

## 2021-12-23 DIAGNOSIS — M17.0 PRIMARY OSTEOARTHRITIS OF BOTH KNEES: ICD-10-CM

## 2021-12-23 DIAGNOSIS — Z51.81 ENCOUNTER FOR MEDICATION MONITORING: ICD-10-CM

## 2021-12-23 DIAGNOSIS — Z51.81 MEDICATION MONITORING ENCOUNTER: ICD-10-CM

## 2021-12-23 DIAGNOSIS — Z98.890 S/P LEFT KNEE SURGERY: ICD-10-CM

## 2021-12-23 DIAGNOSIS — R29.898 WEAKNESS OF LEFT LEG: ICD-10-CM

## 2021-12-23 RX ORDER — OXYCODONE HYDROCHLORIDE AND ACETAMINOPHEN 5; 325 MG/1; MG/1
1 TABLET ORAL EVERY 6 HOURS PRN
Qty: 120 TABLET | Refills: 0 | Status: SHIPPED | OUTPATIENT
Start: 2021-12-28 | End: 2022-01-25 | Stop reason: SDUPTHER

## 2021-12-23 RX ORDER — MORPHINE SULFATE 30 MG/1
30 TABLET, FILM COATED, EXTENDED RELEASE ORAL EVERY 12 HOURS
Qty: 60 TABLET | Refills: 0 | Status: SHIPPED | OUTPATIENT
Start: 2021-12-28 | End: 2022-01-25 | Stop reason: SDUPTHER

## 2021-12-27 LAB
6-ACETYLMORPHINE, UR: NOT DETECTED
7-AMINOCLONAZEPAM, URINE: NOT DETECTED
ALPHA-OH-ALPRAZ, URINE: NOT DETECTED
ALPHA-OH-MIDAZOLAM, URINE: NOT DETECTED
ALPRAZOLAM, URINE: NOT DETECTED
AMPHETAMINES, URINE: NOT DETECTED
BARBITURATES, URINE: NOT DETECTED
BENZOYLECGONINE, UR: NOT DETECTED
BUPRENORPHINE URINE: NOT DETECTED
CARISOPRODOL, UR: NOT DETECTED
CLONAZEPAM, URINE: NOT DETECTED
CODEINE, URINE: NOT DETECTED
CREATININE URINE: 185.1 MG/DL (ref 20–400)
DIAZEPAM, URINE: NOT DETECTED
DRUGS EXPECTED, UR: NORMAL
EER HI RES INTERP UR: NORMAL
ETHYL GLUCURONIDE UR: NOT DETECTED
FENTANYL URINE: NOT DETECTED
GABAPENTIN: PRESENT
HYDROCODONE, URINE: NOT DETECTED
HYDROMORPHONE, URINE: PRESENT
LORAZEPAM, URINE: NOT DETECTED
MARIJUANA METAB, UR: NOT DETECTED
MDA, UR: NOT DETECTED
MDEA, EVE, UR: NOT DETECTED
MDMA URINE: NOT DETECTED
MEPERIDINE METAB, UR: NOT DETECTED
METHADONE, URINE: NOT DETECTED
METHAMPHETAMINE, URINE: NOT DETECTED
METHYLPHENIDATE: NOT DETECTED
MIDAZOLAM, URINE: NOT DETECTED
MORPHINE URINE: PRESENT
NALOXONE URINE: NOT DETECTED
NORBUPRENORPHINE, URINE: NOT DETECTED
NORDIAZEPAM, URINE: NOT DETECTED
NORFENTANYL, URINE: NOT DETECTED
NORHYDROCODONE, URINE: NOT DETECTED
NOROXYCODONE, URINE: PRESENT
NOROXYMORPHONE, URINE: PRESENT
OXAZEPAM, URINE: NOT DETECTED
OXYCODONE URINE: PRESENT
OXYMORPHONE, URINE: PRESENT
PAIN MANAGEMENT DRUG PANEL INTERP, URINE: NORMAL
PAIN MGT DRUG PANEL, HI RES, UR: NORMAL
PCP,URINE: NOT DETECTED
PHENTERMINE, UR: NOT DETECTED
PREGABALIN: NOT DETECTED
TAPENTADOL, URINE: NOT DETECTED
TAPENTADOL-O-SULFATE, URINE: NOT DETECTED
TEMAZEPAM, URINE: NOT DETECTED
TRAMADOL, URINE: NOT DETECTED
ZOLPIDEM METABOLITE (ZCA), URINE: NOT DETECTED
ZOLPIDEM, URINE: NOT DETECTED

## 2022-01-24 NOTE — PROGRESS NOTES
Francisca 89 PROGRESS NOTE      Patient  completed []  video visit   []   phone call:         Minutes :   [x] in person visit to pain clinic    [x]    to  review Medication Agreement    []  Follow up after procedure   []  Discuss treatment options      Location:  Provider:  working from    []    home    [x]   [facility] - MEÑO NICOLE ,   patient at   [x] pain clinic     []  home         Chief Complaint: knee pain      He c/o bilateral knee pain. He has history of multiple surgeries to left knee and underwent a right knee arthroplasty in 7-. His last x-ray of right knee read as \" \"without hardware complication, no osseous abnormality\"  He reports his knee pain is unchanged. He ambulates with a cane. He  Has history of low back pain. He denies any pain  At present. He had lumbar epidural injection L4, L5, on 5- with 60% relief. His lumbar MRI from 6-2021 read as \" multilevel degenerative disc disease with associated multilevel degenerative facet hypertrophy\" resulting in canal stenosis L2-L3, L3-4, L4-L5. Knee Pain   The pain is present in the left knee and right knee. Quality: sharp right knee, left knee  hurts. The pain is at a severity of 7/10. The pain is moderate. The pain has been constant since onset. Associated symptoms include muscle weakness. Associated symptoms comments: Decreased ROM  Left knee. Foreign body present: left and right knee replacement. Exacerbated by: walking, steps. He has tried elevation and ice for the symptoms.        Treatment goals:  Functional status:  Be fully recovered      Aberrancy:   Any alcoholic beverages no           Any illegal drugs   no      Analgesia:      7               Adverse  Effects :no      ADL;s :activity limited due to foot surgery      Data:    When was thelast UDS:       12-     Was the UDS appropriate:  [] yes []   no      Record/Diagnostics Review:      As above, I did review the imaging     DRUG SCREEN, PAIN  Order: [id number]  Status: Final result    Visible to patient: No (not released)    Next appt: 01/25/2022 at 10:00 AM in Pain Management Norma MERRITT, QUIQUE - CNP)    Dx: Primary osteoarthritis of both knees; Chucho Jaylon ..    0 Result Notes    Component Ref Range & Units 12/21/21 1506 8/7/20 1124 6/26/19 1040 5/23/18 0915 3/28/18 0840 5/3/17 0900 5/25/16 0915   Pain Management Drug Panel Interp, Urine  Consistent ARUP  Consistent CMARUP  Consistent CM   Consistent CMARUP  Consistent CM  Consistent CM    Comment: (NOTE)   ________________________________________________________________   DRUGS EXPECTED:   MORPHINE   GABAPENTIN   OXYCODONE   ________________________________________________________________   CONSISTENT with medications provided:   MORPHINE: based on morphine, hydromorphone   GABAPENTIN: based on gabapentin   OXYCODONE: based on oxycodone, noroxycodone, noroxymorphone,   oxymorphone   ________________________________________________________________   INTERPRETIVE INFORMATION: Targeted drug profile Interp   Interpretation depends on accuracy and completeness of patient   medication information submitted by client.              EXAMINATION:   TWO XRAY VIEWS OF THE RIGHT KNEE       7/9/2021 12:02 pm       COMPARISON:   11 October 2020       HISTORY:   ORDERING SYSTEM PROVIDED HISTORY: Internal derangement of multiple sites of   right knee   TECHNOLOGIST PROVIDED HISTORY:   Acuity: Chronic   Type of Exam: Ongoing       FINDINGS:   A total knee arthroplasty is noted in satisfactory alignment position without   acute fracture, dislocation or effusion.           Impression   Status post total knee arthroplasty without hardware complication or acute   osseous abnormality.               EXAMINATION:   MRI OF THE LUMBAR SPINE WITHOUT CONTRAST, 6/1/2021 2:02 pm       TECHNIQUE:   Multiplanar multisequence MRI of the lumbar spine was performed without the   administration of intravenous contrast.       COMPARISON:   None.     HISTORY:   ORDERING SYSTEM PROVIDED HISTORY: Lumbar radiculopathy   TECHNOLOGIST PROVIDED HISTORY:   numbness and pain left foot, low back pain   Reason for Exam: Lumbar radiculopathy M54.16 (ICD-10-CM);  Lumbar back pain   with radiculopathy affecting left lower extremity   Additional signs and symptoms: numbness and pain left foot, low back pain       FINDINGS:   BONES/ALIGNMENT: The vertebral body heights are maintained.  There is   age-appropriate bone marrow signal.  There is multilevel degenerative disc   disease with loss of disc signal.  There is disc space narrowing at the L2-3,   L3-4, and L4-5 levels.  There is no spondylolisthesis.       SPINAL CORD: The conus medullaris is normal in caliber and signal and   terminates at the L1 level.  The cauda equina is unremarkable.       SOFT TISSUES: The posterior paraspinal soft tissues are unremarkable.  The   visualized abdominal structures demonstrate bilateral renal cysts.       L1-L2: There is a circumferential disc bulge with facet and ligamentous   hypertrophy as well as prominent posterior epidural fat.  There is no canal   stenosis or foraminal narrowing.       L2-L3: There is a circumferential disc bulge with facet and ligamentous   hypertrophy as well as prominent posterior epidural fat.  There is canal   stenosis measuring 9 mm in AP dimension.  There is moderate left foraminal   narrowing and no significant right foraminal narrowing.       L3-L4: There is a circumferential disc bulge with facet and ligamentous   hypertrophy as well as prominent posterior epidural fat.  There is canal   stenosis measuring 8 mm in AP dimension.  There is moderate bilateral   foraminal narrowing.       L4-L5: There is a circumferential disc bulge with facet and ligamentous   hypertrophy as well as prominent posterior epidural fat.  There is canal   stenosis measuring 9 mm in AP dimension.  There is moderate to severe   bilateral foraminal narrowing.       L5-S1: There is a circumferential disc bulge with facet and ligamentous   hypertrophy. Satira Cones is no canal stenosis or significant foraminal narrowing.           Impression   Multilevel degenerative disc disease with associated multilevel degenerative   facet hypertrophy resulting in canal stenosis at L2-3, L3-4, and L4-5.       Bilateral foraminal narrowing as described above.                     Pill count: appropriate    fill date :1-27-22 for ms contin and 1-30-22 for percocet    Morphine equivalent dose as reported on OARRS: 90 has narcan at home     Review ofOARRS does not show any aberrant prescription behavior. Medication is helping the patient stay active. Patient denies any side effects and reports adequate analgesia. No sign of misuse/abuse. Past Medical History:   Diagnosis Date    Arthritis     History of blood transfusion     Hx of blood clots     after one of his knee surgeries     Hyperlipidemia     Left knee pain 4/9/2014    Neuropathy     Teeth problem     abcessw       Past Surgical History:   Procedure Laterality Date    BUNIONECTOMY N/A 12/15/2021    AKIN OSTEOTOMY WITH BONE STAPLE CHEILECTOMY RIGHT FOOT performed by Arnaldo Greenwood DPM at [address] Bilateral     DENTAL SURGERY  10/2015    DENTAL SURGERY  03/2021    EYE SURGERY      bilat cataracts     FOOT SURGERY Right 12/15/2021    AKIN OSTEOTOMY WITH BONE STAPLE CHEILECTOMY RIGHT FOOT (N/A Foot)    JOINT REPLACEMENT Left     KNEE  X 6 pt unsure of date    JOINT REPLACEMENT Right 07/20/2018    ROTATOR CUFF REPAIR Right     TONSILLECTOMY         Allergies   Allergen Reactions    Oxycontin [Oxycodone Hcl] Nausea Only         Current Outpatient Medications:     oxyCODONE-acetaminophen (PERCOCET) 5-325 MG per tablet, Take 1 tablet by mouth every 6 hours as needed for Pain for up to 30 days. , Disp: 120 tablet, Rfl: 0    morphine (MS CONTIN) 30 MG extended release tablet, Take 1 tablet by mouth every 12 file    Frequency of Social Gatherings with Friends and Family: Not on file    Attends Oriental orthodox Services: Not on file    Active Member of Clubs or Organizations: Not on file    Attends Club or Organization Meetings: Not on file    Marital Status: Not on file   Intimate Partner Violence:     Fear of Current or Ex-Partner: Not on file    Emotionally Abused: Not on file    Physically Abused: Not on file    Sexually Abused: Not on file   Housing Stability:     Unable to Pay for Housing in the Last Year: Not on file    Number of [place] in the Last Year: Not on file    Unstable Housing in the Last Year: Not on file         Review of Systems:  Review of Systems   Constitutional: Negative. HENT: Negative. Eyes:        Glases   Cardiovascular: Negative. Respiratory: Negative. Endocrine: Negative. Hematologic/Lymphatic: Negative. Skin: Negative. Musculoskeletal: Positive for joint pain. Gastrointestinal: Negative. Genitourinary: Negative. Neurological:        Uses  A cane   Psychiatric/Behavioral: Negative. Physical Exam:  There were no vitals taken for this visit. Physical Exam  HENT:      Head: Normocephalic. Pulmonary:      Effort: Pulmonary effort is normal.   Skin:         Neurological:      Mental Status: He is alert and oriented to person, place, and time. Psychiatric:         Mood and Affect: Mood normal.         Thought Content: Thought content normal.           Assessment:    Problem List Items Addressed This Visit     Use of opiates for therapeutic purposes - Primary    S/P left knee surgery    Primary osteoarthritis of both knees    Medication monitoring encounter    Lumbar back pain with radiculopathy affecting left lower extremity    Fibromyalgia    Encounter for medication monitoring            Treatment Plan:  DISCUSSION: Treatment options discussed withpatient and all questions answered to patient's satisfaction.      Possible side effects, risk of tolerance and or dependence and alternative treatments discussed    Obtaining appropriate analgesic effect of treatment   No signs of potential drug abuse or diversion identified    [x] Ill effects of being on chronic pain medications such as sleep disturbances, respiratory depression, hormonal changes, withdrawal symptoms, chronic opioid dependence and tolerance as well as risk of taking opioids with Benzodiazepines and taking opioids along with alcohol,  werediscussed with patient. I had asked the patient to minimize medication use and utilize pain medications only for uncontrolled rest pain or pain with exertional activities. I advised patient not to self-escalate painmedications without consulting with us. At each of patient's future visits we will try to taper pain medications, while adjusting the adjunct medications, and re-evaluating for Physical Therapy to improve spinal andjoint strength. We will continue to have discussions to decrease pain medications as tolerated. Counseled patient on effects their pain medication and /or their medical condition mayhave on their  ability to drive or operate machinery. Instructed not to drive or operate machinery if drowsy     I also discussed with the patient regarding the dangers of combining narcotic pain medication with tranquilizers, alcohol or illegal drugs or taking the medication any way other than prescribed. The dangers were discussed  including respiratory depression and death. Patient was told to tell  all  physicians regarding the medications he is getting from pain clinic. Patient is warned not to take any unprescribed medications over-the-countermedications that can depress breathing . Patient is advised to talk to the pharmacist or physicians if planning to take any over-the-counter medications before  takeing them.  Patient is strongly advised to avoid tranquilizers or  relaxants, illegal drugs  or any medications that can depress breathing  Patient is also advised to tell us if there is any changes in their medications from other physicians. 1. Medication contract x2 signed  2.  Will give 27 days worth of percocet to get on same schedule next month  TREATMENT OPTIONS:       Medication Agreement Requirements Met  Continue Opioid therapy  Script written for  Ms contin, percocet, gabapentin  Follow up appointment made

## 2022-01-25 ENCOUNTER — HOSPITAL ENCOUNTER (OUTPATIENT)
Dept: PAIN MANAGEMENT | Age: 72
Discharge: HOME | End: 2022-01-25
Payer: MEDICARE

## 2022-01-25 VITALS
HEIGHT: 67 IN | SYSTOLIC BLOOD PRESSURE: 101 MMHG | HEART RATE: 78 BPM | OXYGEN SATURATION: 98 % | DIASTOLIC BLOOD PRESSURE: 67 MMHG | TEMPERATURE: 97.1 F | WEIGHT: 199 LBS | BODY MASS INDEX: 31.23 KG/M2 | RESPIRATION RATE: 16 BRPM

## 2022-01-25 DIAGNOSIS — Z51.81 MEDICATION MONITORING ENCOUNTER: ICD-10-CM

## 2022-01-25 DIAGNOSIS — M17.0 OSTEOARTHRITIS OF BOTH KNEES, UNSPECIFIED OSTEOARTHRITIS TYPE: ICD-10-CM

## 2022-01-25 DIAGNOSIS — M25.562 CHRONIC PAIN OF LEFT KNEE: ICD-10-CM

## 2022-01-25 DIAGNOSIS — Z98.890 S/P LEFT KNEE SURGERY: ICD-10-CM

## 2022-01-25 DIAGNOSIS — R29.898 WEAKNESS OF LEFT LEG: ICD-10-CM

## 2022-01-25 DIAGNOSIS — G89.29 CHRONIC PAIN OF LEFT KNEE: ICD-10-CM

## 2022-01-25 DIAGNOSIS — Z51.81 ENCOUNTER FOR MEDICATION MONITORING: ICD-10-CM

## 2022-01-25 DIAGNOSIS — M17.12 PRIMARY OSTEOARTHRITIS OF LEFT KNEE: ICD-10-CM

## 2022-01-25 DIAGNOSIS — M54.16 LUMBAR BACK PAIN WITH RADICULOPATHY AFFECTING LEFT LOWER EXTREMITY: ICD-10-CM

## 2022-01-25 DIAGNOSIS — M79.7 FIBROMYALGIA: ICD-10-CM

## 2022-01-25 DIAGNOSIS — M17.0 PRIMARY OSTEOARTHRITIS OF BOTH KNEES: ICD-10-CM

## 2022-01-25 DIAGNOSIS — Z98.890 S/P KNEE SURGERY: ICD-10-CM

## 2022-01-25 DIAGNOSIS — Z79.891 USE OF OPIATES FOR THERAPEUTIC PURPOSES: Primary | ICD-10-CM

## 2022-01-25 DIAGNOSIS — M17.11 PRIMARY OSTEOARTHRITIS OF RIGHT KNEE: ICD-10-CM

## 2022-01-25 PROCEDURE — 99213 OFFICE O/P EST LOW 20 MIN: CPT

## 2022-01-25 PROCEDURE — 99213 OFFICE O/P EST LOW 20 MIN: CPT | Performed by: NURSE PRACTITIONER

## 2022-01-25 RX ORDER — OXYCODONE HYDROCHLORIDE AND ACETAMINOPHEN 5; 325 MG/1; MG/1
1 TABLET ORAL EVERY 6 HOURS PRN
Qty: 108 TABLET | Refills: 0 | Status: SHIPPED | OUTPATIENT
Start: 2022-01-30 | End: 2022-02-23 | Stop reason: SDUPTHER

## 2022-01-25 RX ORDER — MORPHINE SULFATE 30 MG/1
30 TABLET, FILM COATED, EXTENDED RELEASE ORAL EVERY 12 HOURS
Qty: 60 TABLET | Refills: 0 | Status: SHIPPED | OUTPATIENT
Start: 2022-01-27 | End: 2022-02-23 | Stop reason: SDUPTHER

## 2022-01-25 RX ORDER — GABAPENTIN 600 MG/1
TABLET ORAL
Qty: 90 TABLET | Refills: 1 | Status: SHIPPED | OUTPATIENT
Start: 2022-01-25 | End: 2022-03-23 | Stop reason: SDUPTHER

## 2022-01-25 ASSESSMENT — ENCOUNTER SYMPTOMS
RESPIRATORY NEGATIVE: 1
GASTROINTESTINAL NEGATIVE: 1

## 2022-02-01 RX ORDER — SPIRONOLACTONE 50 MG/1
TABLET, FILM COATED ORAL
Qty: 90 TABLET | Refills: 0 | Status: SHIPPED | OUTPATIENT
Start: 2022-02-01 | End: 2022-02-08 | Stop reason: ALTCHOICE

## 2022-02-01 NOTE — TELEPHONE ENCOUNTER
Dennis Solorio is calling to request a refill on the following medication(s):    Medication Request:  Last fill 08/02/21 #90 w 1R    Requested Prescriptions     Pending Prescriptions Disp Refills    spironolactone (ALDACTONE) 50 MG tablet [Pharmacy Med Name: SPIRONOLACTONE 50 MG TABLET] 90 tablet 1     Sig: TAKE ONE TABLET BY MOUTH DAILY       Last Visit Date (If Applicable):  34/6/8551    Next Visit Date:    2/8/2022

## 2022-02-08 ENCOUNTER — OFFICE VISIT (OUTPATIENT)
Dept: INTERNAL MEDICINE CLINIC | Age: 72
End: 2022-02-08
Payer: MEDICARE

## 2022-02-08 VITALS
TEMPERATURE: 97.4 F | OXYGEN SATURATION: 96 % | HEART RATE: 81 BPM | SYSTOLIC BLOOD PRESSURE: 100 MMHG | DIASTOLIC BLOOD PRESSURE: 70 MMHG | RESPIRATION RATE: 20 BRPM

## 2022-02-08 DIAGNOSIS — Z79.891 USE OF OPIATES FOR THERAPEUTIC PURPOSES: ICD-10-CM

## 2022-02-08 DIAGNOSIS — Z98.890 S/P KNEE SURGERY: ICD-10-CM

## 2022-02-08 DIAGNOSIS — F39 MOOD DISORDER (HCC): ICD-10-CM

## 2022-02-08 DIAGNOSIS — I10 ESSENTIAL HYPERTENSION: ICD-10-CM

## 2022-02-08 DIAGNOSIS — M17.0 PRIMARY OSTEOARTHRITIS OF BOTH KNEES: ICD-10-CM

## 2022-02-08 DIAGNOSIS — F51.04 PSYCHOPHYSIOLOGICAL INSOMNIA: ICD-10-CM

## 2022-02-08 DIAGNOSIS — R73.03 PRE-DIABETES: ICD-10-CM

## 2022-02-08 DIAGNOSIS — N18.30 STAGE 3 CHRONIC KIDNEY DISEASE, UNSPECIFIED WHETHER STAGE 3A OR 3B CKD (HCC): ICD-10-CM

## 2022-02-08 DIAGNOSIS — M79.7 FIBROMYALGIA: ICD-10-CM

## 2022-02-08 DIAGNOSIS — M54.16 LUMBAR BACK PAIN WITH RADICULOPATHY AFFECTING LEFT LOWER EXTREMITY: ICD-10-CM

## 2022-02-08 DIAGNOSIS — E78.5 DYSLIPIDEMIA: Primary | ICD-10-CM

## 2022-02-08 PROBLEM — R29.898 WEAKNESS OF LEFT LEG: Status: RESOLVED | Noted: 2018-02-06 | Resolved: 2022-02-08

## 2022-02-08 PROCEDURE — 99214 OFFICE O/P EST MOD 30 MIN: CPT | Performed by: FAMILY MEDICINE

## 2022-02-08 ASSESSMENT — ENCOUNTER SYMPTOMS
RESPIRATORY NEGATIVE: 1
GASTROINTESTINAL NEGATIVE: 1
ALLERGIC/IMMUNOLOGIC NEGATIVE: 1
EYES NEGATIVE: 1
BACK PAIN: 1

## 2022-02-08 NOTE — PROGRESS NOTES
Subjective:      Patient ID: Randall Webb is a 70 y.o. male. Toe Pain   The incident occurred more than 1 week ago. There was no injury mechanism. Pain location: Left big toe. The quality of the pain is described as aching and cramping. The pain is at a severity of 5/10. The pain is moderate. Associated symptoms include a loss of motion and muscle weakness. He reports no foreign bodies present. Treatments tried: He is on opiates, gabapentin, Depakote. The treatment provided moderate relief. Review of Systems   Constitutional: Negative. HENT: Negative. Eyes: Negative. Respiratory: Negative. Cardiovascular: Negative. Gastrointestinal: Negative. Endocrine: Negative. Musculoskeletal: Positive for arthralgias, back pain, gait problem and myalgias. Skin: Negative. Allergic/Immunologic: Negative. Hematological: Negative. Psychiatric/Behavioral: Positive for dysphoric mood. The patient is nervous/anxious. Past family and social history unremarkable. Diagnosis Orders   1. Dyslipidemia     2. S/P knee surgery     3. Mood disorder (HCC)     4. Stage 3 chronic kidney disease, unspecified whether stage 3a or 3b CKD ([facility] [address]     5. Primary osteoarthritis of both knees     6. Use of opiates for therapeutic purposes     7. Pre-diabetes     8. Essential hypertension     9. Psychophysiological insomnia     10. Fibromyalgia     11. Lumbar back pain with radiculopathy affecting left lower extremity           Objective:   Physical Exam  Vitals and nursing note reviewed. Constitutional:       Appearance: He is well-developed. HENT:      Head: Normocephalic and atraumatic. Right Ear: External ear normal.      Left Ear: External ear normal.      Nose: Nose normal.   Eyes:      Conjunctiva/sclera: Conjunctivae normal.      Pupils: Pupils are equal, round, and reactive to light. Cardiovascular:      Rate and Rhythm: Normal rate and regular rhythm.       Heart sounds: Normal heart consultation and close follow-up  History of fibromyalgia. Improve sleep hygiene, stress reduction. Continue gabapentin  CKD with creatinine of 1.20. Avoid nephrotoxic drugs, anti-inflammatory radiocontrast.  Impaired fasting glucose with A1c of 6. He would benefit from low-fat high-fiber diet, daily moderate exercise  Insomnia on melatonin. Hypertension well-controlled to Aldactone that may be causing gynecomastia. He declined ultrasound  Hyperlipidemia/metabolic syndrome. Continue Lipitor 40 mg p.o. daily that he is tolerating well  He is updating COVID vaccination, influenza vaccination, pneumococcal vaccination, colon screening  Med list and available labs reviewed, discussed with patient, questions answered  This note is created with a voice recognition program and while intend to generate a document that accurately reflects the content of the visit, no guarantee can be provided that every mistake has been identified and corrected by editing.           Abhay Hawkins MD

## 2022-02-22 NOTE — PROGRESS NOTES
Francisca 89 PROGRESS NOTE      Patient  completed []  video visit   []   phone call:         Minutes :   [x] in person visit to pain clinic    [x]    to  review Medication Agreement    []  Follow up after procedure   []  Discuss treatment options      Location:  Provider:  working from    []    home    [x]   [facility] - MEÑO NICOLE ,   patient at   [x] pain clinic     []  home         Chief Complaint: knee pain      He c/o bilateral knee pain. He c/o bilateral knee pain. He had right knee arthroplasty in 2018 but continues to have pain. His last x-ray of right knee read as \" \"without hardware complication, no osseous abnormality\"  He has history of multiple surgeries on his left knee. He has history of low back pain, no history of surgery. He had lumbar epidural injection L4, L5, on 5- with 60% relief. His back pain is manageable. His lumbar MRI from 6-2021 read as \" multilevel degenerative disc disease with associated multilevel degenerative facet hypertrophy\" resulting in canal stenosis L2-L3, L3-4, L4-L5. He has shoe boot on right foot, he had surgery 2 months ago and will be having another surgery. He takes mkelatonin for sleep but still does not sleep well. Knee Pain   The incident occurred more than 1 week ago. The injury mechanism was a burn. The pain is present in the left knee, left leg and right knee. Quality: sharp right knee, left knee, sharp, dull. The pain is at a severity of 7/10. The pain is moderate. The pain has been constant since onset. Associated symptoms include muscle weakness and numbness. Foreign body present: knee replacements bilaterally. Exacerbated by: walking. He has tried ice, heat and elevation (voltaren gel) for the symptoms. Back Pain  This is a chronic problem. The current episode started more than 1 year ago. The problem is unchanged. The pain is present in the lumbar spine. Quality: sharp. The pain is at a severity of 5/10. The pain is moderate. The pain is the same all the time. Exacerbated by: getting up. Associated symptoms include numbness. (Legs) Risk factors: pain meds. Treatment goals:  Functional status: get off pain meds      Aberrancy:   Any alcoholic beverages    no        Any illegal drugs   no      Analgesia:       7              Adverse  Effects :no      ADL;s :not active at poresent      Data:    When was thelast UDS:  12-21-21         Was the UDS appropriate:  [x] yes []   no      Record/Diagnostics Review:      As above, I did review the imaging     RUG SCREEN, PAIN  Order: [id number]  Status: Final result    Visible to patient: No (not released)    Next appt: 02/23/2022 at 10:20 AM in Pain Management Fredy MERRITT, QUIQUE - CNP)    Dx: Primary osteoarthritis of both knees; Polo Settler ..    0 Result Notes    Component Ref Range & Units 12/21/21 1506 8/7/20 1124 6/26/19 1040 5/23/18 0915 3/28/18 0840 5/3/17 0900 5/25/16 0915   Pain Management Drug Panel Interp, Urine  Consistent ARUP  Consistent CMARUP  Consistent CM   Consistent CMARUP  Consistent CM  Consistent CM    Comment: (NOTE)   ________________________________________________________________   DRUGS EXPECTED:   MORPHINE   GABAPENTIN   OXYCODONE   ________________________________________________________________   CONSISTENT with medications provided:   MORPHINE: based on morphine, hydromorphone   GABAPENTIN: based on gabapentin   OXYCODONE: based on oxycodone, noroxycodone, noroxymorphone,   oxymorphone   ________________________________________________________________   INTERPRETIVE INFORMATION: Targeted drug profile Interp   Interpretation depends on accuracy and completeness of patient   medication information submitted by client.     6-Acetylmorphine, Ur  Not Detected ARUP  Not Detected ARUP  Not Detected   Not Detected ARUP  Not Detected  Not Detected    7-Aminoclonazepam, Urine  Not Detected ARUP  Not Detected [facility]UP  Not Detected              EXAMINATION:   TWO XRAY VIEWS OF THE RIGHT KNEE       7/9/2021 12:02 pm       COMPARISON:   11 October 2020       HISTORY:   ORDERING SYSTEM PROVIDED HISTORY: Internal derangement of multiple sites of   right knee   TECHNOLOGIST PROVIDED HISTORY:   Acuity: Chronic   Type of Exam: Ongoing       FINDINGS:   A total knee arthroplasty is noted in satisfactory alignment position without   acute fracture, dislocation or effusion.           Impression   Status post total knee arthroplasty without hardware complication or acute   osseous abnormality.           EXAMINATION:   MRI OF THE LUMBAR SPINE WITHOUT CONTRAST, 6/1/2021 2:02 pm       TECHNIQUE:   Multiplanar multisequence MRI of the lumbar spine was performed without the   administration of intravenous contrast.       COMPARISON:   None.       HISTORY:   ORDERING SYSTEM PROVIDED HISTORY: Lumbar radiculopathy   TECHNOLOGIST PROVIDED HISTORY:   numbness and pain left foot, low back pain   Reason for Exam: Lumbar radiculopathy M54.16 (ICD-10-CM);  Lumbar back pain   with radiculopathy affecting left lower extremity   Additional signs and symptoms: numbness and pain left foot, low back pain       FINDINGS:   BONES/ALIGNMENT: The vertebral body heights are maintained.  There is   age-appropriate bone marrow signal.  There is multilevel degenerative disc   disease with loss of disc signal.  There is disc space narrowing at the L2-3,   L3-4, and L4-5 levels.  There is no spondylolisthesis.       SPINAL CORD: The conus medullaris is normal in caliber and signal and   terminates at the L1 level.  The cauda equina is unremarkable.       SOFT TISSUES: The posterior paraspinal soft tissues are unremarkable.  The   visualized abdominal structures demonstrate bilateral renal cysts.       L1-L2: There is a circumferential disc bulge with facet and ligamentous   hypertrophy as well as prominent posterior epidural fat.  There is no canal   stenosis or foraminal narrowing.       L2-L3: There is a circumferential disc bulge with facet and ligamentous   hypertrophy as well as prominent posterior epidural fat.  There is canal   stenosis measuring 9 mm in AP dimension.  There is moderate left foraminal   narrowing and no significant right foraminal narrowing.       L3-L4: There is a circumferential disc bulge with facet and ligamentous   hypertrophy as well as prominent posterior epidural fat.  There is canal   stenosis measuring 8 mm in AP dimension.  There is moderate bilateral   foraminal narrowing.       L4-L5: There is a circumferential disc bulge with facet and ligamentous   hypertrophy as well as prominent posterior epidural fat.  There is canal   stenosis measuring 9 mm in AP dimension.  There is moderate to severe   bilateral foraminal narrowing.       L5-S1: There is a circumferential disc bulge with facet and ligamentous   hypertrophy.  There is no canal stenosis or significant foraminal narrowing.           Impression   Multilevel degenerative disc disease with associated multilevel degenerative   facet hypertrophy resulting in canal stenosis at L2-3, L3-4, and L4-5.       Bilateral foraminal narrowing as described above.                         Pill count: appropriate    fill date :2-28-22 8 ms contin and 18 percocet tabs left    Morphine equivalent dose as reported on OARRS:90 narcan script filled  Periodic Controlled Substance Monitoring: Possible medication side effects, risk of tolerance/dependence & alternative treatments discussed. ,No signs of potential drug abuse or diversion identified. ,Assessed functional status. ,Obtaining appropriate analgesic effect of treatment. Lindsay Bello, APRN - CNP)  Chronic Pain > 80 MEDD: Co-prescribed Naloxone. ,Obtained or confirmed \"Medication Contract\" on file. Lindsay Bello, APRN - CNP)  Review of[place]ARRS does not show any aberrant prescription behavior. Medication is helping the patient stay active. Patient denies any side effects and reports adequate analgesia.  No sign of misuse/abuse. Past Medical History:   Diagnosis Date    Arthritis     History of blood transfusion     Hx of blood clots     after one of his knee surgeries     Hyperlipidemia     Left knee pain 4/9/2014    Neuropathy     Teeth problem     abcessw       Past Surgical History:   Procedure Laterality Date    BUNIONECTOMY N/A 12/15/2021    AKIN OSTEOTOMY WITH BONE STAPLE CHEILECTOMY RIGHT FOOT performed by Konnie Dakins, DPM at [facility] Bilateral     DENTAL SURGERY  10/2015    DENTAL SURGERY  03/2021    EYE SURGERY      bilat cataracts     FOOT SURGERY Right 12/15/2021    AKIN OSTEOTOMY WITH BONE STAPLE CHEILECTOMY RIGHT FOOT (N/A Foot)    JOINT REPLACEMENT Left     KNEE  X 6 pt unsure of date    JOINT REPLACEMENT Right 07/20/2018    ROTATOR CUFF REPAIR Right     TONSILLECTOMY         Allergies   Allergen Reactions    Oxycontin [Oxycodone Hcl] Nausea Only         Current Outpatient Medications:     [START ON 2/28/2022] morphine (MS CONTIN) 30 MG extended release tablet, Take 1 tablet by mouth every 12 hours for 30 days. , Disp: 60 tablet, Rfl: 0    [START ON 2/28/2022] oxyCODONE-acetaminophen (PERCOCET) 5-325 MG per tablet, Take 1 tablet by mouth every 6 hours as needed for Pain for up to 30 days. , Disp: 120 tablet, Rfl: 0    diclofenac sodium (VOLTAREN) 1 % GEL, Apply 2 g topically 2 times daily, Disp: 120 g, Rfl: 2    gabapentin (NEURONTIN) 600 MG tablet, 1 tab  tid, Disp: 90 tablet, Rfl: 1    atorvastatin (LIPITOR) 40 MG tablet, TAKE ONE TABLET BY MOUTH DAILY, Disp: 90 tablet, Rfl: 1    divalproex (DEPAKOTE) 125 MG DR tablet, TAKE ONE TABLET BY MOUTH TWICE A DAY, Disp: 180 tablet, Rfl: 1    melatonin 3 MG TABS tablet, Take 6 mg by mouth nightly as needed (insomnia), Disp: , Rfl:     Family History   Problem Relation Age of Onset    Diabetes Mother     No Known Problems Father        Social History     Socioeconomic History    Marital status:   Spouse name: Not on file    Number of children: Not on file    Years of education: Not on file    Highest education level: Not on file   Occupational History    Occupation: retired   Tobacco Use    Smoking status: Never Smoker    Smokeless tobacco: Never Used   Vaping Use    Vaping Use: Never used   Substance and Sexual Activity    Alcohol use: No    Drug use: No    Sexual activity: Never   Other Topics Concern    Not on file   Social History Narrative    Not on file     Social Determinants of Health     Financial Resource Strain: Low Risk     Difficulty of Paying Living Expenses: Not hard at all   Food Insecurity: No Food Insecurity    Worried About [address] [facility] in the Last Year: Never true    [facility] [facility] in the Last Year: Never true   Transportation Needs:     Lack of Transportation (Medical): Not on file    Lack of Transportation (Non-Medical): Not on file   Physical Activity:     Days of Exercise per Week: Not on file    Minutes of Exercise per Session: Not on file   Stress:     Feeling of Stress : Not on file   Social Connections:     Frequency of Communication with Friends and Family: Not on file    Frequency of Social Gatherings with Friends and Family: Not on file    Attends Mandaen Services: Not on file    Active Member of [address] [place] [address] or Organizations: Not on file    Attends Club or Organization Meetings: Not on file    Marital Status: Not on file   Intimate Partner Violence:     Fear of Current or Ex-Partner: Not on file    Emotionally Abused: Not on file    Physically Abused: Not on file    Sexually Abused: Not on file   Housing Stability:     Unable to Pay for Housing in the Last Year: Not on file    Number of [place] in the Last Year: Not on file    Unstable Housing in the Last Year: Not on file         Review of Systems:  Review of Systems   Constitutional: Negative. HENT: Negative. Eyes:        Glasses   Cardiovascular: Negative.     Respiratory: Negative. Endocrine: Negative. Hematologic/Lymphatic: Negative. Skin: Negative. Musculoskeletal: Positive for back pain and joint pain. Gastrointestinal: Negative. Genitourinary: Negative. Neurological: Positive for loss of balance and numbness. Uses a cane    Psychiatric/Behavioral: Negative. Physical Exam:  BP (!) 151/90   Pulse 79   Temp 97.3 °F (36.3 °C) (Skin)   Resp 16   SpO2 96%     Physical Exam  HENT:      Head: Normocephalic. Pulmonary:      Effort: Pulmonary effort is normal.   Skin:         Neurological:      Mental Status: He is alert and oriented to person, place, and time. Comments: Ambulates with a cane. limps   Psychiatric:         Mood and Affect: Mood normal.         Thought Content:  Thought content normal.           Assessment:      Problem List Items Addressed This Visit     Use of opiates for therapeutic purposes    S/P knee surgery    Relevant Medications    oxyCODONE-acetaminophen (PERCOCET) 5-325 MG per tablet (Start on 2/28/2022)    Primary osteoarthritis of both knees    Relevant Medications    morphine (MS CONTIN) 30 MG extended release tablet (Start on 2/28/2022)    oxyCODONE-acetaminophen (PERCOCET) 5-325 MG per tablet (Start on 2/28/2022)    Osteoarthritis of both knees    Relevant Medications    morphine (MS CONTIN) 30 MG extended release tablet (Start on 2/28/2022)    oxyCODONE-acetaminophen (PERCOCET) 5-325 MG per tablet (Start on 2/28/2022)    Medication monitoring encounter    Relevant Medications    oxyCODONE-acetaminophen (PERCOCET) 5-325 MG per tablet (Start on 2/28/2022)    Lumbar back pain with radiculopathy affecting left lower extremity    Relevant Medications    morphine (MS CONTIN) 30 MG extended release tablet (Start on 2/28/2022)    oxyCODONE-acetaminophen (PERCOCET) 5-325 MG per tablet (Start on 2/28/2022)    Fibromyalgia    Relevant Medications    morphine (MS CONTIN) 30 MG extended release tablet (Start on 2/28/2022) oxyCODONE-acetaminophen (PERCOCET) 5-325 MG per tablet (Start on 2/28/2022)    Encounter for medication monitoring - Primary    Relevant Medications    oxyCODONE-acetaminophen (PERCOCET) 5-325 MG per tablet (Start on 2/28/2022)      Other Visit Diagnoses     S/P left knee surgery        Relevant Medications    oxyCODONE-acetaminophen (PERCOCET) 5-325 MG per tablet (Start on 2/28/2022)    Weakness of left leg        Relevant Medications    oxyCODONE-acetaminophen (PERCOCET) 5-325 MG per tablet (Start on 2/28/2022)            Treatment Plan:  DISCUSSION: Treatment options discussed withpatient and all questions answered to patient's satisfaction. Possible side effects, risk of tolerance and or dependence and alternative treatments discussed    Obtaining appropriate analgesic effect of treatment   No signs of potential drug abuse or diversion identified    [x] Ill effects of being on chronic pain medications such as sleep disturbances, respiratory depression, hormonal changes, withdrawal symptoms, chronic opioid dependence and tolerance as well as risk of taking opioids with Benzodiazepines and taking opioids along with alcohol,  werediscussed with patient. I had asked the patient to minimize medication use and utilize pain medications only for uncontrolled rest pain or pain with exertional activities. I advised patient not to self-escalate painmedications without consulting with us. At each of patient's future visits we will try to taper pain medications, while adjusting the adjunct medications, and re-evaluating for Physical Therapy to improve spinal andjoint strength. We will continue to have discussions to decrease pain medications as tolerated. Counseled patient on effects their pain medication and /or their medical condition mayhave on their  ability to drive or operate machinery.  Instructed not to drive or operate machinery if drowsy     I also discussed with the patient regarding the dangers of combining narcotic pain medication with tranquilizers, alcohol or illegal drugs or taking the medication any way other than prescribed. The dangers were discussed  including respiratory depression and death. Patient was told to tell  all  physicians regarding the medications he is getting from pain clinic. Patient is warned not to take any unprescribed medications over-the-countermedications that can depress breathing . Patient is advised to talk to the pharmacist or physicians if planning to take any over-the-counter medications before  takeing them. Patient is strongly advised to avoid tranquilizers or  relaxants, illegal drugs  or any medications that can depress breathing  Patient is also advised to tell us if there is any changes in their medications from other physicians.         TREATMENT OPTIONS:       Medication Agreement Requirements Met  Continue Opioid therapy  Script written for  Ms contin, percocet, voltaren gel  Follow up appointment made

## 2022-02-23 ENCOUNTER — HOSPITAL ENCOUNTER (OUTPATIENT)
Dept: PAIN MANAGEMENT | Age: 72
Discharge: HOME | End: 2022-02-23
Payer: MEDICARE

## 2022-02-23 VITALS
SYSTOLIC BLOOD PRESSURE: 151 MMHG | RESPIRATION RATE: 16 BRPM | OXYGEN SATURATION: 96 % | HEART RATE: 79 BPM | DIASTOLIC BLOOD PRESSURE: 90 MMHG | TEMPERATURE: 97.3 F

## 2022-02-23 DIAGNOSIS — Z51.81 ENCOUNTER FOR MEDICATION MONITORING: Primary | ICD-10-CM

## 2022-02-23 DIAGNOSIS — M17.0 PRIMARY OSTEOARTHRITIS OF BOTH KNEES: ICD-10-CM

## 2022-02-23 DIAGNOSIS — M17.0 OSTEOARTHRITIS OF BOTH KNEES, UNSPECIFIED OSTEOARTHRITIS TYPE: ICD-10-CM

## 2022-02-23 DIAGNOSIS — Z98.890 S/P KNEE SURGERY: ICD-10-CM

## 2022-02-23 DIAGNOSIS — Z79.891 USE OF OPIATES FOR THERAPEUTIC PURPOSES: ICD-10-CM

## 2022-02-23 DIAGNOSIS — R29.898 WEAKNESS OF LEFT LEG: ICD-10-CM

## 2022-02-23 DIAGNOSIS — M54.16 LUMBAR BACK PAIN WITH RADICULOPATHY AFFECTING LEFT LOWER EXTREMITY: ICD-10-CM

## 2022-02-23 DIAGNOSIS — M79.7 FIBROMYALGIA: ICD-10-CM

## 2022-02-23 DIAGNOSIS — Z51.81 MEDICATION MONITORING ENCOUNTER: ICD-10-CM

## 2022-02-23 DIAGNOSIS — Z98.890 S/P LEFT KNEE SURGERY: ICD-10-CM

## 2022-02-23 PROCEDURE — 99213 OFFICE O/P EST LOW 20 MIN: CPT | Performed by: NURSE PRACTITIONER

## 2022-02-23 PROCEDURE — 99213 OFFICE O/P EST LOW 20 MIN: CPT

## 2022-02-23 RX ORDER — MORPHINE SULFATE 30 MG/1
30 TABLET, FILM COATED, EXTENDED RELEASE ORAL EVERY 12 HOURS
Qty: 60 TABLET | Refills: 0 | Status: SHIPPED | OUTPATIENT
Start: 2022-02-28 | End: 2022-03-23 | Stop reason: SDUPTHER

## 2022-02-23 RX ORDER — OXYCODONE HYDROCHLORIDE AND ACETAMINOPHEN 5; 325 MG/1; MG/1
1 TABLET ORAL EVERY 6 HOURS PRN
Qty: 120 TABLET | Refills: 0 | Status: SHIPPED | OUTPATIENT
Start: 2022-02-28 | End: 2022-03-23 | Stop reason: SDUPTHER

## 2022-02-23 ASSESSMENT — ENCOUNTER SYMPTOMS
RESPIRATORY NEGATIVE: 1
GASTROINTESTINAL NEGATIVE: 1
BACK PAIN: 1

## 2022-03-22 ENCOUNTER — HOSPITAL ENCOUNTER (OUTPATIENT)
Dept: PREADMISSION TESTING | Age: 72
Discharge: HOME | End: 2022-03-26
Payer: MEDICARE

## 2022-03-22 VITALS
HEART RATE: 71 BPM | DIASTOLIC BLOOD PRESSURE: 87 MMHG | OXYGEN SATURATION: 99 % | HEIGHT: 67 IN | WEIGHT: 195 LBS | SYSTOLIC BLOOD PRESSURE: 119 MMHG | BODY MASS INDEX: 30.61 KG/M2 | TEMPERATURE: 97.8 F

## 2022-03-22 LAB
BUN BLDV-MCNC: 10 MG/DL (ref 8–23)
CREAT SERPL-MCNC: 1.15 MG/DL (ref 0.7–1.2)
EKG ATRIAL RATE: 63 BPM
EKG P AXIS: 58 DEGREES
EKG P-R INTERVAL: 176 MS
EKG Q-T INTERVAL: 418 MS
EKG QRS DURATION: 76 MS
EKG QTC CALCULATION (BAZETT): 427 MS
EKG R AXIS: 19 DEGREES
EKG T AXIS: 36 DEGREES
EKG VENTRICULAR RATE: 63 BPM
GFR AFRICAN AMERICAN: >60 ML/MIN
GFR NON-AFRICAN AMERICAN: >60 ML/MIN
GFR SERPL CREATININE-BSD FRML MDRD: NORMAL ML/MIN/{1.73_M2}
HCT VFR BLD CALC: 39.2 % (ref 40.7–50.3)
HEMOGLOBIN: 12 G/DL (ref 13–17)
MCH RBC QN AUTO: 28.2 PG (ref 25.2–33.5)
MCHC RBC AUTO-ENTMCNC: 30.6 G/DL (ref 28.4–34.8)
MCV RBC AUTO: 92.2 FL (ref 82.6–102.9)
NRBC AUTOMATED: 0 PER 100 WBC
PDW BLD-RTO: 13.1 % (ref 11.8–14.4)
PLATELET # BLD: 186 K/UL (ref 138–453)
PMV BLD AUTO: 9.6 FL (ref 8.1–13.5)
RBC # BLD: 4.25 M/UL (ref 4.21–5.77)
WBC # BLD: 3.9 K/UL (ref 3.5–11.3)

## 2022-03-22 PROCEDURE — 84520 ASSAY OF UREA NITROGEN: CPT

## 2022-03-22 PROCEDURE — 36415 COLL VENOUS BLD VENIPUNCTURE: CPT

## 2022-03-22 PROCEDURE — 85027 COMPLETE CBC AUTOMATED: CPT

## 2022-03-22 PROCEDURE — 82565 ASSAY OF CREATININE: CPT

## 2022-03-22 PROCEDURE — 93005 ELECTROCARDIOGRAM TRACING: CPT | Performed by: ANESTHESIOLOGY

## 2022-03-22 PROCEDURE — 93010 ELECTROCARDIOGRAM REPORT: CPT | Performed by: INTERNAL MEDICINE

## 2022-03-22 ASSESSMENT — PAIN DESCRIPTION - PAIN TYPE: TYPE: CHRONIC PAIN

## 2022-03-22 ASSESSMENT — PAIN DESCRIPTION - ORIENTATION: ORIENTATION: RIGHT

## 2022-03-22 ASSESSMENT — PAIN DESCRIPTION - FREQUENCY: FREQUENCY: CONTINUOUS

## 2022-03-22 ASSESSMENT — PAIN SCALES - GENERAL: PAINLEVEL_OUTOF10: 8

## 2022-03-22 ASSESSMENT — PAIN DESCRIPTION - ONSET: ONSET: ON-GOING

## 2022-03-22 ASSESSMENT — PAIN DESCRIPTION - LOCATION: LOCATION: FOOT

## 2022-03-22 NOTE — PROGRESS NOTES
Francisca 89 PROGRESS NOTE      Patient  completed []  video visit   []   phone call:         Minutes :   [x] in person visit to pain clinic    [x]    to  review Medication Agreement    []  Follow up after procedure   []  Discuss treatment options      Location:  Provider:  working from    []    home    [x]   [facility] - MEÑO NICOLE ,   patient at   [x] pain clinic     []  home         Chief Complaint:  Knee pain  And back pain    . He has history of low back pain, no history of surgery. He had lumbar epidural injection L4, L5, on 5- with 60% relief. His back pain has increased. He would like to have another injection. His lumbar MRI from 6-2021 read as \" multilevel degenerative disc disease with associated multilevel degenerative facet hypertrophy\" resulting in canal stenosis L2-L3, L3-4, L4-L5. He has shoe boot on right foot, he had surgery 2 months ago and will have surgery again next month. He has history of bilateral knee pain. He had right knee arthroplasty in 2018 but continues to have pain. He has had multiple surgeries on his left knee. His last x-ray of right knee read as \" \"without hardware complication, no osseous abnormality\"        Knee Pain   There was no injury mechanism. The pain is present in the left knee, right knee and left leg. The pain is at a severity of 8/10. The pain is severe. The pain has been constant since onset. Foreign body present: right knee replaced. Exacerbated by: walking, standing. He has tried elevation, ice and heat for the symptoms. The treatment provided mild relief. Back Pain  This is a chronic problem. The problem occurs constantly. The problem has been gradually worsening since onset. The pain is present in the lumbar spine. Quality: sharp. The pain does not radiate. The pain is at a severity of 8/10. The pain is severe. The pain is worse during the night. The symptoms are aggravated by bending. Associated symptoms include weakness.  (Left leg) He has tried analgesics (hot epsom salt bath) for the symptoms. The treatment provided mild relief. Treatment goals:  Functional status: reduce pain 4      Aberrancy:   Any alcoholic beverages    no        Any illegal drugs   no      Analgesia:    8                 Adverse  Effects :no      ADL;s :      e, I did review the imaging     Data:     When was thelast UDS:  12-21-21         Was the UDS appropriate:  [x]? yes []?   no        Record/Diagnostics Review:       As above, I did review the imaging     RUG SCREEN, PAIN  Order: [id number]   Status: Final result     Visible to patient: No (not released)     Next appt: 02/23/2022 at 10:20 AM in Pain Management Jake MERRITT, APRN - CNP)     Dx: Primary osteoarthritis of both knees; Nirmal Pesa ..     0 Result Notes     Component Ref Range & Units 12/21/21 1506 8/7/20 1124 6/26/19 1040 5/23/18 0915 3/28/18 0840 5/3/17 0900 5/25/16 0915   Pain Management Drug Panel Interp, Urine   Consistent ARUP  Consistent CMARUP  Consistent CM    Consistent CMARUP  Consistent CM  Consistent CM    Comment: (NOTE)   ________________________________________________________________   DRUGS EXPECTED:   MORPHINE   GABAPENTIN   OXYCODONE   ________________________________________________________________   CONSISTENT with medications provided:   MORPHINE: based on morphine, hydromorphone   GABAPENTIN: based on gabapentin   OXYCODONE: based on oxycodone, noroxycodone, noroxymorphone,   oxymorphone   ________________________________________________________________   INTERPRETIVE INFORMATION: Targeted drug profile Interp   Interpretation depends on accuracy and completeness of patient   medication information submitted by client.     6-Acetylmorphine, Ur   Not Detected ARUP  Not Detected ARUP  Not Detected    Not Detected ARUP  Not Detected  Not Detected    7-Aminoclonazepam, Urine   Not Detected ARUP  Not Detected ARUP  Not Detected                    EXAMINATION:   TWO XRAY VIEWS OF THE RIGHT KNEE       7/9/2021 12:02 pm       COMPARISON:   11 October 2020       HISTORY:   ORDERING SYSTEM PROVIDED HISTORY: Internal derangement of multiple sites of   right knee   TECHNOLOGIST PROVIDED HISTORY:   Acuity: Chronic   Type of Exam: Ongoing       FINDINGS:   A total knee arthroplasty is noted in satisfactory alignment position without   acute fracture, dislocation or effusion.           Impression   Status post total knee arthroplasty without hardware complication or acute   osseous abnormality.             EXAMINATION:   MRI OF THE LUMBAR SPINE WITHOUT CONTRAST, 6/1/2021 2:02 pm       TECHNIQUE:   Multiplanar multisequence MRI of the lumbar spine was performed without the   administration of intravenous contrast.       COMPARISON:   None.       HISTORY:   ORDERING SYSTEM PROVIDED HISTORY: Lumbar radiculopathy   TECHNOLOGIST PROVIDED HISTORY:   numbness and pain left foot, low back pain   Reason for Exam: Lumbar radiculopathy M54.16 (ICD-10-CM);  Lumbar back pain   with radiculopathy affecting left lower extremity   Additional signs and symptoms: numbness and pain left foot, low back pain       FINDINGS:   BONES/ALIGNMENT: The vertebral body heights are maintained.  There is   age-appropriate bone marrow signal.  There is multilevel degenerative disc   disease with loss of disc signal.  There is disc space narrowing at the L2-3,   L3-4, and L4-5 levels.  There is no spondylolisthesis.       SPINAL CORD: The conus medullaris is normal in caliber and signal and   terminates at the L1 level.  The cauda equina is unremarkable.       SOFT TISSUES: The posterior paraspinal soft tissues are unremarkable.  The   visualized abdominal structures demonstrate bilateral renal cysts.       L1-L2: There is a circumferential disc bulge with facet and ligamentous   hypertrophy as well as prominent posterior epidural fat.  There is no canal   stenosis or foraminal narrowing.       L2-L3: There is a circumferential disc bulge with facet and ligamentous   hypertrophy as well as prominent posterior epidural fat.  There is canal   stenosis measuring 9 mm in AP dimension.  There is moderate left foraminal   narrowing and no significant right foraminal narrowing.       L3-L4: There is a circumferential disc bulge with facet and ligamentous   hypertrophy as well as prominent posterior epidural fat.  There is canal   stenosis measuring 8 mm in AP dimension.  There is moderate bilateral   foraminal narrowing.       L4-L5: There is a circumferential disc bulge with facet and ligamentous   hypertrophy as well as prominent posterior epidural fat.  There is canal   stenosis measuring 9 mm in AP dimension.  There is moderate to severe   bilateral foraminal narrowing.       L5-S1: There is a circumferential disc bulge with facet and ligamentous   hypertrophy.  There is no canal stenosis or significant foraminal narrowing.           Impression   Multilevel degenerative disc disease with associated multilevel degenerative   facet hypertrophy resulting in canal stenosis at L2-3, L3-4, and L4-5.       Bilateral foraminal narrowing as described above.                                   Pill count: appropriate    fill date :3-30-22 16 ms contin and 26 percocet tabs    Morphine equivalent dose as reported on OARRS:90 narcan script filled  Periodic Controlled Substance Monitoring: Possible medication side effects, risk of tolerance/dependence & alternative treatments discussed. ,No signs of potential drug abuse or diversion identified. ,Assessed functional status. ,Obtaining appropriate analgesic effect of treatment. Bing Martinez, APRN - CNP)  Review of[place]ARRS does not show any aberrant prescription behavior. Medication is helping the patient stay active. Patient denies any side effects and reports adequate analgesia. No sign of misuse/abuse.             Past Medical History:   Diagnosis Date    Arthritis     History of blood transfusion     Hx of blood clots after one of his knee surgeries     Hyperlipidemia     Left knee pain 4/9/2014    Neuropathy     Teeth problem     abcessw    Wears glasses        Past Surgical History:   Procedure Laterality Date    BUNIONECTOMY N/A 12/15/2021    AKIN OSTEOTOMY WITH BONE STAPLE CHEILECTOMY RIGHT FOOT performed by Dioni Flores DPM at [address] Bilateral     COLONOSCOPY     [facility]  10/2015    DENTAL SURGERY  03/2021    EYE SURGERY      bilat cataracts     FOOT SURGERY Right 12/15/2021    AKIN OSTEOTOMY WITH BONE STAPLE CHEILECTOMY RIGHT FOOT (N/A Foot)    JOINT REPLACEMENT Left     KNEE  X 6 pt unsure of date    JOINT REPLACEMENT Right 07/20/2018    ROTATOR CUFF REPAIR Right     TONSILLECTOMY         Allergies   Allergen Reactions    Oxycontin [Oxycodone Hcl] Nausea Only         Current Outpatient Medications:     [START ON 3/30/2022] morphine (MS CONTIN) 30 MG extended release tablet, Take 1 tablet by mouth every 12 hours for 30 days. , Disp: 60 tablet, Rfl: 0    [START ON 3/30/2022] oxyCODONE-acetaminophen (PERCOCET) 5-325 MG per tablet, Take 1 tablet by mouth every 6 hours as needed for Pain for up to 30 days. , Disp: 120 tablet, Rfl: 0    [START ON 3/26/2022] gabapentin (NEURONTIN) 600 MG tablet, 1 tab  tid, Disp: 90 tablet, Rfl: 1    diclofenac sodium (VOLTAREN) 1 % GEL, Apply 2 g topically 2 times daily, Disp: 120 g, Rfl: 2    divalproex (DEPAKOTE) 125 MG DR tablet, TAKE ONE TABLET BY MOUTH TWICE A DAY, Disp: 180 tablet, Rfl: 1    melatonin 3 MG TABS tablet, Take 6 mg by mouth nightly as needed (insomnia), Disp: , Rfl:     Family History   Problem Relation Age of Onset    Diabetes Mother     No Known Problems Father        Social History     Socioeconomic History    Marital status:       Spouse name: Not on file    Number of children: Not on file    Years of education: Not on file    Highest education level: Not on file   Occupational History    Occupation: Genitourinary: Negative. Neurological: Positive for weakness. Psychiatric/Behavioral: Positive for depression. Managing         Physical Exam:  BP (!) 122/91   Pulse 86   Temp 98 °F (36.7 °C) (Temporal)   Ht 5' 7\" (1.702 m)   Wt 200 lb (90.7 kg)   SpO2 95%   BMI 31.32 kg/m²     Physical Exam  HENT:      Head: Normocephalic. Pulmonary:      Effort: Pulmonary effort is normal.   Skin:            Comments: Tender lumbar paraspinal muscles    Left knee tender   Neurological:      Mental Status: He is alert and oriented to person, place, and time. Psychiatric:         Mood and Affect: Mood normal.         Thought Content:  Thought content normal.           Assessment:    Problem List Items Addressed This Visit     Use of opiates for therapeutic purposes - Primary    Relevant Medications    gabapentin (NEURONTIN) 600 MG tablet (Start on 3/26/2022)    S/P knee surgery    Relevant Medications    oxyCODONE-acetaminophen (PERCOCET) 5-325 MG per tablet (Start on 3/30/2022)    gabapentin (NEURONTIN) 600 MG tablet (Start on 3/26/2022)    Primary osteoarthritis of both knees    Relevant Medications    morphine (MS CONTIN) 30 MG extended release tablet (Start on 3/30/2022)    oxyCODONE-acetaminophen (PERCOCET) 5-325 MG per tablet (Start on 3/30/2022)    gabapentin (NEURONTIN) 600 MG tablet (Start on 3/26/2022)    Osteoarthritis of both knees    Relevant Medications    morphine (MS CONTIN) 30 MG extended release tablet (Start on 3/30/2022)    oxyCODONE-acetaminophen (PERCOCET) 5-325 MG per tablet (Start on 3/30/2022)    gabapentin (NEURONTIN) 600 MG tablet (Start on 3/26/2022)    Medication monitoring encounter    Relevant Medications    oxyCODONE-acetaminophen (PERCOCET) 5-325 MG per tablet (Start on 3/30/2022)    gabapentin (NEURONTIN) 600 MG tablet (Start on 3/26/2022)    Lumbar back pain with radiculopathy affecting left lower extremity    Relevant Medications    morphine (MS CONTIN) 30 MG extended release tablet (Start on 3/30/2022)    oxyCODONE-acetaminophen (PERCOCET) 5-325 MG per tablet (Start on 3/30/2022)    gabapentin (NEURONTIN) 600 MG tablet (Start on 3/26/2022)    Other Relevant Orders    IA NJX AA&/STRD TFRML EPI LUMBAR/SACRAL 1 LEVEL    Saline lock IV    FLUORO FOR SURGICAL PROCEDURES    Fibromyalgia    Relevant Medications    morphine (MS CONTIN) 30 MG extended release tablet (Start on 3/30/2022)    oxyCODONE-acetaminophen (PERCOCET) 5-325 MG per tablet (Start on 3/30/2022)    gabapentin (NEURONTIN) 600 MG tablet (Start on 3/26/2022)    Encounter for medication monitoring    Relevant Medications    oxyCODONE-acetaminophen (PERCOCET) 5-325 MG per tablet (Start on 3/30/2022)    gabapentin (NEURONTIN) 600 MG tablet (Start on 3/26/2022)      Other Visit Diagnoses     S/P left knee surgery        Relevant Medications    oxyCODONE-acetaminophen (PERCOCET) 5-325 MG per tablet (Start on 3/30/2022)    gabapentin (NEURONTIN) 600 MG tablet (Start on 3/26/2022)    Weakness of left leg        Relevant Medications    oxyCODONE-acetaminophen (PERCOCET) 5-325 MG per tablet (Start on 3/30/2022)    Chronic pain of left knee        Relevant Medications    morphine (MS CONTIN) 30 MG extended release tablet (Start on 3/30/2022)    oxyCODONE-acetaminophen (PERCOCET) 5-325 MG per tablet (Start on 3/30/2022)    gabapentin (NEURONTIN) 600 MG tablet (Start on 3/26/2022)    Primary osteoarthritis of right knee        Relevant Medications    morphine (MS CONTIN) 30 MG extended release tablet (Start on 3/30/2022)    oxyCODONE-acetaminophen (PERCOCET) 5-325 MG per tablet (Start on 3/30/2022)    gabapentin (NEURONTIN) 600 MG tablet (Start on 3/26/2022)    Primary osteoarthritis of left knee        Relevant Medications    morphine (MS CONTIN) 30 MG extended release tablet (Start on 3/30/2022)    oxyCODONE-acetaminophen (PERCOCET) 5-325 MG per tablet (Start on 3/30/2022)    gabapentin (NEURONTIN) 600 MG tablet (Start on 3/26/2022)              Treatment Plan:  DISCUSSION: Treatment options discussed withpatient and all questions answered to patient's satisfaction. Possible side effects, risk of tolerance and or dependence and alternative treatments discussed    Obtaining appropriate analgesic effect of treatment   No signs of potential drug abuse or diversion identified    [x] Ill effects of being on chronic pain medications such as sleep disturbances, respiratory depression, hormonal changes, withdrawal symptoms, chronic opioid dependence and tolerance as well as risk of taking opioids with Benzodiazepines and taking opioids along with alcohol,  werediscussed with patient. I had asked the patient to minimize medication use and utilize pain medications only for uncontrolled rest pain or pain with exertional activities. I advised patient not to self-escalate painmedications without consulting with us. At each of patient's future visits we will try to taper pain medications, while adjusting the adjunct medications, and re-evaluating for Physical Therapy to improve spinal andjoint strength. We will continue to have discussions to decrease pain medications as tolerated. Counseled patient on effects their pain medication and /or their medical condition mayhave on their  ability to drive or operate machinery. Instructed not to drive or operate machinery if drowsy     I also discussed with the patient regarding the dangers of combining narcotic pain medication with tranquilizers, alcohol or illegal drugs or taking the medication any way other than prescribed. The dangers were discussed  including respiratory depression and death. Patient was told to tell  all  physicians regarding the medications he is getting from pain clinic. Patient is warned not to take any unprescribed medications over-the-countermedications that can depress breathing .  Patient is advised to talk to the pharmacist or physicians if planning to take any over-the-counter medications before  takeing them. Patient is strongly advised to avoid tranquilizers or  relaxants, illegal drugs  or any medications that can depress breathing  Patient is also advised to tell us if there is any changes in th  The patient was counseled about the risks of elin Covid-19 during their procedure period and any recovery window from their procedure. The patient was made aware that elin Covid-19 may worsen their prognosis for recovering from their procedure and lend to a higher morbidity and/or mortality risk. All material risks, benefits, and reasonable alternatives including postponing the procedure were discussed. The patient (DOES  ,wish) to proceed with their procedure at this time.   1. ScheduleLESI L4, L4  Pain increasing, last injection 5/2021     TREATMENT OPTIONS:     LESI L4, L5  Medication Agreement Requirements Met  Continue Opioid therapy  Script written for  Percocet, ms contin, gabapentin  Follow up appointment made

## 2022-03-22 NOTE — PRE-PROCEDURE INSTRUCTIONS
ARRIVE  [place] Wednesday April 6th   Arrive at 6:30  Any questions call [phone]    Once you enter the hospital lobby, take the elevators to the second floor. Check-In is at the surgery registration desk. Continue to take your home medications as you normally do up to and including the night before surgery with the exception of any blood thinning medications. Please stop any blood thinning medications as directed by your surgeon or prescribing physician. Failure to stop certain medications may interfere with your scheduled surgery. These may include:  Aspirin, Warfarin (Coumadin), Clopidogrel (Plavix), Ibuprofen (Motrin, Advil), Naproxen (Aleve), Meloxicam (Mobic), Celecoxib (Celebrex), Eliquis, Pradaxa, Xarelto, Effient, Fish Oil, Herbal supplements. Please take the following medication(s) the day of surgery with a small sip of water:  Morphine, Percocet, Depakote, Neurontin    PREPARING FOR YOUR SURGERY:     Before surgery, you can play an important role in your own health. Because skin is not sterile, we need to be sure that your skin is as free of germs as possible before surgery by carefully washing before surgery. Preparing or prepping skin before surgery can reduce the risk of a surgical site infection.   Do not shave the area of your body where your surgery will be performed unless you received specific permission from your physician. You will need to shower at home the night before surgery and the morning of surgery with a special soap called chlorhexidine gluconate (CHG*). *Not to be used by people allergic to Chlorhexidine Gluconate (CHG). Following these instructions will help you be sure that your skin is clean before surgery. Instructions on cleaning your skin before surgery: The night before your surgery:      You will need to shower with warm water (not hot) and the CHG soap.  Use a clean wash cloth and a clean towel.   Have clean clothes available to put on after the shower.   First wash your hair with regular shampoo. Rinse your hair and body thoroughly to remove the shampoo. [facility] Wash your face with your regular soap or water only. Thoroughly rinse your body with warm water from the neck down.  Turn water off to prevent rinsing the soap off too soon.  With a clean wet washcloth and half of the CHG soap in the bottle, lather your entire body from the neck down. Do not use CHG soap near your eyes or ears to avoid injury to those areas.  Wash thoroughly, paying special attention to the area where your surgery will be performed.  Wash your body gently for five (5) minutes. Avoid scrubbing your skin too hard.  Turn the water back on and rinse your body thoroughly.  Pat yourself dry with a clean, soft towel. Do not apply lotion, cream or powder.  Dress with clean freshly washed clothes. The morning of surgery:     Repeat shower following steps above - using remaining half of CHG soap in bottle. Patient Instructions:    [facility] If you are having any type of anesthesia you are to have nothing to eat or drink after midnight the night before your surgery. This includes gum, mints, water or smoking or chewing tobacco.  The only exception to this is a small sip of water to take with any morning dose of heart, blood pressure, or seizure medications. No alcoholic beverages for 24 hours prior to surgery.  Brush your teeth but do not swallow water.  Bring your eyeglasses and case with you. No contacts are to be worn the day of surgery. You also may bring your hearing aids. Most surgical procedures involving anesthesia will require that you remove your dentures prior to surgery. · Do not wear any jewelry or body piercings day of surgery. Also, NO lotion, perfume or deodorant to be used the day of surgery.   No nail polish on the operative extremity (arm/leg surgeries)    · Do not bring any valuables such as

## 2022-03-23 ENCOUNTER — HOSPITAL ENCOUNTER (OUTPATIENT)
Dept: PAIN MANAGEMENT | Age: 72
Discharge: HOME | End: 2022-03-23
Payer: MEDICARE

## 2022-03-23 VITALS
TEMPERATURE: 98 F | WEIGHT: 200 LBS | SYSTOLIC BLOOD PRESSURE: 122 MMHG | HEIGHT: 67 IN | BODY MASS INDEX: 31.39 KG/M2 | OXYGEN SATURATION: 95 % | HEART RATE: 86 BPM | DIASTOLIC BLOOD PRESSURE: 91 MMHG

## 2022-03-23 DIAGNOSIS — Z98.890 S/P LEFT KNEE SURGERY: ICD-10-CM

## 2022-03-23 DIAGNOSIS — Z79.891 USE OF OPIATES FOR THERAPEUTIC PURPOSES: Primary | ICD-10-CM

## 2022-03-23 DIAGNOSIS — Z51.81 ENCOUNTER FOR MEDICATION MONITORING: ICD-10-CM

## 2022-03-23 DIAGNOSIS — M79.7 FIBROMYALGIA: ICD-10-CM

## 2022-03-23 DIAGNOSIS — M17.11 PRIMARY OSTEOARTHRITIS OF RIGHT KNEE: ICD-10-CM

## 2022-03-23 DIAGNOSIS — Z98.890 S/P KNEE SURGERY: ICD-10-CM

## 2022-03-23 DIAGNOSIS — R29.898 WEAKNESS OF LEFT LEG: ICD-10-CM

## 2022-03-23 DIAGNOSIS — G89.29 CHRONIC PAIN OF LEFT KNEE: ICD-10-CM

## 2022-03-23 DIAGNOSIS — M17.0 PRIMARY OSTEOARTHRITIS OF BOTH KNEES: ICD-10-CM

## 2022-03-23 DIAGNOSIS — M17.12 PRIMARY OSTEOARTHRITIS OF LEFT KNEE: ICD-10-CM

## 2022-03-23 DIAGNOSIS — M25.562 CHRONIC PAIN OF LEFT KNEE: ICD-10-CM

## 2022-03-23 DIAGNOSIS — M17.0 OSTEOARTHRITIS OF BOTH KNEES, UNSPECIFIED OSTEOARTHRITIS TYPE: ICD-10-CM

## 2022-03-23 DIAGNOSIS — M54.16 LUMBAR BACK PAIN WITH RADICULOPATHY AFFECTING LEFT LOWER EXTREMITY: ICD-10-CM

## 2022-03-23 DIAGNOSIS — Z51.81 MEDICATION MONITORING ENCOUNTER: ICD-10-CM

## 2022-03-23 PROCEDURE — 99213 OFFICE O/P EST LOW 20 MIN: CPT | Performed by: NURSE PRACTITIONER

## 2022-03-23 PROCEDURE — 99213 OFFICE O/P EST LOW 20 MIN: CPT

## 2022-03-23 RX ORDER — OXYCODONE HYDROCHLORIDE AND ACETAMINOPHEN 5; 325 MG/1; MG/1
1 TABLET ORAL EVERY 6 HOURS PRN
Qty: 120 TABLET | Refills: 0 | Status: SHIPPED | OUTPATIENT
Start: 2022-03-30 | End: 2022-04-26 | Stop reason: SDUPTHER

## 2022-03-23 RX ORDER — MORPHINE SULFATE 30 MG/1
30 TABLET, FILM COATED, EXTENDED RELEASE ORAL EVERY 12 HOURS
Qty: 60 TABLET | Refills: 0 | Status: SHIPPED | OUTPATIENT
Start: 2022-03-30 | End: 2022-04-26 | Stop reason: SDUPTHER

## 2022-03-23 RX ORDER — GABAPENTIN 600 MG/1
TABLET ORAL
Qty: 90 TABLET | Refills: 1 | Status: SHIPPED | OUTPATIENT
Start: 2022-03-26 | End: 2022-06-29 | Stop reason: SDUPTHER

## 2022-03-23 ASSESSMENT — ENCOUNTER SYMPTOMS
BACK PAIN: 1
GASTROINTESTINAL NEGATIVE: 1
RESPIRATORY NEGATIVE: 1

## 2022-03-23 ASSESSMENT — PAIN DESCRIPTION - PAIN TYPE: TYPE: CHRONIC PAIN

## 2022-03-23 ASSESSMENT — PAIN DESCRIPTION - DIRECTION: RADIATING_TOWARDS: DOES NOT RADIATE

## 2022-03-23 ASSESSMENT — PAIN DESCRIPTION - ORIENTATION: ORIENTATION: RIGHT;LEFT

## 2022-03-23 ASSESSMENT — PAIN DESCRIPTION - LOCATION: LOCATION: FOOT;LEG

## 2022-03-23 ASSESSMENT — PAIN DESCRIPTION - FREQUENCY: FREQUENCY: CONTINUOUS

## 2022-03-23 ASSESSMENT — PAIN SCALES - GENERAL: PAINLEVEL_OUTOF10: 9

## 2022-03-23 ASSESSMENT — PAIN DESCRIPTION - DESCRIPTORS: DESCRIPTORS: PINS AND NEEDLES;ACHING

## 2022-04-04 ENCOUNTER — HOSPITAL ENCOUNTER (OUTPATIENT)
Dept: PAIN MANAGEMENT | Age: 72
Discharge: HOME | End: 2022-04-04
Payer: MEDICARE

## 2022-04-04 ENCOUNTER — HOSPITAL ENCOUNTER (OUTPATIENT)
Dept: GENERAL RADIOLOGY | Age: 72
Discharge: HOME | End: 2022-04-06
Payer: MEDICARE

## 2022-04-04 VITALS
OXYGEN SATURATION: 96 % | TEMPERATURE: 97.7 F | DIASTOLIC BLOOD PRESSURE: 75 MMHG | SYSTOLIC BLOOD PRESSURE: 105 MMHG | BODY MASS INDEX: 28.25 KG/M2 | RESPIRATION RATE: 12 BRPM | WEIGHT: 180 LBS | HEIGHT: 67 IN | HEART RATE: 93 BPM

## 2022-04-04 DIAGNOSIS — M48.062 SPINAL STENOSIS OF LUMBAR REGION WITH NEUROGENIC CLAUDICATION: Chronic | ICD-10-CM

## 2022-04-04 DIAGNOSIS — R52 PAIN MANAGEMENT: ICD-10-CM

## 2022-04-04 PROCEDURE — 2500000003 HC RX 250 WO HCPCS: Performed by: ANESTHESIOLOGY

## 2022-04-04 PROCEDURE — 3209999900 FLUORO FOR SURGICAL PROCEDURES

## 2022-04-04 PROCEDURE — 2580000003 HC RX 258: Performed by: ANESTHESIOLOGY

## 2022-04-04 PROCEDURE — 62323 NJX INTERLAMINAR LMBR/SAC: CPT

## 2022-04-04 PROCEDURE — 6360000004 HC RX CONTRAST MEDICATION: Performed by: ANESTHESIOLOGY

## 2022-04-04 PROCEDURE — 6360000002 HC RX W HCPCS: Performed by: ANESTHESIOLOGY

## 2022-04-04 PROCEDURE — 99152 MOD SED SAME PHYS/QHP 5/>YRS: CPT | Performed by: ANESTHESIOLOGY

## 2022-04-04 PROCEDURE — 62323 NJX INTERLAMINAR LMBR/SAC: CPT | Performed by: ANESTHESIOLOGY

## 2022-04-04 RX ORDER — DEXAMETHASONE SODIUM PHOSPHATE 10 MG/ML
INJECTION, SOLUTION INTRAMUSCULAR; INTRAVENOUS
Status: COMPLETED | OUTPATIENT
Start: 2022-04-04 | End: 2022-04-04

## 2022-04-04 RX ORDER — MIDAZOLAM HYDROCHLORIDE 1 MG/ML
INJECTION INTRAMUSCULAR; INTRAVENOUS
Status: COMPLETED | OUTPATIENT
Start: 2022-04-04 | End: 2022-04-04

## 2022-04-04 RX ORDER — LIDOCAINE HYDROCHLORIDE 10 MG/ML
INJECTION, SOLUTION EPIDURAL; INFILTRATION; INTRACAUDAL; PERINEURAL
Status: COMPLETED | OUTPATIENT
Start: 2022-04-04 | End: 2022-04-04

## 2022-04-04 RX ORDER — SODIUM CHLORIDE 0.9 % (FLUSH) 0.9 %
SYRINGE (ML) INJECTION
Status: COMPLETED | OUTPATIENT
Start: 2022-04-04 | End: 2022-04-04

## 2022-04-04 RX ADMIN — LIDOCAINE HYDROCHLORIDE 5 ML: 10 INJECTION, SOLUTION EPIDURAL; INFILTRATION; INTRACAUDAL; PERINEURAL at 11:53

## 2022-04-04 RX ADMIN — DEXAMETHASONE SODIUM PHOSPHATE 10 MG: 10 INJECTION, SOLUTION INTRAMUSCULAR; INTRAVENOUS at 11:54

## 2022-04-04 RX ADMIN — MIDAZOLAM 2 MG: 1 INJECTION INTRAMUSCULAR; INTRAVENOUS at 11:52

## 2022-04-04 RX ADMIN — Medication 10 ML: at 11:53

## 2022-04-04 RX ADMIN — IOHEXOL 3 ML: 180 INJECTION INTRAVENOUS at 11:54

## 2022-04-04 ASSESSMENT — PAIN - FUNCTIONAL ASSESSMENT
PAIN_FUNCTIONAL_ASSESSMENT: 0-10
PAIN_FUNCTIONAL_ASSESSMENT: PREVENTS OR INTERFERES SOME ACTIVE ACTIVITIES AND ADLS
PAIN_FUNCTIONAL_ASSESSMENT: 0-10

## 2022-04-04 NOTE — OP NOTE
Patient Name: Walt Ty   YOB: 1950  Room/Bed: Room/bed info not found  Medical Record Number: [id number]  Date: 4/4/2022       Sedation/ Anesthesia Plan:   intravenous sedation   as needed. Medications Planned:   midazolam (Versed) / Fentanyl  Intravenously  as needed. Preoperative Diagnosis:    1. Spinal stenosis of lumbar region with neurogenic claudication        Postoperative Diagnosis:    1. Spinal stenosis of lumbar region with neurogenic claudication        Procedure Performed:  Lumbar epidural steroid injection under fluoroscopy guidance    Blood Loss: None    Procedure: The Patient was seen in the preop area, chart was reviewed, informed consent was obtained. Patient was taken to procedure room and was placed in prone position. Vital signs were monitored through out the  Procedure. A time out was completed. The skin over the back was prepped and draped in sterile manner. The target point was marked at The interlaminar space at L3/4 . Skin and deep tissues were anesthetized with 1 % lidocaine. A 20-gauge Tuohy epidural needlele was advanced  under fluoroscopy guidance in AP view. Epidural space was identified using MOSHE technique. Position ws confirmed in Lateral view. Then after negative aspiration contrast dye Omnipaque-180 was injected with live fluoroscopy in AP views that showed  spread of the contrast in the epidural space  and no vascular runoff or intrathecal spread. Finally 5 ml of treatment solution containing 4 ml of PF NS and 1 ml of DEXAMETHASONE 10 mg / ml was injected. The needle was removed and a Band-Aid was placed over the needle  insertion site. The patient's vital signs remained stable and the patient tolerated the procedure well.       Electronically signed by Janina Stack MD on 4/4/2022 at 11:58 AM   SEDATION NOTE:    ASA CLASSIFICATION  3  MP   CLASSIFICATION  3    Moderate intravenous conscious sedation was supervised by Dr. Perera Quiet  The patient was independently monitored by a Registered Nurse assigned to the Procedure Room  Monitoring included automated blood pressure, continuous EKG, Capnography and continuous pulse oximetry. The detailed Conscious Record is permanently stored in the [facility].      The following is the conscious sedation record;  Start Time:  1148  End times:  1158  Duration:  10 MINUTES  MEDS GIVEN 2 MG VERSED AND 0 MCG FENTANYL

## 2022-04-04 NOTE — H&P
UPDATE:  Office visit pain clinic in [facility] with all required elements of H&P dated 03/23/2022  Patient seen preop, chart reviewed,   No changes in medical history and health assessment since last evaluation. PE:  AAO x 3, in NAD, VSS,. Resp: breathing on RA, no respiratory distress  Cardiac: rate normal    Risk / Benefits explained to patient, patient agree to proceed with plan.   ASA 3  MP 3

## 2022-04-05 ENCOUNTER — ANESTHESIA EVENT (OUTPATIENT)
Dept: OPERATING ROOM | Age: 72
End: 2022-04-05
Payer: MEDICARE

## 2022-04-06 ENCOUNTER — HOSPITAL ENCOUNTER (OUTPATIENT)
Age: 72
Setting detail: OUTPATIENT SURGERY
Discharge: HOME | End: 2022-04-06
Attending: PODIATRIST | Admitting: PODIATRIST
Payer: MEDICARE

## 2022-04-06 ENCOUNTER — ANESTHESIA (OUTPATIENT)
Dept: OPERATING ROOM | Age: 72
End: 2022-04-06
Payer: MEDICARE

## 2022-04-06 VITALS
BODY MASS INDEX: 30.61 KG/M2 | DIASTOLIC BLOOD PRESSURE: 82 MMHG | OXYGEN SATURATION: 97 % | HEART RATE: 65 BPM | RESPIRATION RATE: 11 BRPM | SYSTOLIC BLOOD PRESSURE: 108 MMHG | WEIGHT: 195 LBS | HEIGHT: 67 IN | TEMPERATURE: 96.8 F

## 2022-04-06 VITALS — DIASTOLIC BLOOD PRESSURE: 66 MMHG | OXYGEN SATURATION: 94 % | SYSTOLIC BLOOD PRESSURE: 96 MMHG | TEMPERATURE: 93.6 F

## 2022-04-06 DIAGNOSIS — Z98.890 POST-OPERATIVE STATE: Primary | ICD-10-CM

## 2022-04-06 PROCEDURE — 3600000012 HC SURGERY LEVEL 2 ADDTL 15MIN: Performed by: PODIATRIST

## 2022-04-06 PROCEDURE — 7100000010 HC PHASE II RECOVERY - FIRST 15 MIN: Performed by: PODIATRIST

## 2022-04-06 PROCEDURE — 2500000003 HC RX 250 WO HCPCS: Performed by: PODIATRIST

## 2022-04-06 PROCEDURE — 2580000003 HC RX 258: Performed by: ANESTHESIOLOGY

## 2022-04-06 PROCEDURE — 3700000001 HC ADD 15 MINUTES (ANESTHESIA): Performed by: PODIATRIST

## 2022-04-06 PROCEDURE — 3700000000 HC ANESTHESIA ATTENDED CARE: Performed by: PODIATRIST

## 2022-04-06 PROCEDURE — 7100000000 HC PACU RECOVERY - FIRST 15 MIN: Performed by: PODIATRIST

## 2022-04-06 PROCEDURE — 2709999900 HC NON-CHARGEABLE SUPPLY: Performed by: PODIATRIST

## 2022-04-06 PROCEDURE — 6360000002 HC RX W HCPCS: Performed by: NURSE ANESTHETIST, CERTIFIED REGISTERED

## 2022-04-06 PROCEDURE — 3600000002 HC SURGERY LEVEL 2 BASE: Performed by: PODIATRIST

## 2022-04-06 PROCEDURE — 7100000011 HC PHASE II RECOVERY - ADDTL 15 MIN: Performed by: PODIATRIST

## 2022-04-06 PROCEDURE — 7100000001 HC PACU RECOVERY - ADDTL 15 MIN: Performed by: PODIATRIST

## 2022-04-06 PROCEDURE — 2500000003 HC RX 250 WO HCPCS: Performed by: NURSE ANESTHETIST, CERTIFIED REGISTERED

## 2022-04-06 RX ORDER — PROPOFOL 10 MG/ML
INJECTION, EMULSION INTRAVENOUS PRN
Status: DISCONTINUED | OUTPATIENT
Start: 2022-04-06 | End: 2022-04-06 | Stop reason: SDUPTHER

## 2022-04-06 RX ORDER — SODIUM CHLORIDE 0.9 % (FLUSH) 0.9 %
10 SYRINGE (ML) INJECTION EVERY 12 HOURS SCHEDULED
Status: DISCONTINUED | OUTPATIENT
Start: 2022-04-06 | End: 2022-04-06 | Stop reason: HOSPADM

## 2022-04-06 RX ORDER — CEFAZOLIN SODIUM 1 G/3ML
INJECTION, POWDER, FOR SOLUTION INTRAMUSCULAR; INTRAVENOUS PRN
Status: DISCONTINUED | OUTPATIENT
Start: 2022-04-06 | End: 2022-04-06 | Stop reason: SDUPTHER

## 2022-04-06 RX ORDER — SODIUM CHLORIDE 0.9 % (FLUSH) 0.9 %
10 SYRINGE (ML) INJECTION PRN
Status: DISCONTINUED | OUTPATIENT
Start: 2022-04-06 | End: 2022-04-06 | Stop reason: HOSPADM

## 2022-04-06 RX ORDER — OXYCODONE HYDROCHLORIDE 5 MG/1
10 TABLET ORAL PRN
Status: DISCONTINUED | OUTPATIENT
Start: 2022-04-06 | End: 2022-04-06 | Stop reason: HOSPADM

## 2022-04-06 RX ORDER — FENTANYL CITRATE 50 UG/ML
INJECTION, SOLUTION INTRAMUSCULAR; INTRAVENOUS PRN
Status: DISCONTINUED | OUTPATIENT
Start: 2022-04-06 | End: 2022-04-06 | Stop reason: SDUPTHER

## 2022-04-06 RX ORDER — LIDOCAINE HYDROCHLORIDE 20 MG/ML
INJECTION, SOLUTION EPIDURAL; INFILTRATION; INTRACAUDAL; PERINEURAL PRN
Status: DISCONTINUED | OUTPATIENT
Start: 2022-04-06 | End: 2022-04-06 | Stop reason: SDUPTHER

## 2022-04-06 RX ORDER — SODIUM CHLORIDE 0.9 % (FLUSH) 0.9 %
5-40 SYRINGE (ML) INJECTION EVERY 12 HOURS SCHEDULED
Status: DISCONTINUED | OUTPATIENT
Start: 2022-04-06 | End: 2022-04-06 | Stop reason: HOSPADM

## 2022-04-06 RX ORDER — DEXAMETHASONE SODIUM PHOSPHATE 10 MG/ML
INJECTION, SOLUTION INTRAMUSCULAR; INTRAVENOUS PRN
Status: DISCONTINUED | OUTPATIENT
Start: 2022-04-06 | End: 2022-04-06 | Stop reason: SDUPTHER

## 2022-04-06 RX ORDER — SODIUM CHLORIDE 9 MG/ML
25 INJECTION, SOLUTION INTRAVENOUS PRN
Status: DISCONTINUED | OUTPATIENT
Start: 2022-04-06 | End: 2022-04-06 | Stop reason: HOSPADM

## 2022-04-06 RX ORDER — ONDANSETRON 2 MG/ML
INJECTION INTRAMUSCULAR; INTRAVENOUS PRN
Status: DISCONTINUED | OUTPATIENT
Start: 2022-04-06 | End: 2022-04-06 | Stop reason: SDUPTHER

## 2022-04-06 RX ORDER — FENTANYL CITRATE 50 UG/ML
25 INJECTION, SOLUTION INTRAMUSCULAR; INTRAVENOUS EVERY 5 MIN PRN
Status: DISCONTINUED | OUTPATIENT
Start: 2022-04-06 | End: 2022-04-06 | Stop reason: HOSPADM

## 2022-04-06 RX ORDER — OXYCODONE HYDROCHLORIDE 5 MG/1
5 TABLET ORAL PRN
Status: DISCONTINUED | OUTPATIENT
Start: 2022-04-06 | End: 2022-04-06 | Stop reason: HOSPADM

## 2022-04-06 RX ORDER — SODIUM CHLORIDE 9 MG/ML
INJECTION, SOLUTION INTRAVENOUS CONTINUOUS
Status: DISCONTINUED | OUTPATIENT
Start: 2022-04-06 | End: 2022-04-06

## 2022-04-06 RX ORDER — SODIUM CHLORIDE 0.9 % (FLUSH) 0.9 %
5-40 SYRINGE (ML) INJECTION PRN
Status: DISCONTINUED | OUTPATIENT
Start: 2022-04-06 | End: 2022-04-06 | Stop reason: HOSPADM

## 2022-04-06 RX ORDER — SODIUM CHLORIDE, SODIUM LACTATE, POTASSIUM CHLORIDE, CALCIUM CHLORIDE 600; 310; 30; 20 MG/100ML; MG/100ML; MG/100ML; MG/100ML
INJECTION, SOLUTION INTRAVENOUS CONTINUOUS
Status: DISCONTINUED | OUTPATIENT
Start: 2022-04-06 | End: 2022-04-06 | Stop reason: HOSPADM

## 2022-04-06 RX ORDER — LIDOCAINE HYDROCHLORIDE 10 MG/ML
1 INJECTION, SOLUTION EPIDURAL; INFILTRATION; INTRACAUDAL; PERINEURAL
Status: DISCONTINUED | OUTPATIENT
Start: 2022-04-06 | End: 2022-04-06 | Stop reason: HOSPADM

## 2022-04-06 RX ORDER — FENTANYL CITRATE 50 UG/ML
50 INJECTION, SOLUTION INTRAMUSCULAR; INTRAVENOUS EVERY 5 MIN PRN
Status: DISCONTINUED | OUTPATIENT
Start: 2022-04-06 | End: 2022-04-06 | Stop reason: HOSPADM

## 2022-04-06 RX ORDER — ONDANSETRON 2 MG/ML
4 INJECTION INTRAMUSCULAR; INTRAVENOUS
Status: DISCONTINUED | OUTPATIENT
Start: 2022-04-06 | End: 2022-04-06 | Stop reason: HOSPADM

## 2022-04-06 RX ORDER — PHENYLEPHRINE HCL IN 0.9% NACL 1 MG/10 ML
SYRINGE (ML) INTRAVENOUS PRN
Status: DISCONTINUED | OUTPATIENT
Start: 2022-04-06 | End: 2022-04-06 | Stop reason: SDUPTHER

## 2022-04-06 RX ADMIN — PROPOFOL 200 MG: 10 INJECTION, EMULSION INTRAVENOUS at 08:21

## 2022-04-06 RX ADMIN — Medication 100 MCG: at 08:42

## 2022-04-06 RX ADMIN — Medication 50 MCG: at 08:20

## 2022-04-06 RX ADMIN — DEXAMETHASONE SODIUM PHOSPHATE 10 MG: 10 INJECTION, SOLUTION INTRAMUSCULAR; INTRAVENOUS at 08:37

## 2022-04-06 RX ADMIN — Medication 100 MCG: at 08:36

## 2022-04-06 RX ADMIN — ONDANSETRON 4 MG: 2 INJECTION INTRAMUSCULAR; INTRAVENOUS at 08:37

## 2022-04-06 RX ADMIN — Medication 100 MCG: at 08:51

## 2022-04-06 RX ADMIN — CEFAZOLIN SODIUM 2000 MG: 1 INJECTION, POWDER, FOR SOLUTION INTRAMUSCULAR; INTRAVENOUS at 08:34

## 2022-04-06 RX ADMIN — Medication 100 MCG: at 08:39

## 2022-04-06 RX ADMIN — PROPOFOL 200 MG: 10 INJECTION, EMULSION INTRAVENOUS at 08:20

## 2022-04-06 RX ADMIN — Medication 100 MCG: at 08:54

## 2022-04-06 RX ADMIN — Medication 100 MCG: at 08:33

## 2022-04-06 RX ADMIN — Medication 100 MCG: at 09:16

## 2022-04-06 RX ADMIN — SODIUM CHLORIDE, POTASSIUM CHLORIDE, SODIUM LACTATE AND CALCIUM CHLORIDE: 600; 310; 30; 20 INJECTION, SOLUTION INTRAVENOUS at 06:59

## 2022-04-06 RX ADMIN — SODIUM CHLORIDE, POTASSIUM CHLORIDE, SODIUM LACTATE AND CALCIUM CHLORIDE: 600; 310; 30; 20 INJECTION, SOLUTION INTRAVENOUS at 09:13

## 2022-04-06 RX ADMIN — Medication 100 MCG: at 08:48

## 2022-04-06 RX ADMIN — Medication 200 MCG: at 09:04

## 2022-04-06 RX ADMIN — LIDOCAINE HYDROCHLORIDE 80 MG: 20 INJECTION, SOLUTION EPIDURAL; INFILTRATION; INTRACAUDAL; PERINEURAL at 08:20

## 2022-04-06 RX ADMIN — Medication 100 MCG: at 08:45

## 2022-04-06 RX ADMIN — Medication 50 MCG: at 08:21

## 2022-04-06 ASSESSMENT — PULMONARY FUNCTION TESTS
PIF_VALUE: 3
PIF_VALUE: 20
PIF_VALUE: 4
PIF_VALUE: 20
PIF_VALUE: 1
PIF_VALUE: 19
PIF_VALUE: 19
PIF_VALUE: 4
PIF_VALUE: 19
PIF_VALUE: 16
PIF_VALUE: 20
PIF_VALUE: 19
PIF_VALUE: 0
PIF_VALUE: 19
PIF_VALUE: 19
PIF_VALUE: 16
PIF_VALUE: 18
PIF_VALUE: 19
PIF_VALUE: 18
PIF_VALUE: 19
PIF_VALUE: 16
PIF_VALUE: 18
PIF_VALUE: 19
PIF_VALUE: 19
PIF_VALUE: 16
PIF_VALUE: 21
PIF_VALUE: 16
PIF_VALUE: 19
PIF_VALUE: 19
PIF_VALUE: 18
PIF_VALUE: 19
PIF_VALUE: 19
PIF_VALUE: 18
PIF_VALUE: 3
PIF_VALUE: 19
PIF_VALUE: 1
PIF_VALUE: 24
PIF_VALUE: 1
PIF_VALUE: 19
PIF_VALUE: 1
PIF_VALUE: 1
PIF_VALUE: 18
PIF_VALUE: 1
PIF_VALUE: 19
PIF_VALUE: 0
PIF_VALUE: 16
PIF_VALUE: 19
PIF_VALUE: 19
PIF_VALUE: 4
PIF_VALUE: 19
PIF_VALUE: 16
PIF_VALUE: 19
PIF_VALUE: 19
PIF_VALUE: 6
PIF_VALUE: 2
PIF_VALUE: 19
PIF_VALUE: 18
PIF_VALUE: 19
PIF_VALUE: 19

## 2022-04-06 ASSESSMENT — PAIN SCALES - GENERAL
PAINLEVEL_OUTOF10: 0

## 2022-04-06 ASSESSMENT — PAIN DESCRIPTION - DESCRIPTORS: DESCRIPTORS: PINS AND NEEDLES

## 2022-04-06 ASSESSMENT — PAIN - FUNCTIONAL ASSESSMENT: PAIN_FUNCTIONAL_ASSESSMENT: 0-10

## 2022-04-06 NOTE — ANESTHESIA POSTPROCEDURE EVALUATION
Department of Anesthesiology  Postprocedure Note    Patient: Dileep Isidro  MRN: [id number]  YOB: 1950  Date of evaluation: 4/6/2022  Time:  10:49 AM     Procedure Summary     Date: 04/06/22 Room / Location: [address] [place] [address] - INPATIENT    Anesthesia Start: 8282 Anesthesia Stop: 3050    Procedure: EXCISION SOFT TISSUE MASS PLANTAR 1ST METHEAD RIGHT (Right Foot) Diagnosis: (DX SOFT TISSUE MASS 1ST METHEAD RIGHT FOOT)    Surgeons: Dayday Larsen DPM Responsible Provider: Franklin Singh MD    Anesthesia Type: general ASA Status: 2          Anesthesia Type: general    Pamela Phase I: Pamela Score: 8    Pamela Phase II:      Last vitals: Reviewed and per EMR flowsheets.        Anesthesia Post Evaluation    Patient location during evaluation: PACU  Patient participation: complete - patient participated  Level of consciousness: awake  Airway patency: patent  Nausea & Vomiting: no vomiting and no nausea  Complications: no  Cardiovascular status: hemodynamically stable  Respiratory status: acceptable  Hydration status: stable

## 2022-04-06 NOTE — OP NOTE
PODIATRY OP NOTE     PATIENT NAME: Blanka Flores  YOB: 1950  -  70 y.o. male  MRN: [id number]  DATE: 4/6/2022  BILLING #: [id number]     Surgeon(s):  Janett Marcano DPM      ASSISTANTS: Fredrick Ornelas DPM PGY-1     PRE-OP DIAGNOSIS:   1. Soft tissue of the plantar 1st metatarsal head     POST-OP DIAGNOSIS: Same as above.     PROCEDURE:   1. Excision of soft tissue mass plantar 1st metatarsal head; right      ANESTHESIA: MAC  Pre operative block : 0.5% marcaine and 1% lidocaine 10cc     HEMOSTASIS: Pneumatic ankle to the right tourniquet @ 250 mmHg for 34 minutes.     ESTIMATED BLOOD LOSS: Less than 5cc.     MATERIALS:   * No implants in log *     INJECTABLES: none     SPECIMEN:   * No specimens in log *     COMPLICATIONS: none     FINDINGS: soft tissue mass excised. Please see op note for full detail.     The patient was counseled at length about the risks of elin Covid-19 during their perioperative period and any recovery window from their procedure.  The patient was made aware that elin Covid-19  may worsen their prognosis for recovering from their procedure  and lend to a higher morbidity and/or mortality risk.  All material risks, benefits, and reasonable alternatives including postponing the procedure were discussed. The patient does wish to proceed with the procedure at this time. INDICATIONS FOR PROCEDURE: Patient is a 71 yo male that has had a small mass on the plantar aspect of the right 1st metatarsal head. The mass has continued to cause pain and it has become exquisitely painful to the point of limiting ambulation, negatively effecting activities of daily living, difficulty with shoegear. MRI of the right foot was done preoperatively which did not show any abnormalities but possible small soft mass. The patient has failed multiple conservative treatment modalities. The patient elected to undergo surgical intervention for therapeutic and diagnostic purposes.  All the risks and benefits were discussed. No guarantees were given or implied. The patient provided informed written consent which was placed in the chart. COVID is negative. PROCEDURE IN DETAIL:  Under mild sedation the patient was transported from pre-op to the operating room and placed on the operating table in the supine position with a safety strap across the lap. A well padded pneumatic tourniquet was applied to the right ankle. After adequate sedation by the Anesthesia team, a local block of 10cc 1:1 mixture 1% lidocaine plain and 0.5% Marcaine plain was injected. The foot was then scrubbed, prepped and draped in the usual aseptic fashion. A timeout was performed confirming the correct patient, correct site, correct procedure, preoperative antibiotics, everyone in the room was in agreement. The right foot was then exsanguinated with an Esmarch bandage. The pneumatic ankle tourniquet was inflated to 250mmHg where it would remain for approximately 34 minutes throughout the procedure. Attention was directed to the medial aspect of the right 1st metatarsal head. A linear incision was made medially utilizing a #15 blade. Incision was deepened to the level of subcutaneous tissue. Once the incision was disected in layers a freer was used to inspect the soft tissue plantar to the 1st metatarsal head. Blunt dissection was performed to released any scar like tissue and decompress any soft tissue. Once the incision was completed any and all mass and abscess was excised. Surrounding and underlying tissue was then inspected for remnants of the mass or other foreign body. All remnants of the mass appeared to be removed, and no foreign body was visualized. Throughout the dissection process hemostasis was achieved with Bovie electrocautery. Copious amounts of sterile saline were then used to irrigate the surgical site.  The incision was closed in layer with vicryl and nylon sutures were then used to coapt the surgical incision. Dressings consisting of adaptic, 4 x 4s, Kerlix and an Ace bandage. The pneumatic ankle tourniquet was released and immediate hyperemic flush was noted to all five digits of the right foot. The patient tolerated the above procedure and anesthesia well without complications. The patient was transported from the operating room to the PACU with vital signs stable and vascular status intact to the right  foot. Patient will be given a surgical shoe in the PACU unit.

## 2022-04-06 NOTE — BRIEF OP NOTE
PODIATRY BRIEF OP NOTE    PATIENT NAME: Mike Martinez  YOB: 1950  -  70 y.o. male  MRN: [id number]  DATE: 4/6/2022  BILLING #: [id number]    Surgeon(s):  Brayden Pendleton DPM     ASSISTANTS: Lavinia Jamison DPM PGY-1    PRE-OP DIAGNOSIS:   1. Soft tissue of the plantar 1st metatarsal head    POST-OP DIAGNOSIS: Same as above. PROCEDURE:   1. Excision of soft tissue mass plantar 1st metatarsal head; right     ANESTHESIA: MAC  Pre operative block : 0.5% marcaine and 1% lidocaine 10cc    HEMOSTASIS: Pneumatic ankle to the right tourniquet @ 250 mmHg for 34 minutes. ESTIMATED BLOOD LOSS: Less than 5cc. MATERIALS:   * No implants in log *    INJECTABLES: none    SPECIMEN:   * No specimens in log *    COMPLICATIONS: none    FINDINGS: soft tissue mass excised. Please see op note for full detail. The patient was counseled at length about the risks of elin Covid-19 during their perioperative period and any recovery window from their procedure. The patient was made aware that elin Covid-19  may worsen their prognosis for recovering from their procedure  and lend to a higher morbidity and/or mortality risk. All material risks, benefits, and reasonable alternatives including postponing the procedure were discussed. The patient does wish to proceed with the procedure at this time.     Lavinia Jamison DPM   Podiatric Medicine & Surgery   4/6/2022 at 9:36 AM

## 2022-04-06 NOTE — H&P
History and Physical Service   [facility]    HISTORY AND PHYSICAL EXAMINATION            Date of Evaluation: 4/6/2022  Patient name:  Adilia Oliveros  MRN:   [id number]  YOB: 1950  PCP:    Marisela Ballard MD    History Obtained From:     Patient, medical records    History of Present Illness: This is Adilia Oliveros a 70 y.o. male who presents today for a excision soft tissue mass plantar 1st methead right by Dr. Dane Colvin for soft tissue mass 1st methead right foot. The patient's chief complaint is right foot pain that has progressively worsened over the past 1 year. Right foot pain is aggravated by walking, standing and is minimally relieved with rest, treatment with pain management. Patient is s/p fabiola bunionectomy on 12/15/2021 which patient states helped alleviate some pain but it has not resolved. Denies recent falls and injuries. Denies fever, chills, shortness of breath, cough, chest pain, open sores or wounds. Denies hx diabetes. Denies taking any blood thinning medications in the last 10 days.      Past Medical History:     Past Medical History:   Diagnosis Date    Arthritis     History of blood transfusion     Hx of blood clots     after one of his knee surgeries     Hyperlipidemia     Left knee pain 4/9/2014    Neuropathy     Teeth problem     abcessw    Wears glasses         Past Surgical History:     Past Surgical History:   Procedure Laterality Date    BUNIONECTOMY N/A 12/15/2021    AKIN OSTEOTOMY WITH BONE STAPLE CHEILECTOMY RIGHT FOOT performed by Sandra Beard DPM at [address] Bilateral     COLONOSCOPY      DENTAL SURGERY  10/2015    DENTAL SURGERY  03/2021    EYE SURGERY      bilat cataracts     FOOT SURGERY Right 12/15/2021    AKIN OSTEOTOMY WITH BONE STAPLE CHEILECTOMY RIGHT FOOT (N/A Foot)    JOINT REPLACEMENT Left     KNEE  X 6 pt unsure of date    JOINT REPLACEMENT Right 07/20/2018    ROTATOR CUFF REPAIR Right  TONSILLECTOMY          Medications Prior to Admission:     Prior to Admission medications    Medication Sig Start Date End Date Taking? Authorizing Provider   morphine (MS CONTIN) 30 MG extended release tablet Take 1 tablet by mouth every 12 hours for 30 days. 3/30/22 4/29/22  QUIQUE Carlton CNP   oxyCODONE-acetaminophen (PERCOCET) 5-325 MG per tablet Take 1 tablet by mouth every 6 hours as needed for Pain for up to 30 days. 3/30/22 4/29/22  QUIQUE Carlton CNP   gabapentin (NEURONTIN) 600 MG tablet 1 tab  tid 3/26/22 6/12/22  QUIQUE Carlton CNP   diclofenac sodium (VOLTAREN) 1 % GEL Apply 2 g topically 2 times daily 2/23/22   QUIQUE Carlton CNP   divalproex (DEPAKOTE) 125 MG DR tablet TAKE ONE TABLET BY MOUTH TWICE A DAY 10/25/21   Marisela Ballard MD   melatonin 3 MG TABS tablet Take 6 mg by mouth nightly as needed (insomnia)    Historical Provider, MD        Allergies:     Oxycontin [oxycodone hcl]    Social History:     Tobacco:    reports that he has never smoked. He has never used smokeless tobacco.  Alcohol:      reports no history of alcohol use. Drug Use:  reports no history of drug use. Family History:     Family History   Problem Relation Age of Onset    Diabetes Mother     No Known Problems Father        Review of Systems:     Positive and Negative as described in HPI. CONSTITUTIONAL: Negative for fevers, chills, sweats, fatigue, and weight loss. HEENT: Bilateral cataract removal. Wears glasses. Negative for hearing changes, rhinorrhea, and throat pain. RESPIRATORY: Negative for shortness of breath, cough, congestion, and wheezing. CARDIOVASCULAR: Hx blood clots - during left knee surgery. HLD. Negative for chest pain, irregular heartbeat, and palpitations. GASTROINTESTINAL: Negative for reflux, nausea, vomiting, diarrhea, constipation, change in bowel habits, and abdominal pain.    GENITOURINARY: Negative for difficulty of urination, burning with urination, and frequency. INTEGUMENT: Easy bruising. Negative for rash, skin lesions. HEMATOLOGIC/LYMPHATIC: Negative for swelling/edema. ALLERGIC/IMMUNOLOGIC: Negative for urticaria and itching. ENDOCRINE:  Negative for increase in thirst, increase in urination, and heat or cold intolerance. MUSCULOSKELETAL: See HPI Bilateral knee pain. Negative muscle aches, and swelling of joints. NEUROLOGICAL: Neuropathy bilateral knees. Negative for headaches, dizziness, lightheadedness  BEHAVIOR/PSYCH: Negative for depression and anxiety. Physical Exam:   BP (!) 130/92   Pulse 78   Temp 97.1 °F (36.2 °C) (Temporal)   Resp 16   Ht 5' 7\" (1.702 m)   Wt 195 lb (88.5 kg)   SpO2 97%   BMI 30.54 kg/m²   No results for input(s): POCGLU in the last 72 hours. General Appearance:  alert, well appearing, and in no acute distress  Mental status: oriented to person, place, and time with normal affect  Head:  normocephalic, atraumatic. Eye: no icterus, redness, pupils equal and reactive, extraocular eye movements intact, conjunctiva clear  Ear: normal external ear, no discharge, hearing intact  Nose:  no drainage noted  Mouth: mucous membranes moist  Neck: supple, no carotid bruits, thyroid not palpable  Lungs: Bilateral equal air entry, clear to ausculation, no wheezing, rales or rhonchi, normal effort  Cardiovascular: HR 78 normal rate, regular rhythm, no murmur, gallop, rub. Abdomen: Soft, nontender, nondistended, normal bowel sounds  Neurologic: There are no new focal motor or sensory deficits, normal muscle tone and bulk, no abnormal sensation, normal speech, cranial nerves II through XII grossly intact  Skin: No gross lesions, rashes, bruising or bleeding on exposed skin area  Extremities:  peripheral pulses palpable, no pedal edema or calf pain with palpation  Psych: normal affect     Investigations:      Laboratory Testing:  No results found for this or any previous visit (from the past 24 hour(s)).     Recent Labs 03/22/22  1113   HGB 12.0*   HCT 39.2*   WBC 3.9   MCV 92.2   BUN 10   CREATININE 1.15       No results for input(s): COVID19 in the last 720 hours. Imaging/Diagnostics:    [facility] FOR SURGICAL PROCEDURES    Result Date: 4/4/2022  Radiology exam is complete. No Radiologist dictation. Please follow up with ordering provider. Diagnosis:      1. Soft tissue mass 1st methead right foot    Plans:     1.  Excision soft tissue mass plantar 1st methead right      QUIQUE Álvarez CNP  4/6/2022  7:19 AM

## 2022-04-06 NOTE — ANESTHESIA PRE PROCEDURE
Department of Anesthesiology  Preprocedure Note       Name:  Ghulam Arreaga   Age:  70 y.o.  :  1950                                          MRN:  [id number]         Date:  2022      Surgeon: Tavo Tripp):  Porfirio Sandoval DPM    Procedure: Procedure(s):  EXCISION SOFT TISSUE MASS PLANTAR 1ST METHEAD RIGHT    Medications prior to admission:   Prior to Admission medications    Medication Sig Start Date End Date Taking? Authorizing Provider   morphine (MS CONTIN) 30 MG extended release tablet Take 1 tablet by mouth every 12 hours for 30 days. 3/30/22 4/29/22  Brittani Sotelo APRN - CNP   oxyCODONE-acetaminophen (PERCOCET) 5-325 MG per tablet Take 1 tablet by mouth every 6 hours as needed for Pain for up to 30 days. 3/30/22 4/29/22  Brittani Sotelo APRN - CNP   gabapentin (NEURONTIN) 600 MG tablet 1 tab  tid 3/26/22 6/12/22  Brittani Sotelo APRN - CNP   diclofenac sodium (VOLTAREN) 1 % GEL Apply 2 g topically 2 times daily 22   Brittani Sotelo APRN - CNP   divalproex (DEPAKOTE) 125 MG DR tablet TAKE ONE TABLET BY MOUTH TWICE A DAY 10/25/21   Nohemi Baig MD   melatonin 3 MG TABS tablet Take 6 mg by mouth nightly as needed (insomnia)    Historical Provider, MD       Current medications:    No current facility-administered medications for this encounter. Allergies:     Allergies   Allergen Reactions    Oxycontin [Oxycodone Hcl] Nausea Only       Problem List:    Patient Active Problem List   Diagnosis Code    S/P knee surgery Z98.890    Medication monitoring encounter Z51.81    Encounter for medication monitoring Z51.81    Primary osteoarthritis of both knees M17.0    Use of opiates for therapeutic purposes Z79.891    Pre-diabetes R73.03    Dyslipidemia E78.5    Osteoarthritis of both knees M17.0    Essential hypertension I10    Psychophysiological insomnia F51.04    Fibromyalgia M79.7    Mood disorder (HCC) F39    Lumbar back pain with radiculopathy affecting left lower extremity M54.16    Stage 3 chronic kidney disease (HCC) N18.30    Spinal stenosis of lumbar region with neurogenic claudication M48.062       Past Medical History:        Diagnosis Date    Arthritis     History of blood transfusion     Hx of blood clots     after one of his knee surgeries     Hyperlipidemia     Left knee pain 4/9/2014    Neuropathy     Teeth problem     abcessw    Wears glasses        Past Surgical History:        Procedure Laterality Date    BUNIONECTOMY N/A 12/15/2021    AKIN OSTEOTOMY WITH BONE STAPLE CHEILECTOMY RIGHT FOOT performed by Christopher Cuenca DPM at [address] Bilateral     COLONOSCOPY      DENTAL SURGERY  10/2015    DENTAL SURGERY  03/2021    EYE SURGERY      bilat cataracts     FOOT SURGERY Right 12/15/2021    AKIN OSTEOTOMY WITH BONE STAPLE CHEILECTOMY RIGHT FOOT (N/A Foot)    JOINT REPLACEMENT Left     KNEE  X 6 pt unsure of date    JOINT REPLACEMENT Right 07/20/2018    ROTATOR CUFF REPAIR Right     TONSILLECTOMY         Social History:    Social History     Tobacco Use    Smoking status: Never Smoker    Smokeless tobacco: Never Used   Substance Use Topics    Alcohol use: No                                Counseling given: Not Answered      Vital Signs (Current): There were no vitals filed for this visit.                                            BP Readings from Last 3 Encounters:   04/04/22 105/75   03/22/22 119/87   03/23/22 (!) 122/91       NPO Status:                                                                                 BMI:   Wt Readings from Last 3 Encounters:   04/04/22 180 lb (81.6 kg)   03/22/22 195 lb (88.5 kg)   03/23/22 200 lb (90.7 kg)     There is no height or weight on file to calculate BMI.    CBC:   Lab Results   Component Value Date    WBC 3.9 03/22/2022    RBC 4.25 03/22/2022    RBC 4.10 11/28/2011    HGB 12.0 03/22/2022    HCT 39.2 03/22/2022    MCV 92.2 03/22/2022    RDW 13.1 03/22/2022     03/22/2022     11/28/2011       CMP:   Lab Results   Component Value Date     12/07/2021    K 3.7 12/07/2021    CL 99 12/07/2021    CO2 25 12/07/2021    BUN 10 03/22/2022    CREATININE 1.15 03/22/2022    GFRAA >60 03/22/2022    LABGLOM >60 03/22/2022    GLUCOSE 119 12/07/2021    GLUCOSE 115 11/28/2011    PROT 7.2 03/30/2021    CALCIUM 8.8 12/07/2021    BILITOT 0.44 03/30/2021    ALKPHOS 54 03/30/2021    AST 13 03/30/2021    ALT 9 03/30/2021       POC Tests: No results for input(s): POCGLU, POCNA, POCK, POCCL, POCBUN, POCHEMO, POCHCT in the last 72 hours. Coags: No results found for: PROTIME, INR, APTT    HCG (If Applicable): No results found for: PREGTESTUR, PREGSERUM, HCG, HCGQUANT     ABGs: No results found for: PHART, PO2ART, VCI5EAT, PDM8PCY, BEART, W4CAZZAP     Type & Screen (If Applicable):  No results found for: LABABO, LABRH    Drug/Infectious Status (If Applicable):  Lab Results   Component Value Date    HEPCAB NONREACTIVE 08/11/2020       COVID-19 Screening (If Applicable): No results found for: COVID19        Anesthesia Evaluation  Patient summary reviewed and Nursing notes reviewed no history of anesthetic complications:   Airway: Mallampati: II  TM distance: >3 FB   Neck ROM: full  Mouth opening: > = 3 FB Dental: normal exam         Pulmonary:normal exam  breath sounds clear to auscultation                             Cardiovascular:    (+) hypertension: moderate,       ECG reviewed    Rate: normal                    Neuro/Psych:   (+) neuromuscular disease:, psychiatric history: stable with treatment            GI/Hepatic/Renal: Neg GI/Hepatic/Renal ROS            Endo/Other:    (+) : arthritis: OA., .                 Abdominal:             Vascular: negative vascular ROS.  + DVT, . Other Findings:           Anesthesia Plan      general     ASA 2       Induction: intravenous. MIPS: Postoperative opioids intended. Anesthetic plan and risks discussed with patient.       Plan discussed with Ceci Paz MD   4/6/2022

## 2022-04-26 ENCOUNTER — HOSPITAL ENCOUNTER (OUTPATIENT)
Dept: PAIN MANAGEMENT | Age: 72
Discharge: HOME | End: 2022-04-26
Payer: MEDICARE

## 2022-04-26 VITALS
DIASTOLIC BLOOD PRESSURE: 65 MMHG | BODY MASS INDEX: 31.39 KG/M2 | OXYGEN SATURATION: 95 % | WEIGHT: 200 LBS | HEIGHT: 67 IN | HEART RATE: 91 BPM | SYSTOLIC BLOOD PRESSURE: 118 MMHG

## 2022-04-26 DIAGNOSIS — M17.0 PRIMARY OSTEOARTHRITIS OF BOTH KNEES: ICD-10-CM

## 2022-04-26 DIAGNOSIS — Z98.890 S/P LEFT KNEE SURGERY: ICD-10-CM

## 2022-04-26 DIAGNOSIS — M48.062 SPINAL STENOSIS OF LUMBAR REGION WITH NEUROGENIC CLAUDICATION: ICD-10-CM

## 2022-04-26 DIAGNOSIS — Z51.81 ENCOUNTER FOR MEDICATION MONITORING: ICD-10-CM

## 2022-04-26 DIAGNOSIS — M17.0 OSTEOARTHRITIS OF BOTH KNEES, UNSPECIFIED OSTEOARTHRITIS TYPE: ICD-10-CM

## 2022-04-26 DIAGNOSIS — Z51.81 MEDICATION MONITORING ENCOUNTER: ICD-10-CM

## 2022-04-26 DIAGNOSIS — R29.898 WEAKNESS OF LEFT LEG: ICD-10-CM

## 2022-04-26 PROCEDURE — 99213 OFFICE O/P EST LOW 20 MIN: CPT

## 2022-04-26 PROCEDURE — 99213 OFFICE O/P EST LOW 20 MIN: CPT | Performed by: NURSE PRACTITIONER

## 2022-04-26 RX ORDER — MORPHINE SULFATE 30 MG/1
30 TABLET, FILM COATED, EXTENDED RELEASE ORAL EVERY 12 HOURS
Qty: 60 TABLET | Refills: 0 | Status: SHIPPED
Start: 2022-04-29 | End: 2022-05-27 | Stop reason: DRUGHIGH

## 2022-04-26 RX ORDER — OXYCODONE HYDROCHLORIDE AND ACETAMINOPHEN 5; 325 MG/1; MG/1
1 TABLET ORAL EVERY 8 HOURS PRN
Qty: 90 TABLET | Refills: 0 | Status: SHIPPED | OUTPATIENT
Start: 2022-04-29 | End: 2022-05-27 | Stop reason: SDUPTHER

## 2022-04-26 ASSESSMENT — ENCOUNTER SYMPTOMS
ABDOMINAL PAIN: 0
COUGH: 0
SHORTNESS OF BREATH: 0
CONSTIPATION: 0
BACK PAIN: 1
BOWEL INCONTINENCE: 0

## 2022-04-26 ASSESSMENT — PAIN SCALES - GENERAL: PAINLEVEL_OUTOF10: 8

## 2022-04-26 ASSESSMENT — PAIN DESCRIPTION - PAIN TYPE: TYPE: CHRONIC PAIN

## 2022-04-26 ASSESSMENT — PAIN DESCRIPTION - PROGRESSION: CLINICAL_PROGRESSION: NOT CHANGED

## 2022-04-26 ASSESSMENT — PAIN DESCRIPTION - ORIENTATION: ORIENTATION: LOWER

## 2022-04-26 ASSESSMENT — PAIN DESCRIPTION - LOCATION: LOCATION: BACK;KNEE;FOOT

## 2022-04-26 ASSESSMENT — PAIN DESCRIPTION - FREQUENCY: FREQUENCY: CONTINUOUS

## 2022-04-26 ASSESSMENT — PAIN DESCRIPTION - DESCRIPTORS: DESCRIPTORS: SHOOTING;STABBING

## 2022-04-26 NOTE — PROGRESS NOTES
Chief Complaint   Patient presents with    Back Pain    Medication Refill     percocet,morphine    Follow Up After Procedure     L-[id number]    Knee Pain         PMH  Pt reports bilat. Knee pain with no known injury, and has had multiple surgeries/procedures on left knee but pain continues.  He is opioid dependant for pain control. He has had PT in the past with no relief. Discussed genicular blocks but he declines. He also complains of back pain. MRI lumbar with multilevel degenerative disc disease. He has had LESI with 60% relief - last injection was 5/2020. He completed PT for his back with some benefit. He has been using Voltaren gel with some relief. He had LESI 4/4/22 and reports  50% relief. Back Pain  This is a chronic problem. The current episode started more than 1 month ago. The problem occurs constantly. The problem is unchanged. The quality of the pain is described as aching, shooting and stabbing. The pain does not radiate. The pain is at a severity of 6/10. The pain is worse during the night. The symptoms are aggravated by bending and twisting (walking). Stiffness is present all day. Associated symptoms include leg pain, numbness and tingling. Pertinent negatives include no abdominal pain, bladder incontinence, bowel incontinence, chest pain, dysuria, fever, headaches, pelvic pain or weakness. He has tried chiropractic manipulation, heat, ice and muscle relaxant (physical therapy) for the symptoms. The treatment provided mild (pain medication) relief. Knee Pain   The incident occurred more than 1 week ago. There was no injury mechanism. The pain is present in the left knee and right knee. The quality of the pain is described as aching, shooting and stabbing. The pain is at a severity of 8/10. The pain has been constant since onset. Associated symptoms include a loss of motion, muscle weakness, numbness and tingling.  Pertinent negatives include no inability to bear weight or loss of sensation. Possible foreign bodies include metal. The symptoms are aggravated by movement and weight bearing. He has tried elevation, heat, ice, acetaminophen and NSAIDs (physical therapy) for the symptoms. The treatment provided mild (pain medication) relief. Patient denies any new neurological symptoms. No bowel or bladder incontinence, no weakness, and no falling. Pill count: appropriate / percocet 13 tabs, morphine 18 tabs - due 4/29    Morphine equivalent: 90    Controlled Substance Monitoring:    Acute and Chronic Pain Monitoring:   RX Monitoring 4/26/2022   Attestation -   Acute Pain Prescriptions -   Periodic Controlled Substance Monitoring Possible medication side effects, risk of tolerance/dependence & alternative treatments discussed. ;No signs of potential drug abuse or diversion identified.;Obtaining appropriate analgesic effect of treatment. Chronic Pain > 50 MEDD Re-evaluated the status of the patient's underlying condition causing pain. Chronic Pain > 80 MEDD Obtained or confirmed \"Medication Contract\" on file. ;Co-prescribed Naloxone.        Past Medical History:   Diagnosis Date    Arthritis     History of blood transfusion     Hx of blood clots     after one of his knee surgeries     Hyperlipidemia     Left knee pain 4/9/2014    Neuropathy     Teeth problem     abcessw    Wears glasses        Past Surgical History:   Procedure Laterality Date    BUNIONECTOMY N/A 12/15/2021    AKIN OSTEOTOMY WITH BONE STAPLE CHEILECTOMY RIGHT FOOT performed by Miguel Davison DPM at [facility] Bilateral     COLONOSCOPY     [facility]  10/2015    DENTAL SURGERY  03/2021    EYE SURGERY      bilat cataracts     FOOT SURGERY Right 12/15/2021    AKIN OSTEOTOMY WITH BONE STAPLE CHEILECTOMY RIGHT FOOT (N/A Foot)    FOOT SURGERY Right 4/6/2022    EXCISION SOFT TISSUE MASS PLANTAR 1ST METHEAD RIGHT performed by Miguel Davison DPM at [facility] KNEE  X 6 pt unsure of date    JOINT REPLACEMENT Right 07/20/2018    ROTATOR CUFF REPAIR Right     TONSILLECTOMY         Allergies   Allergen Reactions    Oxycontin [Oxycodone Hcl] Nausea Only         Current Outpatient Medications:     morphine (MS CONTIN) 30 MG extended release tablet, Take 1 tablet by mouth every 12 hours for 30 days. , Disp: 60 tablet, Rfl: 0    oxyCODONE-acetaminophen (PERCOCET) 5-325 MG per tablet, Take 1 tablet by mouth every 6 hours as needed for Pain for up to 30 days. , Disp: 120 tablet, Rfl: 0    gabapentin (NEURONTIN) 600 MG tablet, 1 tab  tid, Disp: 90 tablet, Rfl: 1    diclofenac sodium (VOLTAREN) 1 % GEL, Apply 2 g topically 2 times daily, Disp: 120 g, Rfl: 2    divalproex (DEPAKOTE) 125 MG DR tablet, TAKE ONE TABLET BY MOUTH TWICE A DAY, Disp: 180 tablet, Rfl: 1    melatonin 3 MG TABS tablet, Take 6 mg by mouth nightly as needed (insomnia), Disp: , Rfl:     Family History   Problem Relation Age of Onset    Diabetes Mother     No Known Problems Father        Social History     Socioeconomic History    Marital status:      Spouse name: Not on file    Number of children: Not on file    Years of education: Not on file    Highest education level: Not on file   Occupational History    Occupation: retired   Tobacco Use    Smoking status: Never Smoker    Smokeless tobacco: Never Used   Vaping Use    Vaping Use: Never used   Substance and Sexual Activity    Alcohol use: No    Drug use: No    Sexual activity: Never   Other Topics Concern    Not on file   Social History Narrative    Not on file     Social Determinants of Health     Financial Resource Strain: Low Risk     Difficulty of Paying Living Expenses: Not hard at all   Food Insecurity: No Food Insecurity    Worried About [address] [employer] in the Last Year: Never true    [facility] [employer] in the Last Year: Never true   Transportation Needs:     Lack of Transportation (Medical):  Not on file    Lack of Transportation (Non-Medical): Not on file   Physical Activity:     Days of Exercise per Week: Not on file    Minutes of Exercise per Session: Not on file   Stress:     Feeling of Stress : Not on file   Social Connections:     Frequency of Communication with Friends and Family: Not on file    Frequency of Social Gatherings with Friends and Family: Not on file    Attends Yarsanism Services: Not on file    Active Member of [address] [place] [address] or Organizations: Not on file    Attends Club or Organization Meetings: Not on file    Marital Status: Not on file   Intimate Partner Violence:     Fear of Current or Ex-Partner: Not on file    Emotionally Abused: Not on file    Physically Abused: Not on file    Sexually Abused: Not on file   Housing Stability:     Unable to Pay for Housing in the Last Year: Not on file    Number of [place] in the Last Year: Not on file    Unstable Housing in the Last Year: Not on file       Review of Systems:  Review of Systems   Constitutional: Negative for chills and fever. Cardiovascular: Negative for chest pain and palpitations. Respiratory: Negative for cough and shortness of breath. Musculoskeletal: Positive for back pain and joint pain. Gastrointestinal: Negative for abdominal pain, bowel incontinence and constipation. Genitourinary: Negative for bladder incontinence, dysuria and pelvic pain. Neurological: Positive for numbness and tingling. Negative for disturbances in coordination, headaches, loss of balance and weakness. Physical Exam:  /65   Pulse 91   Ht 5' 7\" (1.702 m)   Wt 200 lb (90.7 kg)   SpO2 95%   BMI 31.32 kg/m²     Physical Exam  HENT:      Head: Normocephalic. Pulmonary:      Effort: Pulmonary effort is normal.   Musculoskeletal:         General: Normal range of motion. Cervical back: Normal range of motion. Lumbar back: Tenderness present. Right knee: Tenderness present. Left knee: Tenderness present.    Skin: General: Skin is warm and dry. Neurological:      Mental Status: He is alert and oriented to person, place, and time. Record/Diagnostics Review:    Last dominga 12/2021 and was appropriate     Assessment:  Problem List Items Addressed This Visit     Spinal stenosis of lumbar region with neurogenic claudication (Chronic)    Relevant Medications    oxyCODONE-acetaminophen (PERCOCET) 5-325 MG per tablet (Start on 4/29/2022)    morphine (MS CONTIN) 30 MG extended release tablet (Start on 4/29/2022)    Medication monitoring encounter    Relevant Medications    oxyCODONE-acetaminophen (PERCOCET) 5-325 MG per tablet (Start on 4/29/2022)    Encounter for medication monitoring    Relevant Medications    oxyCODONE-acetaminophen (PERCOCET) 5-325 MG per tablet (Start on 4/29/2022)    Primary osteoarthritis of both knees    Relevant Medications    oxyCODONE-acetaminophen (PERCOCET) 5-325 MG per tablet (Start on 4/29/2022)    morphine (MS CONTIN) 30 MG extended release tablet (Start on 4/29/2022)    Osteoarthritis of both knees    Relevant Medications    oxyCODONE-acetaminophen (PERCOCET) 5-325 MG per tablet (Start on 4/29/2022)    morphine (MS CONTIN) 30 MG extended release tablet (Start on 4/29/2022)      Other Visit Diagnoses     S/P left knee surgery        Relevant Medications    oxyCODONE-acetaminophen (PERCOCET) 5-325 MG per tablet (Start on 4/29/2022)    Weakness of left leg        Relevant Medications    oxyCODONE-acetaminophen (PERCOCET) 5-325 MG per tablet (Start on 4/29/2022)             Treatment Plan:  Patient relates current medications are helping the pain. Patient reports taking pain medications as prescribed, denies obtaining medications from different sources and denies use of illegal drugs. Patient denies side effects from medications like nausea, vomiting, constipation or drowsiness. Patient reports current activities of daily living are possible due to medications and would like to continue them. As always, we encourage daily stretching and strengthening exercises, and recommend minimizing use of pain medications unless patient cannot get through daily activities due to pain. Contract requirements met. Continue opioid therapy. Script written for percocet and MSER   Did discuss the opiate policy and will start reducing dose of percocet today to TID - will continue to taper at each visit. Discussed different treatment options including continued conservative care such as physical therapy, chiropractic care, acupuncture. He declines. Discussed different interventional options such as epidural steroids or medial branch blocks. He declines. Also discussed surgical evaluation. He does not want surgery. Follow up appointment made for 4 weeks    I have reviewed the chief complaint and history of present illness (including ROS and PFSH) and vital documentation by my staff and I agree with their documentation and have added where applicable.

## 2022-04-29 RX ORDER — SPIRONOLACTONE 50 MG/1
TABLET, FILM COATED ORAL
Qty: 90 TABLET | Refills: 1 | Status: SHIPPED | OUTPATIENT
Start: 2022-04-29 | End: 2022-11-01

## 2022-04-29 NOTE — TELEPHONE ENCOUNTER
Francesca Bryant is calling to request a refill on the following medication(s):    Last Visit Date (If Applicable):  2/9/5637    Next Visit Date:    5/10/2022    Medication Request:  Requested Prescriptions     Pending Prescriptions Disp Refills    spironolactone (ALDACTONE) 50 MG tablet [Pharmacy Med Name: SPIRONOLACTONE 50 MG TABLET] 90 tablet 3     Sig: TAKE ONE TABLET BY MOUTH DAILY

## 2022-05-10 ENCOUNTER — HOSPITAL ENCOUNTER (OUTPATIENT)
Age: 72
Setting detail: SPECIMEN
Discharge: HOME | End: 2022-05-10

## 2022-05-10 ENCOUNTER — OFFICE VISIT (OUTPATIENT)
Dept: INTERNAL MEDICINE CLINIC | Age: 72
End: 2022-05-10
Payer: MEDICARE

## 2022-05-10 VITALS
TEMPERATURE: 97.9 F | BODY MASS INDEX: 34.53 KG/M2 | WEIGHT: 220 LBS | HEART RATE: 74 BPM | SYSTOLIC BLOOD PRESSURE: 122 MMHG | DIASTOLIC BLOOD PRESSURE: 88 MMHG | OXYGEN SATURATION: 99 % | HEIGHT: 67 IN | RESPIRATION RATE: 16 BRPM

## 2022-05-10 DIAGNOSIS — R29.898 WEAKNESS OF LEFT LEG: ICD-10-CM

## 2022-05-10 DIAGNOSIS — I10 ESSENTIAL HYPERTENSION: ICD-10-CM

## 2022-05-10 DIAGNOSIS — Z51.81 MEDICATION MONITORING ENCOUNTER: ICD-10-CM

## 2022-05-10 DIAGNOSIS — M17.0 PRIMARY OSTEOARTHRITIS OF BOTH KNEES: ICD-10-CM

## 2022-05-10 DIAGNOSIS — E78.5 DYSLIPIDEMIA: ICD-10-CM

## 2022-05-10 DIAGNOSIS — N18.30 STAGE 3 CHRONIC KIDNEY DISEASE, UNSPECIFIED WHETHER STAGE 3A OR 3B CKD (HCC): ICD-10-CM

## 2022-05-10 DIAGNOSIS — Z51.81 ENCOUNTER FOR MEDICATION MONITORING: ICD-10-CM

## 2022-05-10 DIAGNOSIS — M48.062 SPINAL STENOSIS OF LUMBAR REGION WITH NEUROGENIC CLAUDICATION: Primary | Chronic | ICD-10-CM

## 2022-05-10 DIAGNOSIS — F39 MOOD DISORDER (HCC): ICD-10-CM

## 2022-05-10 DIAGNOSIS — M79.7 FIBROMYALGIA: ICD-10-CM

## 2022-05-10 DIAGNOSIS — Z79.891 USE OF OPIATES FOR THERAPEUTIC PURPOSES: ICD-10-CM

## 2022-05-10 DIAGNOSIS — R73.03 PRE-DIABETES: ICD-10-CM

## 2022-05-10 DIAGNOSIS — F51.04 PSYCHOPHYSIOLOGICAL INSOMNIA: ICD-10-CM

## 2022-05-10 DIAGNOSIS — Z98.890 S/P LEFT KNEE SURGERY: ICD-10-CM

## 2022-05-10 PROBLEM — M54.16 LUMBAR BACK PAIN WITH RADICULOPATHY AFFECTING LEFT LOWER EXTREMITY: Status: RESOLVED | Noted: 2020-12-30 | Resolved: 2022-05-10

## 2022-05-10 LAB
-: NORMAL
ALBUMIN SERPL-MCNC: 4.4 G/DL (ref 3.5–5.2)
ALBUMIN/GLOBULIN RATIO: 1.5 (ref 1–2.5)
ALP BLD-CCNC: 74 U/L (ref 40–129)
ALT SERPL-CCNC: 12 U/L (ref 5–41)
ANION GAP SERPL CALCULATED.3IONS-SCNC: 13 MMOL/L (ref 9–17)
AST SERPL-CCNC: 18 U/L
BILIRUB SERPL-MCNC: 0.52 MG/DL (ref 0.3–1.2)
BILIRUBIN URINE: NEGATIVE
BUN BLDV-MCNC: 11 MG/DL (ref 8–23)
CALCIUM SERPL-MCNC: 9.1 MG/DL (ref 8.6–10.4)
CASTS UA: NORMAL /LPF (ref 0–8)
CHLORIDE BLD-SCNC: 103 MMOL/L (ref 98–107)
CHOLESTEROL/HDL RATIO: 4.1
CHOLESTEROL: 151 MG/DL
CO2: 22 MMOL/L (ref 20–31)
COLOR: YELLOW
CREAT SERPL-MCNC: 1.03 MG/DL (ref 0.7–1.2)
EPITHELIAL CELLS UA: NORMAL /HPF (ref 0–5)
GFR AFRICAN AMERICAN: >60 ML/MIN
GFR NON-AFRICAN AMERICAN: >60 ML/MIN
GFR SERPL CREATININE-BSD FRML MDRD: NORMAL ML/MIN/{1.73_M2}
GLUCOSE BLD-MCNC: 98 MG/DL (ref 70–99)
GLUCOSE URINE: NEGATIVE
HCT VFR BLD CALC: 42.4 % (ref 40.7–50.3)
HDLC SERPL-MCNC: 37 MG/DL
HEMOGLOBIN: 13 G/DL (ref 13–17)
KETONES, URINE: NEGATIVE
LDL CHOLESTEROL: 96 MG/DL (ref 0–130)
LEUKOCYTE ESTERASE, URINE: NEGATIVE
MCH RBC QN AUTO: 28.3 PG (ref 25.2–33.5)
MCHC RBC AUTO-ENTMCNC: 30.7 G/DL (ref 28.4–34.8)
MCV RBC AUTO: 92.2 FL (ref 82.6–102.9)
NITRITE, URINE: NEGATIVE
NRBC AUTOMATED: 0 PER 100 WBC
PDW BLD-RTO: 12.7 % (ref 11.8–14.4)
PH UA: 5.5 (ref 5–8)
PLATELET # BLD: 240 K/UL (ref 138–453)
PMV BLD AUTO: 10.7 FL (ref 8.1–13.5)
POTASSIUM SERPL-SCNC: 4.3 MMOL/L (ref 3.7–5.3)
PROSTATE SPECIFIC ANTIGEN: 2.95 NG/ML
PROTEIN UA: NEGATIVE
RBC # BLD: 4.6 M/UL (ref 4.21–5.77)
RBC UA: NORMAL /HPF (ref 0–4)
SODIUM BLD-SCNC: 138 MMOL/L (ref 135–144)
SPECIFIC GRAVITY UA: 1.02 (ref 1–1.03)
TOTAL PROTEIN: 7.3 G/DL (ref 6.4–8.3)
TRIGL SERPL-MCNC: 92 MG/DL
TSH SERPL DL<=0.05 MIU/L-ACNC: 1.03 UIU/ML (ref 0.3–5)
TURBIDITY: CLEAR
URINE HGB: NEGATIVE
UROBILINOGEN, URINE: NORMAL
WBC # BLD: 3.8 K/UL (ref 3.5–11.3)
WBC UA: NORMAL /HPF (ref 0–5)

## 2022-05-10 PROCEDURE — 99214 OFFICE O/P EST MOD 30 MIN: CPT | Performed by: FAMILY MEDICINE

## 2022-05-10 ASSESSMENT — ENCOUNTER SYMPTOMS
EYES NEGATIVE: 1
ALLERGIC/IMMUNOLOGIC NEGATIVE: 1
GASTROINTESTINAL NEGATIVE: 1
RESPIRATORY NEGATIVE: 1
BACK PAIN: 1

## 2022-05-10 ASSESSMENT — PATIENT HEALTH QUESTIONNAIRE - PHQ9
SUM OF ALL RESPONSES TO PHQ QUESTIONS 1-9: 0
SUM OF ALL RESPONSES TO PHQ QUESTIONS 1-9: 0
SUM OF ALL RESPONSES TO PHQ9 QUESTIONS 1 & 2: 0
1. LITTLE INTEREST OR PLEASURE IN DOING THINGS: 0
SUM OF ALL RESPONSES TO PHQ QUESTIONS 1-9: 0
SUM OF ALL RESPONSES TO PHQ QUESTIONS 1-9: 0
2. FEELING DOWN, DEPRESSED OR HOPELESS: 0

## 2022-05-10 NOTE — PROGRESS NOTES
Subjective:      Patient ID: Sarita Rees is a 70 y.o. male. Back Pain  The current episode started more than 1 month ago. The problem occurs constantly. The problem is unchanged. The pain is present in the lumbar spine. The quality of the pain is described as cramping. The pain is at a severity of 7/10. The pain is severe. The pain is the same all the time. The symptoms are aggravated by stress. Stiffness is present all day. Associated symptoms include weakness. Risk factors include sedentary lifestyle, obesity and poor posture. Treatments tried: Opiate dependent as provided by pain management. The treatment provided moderate relief. Review of Systems   Constitutional: Negative. HENT: Negative. Eyes: Negative. Respiratory: Negative. Cardiovascular: Negative. Gastrointestinal: Negative. Endocrine: Negative. Musculoskeletal: Positive for arthralgias, back pain and myalgias. Skin: Negative. Allergic/Immunologic: Negative. Neurological: Positive for weakness. Hematological: Negative. Psychiatric/Behavioral: The patient is nervous/anxious. Past family and social history unremarkable. Diagnosis Orders   1. Spinal stenosis of lumbar region with neurogenic claudication     2. S/P left knee surgery     3. Medication monitoring encounter     4. Encounter for medication monitoring     5. Primary osteoarthritis of both knees     6. Use of opiates for therapeutic purposes     7. Pre-diabetes     8. Dyslipidemia  CBC    Comprehensive Metabolic Panel    Hemoglobin A1C    Lipid Panel    Urinalysis with Microscopic    TSH    PSA Screening   9. Weakness of left leg     10. Essential hypertension  CBC    Comprehensive Metabolic Panel    Hemoglobin A1C    Lipid Panel    Urinalysis with Microscopic    TSH    PSA Screening   11. Psychophysiological insomnia     12. Fibromyalgia     13. Mood disorder ([facility] [address]     14.  Stage 3 chronic kidney disease, unspecified whether stage 3a or 3b CKD ([address]  CBC    Comprehensive Metabolic Panel    Hemoglobin A1C    Lipid Panel    Urinalysis with Microscopic    TSH    PSA Screening         Objective:   Physical Exam  Vitals and nursing note reviewed. Constitutional:       Appearance: He is well-developed. Comments: Obesity   HENT:      Head: Normocephalic and atraumatic. Right Ear: External ear normal.      Left Ear: External ear normal.      Nose: Nose normal.   Eyes:      Conjunctiva/sclera: Conjunctivae normal.      Pupils: Pupils are equal, round, and reactive to light. Cardiovascular:      Rate and Rhythm: Normal rate and regular rhythm. Heart sounds: Normal heart sounds. Comments: Pretension  Hyperlipidemia  Pulmonary:      Effort: Pulmonary effort is normal.      Breath sounds: Normal breath sounds. Abdominal:      General: Bowel sounds are normal.      Palpations: Abdomen is soft. Genitourinary:     Comments: CKD  Musculoskeletal:         General: Normal range of motion. Cervical back: Normal range of motion and neck supple. Comments: Spine stenosis without any sign of myelopathy. Opiate dependent as provided by pain management  Status post right bunionectomy   Skin:     General: Skin is warm and dry. Neurological:      Mental Status: He is alert and oriented to person, place, and time. Deep Tendon Reflexes: Reflexes are normal and symmetric. Psychiatric:      Comments: Anxiety/depression. Chronic pain syndrome         Assessment:       Diagnosis Orders   1. Spinal stenosis of lumbar region with neurogenic claudication     2. S/P left knee surgery     3. Medication monitoring encounter     4. Encounter for medication monitoring     5. Primary osteoarthritis of both knees     6. Use of opiates for therapeutic purposes     7. Pre-diabetes     8. Dyslipidemia  CBC    Comprehensive Metabolic Panel    Hemoglobin A1C    Lipid Panel    Urinalysis with Microscopic    TSH    PSA Screening   9.  Weakness of left leg 10. Essential hypertension  CBC    Comprehensive Metabolic Panel    Hemoglobin A1C    Lipid Panel    Urinalysis with Microscopic    TSH    PSA Screening   11. Psychophysiological insomnia     12. Fibromyalgia     13. Mood disorder ([address]     14. Stage 3 chronic kidney disease, unspecified whether stage 3a or 3b CKD (HCC)  CBC    Comprehensive Metabolic Panel    Hemoglobin A1C    Lipid Panel    Urinalysis with Microscopic    TSH    PSA Screening           Plan:      70-year-old male with chronic pain syndrome/spinal stenosis on opiates as provided by pain management. No apparent sign of myelopathy. He is advised to continue current regimen of medication and close coordination with pain management. Continue activity as tolerated. He would benefit from significant weight reduction to keep BMI around 25, low-fat high-fiber diet, daily moderate exercise and lifestyle change. Continue Voltaren gel, gabapentin  Status post bunionectomy with residual hallux rigidus. Patient is complaining of pain that could be secondary to sesamoiditis. Patient states that he is following up with podiatry and pain management. Bilateral pes planus continue orthotics  Hypertension well controlled with Aldactone. Consume less than 2 g of salt a day  Anxiety/depression with underlying chronic pain syndrome. Continue Depakote, gabapentin  History of fibromyalgia. Stress reduction is advised, improve sleep hygiene. Continue gabapentin  CKD with creatinine 1. 15. Maintain oral hydration, avoid nephrotoxic drugs, anti-inflammatory radiocontrast  Medically stable advised to continue  Further recommendations to follow updated labs  Call for any concern  This note is created with a voice recognition program and while intend to generate a document that accurately reflects the content of the visit, no guarantee can be provided that every mistake has been identified and corrected by editing.           Socorro Barone MD

## 2022-05-11 LAB
ESTIMATED AVERAGE GLUCOSE: 128 MG/DL
HBA1C MFR BLD: 6.1 % (ref 4–6)

## 2022-05-25 RX ORDER — ATORVASTATIN CALCIUM 40 MG/1
TABLET, FILM COATED ORAL
Qty: 90 TABLET | OUTPATIENT
Start: 2022-05-25

## 2022-05-25 NOTE — TELEPHONE ENCOUNTER
Lexii Flores is calling to request a refill on the following medication(s):    Medication Request:  Requested Prescriptions     Pending Prescriptions Disp Refills    atorvastatin (LIPITOR) 40 MG tablet [Pharmacy Med Name: ATORVASTATIN 40 MG TABLET] 90 tablet      Sig: TAKE ONE TABLET BY MOUTH DAILY     Last filled 11/23/21, it was d/c on 3/22/22    Last Visit Date (If Applicable):  9/60/9317    Next Visit Date:    9/9/2022

## 2022-05-27 ENCOUNTER — HOSPITAL ENCOUNTER (OUTPATIENT)
Dept: PAIN MANAGEMENT | Age: 72
Discharge: HOME | End: 2022-05-27
Payer: MEDICARE

## 2022-05-27 VITALS — TEMPERATURE: 97.5 F | HEART RATE: 78 BPM | SYSTOLIC BLOOD PRESSURE: 127 MMHG | DIASTOLIC BLOOD PRESSURE: 91 MMHG

## 2022-05-27 DIAGNOSIS — M48.062 SPINAL STENOSIS OF LUMBAR REGION WITH NEUROGENIC CLAUDICATION: ICD-10-CM

## 2022-05-27 DIAGNOSIS — Z51.81 MEDICATION MONITORING ENCOUNTER: ICD-10-CM

## 2022-05-27 DIAGNOSIS — Z98.890 S/P LEFT KNEE SURGERY: ICD-10-CM

## 2022-05-27 DIAGNOSIS — Z51.81 ENCOUNTER FOR MEDICATION MONITORING: ICD-10-CM

## 2022-05-27 DIAGNOSIS — M79.7 FIBROMYALGIA: Primary | ICD-10-CM

## 2022-05-27 DIAGNOSIS — M17.0 PRIMARY OSTEOARTHRITIS OF BOTH KNEES: ICD-10-CM

## 2022-05-27 DIAGNOSIS — R29.898 WEAKNESS OF LEFT LEG: ICD-10-CM

## 2022-05-27 DIAGNOSIS — M17.0 OSTEOARTHRITIS OF BOTH KNEES, UNSPECIFIED OSTEOARTHRITIS TYPE: ICD-10-CM

## 2022-05-27 PROCEDURE — 99213 OFFICE O/P EST LOW 20 MIN: CPT

## 2022-05-27 PROCEDURE — 99213 OFFICE O/P EST LOW 20 MIN: CPT | Performed by: NURSE PRACTITIONER

## 2022-05-27 RX ORDER — MORPHINE SULFATE 15 MG/1
15 TABLET, FILM COATED, EXTENDED RELEASE ORAL 2 TIMES DAILY
Qty: 60 TABLET | Refills: 0 | Status: SHIPPED | OUTPATIENT
Start: 2022-05-29 | End: 2022-06-28 | Stop reason: SDUPTHER

## 2022-05-27 RX ORDER — OXYCODONE HYDROCHLORIDE AND ACETAMINOPHEN 5; 325 MG/1; MG/1
1 TABLET ORAL EVERY 6 HOURS PRN
Qty: 120 TABLET | Refills: 0 | Status: SHIPPED | OUTPATIENT
Start: 2022-05-29 | End: 2022-05-31 | Stop reason: SDUPTHER

## 2022-05-27 ASSESSMENT — PAIN SCALES - GENERAL: PAINLEVEL_OUTOF10: 7

## 2022-05-27 ASSESSMENT — ENCOUNTER SYMPTOMS
BACK PAIN: 1
BOWEL INCONTINENCE: 0

## 2022-05-27 NOTE — PROGRESS NOTES
Chief Complaint   Patient presents with    Back Pain    Medication Refill    Foot Pain    Knee Pain     [facility] Pt reports bilat. Knee pain with no known injury, and has had multiple surgeries/procedures on left knee but pain continues.  He is opioid dependant for pain control. He has had PT in the past with no relief. Discussed genicular blocks but he declines. He also complains of back pain. MRI lumbar with multilevel degenerative disc disease. He completed PT for his back with some benefit. He has been using Voltaren gel with some relief.  He had LESI 4/4/22 and reports  50% relief. Back Pain  This is a chronic problem. The current episode started more than 1 year ago. The problem occurs intermittently. The problem is unchanged. The pain is present in the lumbar spine. The quality of the pain is described as aching and shooting. The pain radiates to the right foot. The pain is at a severity of 7/10. The pain is moderate. The pain is the same all the time. The symptoms are aggravated by bending, lying down, sitting, standing and twisting. Stiffness is present all day. Associated symptoms include numbness, tingling and weakness. Pertinent negatives include no bladder incontinence, bowel incontinence, chest pain, fever or headaches. He has tried analgesics and ice for the symptoms. The treatment provided mild relief. Foot Pain   The pain is present in the right foot. This is a chronic problem. The current episode started more than 1 year ago. The problem occurs constantly. The quality of the pain is described as aching. The pain is severe. Associated symptoms include numbness and tingling. Pertinent negatives include no fever. The symptoms are aggravated by activity and standing. He has tried oral narcotics and rest (PT) for the symptoms. The treatment provided moderate relief. Knee Pain   There was no injury mechanism. The pain is present in the left knee and right knee.  The quality of the pain is described as aching. The pain is at a severity of 8/10. The pain is moderate. The pain has been constant since onset. Associated symptoms include numbness and tingling. The symptoms are aggravated by movement and weight bearing. He has tried non-weight bearing and rest for the symptoms. The treatment provided mild relief. Patient denies any new neurological symptoms. No bowel or bladder incontinence, no weakness, and no falling. Pill count: appropriate  MSER/17  Oxycodone/12 due 5/29    Morphine equivalent: 82.5    Controlled Substance Monitoring:    Acute and Chronic Pain Monitoring:   RX Monitoring 5/27/2022   Attestation -   Acute Pain Prescriptions -   Periodic Controlled Substance Monitoring Possible medication side effects, risk of tolerance/dependence & alternative treatments discussed. ;No signs of potential drug abuse or diversion identified.;Obtaining appropriate analgesic effect of treatment. Chronic Pain > 50 MEDD Re-evaluated the status of the patient's underlying condition causing pain. Chronic Pain > 80 MEDD Obtained or confirmed \"Medication Contract\" on file.          Past Medical History:   Diagnosis Date    Arthritis     History of blood transfusion     Hx of blood clots     after one of his knee surgeries     Hyperlipidemia     Left knee pain 4/9/2014    Neuropathy     Teeth problem     abcessw    Wears glasses        Past Surgical History:   Procedure Laterality Date    BUNIONECTOMY N/A 12/15/2021    AKIN OSTEOTOMY WITH BONE STAPLE CHEILECTOMY RIGHT FOOT performed by Brayden Pendleton DPM at [place] [id number] Bilateral     COLONOSCOPY     [facility]  10/2015    DENTAL SURGERY  03/2021    EYE SURGERY      bilat cataracts     FOOT SURGERY Right 12/15/2021    AKIN OSTEOTOMY WITH BONE STAPLE CHEILECTOMY RIGHT FOOT (N/A Foot)    FOOT SURGERY Right 4/6/2022    EXCISION SOFT TISSUE MASS PLANTAR 1ST METHEAD RIGHT performed by Brayden Pendleton DPM at [facility] Out of Food in the Last Year: Never true    Ran Out of Food in the Last Year: Never true   Transportation Needs:     Lack of Transportation (Medical): Not on file    Lack of Transportation (Non-Medical): Not on file   Physical Activity:     Days of Exercise per Week: Not on file    Minutes of Exercise per Session: Not on file   Stress:     Feeling of Stress : Not on file   Social Connections:     Frequency of Communication with Friends and Family: Not on file    Frequency of Social Gatherings with Friends and Family: Not on file    Attends Holiness Services: Not on file    Active Member of [address] [place] [address] [employer] or Organizations: Not on file    Attends Club or Organization Meetings: Not on file    Marital Status: Not on file   Intimate Partner Violence:     Fear of Current or Ex-Partner: Not on file    Emotionally Abused: Not on file    Physically Abused: Not on file    Sexually Abused: Not on file   Housing Stability:     Unable to Pay for Housing in the Last Year: Not on file    Number of [place] in the Last Year: Not on file    Unstable Housing in the Last Year: Not on file       Review of Systems:  Review of Systems   Constitutional: Negative for fever. Cardiovascular: Negative for chest pain. Musculoskeletal: Positive for back pain. Gastrointestinal: Negative for bowel incontinence. Genitourinary: Negative for bladder incontinence. Neurological: Positive for numbness, tingling and weakness. Negative for headaches. Physical Exam:  BP (!) 127/91   Pulse 78   Temp 97.5 °F (36.4 °C)     Physical Exam  HENT:      Head: Normocephalic. Pulmonary:      Effort: Pulmonary effort is normal.   Musculoskeletal:         General: Normal range of motion. Cervical back: Normal range of motion. Lumbar back: Tenderness present. Right knee: Tenderness present. Left knee: Tenderness present. Right foot: Tenderness present. Skin:     General: Skin is warm and dry. Neurological:      Mental Status: He is alert and oriented to person, place, and time. Record/Diagnostics Review:    Last dominga 12/2021 and was appropriate     Assessment:  Problem List Items Addressed This Visit     Spinal stenosis of lumbar region with neurogenic claudication (Chronic)    Relevant Medications    PERCOCET 5-325 MG per tablet (Start on 5/29/2022)    morphine (MS CONTIN) 15 MG extended release tablet (Start on 5/29/2022)    S/P left knee surgery    Relevant Medications    PERCOCET 5-325 MG per tablet (Start on 5/29/2022)    morphine (MS CONTIN) 15 MG extended release tablet (Start on 5/29/2022)    Medication monitoring encounter    Relevant Medications    PERCOCET 5-325 MG per tablet (Start on 5/29/2022)    Encounter for medication monitoring    Relevant Medications    PERCOCET 5-325 MG per tablet (Start on 5/29/2022)    Primary osteoarthritis of both knees    Relevant Medications    PERCOCET 5-325 MG per tablet (Start on 5/29/2022)    morphine (MS CONTIN) 15 MG extended release tablet (Start on 5/29/2022)    Weakness of left leg    Relevant Medications    PERCOCET 5-325 MG per tablet (Start on 5/29/2022)    Fibromyalgia - Primary    Relevant Medications    PERCOCET 5-325 MG per tablet (Start on 5/29/2022)    morphine (MS CONTIN) 15 MG extended release tablet (Start on 5/29/2022)      Other Visit Diagnoses     Osteoarthritis of both knees, unspecified osteoarthritis type        Relevant Medications    PERCOCET 5-325 MG per tablet (Start on 5/29/2022)    morphine (MS CONTIN) 15 MG extended release tablet (Start on 5/29/2022)             Treatment Plan:  Patient relates current medications are helping the pain. Patient reports taking pain medications as prescribed, denies obtaining medications from different sources and denies use of illegal drugs. Patient denies side effects from medications like nausea, vomiting, constipation or drowsiness.  Patient reports current activities of daily living are possible due to medications and would like to continue them. As always, we encourage daily stretching and strengthening exercises, and recommend minimizing use of pain medications unless patient cannot get through daily activities due to pain. Contract requirements met. Continue opioid therapy. Script written for percocet and MSER  Dose MSER reduced to 15 mg BID   Percocet every 6 hours  Follow up appointment made for 4 weeks    I have reviewed the chief complaint and history of present illness (including ROS and PFSH) and vital documentation by my staff and I agree with their documentation and have added where applicable.

## 2022-05-31 DIAGNOSIS — M17.0 OSTEOARTHRITIS OF BOTH KNEES, UNSPECIFIED OSTEOARTHRITIS TYPE: ICD-10-CM

## 2022-05-31 DIAGNOSIS — M17.0 PRIMARY OSTEOARTHRITIS OF BOTH KNEES: ICD-10-CM

## 2022-05-31 DIAGNOSIS — Z51.81 MEDICATION MONITORING ENCOUNTER: ICD-10-CM

## 2022-05-31 DIAGNOSIS — Z51.81 ENCOUNTER FOR MEDICATION MONITORING: ICD-10-CM

## 2022-05-31 DIAGNOSIS — Z98.890 S/P LEFT KNEE SURGERY: ICD-10-CM

## 2022-05-31 DIAGNOSIS — R29.898 WEAKNESS OF LEFT LEG: ICD-10-CM

## 2022-05-31 RX ORDER — OXYCODONE HYDROCHLORIDE AND ACETAMINOPHEN 5; 325 MG/1; MG/1
1 TABLET ORAL EVERY 6 HOURS PRN
Qty: 120 TABLET | Refills: 0 | Status: SHIPPED | OUTPATIENT
Start: 2022-05-31 | End: 2022-06-28 | Stop reason: SDUPTHER

## 2022-06-28 ENCOUNTER — TELEPHONE (OUTPATIENT)
Dept: PAIN MANAGEMENT | Age: 72
End: 2022-06-28

## 2022-06-28 DIAGNOSIS — M17.0 OSTEOARTHRITIS OF BOTH KNEES, UNSPECIFIED OSTEOARTHRITIS TYPE: ICD-10-CM

## 2022-06-28 DIAGNOSIS — Z98.890 S/P LEFT KNEE SURGERY: ICD-10-CM

## 2022-06-28 DIAGNOSIS — M79.7 FIBROMYALGIA: ICD-10-CM

## 2022-06-28 DIAGNOSIS — Z51.81 MEDICATION MONITORING ENCOUNTER: ICD-10-CM

## 2022-06-28 DIAGNOSIS — M17.0 PRIMARY OSTEOARTHRITIS OF BOTH KNEES: ICD-10-CM

## 2022-06-28 DIAGNOSIS — Z51.81 ENCOUNTER FOR MEDICATION MONITORING: ICD-10-CM

## 2022-06-28 DIAGNOSIS — R29.898 WEAKNESS OF LEFT LEG: ICD-10-CM

## 2022-06-28 RX ORDER — OXYCODONE HYDROCHLORIDE AND ACETAMINOPHEN 5; 325 MG/1; MG/1
1 TABLET ORAL EVERY 6 HOURS PRN
Qty: 56 TABLET | Refills: 0 | Status: SHIPPED | OUTPATIENT
Start: 2022-06-30 | End: 2022-06-29 | Stop reason: SDUPTHER

## 2022-06-28 RX ORDER — MORPHINE SULFATE 15 MG/1
15 TABLET, FILM COATED, EXTENDED RELEASE ORAL 2 TIMES DAILY
Qty: 32 TABLET | Refills: 0 | Status: SHIPPED | OUTPATIENT
Start: 2022-06-28 | End: 2022-06-29 | Stop reason: SDUPTHER

## 2022-06-28 NOTE — TELEPHONE ENCOUNTER
Pt calls the office stating he is due for medication next appt not until 7/11 Will route message to NP.

## 2022-06-29 ENCOUNTER — TELEPHONE (OUTPATIENT)
Dept: PAIN MANAGEMENT | Age: 72
End: 2022-06-29

## 2022-06-29 DIAGNOSIS — M17.0 PRIMARY OSTEOARTHRITIS OF BOTH KNEES: ICD-10-CM

## 2022-06-29 DIAGNOSIS — M17.12 PRIMARY OSTEOARTHRITIS OF LEFT KNEE: ICD-10-CM

## 2022-06-29 DIAGNOSIS — M79.7 FIBROMYALGIA: ICD-10-CM

## 2022-06-29 DIAGNOSIS — R29.898 WEAKNESS OF LEFT LEG: ICD-10-CM

## 2022-06-29 DIAGNOSIS — Z51.81 ENCOUNTER FOR MEDICATION MONITORING: ICD-10-CM

## 2022-06-29 DIAGNOSIS — Z98.890 S/P KNEE SURGERY: ICD-10-CM

## 2022-06-29 DIAGNOSIS — M25.562 CHRONIC PAIN OF LEFT KNEE: ICD-10-CM

## 2022-06-29 DIAGNOSIS — Z51.81 MEDICATION MONITORING ENCOUNTER: ICD-10-CM

## 2022-06-29 DIAGNOSIS — M17.0 OSTEOARTHRITIS OF BOTH KNEES, UNSPECIFIED OSTEOARTHRITIS TYPE: ICD-10-CM

## 2022-06-29 DIAGNOSIS — Z79.891 USE OF OPIATES FOR THERAPEUTIC PURPOSES: ICD-10-CM

## 2022-06-29 DIAGNOSIS — Z98.890 S/P LEFT KNEE SURGERY: ICD-10-CM

## 2022-06-29 DIAGNOSIS — M17.11 PRIMARY OSTEOARTHRITIS OF RIGHT KNEE: ICD-10-CM

## 2022-06-29 DIAGNOSIS — G89.29 CHRONIC PAIN OF LEFT KNEE: ICD-10-CM

## 2022-06-29 RX ORDER — MORPHINE SULFATE 15 MG/1
15 TABLET, FILM COATED, EXTENDED RELEASE ORAL 2 TIMES DAILY
Qty: 32 TABLET | Refills: 0 | Status: SHIPPED | OUTPATIENT
Start: 2022-06-29 | End: 2022-07-11 | Stop reason: SDUPTHER

## 2022-06-29 RX ORDER — GABAPENTIN 600 MG/1
TABLET ORAL
Qty: 90 TABLET | Refills: 1 | Status: SHIPPED | OUTPATIENT
Start: 2022-06-29 | End: 2022-08-11 | Stop reason: SDUPTHER

## 2022-06-29 RX ORDER — OXYCODONE HYDROCHLORIDE AND ACETAMINOPHEN 5; 325 MG/1; MG/1
1 TABLET ORAL EVERY 6 HOURS PRN
Qty: 56 TABLET | Refills: 0 | Status: SHIPPED | OUTPATIENT
Start: 2022-06-30 | End: 2022-07-11 | Stop reason: SDUPTHER

## 2022-06-29 NOTE — TELEPHONE ENCOUNTER
Pt calls stating [employer] does not have percocet and requesting all scripts be sent to [facility] in [facility].  Will route message to NP.

## 2022-07-11 ENCOUNTER — HOSPITAL ENCOUNTER (OUTPATIENT)
Dept: PAIN MANAGEMENT | Age: 72
Discharge: HOME | End: 2022-07-11
Payer: MEDICARE

## 2022-07-11 VITALS
TEMPERATURE: 97.5 F | HEART RATE: 95 BPM | OXYGEN SATURATION: 82 % | SYSTOLIC BLOOD PRESSURE: 112 MMHG | DIASTOLIC BLOOD PRESSURE: 84 MMHG

## 2022-07-11 DIAGNOSIS — Z98.890 S/P LEFT KNEE SURGERY: ICD-10-CM

## 2022-07-11 DIAGNOSIS — Z51.81 ENCOUNTER FOR MEDICATION MONITORING: ICD-10-CM

## 2022-07-11 DIAGNOSIS — M17.0 OSTEOARTHRITIS OF BOTH KNEES, UNSPECIFIED OSTEOARTHRITIS TYPE: ICD-10-CM

## 2022-07-11 DIAGNOSIS — Z51.81 MEDICATION MONITORING ENCOUNTER: ICD-10-CM

## 2022-07-11 DIAGNOSIS — R29.898 WEAKNESS OF LEFT LEG: ICD-10-CM

## 2022-07-11 DIAGNOSIS — M48.062 SPINAL STENOSIS OF LUMBAR REGION WITH NEUROGENIC CLAUDICATION: ICD-10-CM

## 2022-07-11 DIAGNOSIS — M17.0 PRIMARY OSTEOARTHRITIS OF BOTH KNEES: ICD-10-CM

## 2022-07-11 DIAGNOSIS — M79.7 FIBROMYALGIA: ICD-10-CM

## 2022-07-11 PROCEDURE — 99213 OFFICE O/P EST LOW 20 MIN: CPT

## 2022-07-11 PROCEDURE — 99213 OFFICE O/P EST LOW 20 MIN: CPT | Performed by: NURSE PRACTITIONER

## 2022-07-11 RX ORDER — OXYCODONE HYDROCHLORIDE AND ACETAMINOPHEN 5; 325 MG/1; MG/1
1 TABLET ORAL EVERY 6 HOURS PRN
Qty: 120 TABLET | Refills: 0 | Status: SHIPPED | OUTPATIENT
Start: 2022-07-15 | End: 2022-08-11 | Stop reason: SDUPTHER

## 2022-07-11 RX ORDER — MORPHINE SULFATE 15 MG/1
15 TABLET, FILM COATED, EXTENDED RELEASE ORAL DAILY
Qty: 30 TABLET | Refills: 0 | Status: SHIPPED | OUTPATIENT
Start: 2022-07-15 | End: 2022-08-14

## 2022-07-11 ASSESSMENT — ENCOUNTER SYMPTOMS
SHORTNESS OF BREATH: 0
BOWEL INCONTINENCE: 0
CONSTIPATION: 0
BACK PAIN: 1
COUGH: 0

## 2022-07-11 ASSESSMENT — PAIN SCALES - GENERAL: PAINLEVEL_OUTOF10: 7

## 2022-07-11 NOTE — PROGRESS NOTES
Chief Complaint   Patient presents with    Back Pain    Medication Refill       PMH  Pt reports bilat. Knee pain with no known injury, and has had multiple surgeries/procedures on left knee but pain continues.  He is opioid dependant for pain control. He has had PT in the past with no relief. Discussed genicular blocks but he declines. He also complains of back pain. MRI lumbar with multilevel degenerative disc disease. He completed PT for his back with some benefit. He has been using Voltaren gel with some relief.  He had LESI 4/4/22 and reports  50% relief.        Back Pain  This is a chronic problem. The current episode started more than 1 year ago. The problem is unchanged. The pain is present in the lumbar spine. The quality of the pain is described as aching, burning, shooting, cramping and stabbing. The pain radiates to the left knee, left thigh and right foot. The pain is at a severity of 7/10. The pain is moderate. The pain is the same all the time. The symptoms are aggravated by twisting and standing. Stiffness is present all day. Associated symptoms include numbness. Pertinent negatives include no bladder incontinence, bowel incontinence, chest pain, fever or headaches. He has tried ice, heat and analgesics for the symptoms. The treatment provided mild relief. Patient denies any new neurological symptoms. No bowel or bladder incontinence, no weakness, and no falling. Pill count: appropriate MSER #13  PERCOCET #16 - due 7/15    Morphine equivalent: 60    Periodic Controlled Substance Monitoring: Possible medication side effects, risk of tolerance/dependence & alternative treatments discussed. ,No signs of potential drug abuse or diversion identified. ,Obtaining appropriate analgesic effect of treatment.  (Warren Dior, APRN - CNP)      Past Medical History:   Diagnosis Date    Arthritis     History of blood transfusion     Hx of blood clots     after one of his knee surgeries  Hyperlipidemia     Left knee pain 4/9/2014    Neuropathy     Teeth problem     abcessw    Wears glasses        Past Surgical History:   Procedure Laterality Date    BUNIONECTOMY N/A 12/15/2021    AKIN OSTEOTOMY WITH BONE STAPLE CHEILECTOMY RIGHT FOOT performed by Carmen Godinez DPM at [address] Bilateral     COLONOSCOPY     [facility]  10/2015   [facility] DENTAL SURGERY  03/2021    EYE SURGERY      bilat cataracts     FOOT SURGERY Right 12/15/2021    AKIN OSTEOTOMY WITH BONE STAPLE CHEILECTOMY RIGHT FOOT (N/A Foot)    FOOT SURGERY Right 4/6/2022    EXCISION SOFT TISSUE MASS PLANTAR 1ST METHEAD RIGHT performed by Carmen Godinez DPM at [address] Left     KNEE  X 6 pt unsure of date    JOINT REPLACEMENT Right 07/20/2018    ROTATOR CUFF REPAIR Right     TONSILLECTOMY         Allergies   Allergen Reactions    Oxycontin [Oxycodone Hcl] Nausea Only         Current Outpatient Medications:     gabapentin (NEURONTIN) 600 MG tablet, 1 tab  tid, Disp: 90 tablet, Rfl: 1    morphine (MS CONTIN) 15 MG extended release tablet, Take 1 tablet by mouth 2 times daily for 16 days. , Disp: 32 tablet, Rfl: 0    PERCOCET 5-325 MG per tablet, Take 1 tablet by mouth every 6 hours as needed for Pain for up to 14 days. , Disp: 56 tablet, Rfl: 0    spironolactone (ALDACTONE) 50 MG tablet, TAKE ONE TABLET BY MOUTH DAILY, Disp: 90 tablet, Rfl: 1    diclofenac sodium (VOLTAREN) 1 % GEL, Apply 2 g topically 2 times daily, Disp: 120 g, Rfl: 2    divalproex (DEPAKOTE) 125 MG DR tablet, TAKE ONE TABLET BY MOUTH TWICE A DAY, Disp: 180 tablet, Rfl: 1    melatonin 3 MG TABS tablet, Take 6 mg by mouth nightly as needed (insomnia), Disp: , Rfl:     Family History   Problem Relation Age of Onset    Diabetes Mother     No Known Problems Father        Social History     Socioeconomic History    Marital status:       Spouse name: Not on file    Number of children: Not on file    Years of education: Not on file    Highest education level: Not on file   Occupational History    Occupation: retired   Tobacco Use    Smoking status: Never Smoker    Smokeless tobacco: Never Used   Vaping Use    Vaping Use: Never used   Substance and Sexual Activity    Alcohol use: No    Drug use: No    Sexual activity: Never   Other Topics Concern    Not on file   Social History Narrative    Not on file     Social Determinants of Health     Financial Resource Strain:     Difficulty of Paying Living Expenses: Not on file   Food Insecurity:     Worried About [address] in the Last Year: Not on file    Priscilla of Food in the Last Year: Not on file   Transportation Needs:     Lack of Transportation (Medical): Not on file    Lack of Transportation (Non-Medical): Not on file   Physical Activity:     Days of Exercise per Week: Not on file    Minutes of Exercise per Session: Not on file   Stress:     Feeling of Stress : Not on file   Social Connections:     Frequency of Communication with Friends and Family: Not on file    Frequency of Social Gatherings with Friends and Family: Not on file    Attends Sabianist Services: Not on file    Active Member of [address] [place] [address] or Organizations: Not on file    Attends Club or Organization Meetings: Not on file    Marital Status: Not on file   Intimate Partner Violence:     Fear of Current or Ex-Partner: Not on file    Emotionally Abused: Not on file    Physically Abused: Not on file    Sexually Abused: Not on file   Housing Stability:     Unable to Pay for Housing in the Last Year: Not on file    Number of [place] in the Last Year: Not on file    Unstable Housing in the Last Year: Not on file       Review of Systems:  Review of Systems   Constitutional: Negative for chills and fever. Cardiovascular: Negative for chest pain and palpitations. Respiratory: Negative for cough and shortness of breath.     Musculoskeletal: Positive for back pain and joint pain. Gastrointestinal: Negative for bowel incontinence and constipation. Genitourinary: Negative for bladder incontinence. Neurological: Positive for numbness. Negative for disturbances in coordination, headaches and loss of balance. Physical Exam:  /84   Pulse 95   Temp 97.5 °F (36.4 °C) (Temporal)   SpO2 (!) 82%     Physical Exam  HENT:      Head: Normocephalic. Pulmonary:      Effort: Pulmonary effort is normal.   Musculoskeletal:         General: Normal range of motion. Cervical back: Normal range of motion. Lumbar back: Tenderness present. Right knee: Tenderness present. Left knee: Tenderness present. Skin:     General: Skin is warm and dry. Neurological:      Mental Status: He is alert and oriented to person, place, and time.          Record/Diagnostics Review:    Last dominga 12/2021 and was appropriate     Assessment:  Problem List Items Addressed This Visit     Spinal stenosis of lumbar region with neurogenic claudication (Chronic)    Relevant Medications    morphine (MS CONTIN) 15 MG extended release tablet (Start on 7/15/2022)    PERCOCET 5-325 MG per tablet (Start on 7/15/2022)    S/P left knee surgery    Relevant Medications    morphine (MS CONTIN) 15 MG extended release tablet (Start on 7/15/2022)    PERCOCET 5-325 MG per tablet (Start on 7/15/2022)    Medication monitoring encounter    Relevant Medications    PERCOCET 5-325 MG per tablet (Start on 7/15/2022)    Encounter for medication monitoring    Relevant Medications    PERCOCET 5-325 MG per tablet (Start on 7/15/2022)    Primary osteoarthritis of both knees    Relevant Medications    morphine (MS CONTIN) 15 MG extended release tablet (Start on 7/15/2022)    PERCOCET 5-325 MG per tablet (Start on 7/15/2022)    Osteoarthritis of both knees    Relevant Medications    morphine (MS CONTIN) 15 MG extended release tablet (Start on 7/15/2022)    PERCOCET 5-325 MG per tablet (Start on 7/15/2022)    Weakness of left leg    Relevant Medications    PERCOCET 5-325 MG per tablet (Start on 7/15/2022)    Fibromyalgia    Relevant Medications    morphine (MS CONTIN) 15 MG extended release tablet (Start on 7/15/2022)    PERCOCET 5-325 MG per tablet (Start on 7/15/2022)             Treatment Plan:  Patient relates current medications are helping the pain. Patient reports taking pain medications as prescribed, denies obtaining medications from different sources and denies use of illegal drugs. Patient denies side effects from medications like nausea, vomiting, constipation or drowsiness. Patient reports current activities of daily living are possible due to medications and would like to continue them. As always, we encourage daily stretching and strengthening exercises, and recommend minimizing use of pain medications unless patient cannot get through daily activities due to pain. Contract requirements met. Continue opioid therapy. Script written for MSER and percocet  MSER reduced to 15 mg QD  Continue percocet every 6 hours  Follow up appointment made for 4 weeks    I have reviewed the chief complaint and history of present illness (including ROS and [address]) and vital documentation by my staff and I agree with their documentation and have added where applicable.

## 2022-08-04 ENCOUNTER — OFFICE VISIT (OUTPATIENT)
Dept: PODIATRY | Age: 72
End: 2022-08-04
Payer: MEDICARE

## 2022-08-04 VITALS — HEIGHT: 67 IN | WEIGHT: 220 LBS | BODY MASS INDEX: 34.53 KG/M2

## 2022-08-04 DIAGNOSIS — M25.871 SESAMOIDITIS OF RIGHT FOOT: Primary | ICD-10-CM

## 2022-08-04 DIAGNOSIS — M79.671 RIGHT FOOT PAIN: ICD-10-CM

## 2022-08-04 PROCEDURE — 1123F ACP DISCUSS/DSCN MKR DOCD: CPT | Performed by: PODIATRIST

## 2022-08-04 PROCEDURE — 20605 DRAIN/INJ JOINT/BURSA W/O US: CPT | Performed by: PODIATRIST

## 2022-08-04 PROCEDURE — 99204 OFFICE O/P NEW MOD 45 MIN: CPT | Performed by: PODIATRIST

## 2022-08-05 NOTE — PROGRESS NOTES
[address] [facility] PODIATRY [facility]  [address] [address] [place] [address]  Dept: [phone]  Dept Fax: [phone]    NEW PATIENT PROGRESS NOTE  Date of patient's visit: 8/4/2022  Patient's Name:  Anna Still YOB: 1950            Patient Care Team:  Kathy Coles MD as PCP - General (Family Medicine)  Kathy Coles MD as PCP - [facility] Empaneled Provider  Yahaira Yuan MD as Consulting Physician (Pain Management)  Dorinda Barreto MD as Consulting Physician  Nahun Regan DPM as Physician (Podiatry)        Chief Complaint   Patient presents with    New Patient     To establish care    Foot Pain     Right plantar foot for 2 years, at previous surgical site         HPI:   Anna Still is a 70 y.o. male who presents to the office today complaining of right foot pain. Symptoms began 2 year(s) ago. Patient relates pain is Present. Pain is rated 8 out of 10 and is described as constant, severe. Treatments prior to today's visit include: previous surgery but it did not help. Currently denies F/C/N/V. Pt's primary care physician is Kathy Coles MD last seen 05/10/2022     Allergies   Allergen Reactions    Oxycontin [Oxycodone Hcl] Nausea Only       Past Medical History:   Diagnosis Date    Arthritis     History of blood transfusion     Hx of blood clots     after one of his knee surgeries     Hyperlipidemia     Left knee pain 4/9/2014    Neuropathy     Teeth problem     abcessw    Wears glasses        Prior to Admission medications    Medication Sig Start Date End Date Taking? Authorizing Provider   morphine (MS CONTIN) 15 MG extended release tablet Take 1 tablet by mouth daily for 30 days. 7/15/22 8/14/22 Yes QUIQUE Chacko CNP   PERCOCET 5-325 MG per tablet Take 1 tablet by mouth every 6 hours as needed for Pain for up to 30 days.  7/15/22 8/14/22 Yes QUIQUE Garcia CNP   gabapentin (NEURONTIN) 600 MG tablet 1 tab  tid 6/29/22 9/15/22 Yes Vinay Aguero APRN - CNP   spironolactone (ALDACTONE) 50 MG tablet TAKE ONE TABLET BY MOUTH DAILY 4/29/22  Yes Chayito Guerra MD   diclofenac sodium (VOLTAREN) 1 % GEL Apply 2 g topically 2 times daily 2/23/22  Yes Sudha Cosme APRN - CNP   divalproex (DEPAKOTE) 125 MG DR tablet TAKE ONE TABLET BY MOUTH TWICE A DAY 10/25/21  Yes Chayito Guerra MD   melatonin 3 MG TABS tablet Take 6 mg by mouth nightly as needed (insomnia)   Yes Historical Provider, MD       Past Surgical History:   Procedure Laterality Date    BUNIONECTOMY N/A 12/15/2021    AKIN OSTEOTOMY WITH BONE STAPLE CHEILECTOMY RIGHT FOOT performed by Dorita Null DPM at [address] Bilateral     COLONOSCOPY      DENTAL SURGERY  10/2015    DENTAL SURGERY  03/2021    EYE SURGERY      bilat cataracts     FOOT SURGERY Right 12/15/2021    AKIN OSTEOTOMY WITH BONE STAPLE CHEILECTOMY RIGHT FOOT (N/A Foot)    FOOT SURGERY Right 4/6/2022    EXCISION SOFT TISSUE MASS PLANTAR 1ST METHEAD RIGHT performed by Dorita Null DPM at [address] [facility] Left     KNEE  X 6 pt unsure of date    JOINT REPLACEMENT Right 07/20/2018    ROTATOR CUFF REPAIR Right     TONSILLECTOMY         Family History   Problem Relation Age of Onset    Diabetes Mother     No Known Problems Father        Social History     Tobacco Use    Smoking status: Never    Smokeless tobacco: Never   Substance Use Topics    Alcohol use: No       Review of Systems    Review of Systems:   History obtained from chart review and the patient  General ROS: negative for - chills, fatigue, fever, night sweats or weight gain  Constitutional: Negative for chills, diaphoresis, fatigue, fever and unexpected weight change. Musculoskeletal: Positive for arthralgias, gait problem and joint swelling. Neurological ROS: negative for - behavioral changes, confusion, headaches or seizures. Negative for weakness and numbness. Dermatological ROS: negative for - mole changes, rash  Cardiovascular: Negative for leg swelling. Gastrointestinal: Negative for constipation, diarrhea, nausea and vomiting. Lower Extremity Physical Examination:   Vitals: There were no vitals filed for this visit. General: AAO x 3 in NAD. Dermatologic Exam:  Skin lesion/ulceration Absent . Skin No rashes or nodules noted. .       Musculoskeletal:     1st MPJ ROM decreased, Bilateral.  Muscle strength 5/5, Bilateral.  Pain present upon palpation of right foot plantar great toe at the MTPJ at the tibial sesamoid. Increased pain with end ROM in dorsiflexion of the great toeright . Medial longitudinal arch, Bilateral WNL. Ankle ROM WNL,Bilateral.    Dorsally contracted digits absent digits 1-5 Bilateral.     Vascular: DP and PT pulses palpable 2/4, Bilateral.  CFT <3 seconds, Bilateral.  Hair growth present to the level of the digits, Bilateral.  Edema absent, Bilateral.  Varicosities absent, Bilateral. Erythema absent, Bilateral    Neurological: Sensation intact to light touch to level of digits, Bilateral.  Protective sensation intact 10/10 sites via 5.07/10g [place] Monofilament, Bilateral.  negative Tinel's, Bilateral.  negative Valleix sign, Bilateral.      Integument: Warm, dry, supple, Bilateral.  Open lesion absent, Bilateral.  Interdigital maceration absent to web spaces 1-4, Bilateral.  Nails are normal in length, thickness and color 1-5 bilateral.  Fissures absent, Bilateral.       Radiographs:  3 views right  foot:  irregularitiy at the right foot tibila sesamoid with edema present     Asessment: Patient is a 70 y.o. male with:    Diagnosis Orders   1. Sesamoiditis of right foot  XR FOOT RIGHT (MIN 3 VIEWS)    62593 - SC DRAIN/INJECT SMALL JOINT/BURSA    betamethasone acetate-betamethasone sodium phosphate (CELESTONE) injection 6 mg      2.  Right foot pain  40230 - SC DRAIN/INJECT SMALL JOINT/BURSA    betamethasone acetate-betamethasone sodium phosphate (CELESTONE) injection 6 mg            Plan: Patient examined and evaluated. Current condition and treatment options discussed in detail. Discussed conservative and surgical options with the patient. Advised pt to his condition. Xrays right foot 3 views show te above results         After obtaining consent, and per orders of Dr. Hua Zabala DPM  , injection of celestone and marcaine 1.5 cc of earch given in right foot sesamoid plnatar 1st MTPJ by Hua Zabala DPM. Patient instructed to remain in clinic for 20 minutes afterwards, and to report any adverse reaction to me immediately. .  Verbal and written instructions given to patient. Contact office with any questions/problems/concerns. RTC in 2week(s)    All labs were reviewed and all imagining including the above findings were reviewed PRIOR to the patients arrival and with the patient today. Previous patient encounter was reviewed. Encounters from the patients other medical providers were reviewed and noted. Time was spent educating the patient and their families/caregivers on proper care of the feet and ankles. All the above diagnosis were addressed at todays visit and all questions were answered. A total of 45 minutes was spent with this patients encounter which included charting after the patients visit  .     8/4/2022    Electronically signed by Hua Zabala DPM on 8/5/2022 at 8:04 AM  8/4/2022

## 2022-08-11 ENCOUNTER — HOSPITAL ENCOUNTER (OUTPATIENT)
Dept: PAIN MANAGEMENT | Age: 72
Discharge: HOME | End: 2022-08-11
Payer: MEDICARE

## 2022-08-11 VITALS
BODY MASS INDEX: 34.53 KG/M2 | WEIGHT: 220 LBS | HEIGHT: 67 IN | OXYGEN SATURATION: 97 % | DIASTOLIC BLOOD PRESSURE: 71 MMHG | SYSTOLIC BLOOD PRESSURE: 116 MMHG | HEART RATE: 65 BPM

## 2022-08-11 DIAGNOSIS — M17.11 PRIMARY OSTEOARTHRITIS OF RIGHT KNEE: ICD-10-CM

## 2022-08-11 DIAGNOSIS — M47.817 LUMBOSACRAL SPONDYLOSIS WITHOUT MYELOPATHY: Primary | ICD-10-CM

## 2022-08-11 DIAGNOSIS — M25.562 CHRONIC PAIN OF LEFT KNEE: ICD-10-CM

## 2022-08-11 DIAGNOSIS — Z51.81 ENCOUNTER FOR MEDICATION MONITORING: ICD-10-CM

## 2022-08-11 DIAGNOSIS — Z79.891 CHRONIC USE OF OPIATE DRUG FOR THERAPEUTIC PURPOSE: ICD-10-CM

## 2022-08-11 DIAGNOSIS — M17.0 PRIMARY OSTEOARTHRITIS OF BOTH KNEES: ICD-10-CM

## 2022-08-11 DIAGNOSIS — M54.51 VERTEBROGENIC LOW BACK PAIN: ICD-10-CM

## 2022-08-11 DIAGNOSIS — Z79.891 USE OF OPIATES FOR THERAPEUTIC PURPOSES: ICD-10-CM

## 2022-08-11 DIAGNOSIS — Z98.890 S/P LEFT KNEE SURGERY: ICD-10-CM

## 2022-08-11 DIAGNOSIS — M17.12 PRIMARY OSTEOARTHRITIS OF LEFT KNEE: ICD-10-CM

## 2022-08-11 DIAGNOSIS — M17.0 OSTEOARTHRITIS OF BOTH KNEES, UNSPECIFIED OSTEOARTHRITIS TYPE: ICD-10-CM

## 2022-08-11 DIAGNOSIS — Z51.81 MEDICATION MONITORING ENCOUNTER: ICD-10-CM

## 2022-08-11 DIAGNOSIS — G89.29 CHRONIC PAIN OF LEFT KNEE: ICD-10-CM

## 2022-08-11 DIAGNOSIS — Z98.890 S/P KNEE SURGERY: ICD-10-CM

## 2022-08-11 PROCEDURE — 99213 OFFICE O/P EST LOW 20 MIN: CPT

## 2022-08-11 PROCEDURE — 99215 OFFICE O/P EST HI 40 MIN: CPT | Performed by: ANESTHESIOLOGY

## 2022-08-11 RX ORDER — BETAMETHASONE SODIUM PHOSPHATE AND BETAMETHASONE ACETATE 3; 3 MG/ML; MG/ML
6 INJECTION, SUSPENSION INTRA-ARTICULAR; INTRALESIONAL; INTRAMUSCULAR; SOFT TISSUE ONCE
Status: COMPLETED | OUTPATIENT
Start: 2022-08-11 | End: 2022-08-17

## 2022-08-11 RX ORDER — OXYCODONE HYDROCHLORIDE AND ACETAMINOPHEN 5; 325 MG/1; MG/1
1 TABLET ORAL EVERY 6 HOURS PRN
Qty: 120 TABLET | Refills: 0 | Status: SHIPPED | OUTPATIENT
Start: 2022-08-14 | End: 2022-09-08 | Stop reason: SDUPTHER

## 2022-08-11 RX ORDER — GABAPENTIN 600 MG/1
TABLET ORAL
Qty: 90 TABLET | Refills: 1 | Status: SHIPPED | OUTPATIENT
Start: 2022-08-11 | End: 2022-10-12

## 2022-08-11 ASSESSMENT — ENCOUNTER SYMPTOMS
COUGH: 0
VOMITING: 0
NAUSEA: 0
CONSTIPATION: 0
DIARRHEA: 0
SHORTNESS OF BREATH: 0
WHEEZING: 0
BACK PAIN: 1

## 2022-08-11 NOTE — PROGRESS NOTES
The patient is a 70 y. o. Non- / non  male. Chief Complaint   Patient presents with    Follow-up    Back Pain    Knee Pain        Back Pain  Associated symptoms include numbness and weakness. Pertinent negatives include no fever or headaches. Knee Pain   Associated symptoms include numbness. Medication Refill: Percocer and morphine     Pain score Today:  8  Adverse effects (Constipation / Nausea / Sedation / sexual Dysfunction / others) : NO  Mood: good  Sleep pattern and quality: poor  Activity level: fair    Pill count Today: Morphine # 0 Percocet # 14  Last dose taken  08/11/2022  OARRS report reviewed today: yes  ER/Hospitalizations/PCP visit related to pain since last visit:no   Any legal problems e.g. DUI etc.:No  Satisfied with current management: Yes    Opioid Contract: 01/28/2022  Last Urine Dug screen dated: 12/21/2021    Lab Results   Component Value Date    LABA1C 6.1 (H) 05/10/2022     Lab Results   Component Value Date     05/10/2022       Past Medical History, Past Surgical History, Social History, Allergies and Medications reviewed and updated in EPIC as indicated    Family History reviewed and is noncontributory.       Past Medical History:   Diagnosis Date    Arthritis     History of blood transfusion     Hx of blood clots     after one of his knee surgeries     Hyperlipidemia     Left knee pain 4/9/2014    Neuropathy     Teeth problem     abcessw    Wears glasses         Past Surgical History:   Procedure Laterality Date    BUNIONECTOMY N/A 12/15/2021    AKIN OSTEOTOMY WITH BONE STAPLE CHEILECTOMY RIGHT FOOT performed by Jolanta Beck DPM at [address] Bilateral     COLONOSCOPY      DENTAL SURGERY  10/2015    DENTAL SURGERY  03/2021    EYE SURGERY      bilat cataracts     FOOT SURGERY Right 12/15/2021    AKIN OSTEOTOMY WITH BONE STAPLE CHEILECTOMY RIGHT FOOT (N/A Foot)    FOOT SURGERY Right 4/6/2022    EXCISION SOFT TISSUE MASS PLANTAR 1ST METHEAD RIGHT performed by Nitin Tavera DPM at [address] [facility] Left     KNEE  X 6 pt unsure of date    JOINT REPLACEMENT Right 07/20/2018    ROTATOR CUFF REPAIR Right     TONSILLECTOMY         Social History     Socioeconomic History    Marital status:    Occupational History    Occupation: retired   Tobacco Use    Smoking status: Never    Smokeless tobacco: Never   Vaping Use    Vaping Use: Never used   Substance and Sexual Activity    Alcohol use: No    Drug use: No    Sexual activity: Never       Family History   Problem Relation Age of Onset    Diabetes Mother     No Known Problems Father        Allergies   Allergen Reactions    Oxycontin [Oxycodone Hcl] Nausea Only       There were no vitals filed for this visit. Current Outpatient Medications   Medication Sig Dispense Refill    morphine (MS CONTIN) 15 MG extended release tablet Take 1 tablet by mouth daily for 30 days. 30 tablet 0    PERCOCET 5-325 MG per tablet Take 1 tablet by mouth every 6 hours as needed for Pain for up to 30 days. 120 tablet 0    gabapentin (NEURONTIN) 600 MG tablet 1 tab  tid 90 tablet 1    spironolactone (ALDACTONE) 50 MG tablet TAKE ONE TABLET BY MOUTH DAILY 90 tablet 1    diclofenac sodium (VOLTAREN) 1 % GEL Apply 2 g topically 2 times daily 120 g 2    divalproex (DEPAKOTE) 125 MG DR tablet TAKE ONE TABLET BY MOUTH TWICE A  tablet 1    melatonin 3 MG TABS tablet Take 6 mg by mouth nightly as needed (insomnia)       No current facility-administered medications for this encounter. Review of Systems   Constitutional:  Negative for chills, fatigue and fever. Respiratory:  Negative for cough, shortness of breath and wheezing. Gastrointestinal:  Negative for constipation, diarrhea, nausea and vomiting. Musculoskeletal:  Positive for back pain and gait problem. Negative for neck pain and neck stiffness. Neurological:  Positive for weakness and numbness. Negative for dizziness and headaches. Objective:  Vital signs: (most recent): There were no vitals taken for this visit. No fever. Assessment & Plan  No diagnosis found. No orders of the defined types were placed in this encounter. No orders of the defined types were placed in this encounter.            Electronically signed by Vi Solorio MA on 8/11/2022 at 10:21 AM

## 2022-08-11 NOTE — PROGRESS NOTES
The patient is a 70 y. o. Non- / non  male. Chief Complaint   Patient presents with    Follow-up    Back Pain    Knee Pain        Back Pain  Associated symptoms include numbness and weakness. Pertinent negatives include no fever or headaches. Knee Pain   Associated symptoms include numbness.      79-year-old man with history of chronic knee and back pain  Is diagnosed with knee arthritis had previous left knee surgery  Back pain  Majority of the pain is located in the lower lumbar area  Pain is predominantly axial  Significant radiation in leg  No associated numbness or paresthesia  No changes in bladder or bowel control    Pain has been progressively worsening over last several years  No previous lumbar spine surgical history  Have done multiple courses of physical therapy in [place] continue to do home exercises  Had recent MRI lumbar spine in 2021  Patient is on chronic opioid therapy  Was on significantly high-dose with combination of morphine and Percocet  We have been gradually weaning off opioid  Currently taking MS content 15 mg once a day along with Percocet 4 times a day  Today I will discontinue MS Contin and will continue Percocet  This will be to help simplification of opioid regimen  He reports no side effects on the medication and finds medication helpful          Medication Refill: Percocer and morphine     Pain score Today:  8  Adverse effects (Constipation / Nausea / Sedation / sexual Dysfunction / others) : NO  Mood: good  Sleep pattern and quality: poor  Activity level: fair    Pill count Today: Morphine # 0 Percocet # 14  Last dose taken  08/11/2022  OARRS report reviewed today: yes  ER/Hospitalizations/PCP visit related to pain since last visit:no   Any legal problems e.g. DUI etc.:No  Satisfied with current management: Yes    Opioid Contract: 01/28/2022  Last Urine Dug screen dated: 12/21/2021    Lab Results   Component Value Date    LABA1C 6.1 (H) 05/10/2022     Lab Results Component Value Date     05/10/2022       Past Medical History, Past Surgical History, Social History, Allergies and Medications reviewed and updated in EPIC as indicated    Family History reviewed and is noncontributory. Past Medical History:   Diagnosis Date    Arthritis     History of blood transfusion     Hx of blood clots     after one of his knee surgeries     Hyperlipidemia     Left knee pain 4/9/2014    Neuropathy     Teeth problem     abcessw    Wears glasses         Past Surgical History:   Procedure Laterality Date    BUNIONECTOMY N/A 12/15/2021    AKIN OSTEOTOMY WITH BONE STAPLE CHEILECTOMY RIGHT FOOT performed by Cindy Mina DPM at [address] Bilateral     COLONOSCOPY      DENTAL SURGERY  10/2015    DENTAL SURGERY  03/2021    EYE SURGERY      bilat cataracts     FOOT SURGERY Right 12/15/2021    AKIN OSTEOTOMY WITH BONE STAPLE CHEILECTOMY RIGHT FOOT (N/A Foot)    FOOT SURGERY Right 4/6/2022    EXCISION SOFT TISSUE MASS PLANTAR 1ST METHEAD RIGHT performed by Cindy Mina DPM at [address] [facility] Left     KNEE  X 6 pt unsure of date    JOINT REPLACEMENT Right 07/20/2018    ROTATOR CUFF REPAIR Right     TONSILLECTOMY         Social History     Socioeconomic History    Marital status:     Occupational History    Occupation: retired   Tobacco Use    Smoking status: Never    Smokeless tobacco: Never   Vaping Use    Vaping Use: Never used   Substance and Sexual Activity    Alcohol use: No    Drug use: No    Sexual activity: Never       Family History   Problem Relation Age of Onset    Diabetes Mother     No Known Problems Father        Allergies   Allergen Reactions    Oxycontin [Oxycodone Hcl] Nausea Only       Vitals:    08/11/22 1027   BP: 116/71   Pulse: 65   SpO2: 97%       Current Outpatient Medications   Medication Sig Dispense Refill    [START ON 8/14/2022] PERCOCET 5-325 MG per tablet Take 1 tablet by mouth every 6 hours as needed for Pain for up to 30 days. 120 tablet 0    morphine (MS CONTIN) 15 MG extended release tablet Take 1 tablet by mouth daily for 30 days. 30 tablet 0    gabapentin (NEURONTIN) 600 MG tablet 1 tab  tid 90 tablet 1    spironolactone (ALDACTONE) 50 MG tablet TAKE ONE TABLET BY MOUTH DAILY 90 tablet 1    diclofenac sodium (VOLTAREN) 1 % GEL Apply 2 g topically 2 times daily 120 g 2    divalproex (DEPAKOTE) 125 MG DR tablet TAKE ONE TABLET BY MOUTH TWICE A  tablet 1    melatonin 3 MG TABS tablet Take 6 mg by mouth nightly as needed (insomnia)       No current facility-administered medications for this encounter. Review of Systems   Constitutional:  Negative for chills, fatigue and fever. Respiratory:  Negative for cough, shortness of breath and wheezing. Gastrointestinal:  Negative for constipation, diarrhea, nausea and vomiting. Musculoskeletal:  Positive for back pain and gait problem. Negative for neck pain and neck stiffness. Neurological:  Positive for weakness and numbness. Negative for dizziness and headaches. Objective:  General Appearance:  Well-appearing and in no acute distress. Vital signs: (most recent): Blood pressure 116/71, pulse 65, height 5' 7\" (1.702 m), weight 220 lb (99.8 kg), SpO2 97 %. Vital signs are normal.  No fever. Output: Producing urine and producing stool. HEENT: Normal HEENT exam.    Lungs:  Normal effort and normal respiratory rate. He is not in respiratory distress. Heart: Normal rate. Extremities: Normal range of motion. There is no deformity. Neurological: Patient is alert and oriented to person, place and time. Patient has normal coordination. Pupils:  Pupils are equal, round, and reactive to light. Pupils are equal.   Skin:  Warm and dry. No rash or cyanosis.    Lumbar spine examination  No apparent deformity and inspection  Range of motion limited and associated with pain  Tenderness to palpation in lumbar area  Facet loading maneuver positive  Gait is stable      Assessment & Plan  77-year-old man with history of chronic knee and back pain  Is diagnosed with knee arthritis had previous left knee surgery  Back pain  Majority of the pain is located in the lower lumbar area  Pain is predominantly axial  Significant radiation in leg  No associated numbness or paresthesia  No changes in bladder or bowel control    Pain has been progressively worsening over last several years  No previous lumbar spine surgical history  Have done multiple courses of physical therapy in past continue to do home exercises  Had recent MRI lumbar spine in 2021  Symptoms are progressively worsening, affecting quality of life and activity level refractory to conservative measures      EXAMINATION: MRI OF THE LUMBAR SPINE WITHOUT CONTRAST, 6/1/2021 2:02 pm      Impression Multilevel degenerative disc disease with associated multilevel degenerative facet hypertrophy resulting in canal stenosis at L2-3, L3-4, and L4-5. Bilateral foraminal narrowing as described above. 1. Lumbosacral spondylosis without myelopathy    2. Primary osteoarthritis of both knees    3. Vertebrogenic low back pain    4.  Chronic use of opiate drug for therapeutic purpose        MRI lumbar spine  Report is reviewed  Images were reviewed independently  Finding discussed in detail with patient  Severe multilevel lumbar degenerative disc disease with near complete loss of disc height at L2-L3-L4 disc and severe Modic changes type I and type II involving L2, L3-L4 and L5 lumbar vertebrae  Severe lumbar facet arthropathy in lower lumbar facet bilaterally particularly at L4-5 L5-S1    Explained to patient that his worsening chronic axial lower back pain is likely multifactorial resulting from vertebral Modic changes and as well as facet arthropathy    I will recommend for diagnostic lumbar medial branch nerve block  If that provide good short-term relief then consider for confirmatory block and radiofrequency ablation    Axial back pain likely to significantly be contributed by Modic changes in his vertebral agenic in origin  I think he can benefit from basivertebral nerve ablation involving L2 to-L5 level (INTRACEPT)    Patient is interested in procedure  Informed consent signed  We will submit for prior authorization    Patient is on chronic opioid therapy  Was on significantly high-dose with combination of morphine and Percocet  We have been gradually weaning off opioid  Currently taking MS content 15 mg once a day along with Percocet 4 times a day  Today I will discontinue MS Contin and will continue Percocet  This will be to help simplification of opioid regimen  He reports no side effects on the medication and finds medication helpful    Orders Placed This Encounter   Procedures    PT aquatic therapy    IA INJ DX/THER AGNT PARAVERT FACET JOINT, LUMBAR/SAC, 1ST LEVEL      Orders Placed This Encounter   Medications    PERCOCET 5-325 MG per tablet     Sig: Take 1 tablet by mouth every 6 hours as needed for Pain for up to 30 days. Dispense:  120 tablet     Refill:  0     Reduce doses taken as pain becomes manageable   Controlled Substance Monitoring:    Acute and Chronic Pain Monitoring:   RX Monitoring 8/11/2022   Attestation -   Acute Pain Prescriptions -   Periodic Controlled Substance Monitoring No signs of potential drug abuse or diversion identified. ;Possible medication side effects, risk of tolerance/dependence & alternative treatments discussed. ;Assessed functional status. Chronic Pain > 50 MEDD Obtained or confirmed \"Consent for Opioid Use\" on file.    Chronic Pain > 80 MEDD -                 Electronically signed by Odalys Loera MD on 8/11/2022 at 11:23 AM

## 2022-08-17 RX ADMIN — BETAMETHASONE SODIUM PHOSPHATE AND BETAMETHASONE ACETATE 6 MG: 3; 3 INJECTION, SUSPENSION INTRA-ARTICULAR; INTRALESIONAL; INTRAMUSCULAR; SOFT TISSUE at 13:47

## 2022-08-18 ENCOUNTER — OFFICE VISIT (OUTPATIENT)
Dept: PODIATRY | Age: 72
End: 2022-08-18
Payer: MEDICARE

## 2022-08-18 VITALS — HEIGHT: 67 IN | WEIGHT: 220 LBS | BODY MASS INDEX: 34.53 KG/M2

## 2022-08-18 DIAGNOSIS — M25.871 SESAMOIDITIS OF RIGHT FOOT: ICD-10-CM

## 2022-08-18 DIAGNOSIS — M79.671 RIGHT FOOT PAIN: Primary | ICD-10-CM

## 2022-08-18 DIAGNOSIS — M19.071 PRIMARY OSTEOARTHRITIS OF RIGHT FOOT: ICD-10-CM

## 2022-08-18 PROCEDURE — 99214 OFFICE O/P EST MOD 30 MIN: CPT | Performed by: PODIATRIST

## 2022-08-18 PROCEDURE — 1123F ACP DISCUSS/DSCN MKR DOCD: CPT | Performed by: PODIATRIST

## 2022-08-18 NOTE — PROGRESS NOTES
[address] [facility]  [address] [place] [address]  Dept: [phone]  Dept Fax: [phone]    RETURN PATIENT PROGRESS NOTE  Date of patient's visit: 8/18/2022  Patient's Name:  Jose Angel Sethi YOB: 1950            Patient Care Team:  Luis Eduardo Nava MD as PCP - General (Family Medicine)  Luis Eduardo Nava MD as PCP - [facility] Empaneled Provider  Mayr Ha MD as Consulting Physician (Pain Management)  Bakari Valencia MD as Consulting Physician  Libby Easton DPM as Physician (Podiatry)       Jose Angel Sethi 70 y.o. male that presents for follow-up of   Chief Complaint   Patient presents with    Foot Pain     Right foot pain- 2 wk post injection     Pt's primary care physician is Luis Eduardo Nava MD last seen 05/10/2022  Symptoms began 2 year(s) ago and are unchanged . Patient relates pain is Present. Pain is rated 7 out of 10 and is described as constant, severe. Treatments prior to today's visit include: previous injection, previous surgery from another physician . Currently denies F/C/N/V. Allergies   Allergen Reactions    Oxycontin [Oxycodone Hcl] Nausea Only       Past Medical History:   Diagnosis Date    Arthritis     History of blood transfusion     Hx of blood clots     after one of his knee surgeries     Hyperlipidemia     Left knee pain 4/9/2014    Neuropathy     Teeth problem     abcessw    Wears glasses        Prior to Admission medications    Medication Sig Start Date End Date Taking? Authorizing Provider   PERCOCET 5-325 MG per tablet Take 1 tablet by mouth every 6 hours as needed for Pain for up to 30 days.  8/14/22 9/13/22 Yes Angelika Valderrama MD   gabapentin (NEURONTIN) 600 MG tablet 1 tab  tid 8/11/22 10/28/22 Yes Angelika Valderrama MD   spironolactone (ALDACTONE) 50 MG tablet TAKE ONE TABLET BY MOUTH DAILY 4/29/22  Yes Luis Eduardo Nava MD   diclofenac sodium (VOLTAREN) 1 % GEL Apply 2 g topically 2 times daily 2/23/22  Yes Gita Brain, APRN - CNP   divalproex (DEPAKOTE) 125 MG DR tablet TAKE ONE TABLET BY MOUTH TWICE A DAY 10/25/21  Yes Luis Eduardo Nava MD   melatonin 3 MG TABS tablet Take 6 mg by mouth nightly as needed (insomnia)   Yes Historical Provider, MD       Review of Systems    Review of Systems:  History obtained from chart review and the patient  General ROS: negative for - chills, fatigue, fever, night sweats or weight gain  Constitutional: Negative for chills, diaphoresis, fatigue, fever and unexpected weight change. Musculoskeletal: Positive for arthralgias, gait problem and joint swelling. Neurological ROS: negative for - behavioral changes, confusion, headaches or seizures. Negative for weakness and numbness. Dermatological ROS: negative for - mole changes, rash  Cardiovascular: Negative for leg swelling. Gastrointestinal: Negative for constipation, diarrhea, nausea and vomiting. Lower Extremity Physical Examination:     Vitals: There were no vitals filed for this visit. General: AAO x 3 in NAD. Dermatologic Exam:  Skin lesion/ulceration Absent . Skin No rashes or nodules noted. .       Musculoskeletal:     1st MPJ ROM decreased, Bilateral.  Muscle strength 5/5, Bilateral.  Pain present upon palpation of right great toe plantarly to the tibial sesamoid crepitus noted to the 1st MTPJ with pain on sagittal plain ROM . Medial longitudinal arch, Bilateral WNL.   Ankle ROM WNL,Bilateral.    Dorsally contracted digits absent digits 1-5 Bilateral.     Vascular: DP and PT pulses palpable 2/4, Bilateral.  CFT <3 seconds, Bilateral.  Hair growth present to the level of the digits, Bilateral.  Edema absent, Bilateral.  Varicosities absent, Bilateral. Erythema absent, Bilateral    Neurological: Sensation intact to light touch to level of digits, Bilateral.  Protective sensation intact 10/10 sites via 5.07/10g [place]en Monofilament, Bilateral.  negative Tinel's, Bilateral.  negative Valleix sign, Bilateral.      Integument: Warm, dry, supple, Bilateral.  Open lesion absent, Bilateral.  Interdigital maceration absent to web spaces 1-4, Bilateral.  Nails are normal in length, thickness and color 1-5 bilateral.  Fissures absent, Bilateral.         Asessment: Patient is a 70 y.o. male with:    Diagnosis Orders   1. Right foot pain        2. Primary osteoarthritis of right foot        3. Sesamoiditis of right foot            Plan: Patient examined and evaluated. Current condition and treatment options discussed in detail. Advised pt to his conditon and reviewed x rays and MRI    With the injection only working for 8 hrs I feel the local anestheia did better then the steroid injection    All labs were reviewed and all imagining including the above findings were reviewed prior to the patients arrival and with the patient today      Time was spent educating the patient and their families/caregivers on proper care of the feet and ankles. All the above diagnosis were addressed at todays visit and all questions were answered. I had a long discussion today with the patient about the likely diagnosis and its natural history, physical exam and imaging findings, as well as treatment options. We discussed both surgical and nonsurgical treatments, including risks and benefits. From a nonoperative standpoint, we discussed rest/activity modification, NSAIDs/Acetaminophen/topical anesthetics, orthotics, bracing/immobilization, and physical therapy. Surgically, 1st MTPJ implant with tibial sesamoidectomy       I discussed in detail the procedure. He/She was in full agreement and understood risks. The reason for surgery is due to unsuccessful non-operative treatment and/or conservative treatment is not a viable option. It was discussed with the patient that compliance postoperatively is of utmost importance.  Any deviation on behalf of the patient will decrease the chances of a successful outcome. Patient acknowledged, understands, and would like to move forward with surgery as discussed. The patient was given a consent outlining the general risk of surgery as well as the specifics to the surgical plan. This was carefully discussed giving all options, indications and contraindications regarding the procedure outlined in the consent. All questions were answered to the patient's satisfaction. The patient signed the consent form confirming complete understanding and acceptance of the risks of stated. I specifically stated and inquired if the patient understands and accepts the risks of surgery including infection, failure, prolonged pain, swelling, numbness, recurrence, limited mobility,painful scar, RSDS, overcorrection, under-correction, and loss of limb/life. Death, bleeding, blood clots in the veins or lungs, tendon or blood vessel disturbance, bony conditions, continued pain,stiffness, weakness, and limited function. These were all listed on the consent. Additionally, the postoperative course and treatment was outlined for the patient. Discussion consisted of postoperatively the patient needs to keep the foot elevated for at least the first initial two weeks. I have encouraged movements such as moving from the bed to the sofa or recliner, to the kitchen and the bathroom; quick bursts of movement with the foot elevated. Longstanding periods of time such as cooking, cleaning, and shopping are not permitted. I reminded the patient that there are only two reasons to have surgery. That being, if their function is impaired and also if they are having pain. If they can answer yes to both these questions, I will move forward with surgery. If they can not, there is no reason to proceed with surgical intervention. Pt does elect to have the procedure above    A total of 30 minutes was spent with this patients encounter    Verbal and written instructions given to patient.  Contact office with any questions/problems/concerns. No orders of the defined types were placed in this encounter. No orders of the defined types were placed in this encounter. RTC in 10week(s).     8/18/2022      Electronically signed by Latanya De Guzman DPM on 8/18/2022 at 10:33 AM  8/18/2022

## 2022-09-06 ENCOUNTER — HOSPITAL ENCOUNTER (OUTPATIENT)
Dept: PREADMISSION TESTING | Age: 72
Discharge: HOME | End: 2022-09-10
Payer: MEDICARE

## 2022-09-06 VITALS
TEMPERATURE: 97.2 F | DIASTOLIC BLOOD PRESSURE: 77 MMHG | WEIGHT: 213.85 LBS | SYSTOLIC BLOOD PRESSURE: 122 MMHG | HEART RATE: 63 BPM | OXYGEN SATURATION: 99 % | HEIGHT: 67 IN | BODY MASS INDEX: 33.56 KG/M2 | RESPIRATION RATE: 16 BRPM

## 2022-09-06 LAB
ANION GAP SERPL CALCULATED.3IONS-SCNC: 9 MMOL/L (ref 9–17)
BUN BLDV-MCNC: 11 MG/DL (ref 8–23)
BUN/CREAT BLD: 9 (ref 9–20)
CALCIUM SERPL-MCNC: 9.6 MG/DL (ref 8.6–10.4)
CHLORIDE BLD-SCNC: 103 MMOL/L (ref 98–107)
CO2: 27 MMOL/L (ref 20–31)
CREAT SERPL-MCNC: 1.24 MG/DL (ref 0.7–1.2)
GFR AFRICAN AMERICAN: >60 ML/MIN
GFR NON-AFRICAN AMERICAN: 57 ML/MIN
GFR SERPL CREATININE-BSD FRML MDRD: ABNORMAL ML/MIN/{1.73_M2}
GLUCOSE BLD-MCNC: 89 MG/DL (ref 70–99)
HCT VFR BLD CALC: 44.7 % (ref 40.7–50.3)
HEMOGLOBIN: 13.7 G/DL (ref 13–17)
MCH RBC QN AUTO: 27.8 PG (ref 25.2–33.5)
MCHC RBC AUTO-ENTMCNC: 30.6 G/DL (ref 28.4–34.8)
MCV RBC AUTO: 90.7 FL (ref 82.6–102.9)
NRBC AUTOMATED: 0 PER 100 WBC
PDW BLD-RTO: 13.2 % (ref 11.8–14.4)
PLATELET # BLD: 205 K/UL (ref 138–453)
PMV BLD AUTO: 9.5 FL (ref 8.1–13.5)
POTASSIUM SERPL-SCNC: 4.6 MMOL/L (ref 3.7–5.3)
RBC # BLD: 4.93 M/UL (ref 4.21–5.77)
SODIUM BLD-SCNC: 139 MMOL/L (ref 135–144)
WBC # BLD: 3.9 K/UL (ref 3.5–11.3)

## 2022-09-06 PROCEDURE — 80048 BASIC METABOLIC PNL TOTAL CA: CPT

## 2022-09-06 PROCEDURE — 36415 COLL VENOUS BLD VENIPUNCTURE: CPT

## 2022-09-06 PROCEDURE — 85027 COMPLETE CBC AUTOMATED: CPT

## 2022-09-06 ASSESSMENT — PAIN DESCRIPTION - FREQUENCY: FREQUENCY: CONTINUOUS

## 2022-09-06 ASSESSMENT — PAIN DESCRIPTION - LOCATION: LOCATION: FOOT

## 2022-09-06 ASSESSMENT — PAIN - FUNCTIONAL ASSESSMENT: PAIN_FUNCTIONAL_ASSESSMENT: PREVENTS OR INTERFERES SOME ACTIVE ACTIVITIES AND ADLS

## 2022-09-06 ASSESSMENT — PAIN DESCRIPTION - PAIN TYPE: TYPE: CHRONIC PAIN

## 2022-09-06 ASSESSMENT — PAIN SCALES - GENERAL: PAINLEVEL_OUTOF10: 8

## 2022-09-06 ASSESSMENT — PAIN DESCRIPTION - ORIENTATION: ORIENTATION: RIGHT

## 2022-09-06 ASSESSMENT — PAIN DESCRIPTION - ONSET: ONSET: ON-GOING

## 2022-09-06 ASSESSMENT — PAIN DESCRIPTION - DESCRIPTORS: DESCRIPTORS: ACHING

## 2022-09-06 NOTE — PRE-PROCEDURE INSTRUCTIONS
[facility] ON Friday, September 16,2022 at 07:30 AM    Once you enter the hospital lobby, take the elevators to the second floor. Check-In is at the surgery registration desk. Continue to take your home medications as you normally do up to and including the night before surgery with the exception of any blood thinning medications. Please stop any blood thinning medications as directed by your surgeon or prescribing physician. Failure to stop certain medications may interfere with your scheduled surgery. These may include:  Aspirin, Warfarin (Coumadin), Clopidogrel (Plavix), Ibuprofen (Motrin, Advil), Naproxen (Aleve), Meloxicam (Mobic), Celecoxib (Celebrex), Eliquis, Pradaxa, Xarelto, Effient, Fish Oil, Herbal supplements. Please take the following medication(s) the day of surgery with a small sip of water:  Gabapentin,      PREPARING FOR YOUR SURGERY:     Before surgery, you can play an important role in your own health. Because skin is not sterile, we need to be sure that your skin is as free of germs as possible before surgery by carefully washing before surgery. Preparing or prepping skin before surgery can reduce the risk of a surgical site infection.   Do not shave the area of your body where your surgery will be performed unless you received specific permission from your physician. You will need to shower at home the night before surgery and the morning of surgery with a special soap called chlorhexidine gluconate (CHG*). *Not to be used by people allergic to Chlorhexidine Gluconate (CHG). Following these instructions will help you be sure that your skin is clean before surgery. Instructions on cleaning your skin before surgery: The night before your surgery: You will need to shower with warm water (not hot) and the CHG soap. Use a clean wash cloth and a clean towel. Have clean clothes available to put on after the shower.       First wash your hair with regular shampoo. Rinse your hair and body thoroughly to remove the shampoo. Wash your face and genital area (private parts) with your regular soap or water only. Thoroughly rinse your body with warm water from the neck down. Turn water off to prevent rinsing the soap off too soon. With a clean wet washcloth and half of the CHG soap in the bottle, lather your entire body from the neck down. Do not use CHG soap near your eyes or ears to avoid injury to those areas. Wash thoroughly, paying special attention to the area where your surgery will be performed. Wash your body gently for five (5) minutes. Avoid scrubbing your skin too hard. Turn the water back on and rinse your body thoroughly. Pat yourself dry with a clean, soft towel. Do not apply lotion, cream or powder. Dress with clean freshly washed clothes. The morning of surgery:    Repeat shower following steps above - using remaining half of CHG soap in bottle. Patient Instructions: If you are having any type of anesthesia you are to have nothing to eat or drink after midnight the night before your surgery. This includes gum, hard candy, mints, water or smoking or chewing tobacco.  The only exception to this is a small sip of water to take with any morning dose of heart, blood pressure, or seizure medications. No alcoholic beverages for 24 hours prior to surgery. Brush your teeth but do not swallow water. Bring your eyeglasses and case with you. No contacts are to be worn the day of surgery. You also may bring your hearing aids. Most surgical procedures involving anesthesia will require that you remove your dentures prior to surgery. u.    Do not wear any jewelry or body piercings day of surgery. Also, NO lotion, perfume or deodorant to be used the day of surgery. No nail polish on the operative extremity (arm/leg surgeries)  Please wear loose, comfortable clothing.   If you are potentially going to have a cast or brace bring clothing that will fit over them. In case of illness - If you have cold or flu like symptoms (high fever, runny nose, sore throat, cough, etc.) rash, nausea, vomiting, loose stools, and/or recent contact with someone who has a contagious disease (chicken pox, measles, etc.) Please call your doctor before coming to the hospital.         Day of Surgery/Procedure:    As a patient at [facility] - INPATIENT you can expect quality medical and nursing care that is centered on your individual needs. Our goal is to make your surgical experience as comfortable as possible    . Transportation After Your Surgery/Procedure: You will need a friend or family member to drive you home after your procedure. Your  must be 25years of age or older and able to sign off on your discharge instructions. A taxi cab or any other form of public transportation is not acceptable. Your friend or family member must stay at the hospital throughout your procedure. Someone must remain with you for the first 24 hours after your surgery if you receive anesthesia or medication. If you do not have someone to stay with you, your procedure may be cancelled.       If you have any other questions regarding your procedure or the day of surgery, please call [phone]      _________________________  ____________________________  Signature (Patient)              Signature (Provider) & date

## 2022-09-07 ENCOUNTER — TELEPHONE (OUTPATIENT)
Dept: PAIN MANAGEMENT | Age: 72
End: 2022-09-07

## 2022-09-08 DIAGNOSIS — M17.0 PRIMARY OSTEOARTHRITIS OF BOTH KNEES: ICD-10-CM

## 2022-09-08 RX ORDER — OXYCODONE HYDROCHLORIDE AND ACETAMINOPHEN 5; 325 MG/1; MG/1
1 TABLET ORAL EVERY 6 HOURS PRN
Qty: 120 TABLET | Refills: 0 | Status: SHIPPED | OUTPATIENT
Start: 2022-09-16 | End: 2022-10-16

## 2022-09-09 PROBLEM — M54.51 VERTEBROGENIC LOW BACK PAIN: Status: RESOLVED | Noted: 2021-05-10 | Resolved: 2022-09-09

## 2022-09-09 PROBLEM — R29.898 WEAKNESS OF LEFT LEG: Status: RESOLVED | Noted: 2018-02-06 | Resolved: 2022-09-09

## 2022-09-09 PROBLEM — M48.062 SPINAL STENOSIS OF LUMBAR REGION WITH NEUROGENIC CLAUDICATION: Chronic | Status: RESOLVED | Noted: 2022-04-04 | Resolved: 2022-09-09

## 2022-09-12 ENCOUNTER — HOSPITAL ENCOUNTER (OUTPATIENT)
Dept: GENERAL RADIOLOGY | Age: 72
Discharge: HOME | End: 2022-09-14
Payer: MEDICARE

## 2022-09-12 ENCOUNTER — HOSPITAL ENCOUNTER (OUTPATIENT)
Dept: PAIN MANAGEMENT | Age: 72
Discharge: HOME | End: 2022-09-12
Payer: MEDICARE

## 2022-09-12 VITALS
OXYGEN SATURATION: 97 % | RESPIRATION RATE: 14 BRPM | HEART RATE: 73 BPM | HEIGHT: 67 IN | WEIGHT: 213 LBS | TEMPERATURE: 97.7 F | BODY MASS INDEX: 33.43 KG/M2 | SYSTOLIC BLOOD PRESSURE: 124 MMHG | DIASTOLIC BLOOD PRESSURE: 89 MMHG

## 2022-09-12 DIAGNOSIS — M47.817 LUMBOSACRAL SPONDYLOSIS WITHOUT MYELOPATHY: Primary | Chronic | ICD-10-CM

## 2022-09-12 DIAGNOSIS — R52 PAIN MANAGEMENT: ICD-10-CM

## 2022-09-12 PROCEDURE — 99152 MOD SED SAME PHYS/QHP 5/>YRS: CPT | Performed by: ANESTHESIOLOGY

## 2022-09-12 PROCEDURE — 64494 INJ PARAVERT F JNT L/S 2 LEV: CPT | Performed by: ANESTHESIOLOGY

## 2022-09-12 PROCEDURE — 64495 INJ PARAVERT F JNT L/S 3 LEV: CPT

## 2022-09-12 PROCEDURE — 64494 INJ PARAVERT F JNT L/S 2 LEV: CPT

## 2022-09-12 PROCEDURE — 6360000004 HC RX CONTRAST MEDICATION: Performed by: ANESTHESIOLOGY

## 2022-09-12 PROCEDURE — 6360000002 HC RX W HCPCS: Performed by: ANESTHESIOLOGY

## 2022-09-12 PROCEDURE — 3209999900 FLUORO FOR SURGICAL PROCEDURES

## 2022-09-12 PROCEDURE — 64493 INJ PARAVERT F JNT L/S 1 LEV: CPT | Performed by: ANESTHESIOLOGY

## 2022-09-12 PROCEDURE — 2500000003 HC RX 250 WO HCPCS: Performed by: ANESTHESIOLOGY

## 2022-09-12 PROCEDURE — 64493 INJ PARAVERT F JNT L/S 1 LEV: CPT

## 2022-09-12 RX ORDER — MIDAZOLAM HYDROCHLORIDE 1 MG/ML
INJECTION INTRAMUSCULAR; INTRAVENOUS
Status: COMPLETED | OUTPATIENT
Start: 2022-09-12 | End: 2022-09-12

## 2022-09-12 RX ORDER — BUPIVACAINE HYDROCHLORIDE 5 MG/ML
INJECTION, SOLUTION EPIDURAL; INTRACAUDAL
Status: COMPLETED | OUTPATIENT
Start: 2022-09-12 | End: 2022-09-12

## 2022-09-12 RX ORDER — FENTANYL CITRATE 50 UG/ML
INJECTION, SOLUTION INTRAMUSCULAR; INTRAVENOUS
Status: COMPLETED | OUTPATIENT
Start: 2022-09-12 | End: 2022-09-12

## 2022-09-12 RX ORDER — LIDOCAINE HYDROCHLORIDE 10 MG/ML
INJECTION, SOLUTION EPIDURAL; INFILTRATION; INTRACAUDAL; PERINEURAL
Status: COMPLETED | OUTPATIENT
Start: 2022-09-12 | End: 2022-09-12

## 2022-09-12 RX ADMIN — FENTANYL CITRATE 50 MCG: 50 INJECTION, SOLUTION INTRAMUSCULAR; INTRAVENOUS at 11:59

## 2022-09-12 RX ADMIN — LIDOCAINE HYDROCHLORIDE 5 ML: 10 INJECTION, SOLUTION EPIDURAL; INFILTRATION; INTRACAUDAL; PERINEURAL at 11:59

## 2022-09-12 RX ADMIN — MIDAZOLAM 1 MG: 1 INJECTION INTRAMUSCULAR; INTRAVENOUS at 11:59

## 2022-09-12 RX ADMIN — IOHEXOL 3 ML: 180 INJECTION INTRAVENOUS at 12:00

## 2022-09-12 RX ADMIN — BUPIVACAINE HYDROCHLORIDE 3 ML: 5 INJECTION, SOLUTION EPIDURAL; INTRACAUDAL; PERINEURAL at 12:00

## 2022-09-12 ASSESSMENT — PAIN - FUNCTIONAL ASSESSMENT
PAIN_FUNCTIONAL_ASSESSMENT: PREVENTS OR INTERFERES SOME ACTIVE ACTIVITIES AND ADLS
PAIN_FUNCTIONAL_ASSESSMENT: 0-10
PAIN_FUNCTIONAL_ASSESSMENT: NONE - DENIES PAIN

## 2022-09-12 ASSESSMENT — PAIN DESCRIPTION - DESCRIPTORS: DESCRIPTORS: ACHING

## 2022-09-12 NOTE — H&P
Pain Pre-Op H&P Note    Toya Murphy MD    HPI: Iesha Fuentes  presents with   70-year-old man with history of chronic knee and back pain  Is diagnosed with knee arthritis had previous left knee surgery  Back pain  Majority of the pain is located in the lower lumbar area  Pain is predominantly axial  Significant radiation in leg  No associated numbness or paresthesia  No changes in bladder or bowel control    Pain has been progressively worsening over last several years  No previous lumbar spine surgical history  Have done multiple courses of physical therapy in [place] continue to do home exercises  Had recent MRI lumbar spine in 2021    Past Medical History:   Diagnosis Date    Arthritis     History of blood transfusion     AFTER KNEE SURGERY    Hx of blood clots     after one of his knee surgeries     Hyperlipidemia     Left knee pain 04/09/2014    Neuropathy     Teeth problem     abcessw    Wears glasses        Past Surgical History:   Procedure Laterality Date    BUNIONECTOMY N/A 12/15/2021    AKIN OSTEOTOMY WITH BONE STAPLE CHEILECTOMY RIGHT FOOT performed by Wally Ernst DPM at [address] Bilateral     COLONOSCOPY      DENTAL SURGERY  10/2015    DENTAL SURGERY  03/2021    EYE SURGERY      bilat cataracts     FOOT SURGERY Right 12/15/2021    AKIN OSTEOTOMY WITH BONE STAPLE CHEILECTOMY RIGHT FOOT (N/A Foot)    FOOT SURGERY Right 4/6/2022    EXCISION SOFT TISSUE MASS PLANTAR 1ST METHEAD RIGHT performed by Wally Ernst DPM at [address] Left     KNEE  X 6 pt unsure of date    JOINT REPLACEMENT Right 07/20/2018    ROTATOR CUFF REPAIR Right     TONSILLECTOMY         Family History   Problem Relation Age of Onset    Diabetes Mother     No Known Problems Father        Allergies   Allergen Reactions    Oxycontin [Oxycodone Hcl] Nausea And Vomiting         Current Outpatient Medications:     [START ON 9/16/2022] PERCOCET 5-325 MG per tablet, Take 1 tablet by mouth every 6 hours as needed for Pain for up to 30 days. , Disp: 120 tablet, Rfl: 0    gabapentin (NEURONTIN) 600 MG tablet, 1 tab  tid, Disp: 90 tablet, Rfl: 1    spironolactone (ALDACTONE) 50 MG tablet, TAKE ONE TABLET BY MOUTH DAILY, Disp: 90 tablet, Rfl: 1    diclofenac sodium (VOLTAREN) 1 % GEL, Apply 2 g topically 2 times daily, Disp: 120 g, Rfl: 2    melatonin 3 MG TABS tablet, Take 6 mg by mouth nightly as needed (insomnia), Disp: , Rfl:     Social History     Tobacco Use    Smoking status: Never    Smokeless tobacco: Never   Substance Use Topics    Alcohol use: No       Review of Systems:   Focused review of systems was performed, and negative as pertinent to diagnosis, except as stated in HPI. Physical Exam  Constitutional:       Appearance: Normal appearance. Pulmonary:      Effort: Pulmonary effort is normal.   Neurological:      Mental Status: alert. Psychiatric:         Attention and Perception: Attention and perception normal.         Mood and Affect: Mood and affect normal.   Cardiovascular:      Rate: Normal rate. ASA: 3          Mallampati: 3       Patient's current physical status, medications, medical history, and HPI have been reviewed and updated as appropriate on this date: 09/12/22    Risk/Benefit(s): The risks, benefits, alternatives, and potential complications have been discussed with the patient/family and informed consent has been obtained for the procedure/sedation. Diagnosis:   1.  Lumbosacral spondylosis without myelopathy            Plan:   BILATERAL LUMBAR DIAGNOSTIC MBNBB        Kojo Gonzales MD

## 2022-09-12 NOTE — DISCHARGE INSTRUCTIONS
Discharge Instructions following Sedation or Anesthesia:  You have  received  a sedative/anesthetic therefore, you should not consume any alcoholic beverages for minimum of 12 hours. Do not drive or operate machinery for 24 hours. Do not sign legal documents for 24 hours. Dizziness, drowsiness, and unsteadiness may occur. Rest when need to. Increase diet as tolerated. Keep up on fluids if diet allows. General Instructions:  Do not take a tub bath for 72 hours after procedure (this includes hot tubs and swimming pools). You may shower, but avoid hot water to injection site. Avoid strenuous activity TODAY especially if you experience dizziness. Remove band-aid the next day. Wash off any residual iodine   Do not use heat, heating pad, or any other heating device over the injection site for 3 days after the procedure. If you experience pain after your procedure, you may continue with your current pain medication as prescribed. (DO NOT INCREASE YOUR PAIN MEDICATION WITHOUT TALKING TO DOCTOR)  Soreness and pain at injection site is common, may use ice to reduce soreness. Please complete pain diary as instructed.      Call Annmarie Tucker at [phone] if you experience:   Fever, chills or temperature over 100    Vomiting, Headache, persistent stiff neck, nausea, blurred vision   Difficulty in urinating or unable to urinate with 8 hours   Increase in weakness, numbness or loss of function   Increased redness, swelling or drainage at the injection site

## 2022-09-12 NOTE — OP NOTE
Patient Name: Karrie Piña   YOB: 1950  Room/Bed: Room/bed info not found  Medical Record Number: [id number]  Date: 9/12/2022       Sedation/ Anesthesia Plan:   intravenous sedation   as needed. Medications Planned:   midazolam (Versed) / Fentanyl  Intravenously  as needed. Preoperative Diagnosis: Lumbar spondylosis w/o myelopathy or radiculopathy  Postoperative Diagnosis: Lumbar spondylosis w/o myelopathy or radiculopathy  Blood Loss: none    Procedure Performed:  Bilateral Lumbar Medial Branch nerve Blocks at the transverse processes of L4, L5 and sacral  ala under fluoroscopy guidance    Procedure: The Patient was seen in the preop area, chart was reviewed, informed consent was obtained. Patient was taken to procedure room and was placed in prone position. Vital signs were monitored through out the  Procedure. A time out was completed. The skin over the back was prepped and draped in sterile manner. The target point was marked at the junction of Transverse process and superior articular process at the target levels. Skin and deep tissues were anesthetized with 1 % lidocaine. A 25-gauge needlele was advanced to the target spots under fluoroscopy guidance in AP / Lateral and Oblique views. Then after negative aspiration contrast dye was injected with live fluoroscopy in AP views that showed  spread of the contrast with no epidural space and no vascular runoff or intrathecal spread. Finally 0.5 ml of treatment solution 0.5 % bupivacaine  was injected at each level. The needle was removed and a Band-Aid was placed over the needle  insertion site. The patient's vital signs remained stable and the patient tolerated the procedure well.       Electronically signed by Jeffrey Foster MD on 9/12/2022 at 12:12 PM    SEDATION NOTE:    ASA CLASSIFICATION  3  MP   CLASSIFICATION  3    Moderate intravenous conscious sedation was supervised by Dr. Soniya Chiu  The patient was independently monitored by a Registered Nurse assigned to the Procedure Room  Monitoring included automated blood pressure, continuous EKG, Capnography and continuous pulse oximetry. The detailed Conscious Record is permanently stored in the [facility].      The following is the conscious sedation record;  Start Time:  1154  End times:  1208  Duration:  14 MINUTES  MEDS GIVEN 1 MG VERSED AND 50 MCG FENTANYL

## 2022-09-14 ENCOUNTER — TELEPHONE (OUTPATIENT)
Dept: PAIN MANAGEMENT | Age: 72
End: 2022-09-14

## 2022-09-14 DIAGNOSIS — M47.817 LUMBOSACRAL SPONDYLOSIS WITHOUT MYELOPATHY: Primary | Chronic | ICD-10-CM

## 2022-09-14 NOTE — TELEPHONE ENCOUNTER
Called pt to go over MBB pain diary questions  Pain before MBB with activity - 4  Pain after MBB - 0  Patient states that he did some housework  Patient reports 100% pain relief    Please place order for 2nd MBB

## 2022-09-15 ENCOUNTER — ANESTHESIA EVENT (OUTPATIENT)
Dept: OPERATING ROOM | Age: 72
End: 2022-09-15
Payer: MEDICARE

## 2022-09-16 ENCOUNTER — ANESTHESIA (OUTPATIENT)
Dept: OPERATING ROOM | Age: 72
End: 2022-09-16
Payer: MEDICARE

## 2022-09-16 ENCOUNTER — HOSPITAL ENCOUNTER (OUTPATIENT)
Age: 72
Setting detail: OUTPATIENT SURGERY
Discharge: HOME | End: 2022-09-16
Attending: PODIATRIST | Admitting: PODIATRIST
Payer: MEDICARE

## 2022-09-16 ENCOUNTER — APPOINTMENT (OUTPATIENT)
Dept: GENERAL RADIOLOGY | Age: 72
End: 2022-09-16
Attending: PODIATRIST
Payer: MEDICARE

## 2022-09-16 ENCOUNTER — TELEPHONE (OUTPATIENT)
Dept: PAIN MANAGEMENT | Age: 72
End: 2022-09-16

## 2022-09-16 VITALS
DIASTOLIC BLOOD PRESSURE: 87 MMHG | RESPIRATION RATE: 12 BRPM | SYSTOLIC BLOOD PRESSURE: 134 MMHG | TEMPERATURE: 98.2 F | OXYGEN SATURATION: 100 % | HEART RATE: 69 BPM | WEIGHT: 210 LBS | BODY MASS INDEX: 32.96 KG/M2 | HEIGHT: 67 IN

## 2022-09-16 PROCEDURE — 3700000001 HC ADD 15 MINUTES (ANESTHESIA): Performed by: PODIATRIST

## 2022-09-16 PROCEDURE — 2580000003 HC RX 258: Performed by: ANESTHESIOLOGY

## 2022-09-16 PROCEDURE — 2500000003 HC RX 250 WO HCPCS: Performed by: NURSE ANESTHETIST, CERTIFIED REGISTERED

## 2022-09-16 PROCEDURE — 3600000002 HC SURGERY LEVEL 2 BASE: Performed by: PODIATRIST

## 2022-09-16 PROCEDURE — 3600000012 HC SURGERY LEVEL 2 ADDTL 15MIN: Performed by: PODIATRIST

## 2022-09-16 PROCEDURE — 7100000011 HC PHASE II RECOVERY - ADDTL 15 MIN: Performed by: PODIATRIST

## 2022-09-16 PROCEDURE — 6360000002 HC RX W HCPCS: Performed by: PODIATRIST

## 2022-09-16 PROCEDURE — 7100000010 HC PHASE II RECOVERY - FIRST 15 MIN: Performed by: PODIATRIST

## 2022-09-16 PROCEDURE — 2709999900 HC NON-CHARGEABLE SUPPLY: Performed by: PODIATRIST

## 2022-09-16 PROCEDURE — 28291 CORRJ HALUX RIGDUS W/IMPLT: CPT | Performed by: PODIATRIST

## 2022-09-16 PROCEDURE — 2500000003 HC RX 250 WO HCPCS: Performed by: PODIATRIST

## 2022-09-16 PROCEDURE — 28315 REMOVAL OF SESAMOID BONE: CPT | Performed by: PODIATRIST

## 2022-09-16 PROCEDURE — 73630 X-RAY EXAM OF FOOT: CPT

## 2022-09-16 PROCEDURE — 2720000010 HC SURG SUPPLY STERILE: Performed by: PODIATRIST

## 2022-09-16 PROCEDURE — 6360000002 HC RX W HCPCS: Performed by: NURSE ANESTHETIST, CERTIFIED REGISTERED

## 2022-09-16 PROCEDURE — C1776 JOINT DEVICE (IMPLANTABLE): HCPCS | Performed by: PODIATRIST

## 2022-09-16 PROCEDURE — 3700000000 HC ANESTHESIA ATTENDED CARE: Performed by: PODIATRIST

## 2022-09-16 PROCEDURE — 2580000003 HC RX 258: Performed by: NURSE ANESTHETIST, CERTIFIED REGISTERED

## 2022-09-16 DEVICE — IMPLANTABLE DEVICE
Type: IMPLANTABLE DEVICE | Site: FOOT | Status: FUNCTIONAL
Brand: SWANSON

## 2022-09-16 RX ORDER — OXYCODONE HYDROCHLORIDE 5 MG/1
2.5 TABLET ORAL
Status: DISCONTINUED | OUTPATIENT
Start: 2022-09-16 | End: 2022-09-16 | Stop reason: HOSPADM

## 2022-09-16 RX ORDER — LIDOCAINE HYDROCHLORIDE 10 MG/ML
INJECTION, SOLUTION INFILTRATION; PERINEURAL
Status: DISCONTINUED
Start: 2022-09-16 | End: 2022-09-16 | Stop reason: HOSPADM

## 2022-09-16 RX ORDER — LIDOCAINE HYDROCHLORIDE 10 MG/ML
1 INJECTION, SOLUTION EPIDURAL; INFILTRATION; INTRACAUDAL; PERINEURAL
Status: DISCONTINUED | OUTPATIENT
Start: 2022-09-17 | End: 2022-09-16 | Stop reason: HOSPADM

## 2022-09-16 RX ORDER — HYDROMORPHONE HYDROCHLORIDE 1 MG/ML
0.25 INJECTION, SOLUTION INTRAMUSCULAR; INTRAVENOUS; SUBCUTANEOUS EVERY 5 MIN PRN
Status: DISCONTINUED | OUTPATIENT
Start: 2022-09-16 | End: 2022-09-16 | Stop reason: HOSPADM

## 2022-09-16 RX ORDER — HYDROMORPHONE HYDROCHLORIDE 1 MG/ML
0.5 INJECTION, SOLUTION INTRAMUSCULAR; INTRAVENOUS; SUBCUTANEOUS EVERY 5 MIN PRN
Status: DISCONTINUED | OUTPATIENT
Start: 2022-09-16 | End: 2022-09-16 | Stop reason: HOSPADM

## 2022-09-16 RX ORDER — SODIUM CHLORIDE 0.9 % (FLUSH) 0.9 %
5-40 SYRINGE (ML) INJECTION PRN
Status: DISCONTINUED | OUTPATIENT
Start: 2022-09-16 | End: 2022-09-16 | Stop reason: HOSPADM

## 2022-09-16 RX ORDER — SODIUM CHLORIDE, SODIUM LACTATE, POTASSIUM CHLORIDE, CALCIUM CHLORIDE 600; 310; 30; 20 MG/100ML; MG/100ML; MG/100ML; MG/100ML
INJECTION, SOLUTION INTRAVENOUS CONTINUOUS PRN
Status: DISCONTINUED | OUTPATIENT
Start: 2022-09-16 | End: 2022-09-16 | Stop reason: SDUPTHER

## 2022-09-16 RX ORDER — ONDANSETRON 2 MG/ML
4 INJECTION INTRAMUSCULAR; INTRAVENOUS
Status: DISCONTINUED | OUTPATIENT
Start: 2022-09-16 | End: 2022-09-16 | Stop reason: HOSPADM

## 2022-09-16 RX ORDER — PROPOFOL 10 MG/ML
INJECTION, EMULSION INTRAVENOUS CONTINUOUS PRN
Status: DISCONTINUED | OUTPATIENT
Start: 2022-09-16 | End: 2022-09-16 | Stop reason: SDUPTHER

## 2022-09-16 RX ORDER — FENTANYL CITRATE 50 UG/ML
INJECTION, SOLUTION INTRAMUSCULAR; INTRAVENOUS PRN
Status: DISCONTINUED | OUTPATIENT
Start: 2022-09-16 | End: 2022-09-16 | Stop reason: SDUPTHER

## 2022-09-16 RX ORDER — SODIUM CHLORIDE 9 MG/ML
INJECTION, SOLUTION INTRAVENOUS CONTINUOUS
Status: DISCONTINUED | OUTPATIENT
Start: 2022-09-17 | End: 2022-09-16

## 2022-09-16 RX ORDER — SODIUM CHLORIDE, SODIUM LACTATE, POTASSIUM CHLORIDE, CALCIUM CHLORIDE 600; 310; 30; 20 MG/100ML; MG/100ML; MG/100ML; MG/100ML
INJECTION, SOLUTION INTRAVENOUS CONTINUOUS
Status: DISCONTINUED | OUTPATIENT
Start: 2022-09-17 | End: 2022-09-16 | Stop reason: HOSPADM

## 2022-09-16 RX ORDER — MIDAZOLAM HYDROCHLORIDE 1 MG/ML
INJECTION INTRAMUSCULAR; INTRAVENOUS PRN
Status: DISCONTINUED | OUTPATIENT
Start: 2022-09-16 | End: 2022-09-16 | Stop reason: SDUPTHER

## 2022-09-16 RX ORDER — SODIUM CHLORIDE 9 MG/ML
INJECTION, SOLUTION INTRAVENOUS PRN
Status: DISCONTINUED | OUTPATIENT
Start: 2022-09-16 | End: 2022-09-16 | Stop reason: HOSPADM

## 2022-09-16 RX ORDER — BUPIVACAINE HYDROCHLORIDE AND EPINEPHRINE 5; 5 MG/ML; UG/ML
INJECTION, SOLUTION EPIDURAL; INTRACAUDAL; PERINEURAL
Status: DISCONTINUED
Start: 2022-09-16 | End: 2022-09-16 | Stop reason: HOSPADM

## 2022-09-16 RX ORDER — LIDOCAINE HYDROCHLORIDE 20 MG/ML
INJECTION, SOLUTION EPIDURAL; INFILTRATION; INTRACAUDAL; PERINEURAL PRN
Status: DISCONTINUED | OUTPATIENT
Start: 2022-09-16 | End: 2022-09-16 | Stop reason: SDUPTHER

## 2022-09-16 RX ORDER — SODIUM CHLORIDE 0.9 % (FLUSH) 0.9 %
5-40 SYRINGE (ML) INJECTION EVERY 12 HOURS SCHEDULED
Status: DISCONTINUED | OUTPATIENT
Start: 2022-09-16 | End: 2022-09-16 | Stop reason: HOSPADM

## 2022-09-16 RX ADMIN — SODIUM CHLORIDE, POTASSIUM CHLORIDE, SODIUM LACTATE AND CALCIUM CHLORIDE: 600; 310; 30; 20 INJECTION, SOLUTION INTRAVENOUS at 08:50

## 2022-09-16 RX ADMIN — Medication 50 MCG: at 08:55

## 2022-09-16 RX ADMIN — Medication 50 MCG: at 09:06

## 2022-09-16 RX ADMIN — SODIUM CHLORIDE, POTASSIUM CHLORIDE, SODIUM LACTATE AND CALCIUM CHLORIDE: 600; 310; 30; 20 INJECTION, SOLUTION INTRAVENOUS at 07:47

## 2022-09-16 RX ADMIN — MIDAZOLAM 2 MG: 1 INJECTION INTRAMUSCULAR; INTRAVENOUS at 08:50

## 2022-09-16 RX ADMIN — LIDOCAINE HYDROCHLORIDE 50 MG: 20 INJECTION, SOLUTION EPIDURAL; INFILTRATION; INTRACAUDAL; PERINEURAL at 08:55

## 2022-09-16 RX ADMIN — PROPOFOL 75 MCG/KG/MIN: 10 INJECTION, EMULSION INTRAVENOUS at 08:55

## 2022-09-16 RX ADMIN — CEFAZOLIN 2000 MG: 10 INJECTION, POWDER, FOR SOLUTION INTRAVENOUS at 08:58

## 2022-09-16 ASSESSMENT — PAIN - FUNCTIONAL ASSESSMENT: PAIN_FUNCTIONAL_ASSESSMENT: 0-10

## 2022-09-16 ASSESSMENT — PAIN DESCRIPTION - DESCRIPTORS: DESCRIPTORS: ACHING

## 2022-09-16 NOTE — ANESTHESIA POSTPROCEDURE EVALUATION
Department of Anesthesiology  Postprocedure Note    Patient: John Aggarwal  MRN: [id number]  YOB: 1950  Date of evaluation: 9/16/2022      Procedure Summary     Date: 09/16/22 Room / Location: [facility] OR [address] [address]    Anesthesia Start: 2398 Anesthesia Stop: 9120    Procedure: RIGHT FOOT BUNIONECTOMY WITH IMPLANT AND RIGHT SESMOIDECTOMY TIBIA- Carrie Prime (Right: Foot) Diagnosis:       Bunion, right foot      (Bunion, right foot [M21.611])    Surgeons: Lexx Márquez DPM Responsible Provider: Reggie Canavan, MD    Anesthesia Type: MAC ASA Status: 3          Anesthesia Type: No value filed.     Pamela Phase I:      Pamela Phase II: Pamela Score: 10      Anesthesia Post Evaluation    Complications: no

## 2022-09-16 NOTE — OP NOTE
OP NOTE    PATIENT NAME: Alia Stroud DATE: 1950  -  70 y.o. male  MRN: [id number]  DATE: 9/16/2022  BILLING #: [id number]    Surgeon(s):  Ed Lopez DPM     ASSISTANTS: Darren Abdalla DPM-PGY3, Hilaria Figueroa-MS4    PRE-OP DIAGNOSIS:   Sesamoiditis, right foot tibial sesamoid  Hallux limitus, right foot   Exostosis of metatarsal head, right  Right foot pain     POST-OP DIAGNOSIS: Same as above. PROCEDURE:   Excision of tibial sesamoid, right foot   1st MPJ implant, right   Cheilectomy, right 1st metatarsal     ANESTHESIA: General with local block (10 cc of 1:1 mixture 1% lidocaine plain and 0.5% marcaine plain)    HEMOSTASIS: Pneumatic ankle tourniquet @ 250 mmHg for 62 minutes. ESTIMATED BLOOD LOSS: Less than 5cc. MATERIALS:   Implant Name Type Inv. Item Serial No.  Lot No. LRB No. Used Action   IMPLANT TOE JT SM SZ 4S FLX HNG [facility] [employer] - [id number]  IMPLANT TOE JT SM SZ 4S FLX HNG [facility] [employer]- [id number] Right 1 Implanted       INJECTABLES: None. SPECIMEN:   * No specimens in log *    COMPLICATIONS: None. FINDINGS: Tibial sesamoid removed without issue. Hallux noted to be in rectus alignment upon completion. Further details to follow in op report. INDICATIONS FOR PROCEDURE: Burak Li is a 70 y.o. male well known to Dr. Ita Parker with a CC of right foot pain and has failed conservative treatment. Dr. Ita Parker has recommended surgical treatment to which the patient is amenable. In pre-op all risks and rewards of the aforementioned procedure were discussed at length prior to the patient signing the consent form which was witnessed by a nurse and placed in the patient's chart. The right foot was marked, abx hung, labs and images reviewed, NPO status confirmed. No guarantees were given or implied.      PROCEDURE IN DETAIL: The patient was brought to the operating room and placed on the operating table in the supine position with a safety strap across the lap. A well-padded pneumatic tourniquet was applied to the right ankle. After adequate sedation was given by the anesthesia team, a local block of 10cc of 1:1 mixture of 1% lidocaine plain and 0.5% Marcaine plain was injected into the foot preoperatively. The surgical site was then scrubbed, prepped, and draped in the usual aseptic manner. A timeout was performed confirming the patient's identity, correct site, correct procedure, allergies, and preoperative antibiotics. An Esmarch bandage was then used to exsanguinate the foot and the tourniquet was inflated to 250mmHg. Attention was directed to the 1st MTPJ. A dorsal medial incision was created over the first metatarsophalangeal joint with a #15 blade. The incision was then deepened. The skin and subcutaneous tissue were then undermined off of the capsule medially. A dorsal linear capsular incision was then created over the first metatarsophalangeal joint. The periosteum and capsule were then reflected off of the first metatarsal head. There was noted to be large osteophytes and loose bone fragments dorsal to the joint and within the joint. The articular cartilage was mostly eroded. A sagital saw was used to resect the osteophytes and remodel the head of the 1st metatarsal and base of the proximal phalanx. Care was taken to preserve the extensor hallucis longus tendon. A power rasp was used to make the base of the proximal phalanx and head of the first metatarsal smooth. Attention was directed to the sesamoid apparatus. A linear incision was made medially overlying the tibial sesamoid ligaments. The incision was deepened down to the tibial sesamoid. The sesamoid was freed from all of the soft tissue attachments and passed to the back table to be sent to micro and pathology. The flexor tendons were preserved.     Attention was then directed to the head of the first metatarsal. The Warren implant guide wire was placed in the metatarsal head. The reamer was then used to cut the groove for the implant. The exact same process was performed on the proximal phalanx. The surgical site was irrigated with copious amounts of saline. Next, the appropriate sized implant was tested and confirmed the final implant was then placed within the holes and remained firmly fixed. The hallux was placed through range of motion and it was noted that there was smooth and greatly improved range of motion. The surgical site was then closed from deep to superficial in a layered fashion with suture. Upon completion of the procedure, the incision was dressed with adaptic and covered with sterile compressive dressing such as 4x4s, kerlix, and ACE. The tourniquet was then deflated and immediate hyperemia returned to all digits. The patient tolerated the procedure well and was transferred to the PACU with all vital signs stable and vascular status intact to the foot and ankle. Following a period of postoperative monitoring, the patient was discharged to home and given instructions and prescriptions which were discussed prior to the surgery. Patient will be protected WB. Instructed to keep dressing C/D/I. Patient to follow up with Dr. Otf Cook.      Electronically signed by Bertha Lomeli DPM on 9/18/2022 at 7:39 PM

## 2022-09-16 NOTE — BRIEF OP NOTE
BRIEF OP NOTE    PATIENT NAME: Anna Still  YOB: 1950  -  70 y.o. male  MRN: [id number]  DATE: 9/16/2022  BILLING #: [id number]    Surgeon(s):  Nahun Regan DPM     ASSISTANTS: Kat Tavares DPM-PGY3, Hilaria Figueroa-MS4    PRE-OP DIAGNOSIS:   Sesamoiditis, right foot tibial sesamoid  Hallux limitus, right foot   Exostosis of metatarsal head, right  Right foot pain     POST-OP DIAGNOSIS: Same as above. PROCEDURE:   Excision of tibial sesamoid, right foot   1st MPJ implant, right   Cheilectomy, right 1st metatarsal     ANESTHESIA: General with local block (10 cc of 1:1 mixture 1% lidocaine plain and 0.5% marcaine plain)    HEMOSTASIS: Pneumatic ankle tourniquet @ 250 mmHg for 62 minutes. ESTIMATED BLOOD LOSS: Less than 5cc. MATERIALS:   Implant Name Type Inv. Item Serial No.  Lot No. LRB No. Used Action   IMPLANT TOE JT SM SZ 4S FLX [facility]NG [facility] - [id number]  IMPLANT TOE JT SM SZ 4S FLX HNG [facility] [employer]- [id number] Right 1 Implanted       INJECTABLES: None. SPECIMEN:   * No specimens in log *    COMPLICATIONS: None. FINDINGS: Tibial sesamoid removed without issue. Hallux noted to be in rectus alignment upon completion. Further details to follow in op report. The patient was counseled at length about the risks of elin Covid-19 during their perioperative period and any recovery window from their procedure. The patient was made aware that elin Covid-19  may worsen their prognosis for recovering from their procedure  and lend to a higher morbidity and/or mortality risk. All material risks, benefits, and reasonable alternatives including postponing the procedure were discussed. The patient does wish to proceed with the procedure at this time.     Kat Tavares DPM   Podiatric Medicine & Surgery   9/16/2022 at 10:19 AM

## 2022-09-16 NOTE — TELEPHONE ENCOUNTER
Pt calls the office stating he had surgery today on his foot, requesting to take Percocet every 4 hours for the next few days for the pain.  Let pt know I would get a message to NP

## 2022-09-16 NOTE — DISCHARGE INSTRUCTIONS
Podiatric Post Operative Instructions: You have had a surgical procedure on your right foot. Fluids and Diet:  Begin with clear liquids, broth, dry toast, and crackers. If not nauseated then resume your regular pre-operative diet when you are ready    Medications: Take your prescriptions as directed  Continue taking your prescription for Percocet. If your pain is not severe then you may take the non-prescription medication that you normally take for aches and pains  You may resume your regularly scheduled medications (unless otherwise directed)  If any side effects or adverse reactions occur, discontinue the medication and contact your doctor. Review the patient drug information that is provided before you take any medication    Ambulation and Activity:  You are advised to go directly home from the hospital  Use splint/brace as needed  You may put PROTECTED weight on the operated foot. You should wear the surgical shoe at all times when awake. Avoid stairs if possible. Do not lift or move heavy objects  Do not drive until cleared by your physician    Bandage and Wound Care Instructions:  Keep bandage clean and dry  Do not shower or bathe the operative extremity  Do not remove the bandage (unless otherwise directed)   Do not attempt to put anything between the cast or dressing and your skin, some itching is normal.    Ice and Elevation:  Elevate operative extremity as much as possible to reduce swelling and discomfort. Elevate with 2 pillows at or above the level of the heart for the first 72 hours. Ice:  [facility] dispensed insulated ice bag over the bandage 20 minutes of every hour while awake for the first 72 hours. You may ice behind the knee as well. Special Instructions: Call your doctor immediately if you develop any of the following. Fever over 100.4 degrees Fahrenheit by mouth - take your temperature daily until your first follow up visit.   Pain not relieved by medication ordered  Swelling, increased redness, warmth, or hardness around operative area. Numb, tingling or cold toes. Toe(s) become white or bluish  Bandage becomes wet, soiled, or blood soaked (small amount of bleeding may be normal)  Increased or progressive drainage from surgical area. Follow up instructions: You will need to follow up with Dr. Governor Tiffany DPM   Call when you get home to schedule or confirm your appointment. Call your Podiatrist office if you have any questions or concerns.

## 2022-09-16 NOTE — H&P
History and Physical Update    Pt Name: Iesha Fuentes  MRN: [id number]  YOB: 1950  Date of evaluation: 9/16/2022      [x] I have reviewed the progress note found in Epic by Dr. Sudha Zaldivar dated 8/18/22 used for an Interval History and Physical note. [x] I have examined Iesha Fuentes a 70 y.o., male who is scheduled for a RIGHT FOOT BUNIONECTOMY WITH IMPLANT AND RIGHT SESMOIDECTOMY TIBIA- [facility]   GAY by Dr. Sam Jimenez, DP due to Bunion, right foot [M21.611]. The patient denies health changes since his appointment with Dr. Sudha Zaldivar on 8/18/22. Pt denies fever, chills, productive cough, SOB, chest pain, open sores, rashes, wounds, and history of diabetes. Pt denies taking blood thinning medication in the past 10 days. Pt denies chest pain, dyspnea, palpitations, and history of an irregular heartbeat. Irregular heartbeat noted during physical examination today. Telemetry strip done in Pre-op revealed PVCs. Please see the telemetry strip in the pt's paper chart. Dr. Obi Akers reviewed the telemetry strip and stated an EKG was not needed. No further orders received from Dr. Obi Akers. Pt was instructed to follow-up with his PCP, Dr. Evgeny Guzman, as soon as possible regarding the irregular heartbeat. Pt voiced understanding to this instruction.      EKG 3/22/22  Narrative & Impression     Poor data quality, interpretation may be adversely affected  Normal sinus rhythm  Normal ECG  When compared with ECG of 07-DEC-2021 15:12,  No significant change was found      Specimen Collected: 03/22/22 11:28 EDT          Vital signs: /79   Pulse 78   Temp 97.1 °F (36.2 °C) (Temporal)   Resp 16   Ht 5' 7\" (1.702 m)   Wt 210 lb (95.3 kg)   SpO2 93%   BMI 32.89 kg/m²      Allergies:  Oxycontin [oxycodone hcl]      Past Medical History:     Past Medical History:   Diagnosis Date    Arthritis     History of blood transfusion     AFTER KNEE SURGERY    Hx of blood clots     after one of his knee surgeries Hyperlipidemia     Left knee pain 04/09/2014    Neuropathy     Teeth problem     abcessw    Wears glasses         Past Surgical History:     Past Surgical History:   Procedure Laterality Date    BUNIONECTOMY N/A 12/15/2021    AKIN OSTEOTOMY WITH BONE STAPLE CHEILECTOMY RIGHT FOOT performed by Sumit Wylie DPM at [address] Bilateral     COLONOSCOPY      DENTAL SURGERY  10/2015    DENTAL SURGERY  03/2021    EYE SURGERY      bilat cataracts     FOOT SURGERY Right 12/15/2021    AKIN OSTEOTOMY WITH BONE STAPLE CHEILECTOMY RIGHT FOOT (N/A Foot)    FOOT SURGERY Right 4/6/2022    EXCISION SOFT TISSUE MASS PLANTAR 1ST METHEAD RIGHT performed by Sumit Wylie DPM at [address] [facility] Left     KNEE  X 6 pt unsure of date    JOINT REPLACEMENT Right 07/20/2018    ROTATOR CUFF REPAIR Right     TONSILLECTOMY          Social History:     Tobacco:    reports that he has never smoked. He has never used smokeless tobacco.  Alcohol:      reports no history of alcohol use. Drug Use:  reports no history of drug use. Family History:     Family History   Problem Relation Age of Onset    Diabetes Mother     No Known Problems Father         Medications:    Prior to Admission medications    Medication Sig Start Date End Date Taking? Authorizing Provider   PERCOCET 5-325 MG per tablet Take 1 tablet by mouth every 6 hours as needed for Pain for up to 30 days. 9/16/22 10/16/22  QUIQUE Sandoval - CNP   gabapentin (NEURONTIN) 600 MG tablet 1 tab  tid 8/11/22 10/28/22  Yuko Isabel MD   spironolactone (ALDACTONE) 50 MG tablet TAKE ONE TABLET BY MOUTH DAILY 4/29/22   Yahaira Cat MD   diclofenac sodium (VOLTAREN) 1 % GEL Apply 2 g topically 2 times daily 2/23/22   QUIQUE Kuhn - CNP   melatonin 3 MG TABS tablet Take 6 mg by mouth nightly as needed (insomnia)    Historical Provider, MD       Physical Exam:     General Appearance:  Alert, well appearing, and in no acute distress. Obese. Physician (Pain Management)  Adam Poole MD as Consulting Physician  Cari Benson DPM as Physician (Podiatry)         Loan Monterroso 70 y.o. male that presents for follow-up of        Chief Complaint   Patient presents with    Foot Pain       Right foot pain- 2 wk post injection      Pt's primary care physician is Mal Aschoff, MD last seen 05/10/2022  Symptoms began 2 year(s) ago and are unchanged . Patient relates pain is Present. Pain is rated 7 out of 10 and is described as constant, severe. Treatments prior to today's visit include: previous injection, previous surgery from another physician . Currently denies F/C/N/V. Allergies   Allergen Reactions    Oxycontin [Oxycodone Hcl] Nausea Only         Past Medical History        Past Medical History:   Diagnosis Date    Arthritis      History of blood transfusion      Hx of blood clots       after one of his knee surgeries     Hyperlipidemia      Left knee pain 4/9/2014    Neuropathy      Teeth problem       abcessw    Wears glasses              Home Medications           Prior to Admission medications    Medication Sig Start Date End Date Taking? Authorizing Provider   PERCOCET 5-325 MG per tablet Take 1 tablet by mouth every 6 hours as needed for Pain for up to 30 days.  8/14/22 9/13/22 Yes Zuleyka Mi MD   gabapentin (NEURONTIN) 600 MG tablet 1 tab  tid 8/11/22 10/28/22 Yes Zuleyka Mi MD   spironolactone (ALDACTONE) 50 MG tablet TAKE ONE TABLET BY MOUTH DAILY 4/29/22   Yes Mal Aschoff, MD   diclofenac sodium (VOLTAREN) 1 % GEL Apply 2 g topically 2 times daily 2/23/22   Yes QUIQUE Cee - CNP   divalproex (DEPAKOTE) 125 MG DR tablet TAKE ONE TABLET BY MOUTH TWICE A DAY 10/25/21   Yes Mal Aschoff, MD   melatonin 3 MG TABS tablet Take 6 mg by mouth nightly as needed (insomnia)     Yes Historical Provider, MD            Review of Systems     Review of Systems:  History obtained from chart review and the patient  General ROS: negative for - chills, fatigue, fever, night sweats or weight gain  Constitutional: Negative for chills, diaphoresis, fatigue, fever and unexpected weight change. Musculoskeletal: Positive for arthralgias, gait problem and joint swelling. Neurological ROS: negative for - behavioral changes, confusion, headaches or seizures. Negative for weakness and numbness. Dermatological ROS: negative for - mole changes, rash  Cardiovascular: Negative for leg swelling. Gastrointestinal: Negative for constipation, diarrhea, nausea and vomiting. Lower Extremity Physical Examination:      Vitals: There were no vitals filed for this visit. General: AAO x 3 in NAD. Dermatologic Exam:  Skin lesion/ulceration Absent . Skin No rashes or nodules noted. .         Musculoskeletal:     1st MPJ ROM decreased, Bilateral.  Muscle strength 5/5, Bilateral.  Pain present upon palpation of right great toe plantarly to the tibial sesamoid crepitus noted to the 1st MTPJ with pain on sagittal plain ROM . Medial longitudinal arch, Bilateral WNL. Ankle ROM WNL,Bilateral.    Dorsally contracted digits absent digits 1-5 Bilateral.      Vascular: DP and PT pulses palpable 2/4, Bilateral.  CFT <3 seconds, Bilateral.  Hair growth present to the level of the digits, Bilateral.  Edema absent, Bilateral.  Varicosities absent, Bilateral. Erythema absent, Bilateral     Neurological: Sensation intact to light touch to level of digits, Bilateral.  Protective sensation intact 10/10 sites via 5.07/10g Phoenix-Kristen Monofilament, Bilateral.  negative Tinel's, Bilateral.  negative Valleix sign, Bilateral.       Integument: Warm, dry, supple, Bilateral.  Open lesion absent, Bilateral.  Interdigital maceration absent to web spaces 1-4, Bilateral.  Nails are normal in length, thickness and color 1-5 bilateral.  Fissures absent, Bilateral.            Asessment: Patient is a 70 y.o. male with:     Diagnosis Orders   1.  Right foot pain 2. Primary osteoarthritis of right foot          3. Sesamoiditis of right foot                Plan: Patient examined and evaluated. Current condition and treatment options discussed in detail. Advised pt to his conditon and reviewed x rays and MRI     With the injection only working for 8 hrs I feel the local anestheia did better then the steroid injection     All labs were reviewed and all imagining including the above findings were reviewed prior to the patients arrival and with the patient today      Time was spent educating the patient and their families/caregivers on proper care of the feet and ankles. All the above diagnosis were addressed at todays visit and all questions were answered. I had a long discussion today with the patient about the likely diagnosis and its natural history, physical exam and imaging findings, as well as treatment options. We discussed both surgical and nonsurgical treatments, including risks and benefits. From a nonoperative standpoint, we discussed rest/activity modification, NSAIDs/Acetaminophen/topical anesthetics, orthotics, bracing/immobilization, and physical therapy. Surgically, 1st MTPJ implant with tibial sesamoidectomy         I discussed in detail the procedure. He/She was in full agreement and understood risks. The reason for surgery is due to unsuccessful non-operative treatment and/or conservative treatment is not a viable option. It was discussed with the patient that compliance postoperatively is of utmost importance. Any deviation on behalf of the patient will decrease the chances of a successful outcome. Patient acknowledged, understands, and would like to move forward with surgery as discussed. The patient was given a consent outlining the general risk of surgery as well as the specifics to the surgical plan. This was carefully discussed giving all options, indications and contraindications regarding the procedure outlined in the consent.  All questions were answered to the patient's satisfaction. The patient signed the consent form confirming complete understanding and acceptance of the risks of stated. I specifically stated and inquired if the patient understands and accepts the risks of surgery including infection, failure, prolonged pain, swelling, numbness, recurrence, limited mobility,painful scar, RSDS, overcorrection, under-correction, and loss of limb/life. Death, bleeding, blood clots in the veins or lungs, tendon or blood vessel disturbance, bony conditions, continued pain,stiffness, weakness, and limited function. These were all listed on the consent. Additionally, the postoperative course and treatment was outlined for the patient. Discussion consisted of postoperatively the patient needs to keep the foot elevated for at least the first initial two weeks. I have encouraged movements such as moving from the bed to the sofa or recliner, to the kitchen and the bathroom; quick bursts of movement with the foot elevated. Longstanding periods of time such as cooking, cleaning, and shopping are not permitted. I reminded the patient that there are only two reasons to have surgery. That being, if their function is impaired and also if they are having pain. If they can answer yes to both these questions, I will move forward with surgery. If they can not, there is no reason to proceed with surgical intervention. Pt does elect to have the procedure above     A total of 30 minutes was spent with this patients encounter    Verbal and written instructions given to patient. Contact office with any questions/problems/concerns. No orders of the defined types were placed in this encounter. Encounter Medications    No orders of the defined types were placed in this encounter. RTC in 10week(s).     8/18/2022        Electronically signed by Lexx Márquez DPM on 8/18/2022 at 10:33 AM  8/18/2022

## 2022-09-16 NOTE — ANESTHESIA PRE PROCEDURE
Department of Anesthesiology  Preprocedure Note       Name:  Micaela Watson   Age:  70 y.o.  :  1950                                          MRN:  [id number]         Date:  2022      Surgeon: Meri Moraes):  Eduardo Sevilla DPM    Procedure: Procedure(s):  RIGHT FOOT BUNIONECTOMY WITH IMPLANT AND RIGHT SESMOIDECTOMY TIBIA- PETERSON   GAY    Medications prior to admission:   Prior to Admission medications    Medication Sig Start Date End Date Taking? Authorizing Provider   PERCOCET 5-325 MG per tablet Take 1 tablet by mouth every 6 hours as needed for Pain for up to 30 days. 9/16/22 10/16/22  David Gallo APRN - CNP   gabapentin (NEURONTIN) 600 MG tablet 1 tab  tid 8/11/22 10/28/22  Chilton Prader, MD   spironolactone (ALDACTONE) 50 MG tablet TAKE ONE TABLET BY MOUTH DAILY 22   Mauricia Apley, MD   diclofenac sodium (VOLTAREN) 1 % GEL Apply 2 g topically 2 times daily 22   Erica Powers APRN - CNP   melatonin 3 MG TABS tablet Take 6 mg by mouth nightly as needed (insomnia)    Historical Provider, MD       Current medications:    Current Facility-Administered Medications   Medication Dose Route Frequency Provider Last Rate Last Admin    [START ON 2022] lidocaine PF 1 % injection 1 mL  1 mL IntraDERmal Once PRN Clementina Notch, DO        [START ON 2022] lactated ringers infusion   IntraVENous Continuous Clementina Notch,  mL/hr at 22 0747 New Bag at 22 0747    sodium chloride flush 0.9 % injection 5-40 mL  5-40 mL IntraVENous 2 times per day Aramis Evans DO        sodium chloride flush 0.9 % injection 5-40 mL  5-40 mL IntraVENous PRN Clementina Notch, DO        0.9 % sodium chloride infusion   IntraVENous PRN Clementina Notch, DO        lidocaine 1 % injection             bupivacaine-EPINEPHrine PF (MARCAINE-w/EPINEPHRINE) 0.5% -[id number] injection                Allergies:     Allergies   Allergen Reactions    Oxycontin [Oxycodone Hcl] Nausea And Vomiting       Problem List:    Patient Active Problem List   Diagnosis Code    Primary osteoarthritis of both knees M17.0    Chronic use of opiate drug for therapeutic purpose Z79.891    Pre-diabetes R73.03    Dyslipidemia E78.5    Essential hypertension I10    Psychophysiological insomnia F51.04    Fibromyalgia M79.7    Mood disorder (HCC) F39    Stage 3 chronic kidney disease ([facility] [address] N18.30    Lumbosacral spondylosis without myelopathy M47.817       Past Medical History:        Diagnosis Date    Arthritis     History of blood transfusion     AFTER KNEE SURGERY    Hx of blood clots     after one of his knee surgeries     Hyperlipidemia     Left knee pain 04/09/2014    Neuropathy     Teeth problem     abcessw    Wears glasses        Past Surgical History:        Procedure Laterality Date    BUNIONECTOMY N/A 12/15/2021    AKIN OSTEOTOMY WITH BONE STAPLE CHEILECTOMY RIGHT FOOT performed by Balta Cardoso DPM at [address] Bilateral     COLONOSCOPY     [facility]  10/2015    DENTAL SURGERY  03/2021    EYE SURGERY      bilat cataracts     FOOT SURGERY Right 12/15/2021    AKIN OSTEOTOMY WITH BONE STAPLE CHEILECTOMY RIGHT FOOT (N/A Foot)    FOOT SURGERY Right 4/6/2022    EXCISION SOFT TISSUE MASS PLANTAR 1ST METHEAD RIGHT performed by Balta [facility] DPM [facility] Left     KNEE  X 6 pt unsure of date    JOINT REPLACEMENT Right 07/20/2018    ROTATOR CUFF REPAIR Right     TONSILLECTOMY         Social History:    Social History     Tobacco Use    Smoking status: Never    Smokeless tobacco: Never   Substance Use Topics    Alcohol use:  No                                Counseling given: Not Answered      Vital Signs (Current):   Vitals:    09/16/22 0732 09/16/22 0732   BP:  125/79   Pulse:  78   Resp:  16   Temp:  97.1 °F (36.2 °C)   TempSrc:  Temporal   SpO2:  93%   Weight: 210 lb (95.3 kg)    Height: 5' 7\" (1.702 m) BP Readings from Last 3 Encounters:   09/16/22 125/79   09/12/22 124/89   09/06/22 122/77       NPO Status: Time of last liquid consumption: 2200                        Time of last solid consumption: 2200                        Date of last liquid consumption: 09/15/22                        Date of last solid food consumption: 09/15/22    BMI:   Wt Readings from Last 3 Encounters:   09/16/22 210 lb (95.3 kg)   09/12/22 213 lb (96.6 kg)   09/06/22 213 lb 13.5 oz (97 kg)     Body mass index is 32.89 kg/m². CBC:   Lab Results   Component Value Date/Time    WBC 3.9 09/06/2022 10:41 AM    RBC 4.93 09/06/2022 10:41 AM    RBC 4.10 11/28/2011 07:46 AM    HGB 13.7 09/06/2022 10:41 AM    HCT 44.7 09/06/2022 10:41 AM    MCV 90.7 09/06/2022 10:41 AM    RDW 13.2 09/06/2022 10:41 AM     09/06/2022 10:41 AM     11/28/2011 07:46 AM       CMP:   Lab Results   Component Value Date/Time     09/06/2022 10:41 AM    K 4.6 09/06/2022 10:41 AM     09/06/2022 10:41 AM    CO2 27 09/06/2022 10:41 AM    BUN 11 09/06/2022 10:41 AM    CREATININE 1.24 09/06/2022 10:41 AM    GFRAA >60 09/06/2022 10:41 AM    LABGLOM 57 09/06/2022 10:41 AM    GLUCOSE 89 09/06/2022 10:41 AM    GLUCOSE 115 11/28/2011 07:46 AM    PROT 7.3 05/10/2022 10:51 AM    CALCIUM 9.6 09/06/2022 10:41 AM    BILITOT 0.52 05/10/2022 10:51 AM    ALKPHOS 74 05/10/2022 10:51 AM    AST 18 05/10/2022 10:51 AM    ALT 12 05/10/2022 10:51 AM       POC Tests: No results for input(s): POCGLU, POCNA, POCK, POCCL, POCBUN, POCHEMO, POCHCT in the last 72 hours.     Coags: No results found for: PROTIME, INR, APTT    HCG (If Applicable): No results found for: PREGTESTUR, PREGSERUM, HCG, HCGQUANT     ABGs: No results found for: PHART, PO2ART, VBF6WHN, LML6SWZ, BEART, X0DYTBNY     Type & Screen (If Applicable):  No results found for: LABABO, LABRH    Drug/Infectious Status (If Applicable):  Lab Results   Component Value Date/Time    HEPCAB NONREACTIVE 08/11/2020 11:15 AM       COVID-19 Screening (If Applicable): No results found for: COVID19        Anesthesia Evaluation    Airway: Mallampati: I  TM distance: >3 FB   Neck ROM: full  Mouth opening: > = 3 FB   Dental:          Pulmonary:                              Cardiovascular:                      Neuro/Psych:               GI/Hepatic/Renal:   (+) renal disease: CRI,           Endo/Other:                     Abdominal:             Vascular:           Other Findings:           Anesthesia Plan      MAC     ASA 3                                   Reggie Canavan, MD   9/16/2022

## 2022-09-19 PROBLEM — M25.871 SESAMOIDITIS OF RIGHT FOOT: Status: ACTIVE | Noted: 2022-09-19

## 2022-09-19 PROBLEM — M20.21 ACQUIRED HALLUX RIGIDUS, RIGHT: Status: ACTIVE | Noted: 2022-09-19

## 2022-09-19 PROBLEM — M79.671 RIGHT FOOT PAIN: Status: ACTIVE | Noted: 2022-09-19

## 2022-09-20 ENCOUNTER — TELEPHONE (OUTPATIENT)
Dept: PAIN MANAGEMENT | Age: 72
End: 2022-09-20

## 2022-09-20 RX ORDER — OXYCODONE HYDROCHLORIDE AND ACETAMINOPHEN 5; 325 MG/1; MG/1
1 TABLET ORAL EVERY 6 HOURS PRN
Qty: 28 TABLET | Refills: 0 | Status: SHIPPED | OUTPATIENT
Start: 2022-09-20 | End: 2022-09-27

## 2022-09-20 NOTE — TELEPHONE ENCOUNTER
I returned patient's call; spoke with him. Patient wanted to know if it is ok if he takes Gabapentin which we prescribe along with Percocet the surgeon is prescribing. I stated it is fine. Patient is aware to continue with surgeon's meds until he is released from his care.

## 2022-09-22 ENCOUNTER — OFFICE VISIT (OUTPATIENT)
Dept: PODIATRY | Age: 72
End: 2022-09-22

## 2022-09-22 VITALS — BODY MASS INDEX: 34.53 KG/M2 | WEIGHT: 220 LBS | HEIGHT: 67 IN

## 2022-09-22 DIAGNOSIS — Z98.890 POSTOPERATIVE STATE: Primary | ICD-10-CM

## 2022-09-22 PROCEDURE — 99024 POSTOP FOLLOW-UP VISIT: CPT | Performed by: PODIATRIST

## 2022-09-22 RX ORDER — OXYCODONE HYDROCHLORIDE AND ACETAMINOPHEN 5; 325 MG/1; MG/1
1 TABLET ORAL EVERY 6 HOURS PRN
Qty: 28 TABLET | Refills: 0 | Status: SHIPPED | OUTPATIENT
Start: 2022-09-22 | End: 2022-09-29

## 2022-09-22 NOTE — PROGRESS NOTES
[address] [facility] PODIATRY [facility]  [address] [place] [address]  Dept: [phone]  Dept Fax: [phone]    POST-OP PROGRESS NOTE  Date of patient's visit: 9/22/2022  Patient's Name:  Fredis Jin YOB: 1950            Patient Care Team:  Herb Rogers MD as PCP - General (Family Medicine)  Herb Rogers MD as PCP - [facility]  Lissette Call MD as Consulting Physician (Pain Management)  Niko Bryant MD as Consulting Physician  Luis Alberto Coffman DPM as Physician (Podiatry)        Chief Complaint   Patient presents with    Post-Op Check     Rtc 1 week - RIGHT FOOT BUNIONECTOMY WITH IMPLANT AND RIGHT SESMOIDECTOMY TIBIA- Myrtice Humacao           Subjective: Fredis Jin is a 70 y.o. male who presents to the office today 1week(s)  S/P RIGHT FOOT BUNIONECTOMY WITH IMPLANT AND RIGHT SESMOIDECTOMY TIBIA- Myrtice Humacao for correction of right foot pain and discomfort. Problem List Items Addressed This Visit    None  Visit Diagnoses       Postoperative state    -  Primary        Patient relates pain is Present and stayed the same. Pain is rated 8 out of 10 and is described as constant, mild, moderate. Currently denies F/C/N/V. Is patient taking pain medications as prescribed and is controlling pain yes, prn needs med refill today. Physical Examination:  Incision is coapted, sutures/steri-strips are intact. Minimal bleeding post operatively. Edema present. No erythema. No Pus. Operative correction is satisfactory. Radiographs: next visit      Assessment: Fredis Jin is status post as above  Normal post operative course. Doing well          ICD-10-CM    1. Postoperative state  Z98.890             Plan:  Patient examined and evaluated. Current condition and treatment options discussed in detail. Advised pt to his conditon.   Pt to continue to say off of the foot but is able to move the toe in the up and down direction      New dsd applied pt to wear surgical sheo at all times when walking . Verbal and written instructions given to patient. Orders: No orders of the defined types were placed in this encounter. Contact office with any questions/problems/concerns.   RTC in 2week(s) for suture removal .     Electronically signed by Luis Alberto Coffman DPM on 9/22/2022 at 11:03 AM  9/22/2022

## 2022-10-04 ENCOUNTER — OFFICE VISIT (OUTPATIENT)
Dept: PODIATRY | Age: 72
End: 2022-10-04

## 2022-10-04 VITALS — BODY MASS INDEX: 34.53 KG/M2 | WEIGHT: 220 LBS | HEIGHT: 67 IN

## 2022-10-04 DIAGNOSIS — Z98.890 POSTOPERATIVE STATE: Primary | ICD-10-CM

## 2022-10-04 PROCEDURE — 99024 POSTOP FOLLOW-UP VISIT: CPT | Performed by: PODIATRIST

## 2022-10-04 NOTE — PROGRESS NOTES
[address] [facility] PODIATRY [facility]  [address] [place] [address]  Dept: [phone]  Dept Fax: [phone]    POST-OP PROGRESS NOTE  Date of patient's visit: 10/4/2022  Patient's Name:  Rodrigo Juarez YOB: 1950            Patient Care Team:  Nitin Oseguera MD as PCP - General (Family Medicine)  Nitin Oseguera MD as PCP - [facility] Provider  Faustino Wylie MD as Consulting Physician (Pain Management)  Blane Burkitt, MD as Consulting Physician  Maldonado Rosenthal DPM as Physician (Podiatry)        Chief Complaint   Patient presents with    Post-Op Check     Rtc 3 weeks - RIGHT FOOT BUNIONECTOMY WITH IMPLANT AND RIGHT SESMOIDECTOMY TIBIA- PETERSON   GAY    Suture / Staple Removal     Right foot         Subjective: Rodrigo Juarez is a 70 y.o. male who presents to the office today 3 week(s)  S/P RIGHT FOOT BUNIONECTOMY WITH IMPLANT AND RIGHT SESMOIDECTOMY TIBIA- Zelphia Franks for correction of right foot pain and discomfort. Problem List Items Addressed This Visit    None  Visit Diagnoses       Postoperative state    -  Primary        Patient relates pain is Present and improved. Pain is rated 3 out of 10 and is described as constant, mild, moderate. Currently denies F/C/N/V. Is patient taking pain medications as prescribed and is controlling pain yes, prn    Physical Examination:  Incision is coapted, sutures/steri-strips are intact. Minimal bleeding post operatively. Edema present. No erythema. No Pus. Operative correction is satisfactory. Radiographs: none       Assessment: Duane Winfred Bushman is status post as above  Normal post operative course. Doing well          ICD-10-CM    1. Postoperative state  Z98.890             Plan:  Patient examined and evaluated. Current condition and treatment options discussed in detail. Advised pt to his condition.   Patient presents for suture removal. The wound is well healed without signs of infection. The sutures are removed and the steri strips applied followed by DSD consisting of 4x4, Kerlix and Ace Wrap. . Wound care and activity instructions given. .  .  Verbal and written instructions given to patient. Orders: No orders of the defined types were placed in this encounter. Contact office with any questions/problems/concerns. RTC in 3week(s).      Electronically signed by Sony Fallon DPM on 10/4/2022 at 10:58 AM  10/4/2022

## 2022-10-04 NOTE — PATIENT INSTRUCTIONS
Schedule a Vaccine  When you qualify to receive the vaccine, call the [facility]) COVID-19 Vaccination Hotline to schedule your appointment or to get additional information about the [facility]) locations which are offering the COVID-19 vaccine. To be 94% effective, it's important that you receive two doses of one of the COVID-19 vaccines. -If you are receiving the Finch Peter vaccine, your second shot will be scheduled as close to 21 days after the first shot as possible. -If you are receiving the Moderna vaccine, your second shot will be scheduled as close to 28 days after the first shot as possible. [facility] COVID-19 Vaccination Hotline: [phone]    Links to [facility]) website and [facility] website:    [email or web address]    [email or web address]

## 2022-10-06 ENCOUNTER — OFFICE VISIT (OUTPATIENT)
Dept: INTERNAL MEDICINE CLINIC | Age: 72
End: 2022-10-06
Payer: MEDICARE

## 2022-10-06 ENCOUNTER — HOSPITAL ENCOUNTER (OUTPATIENT)
Age: 72
Setting detail: SPECIMEN
Discharge: HOME | End: 2022-10-06

## 2022-10-06 VITALS
BODY MASS INDEX: 32.96 KG/M2 | DIASTOLIC BLOOD PRESSURE: 80 MMHG | RESPIRATION RATE: 16 BRPM | TEMPERATURE: 97 F | OXYGEN SATURATION: 98 % | SYSTOLIC BLOOD PRESSURE: 100 MMHG | HEIGHT: 67 IN | HEART RATE: 75 BPM | WEIGHT: 210 LBS

## 2022-10-06 DIAGNOSIS — I10 ESSENTIAL HYPERTENSION: ICD-10-CM

## 2022-10-06 DIAGNOSIS — M47.817 LUMBOSACRAL SPONDYLOSIS WITHOUT MYELOPATHY: Chronic | ICD-10-CM

## 2022-10-06 DIAGNOSIS — E78.5 DYSLIPIDEMIA: ICD-10-CM

## 2022-10-06 DIAGNOSIS — F51.04 PSYCHOPHYSIOLOGICAL INSOMNIA: ICD-10-CM

## 2022-10-06 DIAGNOSIS — M20.21 ACQUIRED HALLUX RIGIDUS, RIGHT: Primary | ICD-10-CM

## 2022-10-06 DIAGNOSIS — M79.7 FIBROMYALGIA: ICD-10-CM

## 2022-10-06 DIAGNOSIS — N18.30 STAGE 3 CHRONIC KIDNEY DISEASE, UNSPECIFIED WHETHER STAGE 3A OR 3B CKD (HCC): ICD-10-CM

## 2022-10-06 DIAGNOSIS — F39 MOOD DISORDER (HCC): ICD-10-CM

## 2022-10-06 DIAGNOSIS — R73.03 PRE-DIABETES: ICD-10-CM

## 2022-10-06 DIAGNOSIS — M17.0 PRIMARY OSTEOARTHRITIS OF BOTH KNEES: ICD-10-CM

## 2022-10-06 DIAGNOSIS — Z79.891 CHRONIC USE OF OPIATE DRUG FOR THERAPEUTIC PURPOSE: ICD-10-CM

## 2022-10-06 PROBLEM — M79.671 RIGHT FOOT PAIN: Status: RESOLVED | Noted: 2022-09-19 | Resolved: 2022-10-06

## 2022-10-06 PROBLEM — M25.871 SESAMOIDITIS OF RIGHT FOOT: Status: RESOLVED | Noted: 2022-09-19 | Resolved: 2022-10-06

## 2022-10-06 LAB
ANION GAP SERPL CALCULATED.3IONS-SCNC: 19 MMOL/L (ref 9–17)
BUN BLDV-MCNC: 10 MG/DL (ref 8–23)
CALCIUM SERPL-MCNC: 9.2 MG/DL (ref 8.6–10.4)
CHLORIDE BLD-SCNC: 101 MMOL/L (ref 98–107)
CO2: 21 MMOL/L (ref 20–31)
CREAT SERPL-MCNC: 1.1 MG/DL (ref 0.7–1.2)
GFR SERPL CREATININE-BSD FRML MDRD: >60 ML/MIN/1.73M2
GLUCOSE BLD-MCNC: 70 MG/DL (ref 70–99)
POTASSIUM SERPL-SCNC: 4.2 MMOL/L (ref 3.7–5.3)
SODIUM BLD-SCNC: 141 MMOL/L (ref 135–144)

## 2022-10-06 PROCEDURE — 1123F ACP DISCUSS/DSCN MKR DOCD: CPT | Performed by: FAMILY MEDICINE

## 2022-10-06 PROCEDURE — 99214 OFFICE O/P EST MOD 30 MIN: CPT | Performed by: FAMILY MEDICINE

## 2022-10-06 SDOH — ECONOMIC STABILITY: FOOD INSECURITY: WITHIN THE PAST 12 MONTHS, YOU WORRIED THAT YOUR FOOD WOULD RUN OUT BEFORE YOU GOT MONEY TO BUY MORE.: NEVER TRUE

## 2022-10-06 SDOH — ECONOMIC STABILITY: FOOD INSECURITY: WITHIN THE PAST 12 MONTHS, THE FOOD YOU BOUGHT JUST DIDN'T LAST AND YOU DIDN'T HAVE MONEY TO GET MORE.: NEVER TRUE

## 2022-10-06 ASSESSMENT — ENCOUNTER SYMPTOMS
GASTROINTESTINAL NEGATIVE: 1
RESPIRATORY NEGATIVE: 1
EYES NEGATIVE: 1
ALLERGIC/IMMUNOLOGIC NEGATIVE: 1
BACK PAIN: 1

## 2022-10-06 ASSESSMENT — SOCIAL DETERMINANTS OF HEALTH (SDOH): HOW HARD IS IT FOR YOU TO PAY FOR THE VERY BASICS LIKE FOOD, HOUSING, MEDICAL CARE, AND HEATING?: NOT HARD AT ALL

## 2022-10-06 NOTE — PROGRESS NOTES
Subjective:      Patient ID: Brigido Baxter is a 70 y.o. male. 3 weeks status post right hallux rigidus surgery. Patient is opiate dependent as provided by pain management. Review of Systems   Constitutional: Negative. HENT: Negative. Eyes: Negative. Respiratory: Negative. Cardiovascular: Negative. Gastrointestinal: Negative. Endocrine: Negative. Musculoskeletal:  Positive for arthralgias, back pain and myalgias. Skin: Negative. Allergic/Immunologic: Negative. Neurological: Negative. Hematological: Negative. Psychiatric/Behavioral:  The patient is nervous/anxious. Past family and social history unremarkable. Diagnosis Orders   1. Acquired hallux rigidus, right        2. Lumbosacral spondylosis without myelopathy        3. Primary osteoarthritis of both knees        4. Chronic use of opiate drug for therapeutic purpose        5. Pre-diabetes        6. Dyslipidemia        7. Essential hypertension        8. Psychophysiological insomnia        9. Fibromyalgia        10. Mood disorder ([address]        11. Stage 3 chronic kidney disease, unspecified whether stage 3a or 3b CKD ([address]  Basic Metabolic Panel          Objective:   Physical Exam  Vitals and nursing note reviewed. Constitutional:       Appearance: He is well-developed. HENT:      Head: Normocephalic and atraumatic. Right Ear: External ear normal.      Left Ear: External ear normal.      Nose: Nose normal.   Eyes:      Conjunctiva/sclera: Conjunctivae normal.      Pupils: Pupils are equal, round, and reactive to light. Cardiovascular:      Rate and Rhythm: Normal rate and regular rhythm. Heart sounds: Normal heart sounds. Comments: Dyslipidemia  Impaired fasting glucose  Pulmonary:      Effort: Pulmonary effort is normal.      Breath sounds: Normal breath sounds. Abdominal:      General: Bowel sounds are normal.      Palpations: Abdomen is soft.    Genitourinary:     Comments: CKD  Musculoskeletal:         General: Normal range of motion. Cervical back: Normal range of motion and neck supple. Comments: Degenerative spondylosis. He is on opiates and gabapentin as provided by pain management. 3B status post right hallux rigidus surgery with spacer   Skin:     General: Skin is warm and dry. Neurological:      Mental Status: He is alert and oriented to person, place, and time. Deep Tendon Reflexes: Reflexes are normal and symmetric. Comments: Fibromyalgia on gabapentin   Psychiatric:         Thought Content: Thought content normal.      Comments: Anxious       Assessment:       Diagnosis Orders   1. Acquired hallux rigidus, right        2. Lumbosacral spondylosis without myelopathy        3. Primary osteoarthritis of both knees        4. Chronic use of opiate drug for therapeutic purpose        5. Pre-diabetes        6. Dyslipidemia        7. Essential hypertension        8. Psychophysiological insomnia        9. Fibromyalgia        10. Mood disorder ([facility] [address]        11. Stage 3 chronic kidney disease, unspecified whether stage 3a or 3b CKD (HCC)  Basic Metabolic Panel              Plan:      3 weeks status post right hallux rigidus surgery with spacer. Wound is well-healed. He is complaining of pain without any sign of inflammation or infection. He is on gabapentin and Percocet as provided by pain management. He is advised close follow-up with podiatry. CKD with creatinine of 1.24. Avoid nephrotoxic drugs, anti-inflammatory radiocontrast.  Maintain oral hydration  Fibromyalgia. Stress reduction is advised, improve sleep hygiene. Continue gabapentin  Dyslipidemia being nonpharmacologically treated  Impaired fasting glucose/metabolic syndrome. A1c 6.2.   He would benefit from low-fat high-fiber diet, daily moderate exercise, lifestyle change and weight reduction to keep BMI around 25  He appears anxious heart denies being depressed  He denies tobacco, excessive alcohol or illicit drug use  Hemodynamically stable. Continue Aldactone that he is tolerating well  Insomnia on melatonin  He declined influenza vaccination  He is updated on COVID however needs booster vaccination. He is updated on Tdap, pneumococcal vaccine, Cologuard  Med list and available labs reviewed, discussed with patient, questions answered  He is encouraged to call for any concern  This note is created with a voice recognition program and while intend to generate a document that accurately reflects the content of the visit, no guarantee can be provided that every mistake has been identified and corrected by editing.           Chucho Mondragon MD

## 2022-10-10 ENCOUNTER — HOSPITAL ENCOUNTER (OUTPATIENT)
Dept: GENERAL RADIOLOGY | Age: 72
Discharge: HOME | End: 2022-10-12
Payer: MEDICARE

## 2022-10-10 ENCOUNTER — HOSPITAL ENCOUNTER (OUTPATIENT)
Dept: PAIN MANAGEMENT | Age: 72
Discharge: HOME | End: 2022-10-10
Payer: MEDICARE

## 2022-10-10 VITALS
BODY MASS INDEX: 32.96 KG/M2 | RESPIRATION RATE: 15 BRPM | TEMPERATURE: 98.3 F | DIASTOLIC BLOOD PRESSURE: 104 MMHG | WEIGHT: 210 LBS | HEART RATE: 82 BPM | OXYGEN SATURATION: 94 % | HEIGHT: 67 IN | SYSTOLIC BLOOD PRESSURE: 134 MMHG

## 2022-10-10 DIAGNOSIS — M47.817 LUMBOSACRAL SPONDYLOSIS WITHOUT MYELOPATHY: Primary | Chronic | ICD-10-CM

## 2022-10-10 DIAGNOSIS — R52 PAIN MANAGEMENT: ICD-10-CM

## 2022-10-10 PROCEDURE — 64494 INJ PARAVERT F JNT L/S 2 LEV: CPT

## 2022-10-10 PROCEDURE — 6360000002 HC RX W HCPCS: Performed by: ANESTHESIOLOGY

## 2022-10-10 PROCEDURE — 64493 INJ PARAVERT F JNT L/S 1 LEV: CPT

## 2022-10-10 PROCEDURE — 64493 INJ PARAVERT F JNT L/S 1 LEV: CPT | Performed by: ANESTHESIOLOGY

## 2022-10-10 PROCEDURE — 64495 INJ PARAVERT F JNT L/S 3 LEV: CPT

## 2022-10-10 PROCEDURE — 99152 MOD SED SAME PHYS/QHP 5/>YRS: CPT | Performed by: ANESTHESIOLOGY

## 2022-10-10 PROCEDURE — 3209999900 FLUORO FOR SURGICAL PROCEDURES

## 2022-10-10 PROCEDURE — 64494 INJ PARAVERT F JNT L/S 2 LEV: CPT | Performed by: ANESTHESIOLOGY

## 2022-10-10 PROCEDURE — 2500000003 HC RX 250 WO HCPCS: Performed by: ANESTHESIOLOGY

## 2022-10-10 RX ORDER — LIDOCAINE HYDROCHLORIDE 10 MG/ML
INJECTION, SOLUTION EPIDURAL; INFILTRATION; INTRACAUDAL; PERINEURAL
Status: COMPLETED | OUTPATIENT
Start: 2022-10-10 | End: 2022-10-10

## 2022-10-10 RX ORDER — FENTANYL CITRATE 50 UG/ML
INJECTION, SOLUTION INTRAMUSCULAR; INTRAVENOUS
Status: COMPLETED | OUTPATIENT
Start: 2022-10-10 | End: 2022-10-10

## 2022-10-10 RX ORDER — MIDAZOLAM HYDROCHLORIDE 1 MG/ML
INJECTION INTRAMUSCULAR; INTRAVENOUS
Status: COMPLETED | OUTPATIENT
Start: 2022-10-10 | End: 2022-10-10

## 2022-10-10 RX ORDER — BUPIVACAINE HYDROCHLORIDE 5 MG/ML
INJECTION, SOLUTION EPIDURAL; INTRACAUDAL
Status: COMPLETED | OUTPATIENT
Start: 2022-10-10 | End: 2022-10-10

## 2022-10-10 RX ADMIN — LIDOCAINE HYDROCHLORIDE 5 ML: 10 INJECTION, SOLUTION EPIDURAL; INFILTRATION; INTRACAUDAL; PERINEURAL at 13:12

## 2022-10-10 RX ADMIN — FENTANYL CITRATE 50 MCG: 50 INJECTION, SOLUTION INTRAMUSCULAR; INTRAVENOUS at 13:11

## 2022-10-10 RX ADMIN — MIDAZOLAM 1 MG: 1 INJECTION INTRAMUSCULAR; INTRAVENOUS at 13:11

## 2022-10-10 RX ADMIN — BUPIVACAINE HYDROCHLORIDE 5 ML: 5 INJECTION, SOLUTION EPIDURAL; INTRACAUDAL; PERINEURAL at 13:12

## 2022-10-10 ASSESSMENT — PAIN - FUNCTIONAL ASSESSMENT
PAIN_FUNCTIONAL_ASSESSMENT: 0-10
PAIN_FUNCTIONAL_ASSESSMENT: PREVENTS OR INTERFERES WITH ALL ACTIVE AND SOME PASSIVE ACTIVITIES

## 2022-10-10 ASSESSMENT — PAIN DESCRIPTION - DESCRIPTORS: DESCRIPTORS: ACHING

## 2022-10-10 NOTE — OP NOTE
Patient Name: Valentin Almeida   YOB: 1950  Room/Bed: Room/bed info not found  Medical Record Number: [id number]  Date: 10/10/2022       Sedation/ Anesthesia Plan:   intravenous sedation   as needed. Medications Planned:   midazolam (Versed) / Fentanyl  Intravenously  as needed. Preoperative Diagnosis: Lumbar spondylosis w/o myelopathy or radiculopathy  Postoperative Diagnosis: Lumbar spondylosis w/o myelopathy or radiculopathy  Blood Loss: none    Procedure Performed:  Bilateral Lumbar Medial Branch nerve Blocks at the transverse processes of L4, L5 and sacral  ala under fluoroscopy guidance    Procedure: The Patient was seen in the preop area, chart was reviewed, informed consent was obtained. Patient was taken to procedure room and was placed in prone position. Vital signs were monitored through out the  Procedure. A time out was completed. The skin over the back was prepped and draped in sterile manner. The target point was marked at the junction of Transverse process and superior articular process at the target levels. Skin and deep tissues were anesthetized with 1 % lidocaine. A 25-gauge needlele was advanced to the target spots under fluoroscopy guidance in AP / Lateral and Oblique views. Then after negative aspiration contrast dye was injected with live fluoroscopy in AP views that showed  spread of the contrast with no epidural space and no vascular runoff or intrathecal spread. Finally 0.5 ml of treatment solution 0.5% bupivacaine   was injected at each level. The needle was removed and a Band-Aid was placed over the needle  insertion site. The patient's vital signs remained stable and the patient tolerated the procedure well.         Electronically signed by Alvaro Barrett MD on 10/10/2022 at 1:17 PM    SEDATION NOTE:    ASA CLASSIFICATION  3  MP   CLASSIFICATION  3    Moderate intravenous conscious sedation was supervised by Dr. Sharee Mena  The patient was independently monitored by a Registered Nurse assigned to the Procedure Room  Monitoring included automated blood pressure, continuous EKG, Capnography and continuous pulse oximetry. The detailed Conscious Record is permanently stored in the [employer].      The following is the conscious sedation record;  Start Time:  1308  End times:  1318  Duration:  10 minutes  MEDS GIVEN 1 MG VERSED AND 50 MCG FENTANYL

## 2022-10-10 NOTE — DISCHARGE INSTRUCTIONS
Discharge Instructions following Sedation or Anesthesia:  You have  received  a sedative/anesthetic therefore, you should not consume any alcoholic beverages for minimum of 12 hours. Do not drive or operate machinery for 24 hours. Do not sign legal documents for 24 hours. Dizziness, drowsiness, and unsteadiness may occur. Rest when need to. Increase diet as tolerated. Keep up on fluids if diet allows. General Instructions:  Do not take a tub bath for 72 hours after procedure (this includes hot tubs and swimming pools). You may shower, but avoid hot water to injection site. Avoid strenuous activity TODAY especially if you experience dizziness. Remove band-aid the next day. Wash off any residual iodine   Do not use heat, heating pad, or any other heating device over the injection site for 3 days after the procedure. If you experience pain after your procedure, you may continue with your current pain medication as prescribed. (DO NOT INCREASE YOUR PAIN MEDICATION WITHOUT TALKING TO DOCTOR)  Soreness and pain at injection site is common, may use ice to reduce soreness. Please complete pain diary as instructed.      Call Annmarie Tucker at [phone] if you experience:   Fever, chills or temperature over 100    Vomiting, Headache, persistent stiff neck, nausea, blurred vision   Difficulty in urinating or unable to urinate with 8 hours   Increase in weakness, numbness or loss of function   Increased redness, swelling or drainage at the injection site

## 2022-10-10 NOTE — H&P
Pain Pre-Op H&P Note    Jose Velez MD    HPI: Odessia Yolande  presents with   Odemilee Yolande  presents with   40-year-old man with history of chronic knee and back pain  Is diagnosed with knee arthritis had previous left knee surgery  Back pain  Majority of the pain is located in the lower lumbar area  Pain is predominantly axial  Significant radiation in leg  No associated numbness or paresthesia  No changes in bladder or bowel control    Pain has been progressively worsening over last several years  No previous lumbar spine surgical history  Have done multiple courses of physical therapy in [place] continue to do home exercises  Had recent MRI lumbar spine in 2021    Past Medical History:   Diagnosis Date    Arthritis     History of blood transfusion     AFTER KNEE SURGERY    Hx of blood clots     after one of his knee surgeries     Hyperlipidemia     Left knee pain 04/09/2014    Neuropathy     Teeth problem     abcessw    Wears glasses        Past Surgical History:   Procedure Laterality Date    BUNIONECTOMY N/A 12/15/2021    AKIN OSTEOTOMY WITH BONE STAPLE CHEILECTOMY RIGHT FOOT performed by Hardeep Azar DPM at [place] [address] Right 9/16/2022    RIGHT FOOT BUNIONECTOMY WITH IMPLANT AND RIGHT SESMOIDECTOMY TIBIA- PETERSON   GAY performed by Michael Keith DPM at [facility] [address]     [address]  10/2015    DENTAL SURGERY  03/2021    EYE SURGERY      bilat cataracts     FOOT SURGERY Right 12/15/2021    AKIN OSTEOTOMY WITH BONE STAPLE CHEILECTOMY RIGHT FOOT (N/A Foot)    FOOT SURGERY Right 4/6/2022    EXCISION SOFT TISSUE MASS PLANTAR 1ST METHEAD RIGHT performed by Hardeep Azar DPM at [address] [facility] Left     KNEE  X 6 pt unsure of date    JOINT REPLACEMENT Right 07/20/2018    ROTATOR CUFF REPAIR Right     TONSILLECTOMY         Family History   Problem Relation Age of Onset    Diabetes Mother     No Known Problems Father Allergies   Allergen Reactions    Oxycontin [Oxycodone Hcl] Nausea And Vomiting         Current Outpatient Medications:     PERCOCET 5-325 MG per tablet, Take 1 tablet by mouth every 6 hours as needed for Pain for up to 30 days. , Disp: 120 tablet, Rfl: 0    gabapentin (NEURONTIN) 600 MG tablet, 1 tab  tid, Disp: 90 tablet, Rfl: 1    spironolactone (ALDACTONE) 50 MG tablet, TAKE ONE TABLET BY MOUTH DAILY, Disp: 90 tablet, Rfl: 1    diclofenac sodium (VOLTAREN) 1 % GEL, Apply 2 g topically 2 times daily, Disp: 120 g, Rfl: 2    melatonin 3 MG TABS tablet, Take 6 mg by mouth nightly as needed (insomnia), Disp: , Rfl:     Social History     Tobacco Use    Smoking status: Never    Smokeless tobacco: Never   Substance Use Topics    Alcohol use: No       Review of Systems:   Focused review of systems was performed, and negative as pertinent to diagnosis, except as stated in HPI. Physical Exam  Constitutional:       Appearance: Normal appearance. Pulmonary:      Effort: Pulmonary effort is normal.   Neurological:      Mental Status: alert. Psychiatric:         Attention and Perception: Attention and perception normal.         Mood and Affect: Mood and affect normal.   Cardiovascular:      Rate: Normal rate. ASA: 3          Mallampati: 3       Patient's current physical status, medications, medical history, and HPI have been reviewed and updated as appropriate on this date: 10/10/22    Risk/Benefit(s): The risks, benefits, alternatives, and potential complications have been discussed with the patient/family and informed consent has been obtained for the procedure/sedation. Diagnosis:   1.  Lumbosacral spondylosis without myelopathy            Plan:   Bilateral lumbar MBNB at L4/5 and L5/S1 facet confirmatory        Fermin Wood MD

## 2022-10-12 DIAGNOSIS — Z98.890 S/P KNEE SURGERY: ICD-10-CM

## 2022-10-12 DIAGNOSIS — Z51.81 ENCOUNTER FOR MEDICATION MONITORING: ICD-10-CM

## 2022-10-12 DIAGNOSIS — M17.12 PRIMARY OSTEOARTHRITIS OF LEFT KNEE: ICD-10-CM

## 2022-10-12 DIAGNOSIS — Z51.81 MEDICATION MONITORING ENCOUNTER: ICD-10-CM

## 2022-10-12 DIAGNOSIS — G89.29 CHRONIC PAIN OF LEFT KNEE: ICD-10-CM

## 2022-10-12 DIAGNOSIS — Z98.890 S/P LEFT KNEE SURGERY: ICD-10-CM

## 2022-10-12 DIAGNOSIS — M17.11 PRIMARY OSTEOARTHRITIS OF RIGHT KNEE: ICD-10-CM

## 2022-10-12 DIAGNOSIS — M17.0 OSTEOARTHRITIS OF BOTH KNEES, UNSPECIFIED OSTEOARTHRITIS TYPE: ICD-10-CM

## 2022-10-12 DIAGNOSIS — M17.0 PRIMARY OSTEOARTHRITIS OF BOTH KNEES: ICD-10-CM

## 2022-10-12 DIAGNOSIS — Z79.891 USE OF OPIATES FOR THERAPEUTIC PURPOSES: ICD-10-CM

## 2022-10-12 DIAGNOSIS — M25.562 CHRONIC PAIN OF LEFT KNEE: ICD-10-CM

## 2022-10-12 RX ORDER — GABAPENTIN 600 MG/1
TABLET ORAL
Qty: 90 TABLET | Refills: 0 | Status: SHIPPED | OUTPATIENT
Start: 2022-10-12 | End: 2022-10-13 | Stop reason: SDUPTHER

## 2022-10-13 ENCOUNTER — HOSPITAL ENCOUNTER (OUTPATIENT)
Dept: PAIN MANAGEMENT | Age: 72
Discharge: HOME | End: 2022-10-13
Payer: MEDICARE

## 2022-10-13 VITALS
HEART RATE: 85 BPM | RESPIRATION RATE: 20 BRPM | OXYGEN SATURATION: 94 % | SYSTOLIC BLOOD PRESSURE: 141 MMHG | TEMPERATURE: 97.6 F | DIASTOLIC BLOOD PRESSURE: 74 MMHG

## 2022-10-13 DIAGNOSIS — M17.0 OSTEOARTHRITIS OF BOTH KNEES, UNSPECIFIED OSTEOARTHRITIS TYPE: ICD-10-CM

## 2022-10-13 DIAGNOSIS — M17.0 PRIMARY OSTEOARTHRITIS OF BOTH KNEES: ICD-10-CM

## 2022-10-13 DIAGNOSIS — Z79.891 CHRONIC USE OF OPIATE DRUG FOR THERAPEUTIC PURPOSE: ICD-10-CM

## 2022-10-13 DIAGNOSIS — Z51.81 MEDICATION MONITORING ENCOUNTER: ICD-10-CM

## 2022-10-13 DIAGNOSIS — G89.29 CHRONIC LOW BACK PAIN, UNSPECIFIED BACK PAIN LATERALITY, UNSPECIFIED WHETHER SCIATICA PRESENT: ICD-10-CM

## 2022-10-13 DIAGNOSIS — Z98.890 S/P LEFT KNEE SURGERY: ICD-10-CM

## 2022-10-13 DIAGNOSIS — M25.562 CHRONIC PAIN OF LEFT KNEE: ICD-10-CM

## 2022-10-13 DIAGNOSIS — G89.29 CHRONIC PAIN OF LEFT KNEE: ICD-10-CM

## 2022-10-13 DIAGNOSIS — M17.12 PRIMARY OSTEOARTHRITIS OF LEFT KNEE: ICD-10-CM

## 2022-10-13 DIAGNOSIS — M47.817 LUMBOSACRAL SPONDYLOSIS WITHOUT MYELOPATHY: Primary | Chronic | ICD-10-CM

## 2022-10-13 DIAGNOSIS — M54.50 CHRONIC LOW BACK PAIN, UNSPECIFIED BACK PAIN LATERALITY, UNSPECIFIED WHETHER SCIATICA PRESENT: ICD-10-CM

## 2022-10-13 DIAGNOSIS — Z98.890 S/P KNEE SURGERY: ICD-10-CM

## 2022-10-13 DIAGNOSIS — M17.11 PRIMARY OSTEOARTHRITIS OF RIGHT KNEE: ICD-10-CM

## 2022-10-13 DIAGNOSIS — Z79.891 USE OF OPIATES FOR THERAPEUTIC PURPOSES: ICD-10-CM

## 2022-10-13 DIAGNOSIS — Z51.81 ENCOUNTER FOR MEDICATION MONITORING: ICD-10-CM

## 2022-10-13 PROCEDURE — 99213 OFFICE O/P EST LOW 20 MIN: CPT | Performed by: NURSE PRACTITIONER

## 2022-10-13 PROCEDURE — 99213 OFFICE O/P EST LOW 20 MIN: CPT

## 2022-10-13 RX ORDER — OXYCODONE HYDROCHLORIDE AND ACETAMINOPHEN 5; 325 MG/1; MG/1
1 TABLET ORAL EVERY 6 HOURS PRN
Qty: 120 TABLET | Refills: 0 | Status: SHIPPED | OUTPATIENT
Start: 2022-10-23 | End: 2022-11-22

## 2022-10-13 RX ORDER — GABAPENTIN 600 MG/1
TABLET ORAL
Qty: 90 TABLET | Refills: 1 | Status: SHIPPED | OUTPATIENT
Start: 2022-10-13 | End: 2023-10-13

## 2022-10-13 ASSESSMENT — ENCOUNTER SYMPTOMS
BACK PAIN: 1
CONSTIPATION: 0
COUGH: 0
SHORTNESS OF BREATH: 0

## 2022-10-13 ASSESSMENT — PAIN SCALES - GENERAL: PAINLEVEL_OUTOF10: 7

## 2022-10-13 NOTE — PROGRESS NOTES
Chief Complaint   Patient presents with    Knee Pain    Back Pain    Medication Refill         PMH    Pt reports bilat. Knee pain with no known injury, and has had multiple surgeries/procedures on left knee but pain continues. He is opioid dependant for pain control. He has had PT in the past with no relief. Discussed genicular blocks but he declines. He also complains of back pain. MRI lumbar with multilevel degenerative disc disease. He completed PT for his back with some benefit. He has been using Voltaren gel with some relief. He had LESI 4/4/22 and reports  50% relief. He had confirmatory lumbar MBB 10/10/22 and reports 100% relief for one day. He would like to schedule RFA. Knee Pain   The incident occurred more than 1 week ago. The pain is present in the left knee and right knee. The quality of the pain is described as burning, aching, cramping, shooting and stabbing. The pain is at a severity of 7/10. The pain is moderate. The pain has been Constant since onset. Associated symptoms include muscle weakness, numbness and tingling. Pertinent negatives include no inability to bear weight. It is unknown if a foreign body is present. The symptoms are aggravated by movement and weight bearing. He has tried acetaminophen, ice, elevation, heat, immobilization, non-weight bearing and rest for the symptoms. Back Pain  This is a chronic problem. The current episode started more than 1 year ago. The problem occurs constantly. The problem is unchanged. The pain is present in the lumbar spine. The quality of the pain is described as aching. The pain does not radiate. The pain is at a severity of 7/10. The pain is moderate. The pain is The same all the time. The symptoms are aggravated by bending, position, sitting, lying down, standing and twisting. Associated symptoms include numbness and tingling. Pertinent negatives include no chest pain, fever, headaches or weakness.  He has tried analgesics, bed rest, home exercises, heat, ice and walking for the symptoms. Patient denies any new neurological symptoms. No bowel or bladder incontinence, no weakness, and no falling. Pill count: percocet 26 - was due 10/16 but had outside fills from podiatry following right bunionectomy    Morphine equivalent: 30    Controlled Substance Monitoring:    Acute and Chronic Pain Monitoring:   RX Monitoring 10/13/2022   Attestation -   Acute Pain Prescriptions -   Periodic Controlled Substance Monitoring Possible medication side effects, risk of tolerance/dependence & alternative treatments discussed. ;No signs of potential drug abuse or diversion identified.;Obtaining appropriate analgesic effect of treatment.    Chronic Pain > 50 MEDD -   Chronic Pain > 80 MEDD -            Past Medical History:   Diagnosis Date    Arthritis     History of blood transfusion     AFTER KNEE SURGERY    Hx of blood clots     after one of his knee surgeries     Hyperlipidemia     Left knee pain 04/09/2014    Neuropathy     Teeth problem     abcessw    Wears glasses        Past Surgical History:   Procedure Laterality Date    BUNIONECTOMY N/A 12/15/2021    AKIN OSTEOTOMY WITH BONE STAPLE CHEILECTOMY RIGHT FOOT performed by Jaime Millard DPM at [place] [address] Right 9/16/2022    RIGHT FOOT BUNIONECTOMY WITH IMPLANT AND RIGHT SESMOIDECTOMY TIBIA- PETERSON   GAY performed by Neymar Garcia DPM at [address] [place] [address] [address]ilateral     [address]  10/2015    DENTAL SURGERY  03/2021    EYE SURGERY      bilat cataracts     FOOT SURGERY Right 12/15/2021    AKIN OSTEOTOMY WITH BONE STAPLE CHEILECTOMY RIGHT FOOT (N/A Foot)    FOOT SURGERY Right 4/6/2022    EXCISION SOFT TISSUE MASS PLANTAR 1ST METHEAD RIGHT performed by Jaime Millard DPM at [address] Left     KNEE  X 6 pt unsure of date    JOINT REPLACEMENT Right 07/20/2018    ROTATOR CUFF REPAIR Right     TONSILLECTOMY         Allergies Allergen Reactions    Oxycontin [Oxycodone Hcl] Nausea And Vomiting         Current Outpatient Medications:     gabapentin (NEURONTIN) 600 MG tablet, TAKE ONE TABLET BY MOUTH THREE TIMES A DAY, Disp: 90 tablet, Rfl: 0    PERCOCET 5-325 MG per tablet, Take 1 tablet by mouth every 6 hours as needed for Pain for up to 30 days. , Disp: 120 tablet, Rfl: 0    spironolactone (ALDACTONE) 50 MG tablet, TAKE ONE TABLET BY MOUTH DAILY, Disp: 90 tablet, Rfl: 1    diclofenac sodium (VOLTAREN) 1 % GEL, Apply 2 g topically 2 times daily, Disp: 120 g, Rfl: 2    melatonin 3 MG TABS tablet, Take 6 mg by mouth nightly as needed (insomnia), Disp: , Rfl:     Family History   Problem Relation Age of Onset    Diabetes Mother     No Known Problems Father        Social History     Socioeconomic History    Marital status:      Spouse name: Not on file    Number of children: Not on file    Years of education: Not on file    Highest education level: Not on file   Occupational History    Occupation: retired   Tobacco Use    Smoking status: Never    Smokeless tobacco: Never   Vaping Use    Vaping Use: Never used   Substance and Sexual Activity    Alcohol use: No    Drug use: No    Sexual activity: Never   Other Topics Concern    Not on file   Social History Narrative    Not on file     Social Determinants of Health     Financial Resource Strain: Low Risk     Difficulty of Paying Living Expenses: Not hard at all   Food Insecurity: No Food Insecurity    Worried About Running Out of Food in the Last Year: Never true    Ran Out of Food in the Last Year: Never true   Transportation Needs: Not on file   Physical Activity: Not on file   Stress: Not on file   Social Connections: Not on file   Intimate Partner Violence: Not on file   Housing Stability: Not on file       Review of Systems:  Review of Systems   Constitutional: Negative for chills and fever. Cardiovascular:  Negative for chest pain and palpitations.    Respiratory:  Negative for cough and shortness of breath. Musculoskeletal:  Positive for back pain. Gastrointestinal:  Negative for constipation. Neurological:  Positive for numbness and tingling. Negative for disturbances in coordination, headaches, loss of balance and weakness. Physical Exam:  BP (!) 141/74   Pulse 85   Temp 97.6 °F (36.4 °C)   Resp 20   SpO2 94%     Physical Exam  HENT:      Head: Normocephalic. Pulmonary:      Effort: Pulmonary effort is normal.   Musculoskeletal:         General: Normal range of motion. Cervical back: Normal range of motion. Lumbar back: Tenderness present. Skin:     General: Skin is warm and dry. Neurological:      Mental Status: He is alert and oriented to person, place, and time.        Record/Diagnostics Review:    Last dominga 12/2021 and was appropriate     Assessment:  Problem List Items Addressed This Visit       Lumbosacral spondylosis without myelopathy - Primary (Chronic)    Relevant Medications    PERCOCET 5-325 MG per tablet (Start on 10/23/2022)    Primary osteoarthritis of both knees    Relevant Medications    PERCOCET 5-325 MG per tablet (Start on 10/23/2022)    gabapentin (NEURONTIN) 600 MG tablet    Chronic use of opiate drug for therapeutic purpose    Relevant Medications    gabapentin (NEURONTIN) 600 MG tablet     Other Visit Diagnoses       Encounter for medication monitoring        Relevant Medications    gabapentin (NEURONTIN) 600 MG tablet    Medication monitoring encounter        Relevant Medications    gabapentin (NEURONTIN) 600 MG tablet    S/P knee surgery -Multiple        Relevant Medications    gabapentin (NEURONTIN) 600 MG tablet    Primary osteoarthritis of right knee        Relevant Medications    PERCOCET 5-325 MG per tablet (Start on 10/23/2022)    gabapentin (NEURONTIN) 600 MG tablet    Primary osteoarthritis of left knee        Relevant Medications    PERCOCET 5-325 MG per tablet (Start on 10/23/2022)    gabapentin (NEURONTIN) 600 MG tablet

## 2022-10-25 ENCOUNTER — OFFICE VISIT (OUTPATIENT)
Dept: PODIATRY | Age: 72
End: 2022-10-25

## 2022-10-25 VITALS — BODY MASS INDEX: 32.96 KG/M2 | WEIGHT: 210 LBS | HEIGHT: 67 IN

## 2022-10-25 DIAGNOSIS — M79.671 RIGHT FOOT PAIN: ICD-10-CM

## 2022-10-25 DIAGNOSIS — Z98.890 POSTOPERATIVE STATE: Primary | ICD-10-CM

## 2022-10-25 PROCEDURE — 99024 POSTOP FOLLOW-UP VISIT: CPT | Performed by: PODIATRIST

## 2022-10-25 NOTE — PROGRESS NOTES
[address] [facility] PODIATRY [facility]  [address] [facility] [address] [place] [address]  Dept: [phone]  Dept Fax: [phone]    POST-OP PROGRESS NOTE  Date of patient's visit: 10/25/2022  Patient's Name:  Steve Dodge YOB: 1950            Patient Care Team:  Angel Davalos MD as PCP - General (Family Medicine)  Angel Davalos MD as PCP - [facility]  Dawson Gonzales MD as Consulting Physician (Pain Management)  Mariah Mar MD as Consulting Physician  Elyse Severance, DPM as Physician (Podiatry)        Chief Complaint   Patient presents with    Post-Op Check     6 wk post op right foot    X-ray      Right foot xray       Pt's primary care physician is Angel Davalos MD last seen 10/06/2022     Subjective: Steve Dodge is a 70 y.o. male who presents to the office today 6week(s)  S/P implatn right foot at the 1st MTPJ and sesamoidectomy  for correction of pain  Problem List Items Addressed This Visit    None  Visit Diagnoses       Postoperative state    -  Primary    Relevant Orders    XR FOOT RIGHT (MIN 3 VIEWS)    Right foot pain        Relevant Orders    XR FOOT RIGHT (MIN 3 VIEWS)        . Patient relates pain is Present and improved. Pain is rated 5 out of 10 and is described as constant, moderate. Currently denies F/C/N/V. Is patient taking pain medications as prescribed and is controlling pain    Physical Examination:  Incision is coapted, sutures/steri-strips are intact. Minimal bleeding post operatively. Edema present. No erythema. No Pus. Operative correction is satisfactory. 5/5 muscle strength and no crepitus noted on sagittal plain ROM. No pain     Radiographs: right foot x rays 3 views:  intact implant with no loosening       Assessment: Steve Dodge is status post as abovce  Normal post operative course. Doing well          ICD-10-CM    1.  Postoperative state  Z98.890 XR FOOT RIGHT (MIN 3 VIEWS)      2. Right foot pain  M79.671 XR FOOT RIGHT (MIN 3 VIEWS)            Plan:  Patient examined and evaluated. Current condition and treatment options discussed in detail. Advised pt to weightbear as tolerated to the right foot with no restrictiosn . Verbal and written instructions given to patient. Orders:   Orders Placed This Encounter   Procedures    XR FOOT RIGHT (MIN 3 VIEWS)      Contact office with any questions/problems/concerns. RTC in 4week(s).      Electronically signed by Maldonado Rosenthal DPM on 10/25/2022 at 10:25 AM  10/25/2022

## 2022-10-31 ENCOUNTER — HOSPITAL ENCOUNTER (OUTPATIENT)
Dept: GENERAL RADIOLOGY | Age: 72
Discharge: HOME | End: 2022-11-02
Payer: MEDICARE

## 2022-10-31 ENCOUNTER — HOSPITAL ENCOUNTER (OUTPATIENT)
Dept: PAIN MANAGEMENT | Age: 72
Discharge: HOME | End: 2022-10-31
Payer: MEDICARE

## 2022-10-31 VITALS
RESPIRATION RATE: 13 BRPM | OXYGEN SATURATION: 95 % | DIASTOLIC BLOOD PRESSURE: 91 MMHG | TEMPERATURE: 97.5 F | HEART RATE: 80 BPM | HEIGHT: 67 IN | WEIGHT: 210 LBS | SYSTOLIC BLOOD PRESSURE: 116 MMHG | BODY MASS INDEX: 32.96 KG/M2

## 2022-10-31 DIAGNOSIS — R52 PAIN MANAGEMENT: ICD-10-CM

## 2022-10-31 DIAGNOSIS — M47.817 LUMBOSACRAL SPONDYLOSIS WITHOUT MYELOPATHY: Chronic | ICD-10-CM

## 2022-10-31 PROCEDURE — 64635 DESTROY LUMB/SAC FACET JNT: CPT

## 2022-10-31 PROCEDURE — 3209999900 FLUORO FOR SURGICAL PROCEDURES

## 2022-10-31 PROCEDURE — 64636 DESTROY L/S FACET JNT ADDL: CPT | Performed by: ANESTHESIOLOGY

## 2022-10-31 PROCEDURE — 6360000002 HC RX W HCPCS: Performed by: ANESTHESIOLOGY

## 2022-10-31 PROCEDURE — 2500000003 HC RX 250 WO HCPCS: Performed by: ANESTHESIOLOGY

## 2022-10-31 PROCEDURE — 99152 MOD SED SAME PHYS/QHP 5/>YRS: CPT | Performed by: ANESTHESIOLOGY

## 2022-10-31 PROCEDURE — 64635 DESTROY LUMB/SAC FACET JNT: CPT | Performed by: ANESTHESIOLOGY

## 2022-10-31 PROCEDURE — 64636 DESTROY L/S FACET JNT ADDL: CPT

## 2022-10-31 RX ORDER — LIDOCAINE HYDROCHLORIDE 10 MG/ML
INJECTION, SOLUTION EPIDURAL; INFILTRATION; INTRACAUDAL; PERINEURAL
Status: COMPLETED | OUTPATIENT
Start: 2022-10-31 | End: 2022-10-31

## 2022-10-31 RX ORDER — MIDAZOLAM HYDROCHLORIDE 1 MG/ML
INJECTION INTRAMUSCULAR; INTRAVENOUS
Status: COMPLETED | OUTPATIENT
Start: 2022-10-31 | End: 2022-10-31

## 2022-10-31 RX ORDER — FENTANYL CITRATE 50 UG/ML
INJECTION, SOLUTION INTRAMUSCULAR; INTRAVENOUS
Status: COMPLETED | OUTPATIENT
Start: 2022-10-31 | End: 2022-10-31

## 2022-10-31 RX ORDER — LIDOCAINE HYDROCHLORIDE 40 MG/ML
INJECTION, SOLUTION RETROBULBAR; TOPICAL
Status: COMPLETED | OUTPATIENT
Start: 2022-10-31 | End: 2022-10-31

## 2022-10-31 RX ADMIN — LIDOCAINE HYDROCHLORIDE 5 ML: 40 INJECTION, SOLUTION RETROBULBAR; TOPICAL at 15:11

## 2022-10-31 RX ADMIN — FENTANYL CITRATE 50 MCG: 50 INJECTION, SOLUTION INTRAMUSCULAR; INTRAVENOUS at 15:11

## 2022-10-31 RX ADMIN — MIDAZOLAM 2 MG: 1 INJECTION INTRAMUSCULAR; INTRAVENOUS at 15:11

## 2022-10-31 RX ADMIN — LIDOCAINE HYDROCHLORIDE 5 ML: 10 INJECTION, SOLUTION EPIDURAL; INFILTRATION; INTRACAUDAL; PERINEURAL at 15:11

## 2022-10-31 ASSESSMENT — PAIN - FUNCTIONAL ASSESSMENT
PAIN_FUNCTIONAL_ASSESSMENT: 0-10
PAIN_FUNCTIONAL_ASSESSMENT: 0-10
PAIN_FUNCTIONAL_ASSESSMENT: PREVENTS OR INTERFERES SOME ACTIVE ACTIVITIES AND ADLS

## 2022-10-31 NOTE — H&P
UPDATE:  Office visit pain clinic in [facility] with all required elements of H&P dated 10/13/2022  Patient seen preop, chart reviewed,   No changes in medical history and health assessment since last evaluation. PE:  AAO x 3, in NAD, VSS,. Resp: breathing on RA, no respiratory distress  Cardiac: rate normal    Risk / Benefits explained to patient, patient agree to proceed with plan.   ASA 3  MP 3

## 2022-10-31 NOTE — OP NOTE
seconds. Patient's vital signs and neurological status remained stable throughout the procedure and post procedural period. The patient tolerated the procedure well and was discharged home in stable condition. SEDATION NOTE:    ASA CLASSIFICATION  3  MP   CLASSIFICATION  3    Moderate intravenous conscious sedation was supervised by Dr. Bony Moscoso  The patient was independently monitored by a Registered Nurse assigned to the Procedure Room  Monitoring included automated blood pressure, continuous EKG, Capnography and continuous pulse oximetry. The detailed Conscious Record is permanently stored in the [employer] .      The following is the conscious sedation record;  Start Time:  1508  End times:  1524  Duration:  16 minutes  MEDS GIVEN 2 MG VERSED AND 50 MCG FENTANYL

## 2022-10-31 NOTE — DISCHARGE INSTRUCTIONS
Discharge Instructions following Sedation or Anesthesia:  You have  received  a sedative/anesthetic therefore, you should not consume any alcoholic beverages for minimum of 12 hours. Do not drive or operate machinery for 24 hours. Do not sign legal documents for 24 hours. Dizziness, drowsiness, and unsteadiness may occur. Rest when need to. Increase diet as tolerated. Keep up on fluids if diet allows. General Instructions:  Do not take a tub bath for 72 hours after procedure (this includes hot tubs and swimming pools). You may shower, but avoid hot water to injection site. Avoid strenuous activity TODAY especially if you experience dizziness. Remove band-aid the next day. Wash off any residual iodine   Do not use heat, heating pad, or any other heating device over the injection site for 3 days after the procedure. If you experience pain after your procedure, you may continue with your current pain medication as prescribed. (DO NOT INCREASE YOUR PAIN MEDICATION WITHOUT TALKING TO DOCTOR)  Soreness and pain at injection site is common, may use ice to reduce soreness.     [facility] at [phone] if you experience:   Fever, chills or temperature over 100    Vomiting, Headache, persistent stiff neck, nausea, blurred vision   Difficulty in urinating or unable to urinate with 8 hours   Increase in weakness, numbness or loss of function   Increased redness, swelling or drainage at the injection site

## 2022-11-01 RX ORDER — SPIRONOLACTONE 50 MG/1
TABLET, FILM COATED ORAL
Qty: 90 TABLET | Refills: 1 | Status: SHIPPED | OUTPATIENT
Start: 2022-11-01

## 2022-11-01 NOTE — TELEPHONE ENCOUNTER
Jacinto Gallegos is calling to request a refill on the following medication(s):    Medication Request:  Requested Prescriptions     Pending Prescriptions Disp Refills    spironolactone (ALDACTONE) 50 MG tablet [Pharmacy Med Name: SPIRONOLACTONE 50 MG TABLET] 90 tablet 1     Sig: TAKE ONE TABLET BY MOUTH DAILY     Last filled 4/29/22 90 day 1 refill  Order pending    Last Visit Date (If Applicable):  26/5/6028    Next Visit Date:    1/6/2023

## 2022-11-17 ENCOUNTER — HOSPITAL ENCOUNTER (OUTPATIENT)
Dept: PAIN MANAGEMENT | Age: 72
Discharge: HOME | End: 2022-11-17
Payer: MEDICARE

## 2022-11-17 VITALS
SYSTOLIC BLOOD PRESSURE: 115 MMHG | HEART RATE: 80 BPM | OXYGEN SATURATION: 95 % | WEIGHT: 210 LBS | TEMPERATURE: 97.3 F | DIASTOLIC BLOOD PRESSURE: 82 MMHG | HEIGHT: 67 IN | BODY MASS INDEX: 32.96 KG/M2

## 2022-11-17 DIAGNOSIS — M17.0 PRIMARY OSTEOARTHRITIS OF BOTH KNEES: ICD-10-CM

## 2022-11-17 DIAGNOSIS — M54.51 VERTEBROGENIC LOW BACK PAIN: ICD-10-CM

## 2022-11-17 DIAGNOSIS — M47.817 LUMBOSACRAL SPONDYLOSIS WITHOUT MYELOPATHY: Primary | Chronic | ICD-10-CM

## 2022-11-17 DIAGNOSIS — Z79.891 CHRONIC USE OF OPIATE DRUG FOR THERAPEUTIC PURPOSE: ICD-10-CM

## 2022-11-17 PROCEDURE — 99213 OFFICE O/P EST LOW 20 MIN: CPT | Performed by: ANESTHESIOLOGY

## 2022-11-17 PROCEDURE — 99213 OFFICE O/P EST LOW 20 MIN: CPT

## 2022-11-17 RX ORDER — OXYCODONE HYDROCHLORIDE AND ACETAMINOPHEN 5; 325 MG/1; MG/1
1 TABLET ORAL EVERY 6 HOURS PRN
Qty: 120 TABLET | Refills: 0 | Status: SHIPPED | OUTPATIENT
Start: 2022-11-17 | End: 2022-11-18 | Stop reason: SDUPTHER

## 2022-11-17 ASSESSMENT — ENCOUNTER SYMPTOMS
EYES NEGATIVE: 1
RESPIRATORY NEGATIVE: 1
BACK PAIN: 1

## 2022-11-17 ASSESSMENT — PAIN SCALES - GENERAL: PAINLEVEL_OUTOF10: 8

## 2022-11-17 NOTE — PROGRESS NOTES
The patient is a 67 y. o. Non- / non  male. Chief Complaint   Patient presents with    Back Pain     Follow up RFA    Knee Pain    Medication Refill        HPI    70-year-old man with history of chronic multisite body pain involving low back and knee  Diagnosed with lumbar spondylosis, vertebral agenic low back pain and knee arthritis  On chronic opioid therapy  Daily morphine equivalent 3 0  Reports no side effect  Finds the medication helpful  Recently had lumbar radiofrequency ablation with 50% improvement in axial lower back pain  Pending prior authorization for intercept procedure  Automated prescription report reviewed  Safe use of medication discussed  Refill ordered      S/P: RFA      Outcome   Any improvement of activity?   Yes   Any side effects (appetite,leg cramping,facial fleshing): none   Increase of pain:  No  Pain score Today:  8  % of pain relief: 100% at first  Pain diary (medial branch block):      Medication Refill: Percocet  Pain score Today:  8  Adverse effects (Constipation / Nausea / Sedation / sexual Dysfunction / others) :  none  Mood: good  Sleep pattern and quality: poor  Activity level: fair    Pill count Today: 25  Last dose taken  11/17/2022 afternoon  OARRS report reviewed today: yes  ER/Hospitalizations/PCP visit related to pain since last visit:no   Any legal problems e.g. DUI etc.:No  Satisfied with current management: Yes    Opioid Contract: updated today  Last Urine Dug screen dated: 12/21/2021    Hemoglobin A1C   Date Value Ref Range Status   05/10/2022 6.1 (H) 4.0 - 6.0 % Final       Past Medical History, Past Surgical History, Social History, Allergies and Medications, reviewed and updated in EPIC as indicated      Past Medical History:   Diagnosis Date    Arthritis     History of blood transfusion     AFTER KNEE SURGERY    Hx of blood clots     after one of his knee surgeries     Hyperlipidemia     Left knee pain 04/09/2014    Neuropathy     Teeth problem abcessw    Wears glasses         Past Surgical History:   Procedure Laterality Date    BUNIONECTOMY N/A 12/15/2021    AKIN OSTEOTOMY WITH BONE STAPLE CHEILECTOMY RIGHT FOOT performed by Ros Anne DPM at [place] [address] Right 09/16/2022    RIGHT FOOT BUNIONECTOMY WITH IMPLANT AND RIGHT SESMOIDECTOMY TIBIA- PETERSON   GAY performed by Renetta Gupta DPM at [place] [address] [address]ilateral     [address]  10/[address]    DENTAL SURGERY  03/2021    EYE SURGERY      bilat cataracts     FOOT SURGERY Right 12/15/2021    AKIN OSTEOTOMY WITH BONE STAPLE CHEILECTOMY RIGHT FOOT (N/A Foot)    FOOT SURGERY Right 04/06/2022    EXCISION SOFT TISSUE MASS PLANTAR 1ST METHEAD RIGHT performed by Ros Anne DPM at [place] [address] Right 09/30/2022    JOINT REPLACEMENT Left     KNEE  X 6 pt unsure of date    JOINT REPLACEMENT Right 07/20/2018    ROTATOR CUFF REPAIR Right     TONSILLECTOMY         Social History     Socioeconomic History    Marital status:       Spouse name: None    Number of children: None    Years of education: None    Highest education level: None   Occupational History    Occupation: retired   Tobacco Use    Smoking status: Never    Smokeless tobacco: Never   Vaping Use    Vaping Use: Never used   Substance and Sexual Activity    Alcohol use: No    Drug use: No    Sexual activity: Never     Social Determinants of Health     Financial Resource Strain: Low Risk     Difficulty of Paying Living Expenses: Not hard at all   Food Insecurity: No Food Insecurity    Worried About Running Out of Food in the Last Year: Never true    Ran Out of Food in the Last Year: Never true       Family History   Problem Relation Age of Onset    Diabetes Mother     No Known Problems Father        Allergies   Allergen Reactions    Oxycontin [Oxycodone Hcl] Nausea And Vomiting       Vitals:    11/17/22 1542   BP: 115/82   Pulse: 80   Temp: 97.3 °F (36.3 °C)   SpO2: 95% Current Outpatient Medications   Medication Sig Dispense Refill    PERCOCET 5-325 MG per tablet Take 1 tablet by mouth every 6 hours as needed for Pain for up to 30 days. 120 tablet 0    spironolactone (ALDACTONE) 50 MG tablet TAKE ONE TABLET BY MOUTH DAILY 90 tablet 1    gabapentin (NEURONTIN) 600 MG tablet TAKE ONE TABLET BY MOUTH THREE TIMES A DAY 90 tablet 1    diclofenac sodium (VOLTAREN) 1 % GEL Apply 2 g topically 2 times daily 120 g 2    melatonin 3 MG TABS tablet Take 6 mg by mouth nightly as needed (insomnia)       No current facility-administered medications for this encounter. Review of Systems   Constitutional: Negative. Negative for fever. HENT: Negative. Eyes: Negative. Respiratory: Negative. Cardiovascular: Negative. Musculoskeletal:  Positive for back pain. Objective:  General Appearance: In no acute distress, in pain and uncomfortable. Vital signs: (most recent): Blood pressure 115/82, pulse 80, temperature 97.3 °F (36.3 °C), height 5' 7\" (1.702 m), weight 210 lb (95.3 kg), SpO2 95 %. Vital signs are normal.  No fever. Output: Producing urine and producing stool. Lungs:  Normal effort. He is not in respiratory distress. Heart: Normal rate. Neurological: Patient is alert and oriented to person, place and time. Assessment & Plan    1. Lumbosacral spondylosis without myelopathy    2. Primary osteoarthritis of both knees    3. Chronic use of opiate drug for therapeutic purpose    4. Vertebrogenic low back pain        No orders of the defined types were placed in this encounter. Orders Placed This Encounter   Medications    PERCOCET 5-325 MG per tablet     Sig: Take 1 tablet by mouth every 6 hours as needed for Pain for up to 30 days.      Dispense:  120 tablet     Refill:  0     Reduce doses taken as pain becomes manageable      Controlled Substance Monitoring:    Acute and Chronic Pain Monitoring:   RX Monitoring 11/17/2022   Attestation - Acute Pain Prescriptions -   Periodic Controlled Substance Monitoring No signs of potential drug abuse or diversion identified. ;Possible medication side effects, risk of tolerance/dependence & alternative treatments discussed. ;Assessed functional status. Chronic Pain > 50 MEDD Obtained or confirmed \"Consent for Opioid Use\" on file.    Chronic Pain > 80 MEDD -                 Electronically signed by Saul Castro MD on 11/17/2022 at 4:20 PM

## 2022-11-17 NOTE — PROGRESS NOTES
The patient is a 67 y. o. Non- / non  male. Chief Complaint   Patient presents with    Back Pain     Follow up RFA    Knee Pain    Medication Refill        HPI    S/P: RFA      Outcome   Any improvement of activity?   Yes   Any side effects (appetite,leg cramping,facial fleshing): none   Increase of pain:  No  Pain score Today:  8  % of pain relief: 100% at first  Pain diary (medial branch block):      Medication Refill: Percocet  Pain score Today:  8  Adverse effects (Constipation / Nausea / Sedation / sexual Dysfunction / others) :  none  Mood: good  Sleep pattern and quality: poor  Activity level: fair    Pill count Today: 25  Last dose taken  11/17/2022 afternoon  OARRS report reviewed today: yes  ER/Hospitalizations/PCP visit related to pain since last visit:no   Any legal problems e.g. DUI etc.:No  Satisfied with current management: Yes    Opioid Contract: updated today  Last Urine Dug screen dated: 12/21/2021    Hemoglobin A1C   Date Value Ref Range Status   05/10/2022 6.1 (H) 4.0 - 6.0 % Final       Past Medical History, Past Surgical History, Social History, Allergies and Medications, reviewed and updated in EPIC as indicated      Past Medical History:   Diagnosis Date    Arthritis     History of blood transfusion     AFTER KNEE SURGERY    Hx of blood clots     after one of his knee surgeries     Hyperlipidemia     Left knee pain 04/09/2014    Neuropathy     Teeth problem     abcessw    Wears glasses         Past Surgical History:   Procedure Laterality Date    BUNIONECTOMY N/A 12/15/2021    AKIN OSTEOTOMY WITH BONE STAPLE CHEILECTOMY RIGHT FOOT performed by Boris George DPM at [address] Right 09/16/2022    RIGHT FOOT BUNIONECTOMY WITH IMPLANT AND RIGHT SESMOIDECTOMY TIBIA- PETERSON   GAY performed by Zara Ramirez DPM at [address]     [address]  10/2015    DENTAL SURGERY  03/2021    EYE SURGERY      bilat cataracts     FOOT SURGERY Right 12/15/2021    AKIN OSTEOTOMY WITH BONE STAPLE CHEILECTOMY RIGHT FOOT (N/A Foot)    FOOT SURGERY Right 04/06/2022    EXCISION SOFT TISSUE MASS PLANTAR 1ST METHEAD RIGHT performed by Boris George DPM at [place] [id number] Right 09/30/2022    JOINT REPLACEMENT Left     KNEE  X 6 pt unsure of date    JOINT REPLACEMENT Right 07/20/2018    ROTATOR CUFF REPAIR Right     TONSILLECTOMY         Social History     Socioeconomic History    Marital status:      Spouse name: None    Number of children: None    Years of education: None    Highest education level: None   Occupational History    Occupation: retired   Tobacco Use    Smoking status: Never    Smokeless tobacco: Never   Vaping Use    Vaping Use: Never used   Substance and Sexual Activity    Alcohol use: No    Drug use: No    Sexual activity: Never     Social Determinants of Health     Financial Resource Strain: Low Risk     Difficulty of Paying Living Expenses: Not hard at all   Food Insecurity: No Food Insecurity    Worried About Running Out of Food in the Last Year: Never true    Ran Out of Food in the Last Year: Never true       Family History   Problem Relation Age of Onset    Diabetes Mother     No Known Problems Father        Allergies   Allergen Reactions    Oxycontin [Oxycodone Hcl] Nausea And Vomiting       There were no vitals filed for this visit. Current Outpatient Medications   Medication Sig Dispense Refill    spironolactone (ALDACTONE) 50 MG tablet TAKE ONE TABLET BY MOUTH DAILY 90 tablet 1    PERCOCET 5-325 MG per tablet Take 1 tablet by mouth every 6 hours as needed for Pain for up to 30 days.  120 tablet 0    gabapentin (NEURONTIN) 600 MG tablet TAKE ONE TABLET BY MOUTH THREE TIMES A DAY 90 tablet 1    diclofenac sodium (VOLTAREN) 1 % GEL Apply 2 g topically 2 times daily 120 g 2    melatonin 3 MG TABS tablet Take 6 mg by mouth nightly as needed (insomnia)       No current facility-administered medications for this encounter. Review of Systems   Constitutional: Negative. HENT: Negative. Eyes: Negative. Respiratory: Negative. Cardiovascular: Negative. Musculoskeletal:  Positive for back pain. Objective:  Vital signs: (most recent): There were no vitals taken for this visit. Assessment & Plan  No diagnosis found. No orders of the defined types were placed in this encounter. No orders of the defined types were placed in this encounter.            Electronically signed by Wesley Anthony MA on 11/17/2022 at 3:27 PM

## 2022-11-18 ENCOUNTER — TELEPHONE (OUTPATIENT)
Dept: PAIN MANAGEMENT | Age: 72
End: 2022-11-18

## 2022-11-18 DIAGNOSIS — M17.0 PRIMARY OSTEOARTHRITIS OF BOTH KNEES: ICD-10-CM

## 2022-11-18 RX ORDER — OXYCODONE HYDROCHLORIDE AND ACETAMINOPHEN 5; 325 MG/1; MG/1
1 TABLET ORAL EVERY 6 HOURS PRN
Qty: 120 TABLET | Refills: 0 | Status: SHIPPED | OUTPATIENT
Start: 2022-11-22 | End: 2022-12-22

## 2022-12-01 ENCOUNTER — OFFICE VISIT (OUTPATIENT)
Dept: PODIATRY | Age: 72
End: 2022-12-01

## 2022-12-01 VITALS — BODY MASS INDEX: 32.96 KG/M2 | HEIGHT: 67 IN | WEIGHT: 210 LBS

## 2022-12-01 DIAGNOSIS — M79.671 RIGHT FOOT PAIN: ICD-10-CM

## 2022-12-01 DIAGNOSIS — Z98.890 POSTOPERATIVE STATE: Primary | ICD-10-CM

## 2022-12-01 NOTE — PROGRESS NOTES
[address]  [address] [facility] [address]  Dept: [phone]  Dept Fax: [phone]    POST-OP PROGRESS NOTE  Date of patient's visit: 12/1/2022  Patient's Name:  Eileen Lugo YOB: 1950            Patient Care Team:  Prasanan Aquino MD as PCP - General (Family Medicine)  Prasanna Aquino MD as PCP - [facility]  Latricia Severe, MD as Consulting Physician (Pain Management)  Jenelle Singh MD as Consulting Physician  Jo Robles DPM as Physician (Podiatry)        Chief Complaint   Patient presents with    Post-Op Check     11 wk post op     X-ray      Right foot xray       Pt's primary care physician is Prasanna Aquino MD last seen 10/06/2022     Subjective: Eileen Lugo is a 67 y.o. male who presents to the office today 11week(s)  S/P 1st MTPJ implant and sesamoind removal  for correction of   Problem List Items Addressed This Visit    None  Visit Diagnoses       Postoperative state    -  Primary    Relevant Orders    XR FOOT RIGHT (MIN 3 VIEWS)    Right foot pain        Relevant Orders    XR FOOT RIGHT (MIN 3 VIEWS)        . Patient relates pain is Present and improved. Pain is rated 1 out of 10 and is described as intermittent, mild, moderate. Currently denies F/C/N/V. Is patient taking pain medications as prescribed and is controlling pain    Physical Examination:  Incision is coapted, sutures/steri-strips are intact. Minimal bleeding post operatively. Edema present. No erythema. No Pus. Operative correction is satisfactory. Radiographs: right foot 3 views: intact implant to the 1st mtpj with no displacement no acute findings. Assessment: Eileen Lugo is status post as above  Normal post operative course. Doing well          ICD-10-CM    1. Postoperative state  Z98.890 XR FOOT RIGHT (MIN 3 VIEWS)      2.  Right foot pain  M79.671 XR FOOT RIGHT (MIN 3 VIEWS) Plan:  Patient examined and evaluated. Current condition and treatment options discussed in detail. Advised pt to his conditon. Xrays right foot 3 views show the above findigns. .  Verbal and written instructions given to patient. Orders:   Orders Placed This Encounter   Procedures    XR FOOT RIGHT (MIN 3 VIEWS)      Contact office with any questions/problems/concerns. RTC in 4week(s).      Electronically signed by Salima Camargo DPM on 12/1/2022 at 11:25 AM  12/1/2022

## 2022-12-20 ENCOUNTER — HOSPITAL ENCOUNTER (OUTPATIENT)
Dept: PAIN MANAGEMENT | Age: 72
Discharge: HOME | End: 2022-12-20
Payer: MEDICARE

## 2022-12-20 VITALS
HEIGHT: 67 IN | OXYGEN SATURATION: 95 % | WEIGHT: 210 LBS | BODY MASS INDEX: 32.96 KG/M2 | HEART RATE: 78 BPM | TEMPERATURE: 97.3 F | SYSTOLIC BLOOD PRESSURE: 109 MMHG | DIASTOLIC BLOOD PRESSURE: 82 MMHG

## 2022-12-20 DIAGNOSIS — Z51.81 MEDICATION MONITORING ENCOUNTER: ICD-10-CM

## 2022-12-20 DIAGNOSIS — Z98.890 S/P KNEE SURGERY: ICD-10-CM

## 2022-12-20 DIAGNOSIS — M47.817 LUMBOSACRAL SPONDYLOSIS WITHOUT MYELOPATHY: Primary | Chronic | ICD-10-CM

## 2022-12-20 DIAGNOSIS — Z79.891 CHRONIC USE OF OPIATE DRUG FOR THERAPEUTIC PURPOSE: ICD-10-CM

## 2022-12-20 DIAGNOSIS — Z98.890 S/P LEFT KNEE SURGERY: ICD-10-CM

## 2022-12-20 DIAGNOSIS — M17.11 PRIMARY OSTEOARTHRITIS OF RIGHT KNEE: ICD-10-CM

## 2022-12-20 DIAGNOSIS — M17.12 PRIMARY OSTEOARTHRITIS OF LEFT KNEE: ICD-10-CM

## 2022-12-20 DIAGNOSIS — M17.0 PRIMARY OSTEOARTHRITIS OF BOTH KNEES: ICD-10-CM

## 2022-12-20 DIAGNOSIS — Z51.81 ENCOUNTER FOR MEDICATION MONITORING: ICD-10-CM

## 2022-12-20 DIAGNOSIS — G89.29 CHRONIC PAIN OF LEFT KNEE: ICD-10-CM

## 2022-12-20 DIAGNOSIS — M17.0 OSTEOARTHRITIS OF BOTH KNEES, UNSPECIFIED OSTEOARTHRITIS TYPE: ICD-10-CM

## 2022-12-20 DIAGNOSIS — Z79.891 USE OF OPIATES FOR THERAPEUTIC PURPOSES: ICD-10-CM

## 2022-12-20 DIAGNOSIS — M25.562 CHRONIC PAIN OF LEFT KNEE: ICD-10-CM

## 2022-12-20 PROBLEM — M54.51 VERTEBROGENIC LOW BACK PAIN: Status: RESOLVED | Noted: 2021-05-10 | Resolved: 2022-12-20

## 2022-12-20 PROCEDURE — 99213 OFFICE O/P EST LOW 20 MIN: CPT

## 2022-12-20 PROCEDURE — G0481 DRUG TEST DEF 8-14 CLASSES: HCPCS

## 2022-12-20 PROCEDURE — 80307 DRUG TEST PRSMV CHEM ANLYZR: CPT

## 2022-12-20 PROCEDURE — 99214 OFFICE O/P EST MOD 30 MIN: CPT | Performed by: NURSE PRACTITIONER

## 2022-12-20 RX ORDER — GABAPENTIN 800 MG/1
800 TABLET ORAL 3 TIMES DAILY
Qty: 90 TABLET | Refills: 1 | Status: SHIPPED | OUTPATIENT
Start: 2022-12-20 | End: 2023-01-19

## 2022-12-20 RX ORDER — OXYCODONE HYDROCHLORIDE AND ACETAMINOPHEN 5; 325 MG/1; MG/1
1 TABLET ORAL EVERY 6 HOURS PRN
Qty: 120 TABLET | Refills: 0 | Status: SHIPPED | OUTPATIENT
Start: 2022-12-22 | End: 2023-01-21

## 2022-12-20 ASSESSMENT — ENCOUNTER SYMPTOMS
BACK PAIN: 1
BOWEL INCONTINENCE: 0
COUGH: 0
CONSTIPATION: 0
SHORTNESS OF BREATH: 0

## 2022-12-20 ASSESSMENT — PAIN SCALES - GENERAL: PAINLEVEL_OUTOF10: 7

## 2022-12-20 NOTE — PROGRESS NOTES
Chief Complaint   Patient presents with    Back Pain    Knee Pain    Medication Refill         PMH     Pt reports bilat. Knee pain with no known injury, and has had multiple surgeries/procedures on left knee but pain continues. He is opioid dependant for pain control. He has had PT in the past with no relief. Discussed genicular blocks but he declines. He also complains of back pain. MRI lumbar with multilevel degenerative disc disease. He completed PT for his back with some benefit. He has been using Voltaren gel with some relief. He had LESI 4/4/22 and reported 50% relief. Pt had lumbar RFA 11/22 with 50% improvement in axial lower back pain    Pending prior authorization for intercept procedure    HPI:     Back Pain  This is a chronic problem. The current episode started more than 1 year ago. The problem occurs constantly. The problem is unchanged. The pain is present in the lumbar spine. The quality of the pain is described as aching. The pain radiates to the left knee and right knee. The pain is at a severity of 7/10. The symptoms are aggravated by bending, sitting and standing. Associated symptoms include numbness. Pertinent negatives include no bowel incontinence, chest pain, fever, headaches, tingling or weakness. He has tried ice and heat for the symptoms. Knee Pain   The incident occurred more than 1 week ago. There was no injury mechanism. The pain is present in the left knee and right knee. The quality of the pain is described as aching, burning, cramping, shooting and stabbing. The pain is at a severity of 7/10. The pain has been Constant since onset. Associated symptoms include numbness. Pertinent negatives include no tingling. He reports no foreign bodies present. The symptoms are aggravated by movement and weight bearing. He has tried ice and heat for the symptoms. Patient denies any new neurological symptoms.  No bowel or bladder incontinence, no weakness, and no falling. Pill count: appropriate / Percocet - 9 12/21 prescription adjusted appropriately 12/22    Morphine equivalent:   30    Controlled Substance Monitoring:    Acute and Chronic Pain Monitoring:   RX Monitoring 12/20/2022   Attestation -   Acute Pain Prescriptions -   Periodic Controlled Substance Monitoring Possible medication side effects, risk of tolerance/dependence & alternative treatments discussed. ;No signs of potential drug abuse or diversion identified. ;Assessed functional status. ;Obtaining appropriate analgesic effect of treatment. Chronic Pain > 50 MEDD -   Chronic Pain > 80 MEDD -        Periodic Controlled Substance Monitoring: Possible medication side effects, risk of tolerance/dependence & alternative treatments discussed., No signs of potential drug abuse or diversion identified. , Assessed functional status., Obtaining appropriate analgesic effect of treatment.  Ameena Montemayor, APRN - CNP)      Past Medical History:   Diagnosis Date    Arthritis     History of blood transfusion     AFTER KNEE SURGERY    Hx of blood clots     after one of his knee surgeries     Hyperlipidemia     Left knee pain 04/09/2014    Neuropathy     Teeth problem     abcessw    Wears glasses        Past Surgical History:   Procedure Laterality Date    BUNIONECTOMY N/A 12/15/2021    AKIN OSTEOTOMY WITH BONE STAPLE CHEILECTOMY RIGHT FOOT performed by Priya Núñez DPM at [place] [address] Right 09/16/2022    RIGHT FOOT BUNIONECTOMY WITH IMPLANT AND RIGHT SESMOIDECTOMY TIBIA- PETERSON   GAY performed by Aquilino Cheatham DPM at [address] [place] [address] Bilateral     COLONOSCOPY      DENTAL SURGERY  10/2015    DENTAL SURGERY  03/2021    EYE SURGERY      bilat cataracts     FOOT SURGERY Right 12/15/2021    AKIN OSTEOTOMY WITH BONE STAPLE CHEILECTOMY RIGHT FOOT (N/A Foot)    FOOT SURGERY Right 04/06/2022    EXCISION SOFT TISSUE MASS PLANTAR 1ST METHEAD RIGHT performed by Priya Núñez DPM at [address] [place] [address] FOOT SURGERY Right 09/30/2022    JOINT REPLACEMENT Left     KNEE  X 6 pt unsure of date    JOINT REPLACEMENT Right 07/20/2018    ROTATOR CUFF REPAIR Right     TONSILLECTOMY         Allergies   Allergen Reactions    Oxycontin [Oxycodone Hcl] Nausea And Vomiting         Current Outpatient Medications:     PERCOCET 5-325 MG per tablet, Take 1 tablet by mouth every 6 hours as needed for Pain for up to 30 days. , Disp: 120 tablet, Rfl: 0    spironolactone (ALDACTONE) 50 MG tablet, TAKE ONE TABLET BY MOUTH DAILY, Disp: 90 tablet, Rfl: 1    gabapentin (NEURONTIN) 600 MG tablet, TAKE ONE TABLET BY MOUTH THREE TIMES A DAY, Disp: 90 tablet, Rfl: 1    diclofenac sodium (VOLTAREN) 1 % GEL, Apply 2 g topically 2 times daily, Disp: 120 g, Rfl: 2    melatonin 3 MG TABS tablet, Take 6 mg by mouth nightly as needed (insomnia), Disp: , Rfl:     Family History   Problem Relation Age of Onset    Diabetes Mother     No Known Problems Father        Social History     Socioeconomic History    Marital status:       Spouse name: Not on file    Number of children: Not on file    Years of education: Not on file    Highest education level: Not on file   Occupational History    Occupation: retired   Tobacco Use    Smoking status: Never    Smokeless tobacco: Never   Vaping Use    Vaping Use: Never used   Substance and Sexual Activity    Alcohol use: No    Drug use: No    Sexual activity: Never   Other Topics Concern    Not on file   Social History Narrative    Not on file     Social Determinants of Health     Financial Resource Strain: Low Risk     Difficulty of Paying Living Expenses: Not hard at all   Food Insecurity: No Food Insecurity    Worried About Running Out of Food in the Last Year: Never true    Ran Out of Food in the Last Year: Never true   Transportation Needs: Not on file   Physical Activity: Not on file   Stress: Not on file   Social Connections: Not on file   Intimate Partner Violence: Not on file   Housing Stability: Not on file       Review of Systems:  Review of Systems   Constitutional: Negative for chills and fever. Cardiovascular:  Negative for chest pain. Respiratory:  Negative for cough and shortness of breath. Musculoskeletal:  Positive for back pain. Gastrointestinal:  Negative for bowel incontinence and constipation. Neurological:  Positive for numbness. Negative for headaches, tingling and weakness. Physical Exam:  /82   Pulse 78   Temp 97.3 °F (36.3 °C)   Ht 5' 7\" (1.702 m)   Wt 210 lb (95.3 kg)   SpO2 95%   BMI 32.89 kg/m²     Physical Exam  Cardiovascular:      Rate and Rhythm: Normal rate. Pulmonary:      Effort: Pulmonary effort is normal.   Musculoskeletal:         General: Normal range of motion. Skin:     General: Skin is warm and dry. Neurological:      Mental Status: He is alert and oriented to person, place, and time. Record/Diagnostics Review:    Last dominga 12/21 and was appropriate     Assessment:  Problem List Items Addressed This Visit       Lumbosacral spondylosis without myelopathy - Primary (Chronic)    Chronic use of opiate drug for therapeutic purpose          Treatment Plan:  Patient relates current medications are helping the pain. Patient reports taking pain medications as prescribed, denies obtaining medications from different sources and denies use of illegal drugs. Medication risk and benefits have been discussed. Patient denies side effects from medications like nausea, vomiting, constipation or drowsiness. Patient reports current activities of daily living are possible due to medications and would like to continue them. As always, we encourage daily stretching and strengthening exercises, and recommend minimizing use of pain medications unless patient cannot get through daily activities due to pain. Due to the high risk nature of this patient's pain medication close monitoring is required.    Continue current medication management, pt has been stable and compliant. Script written for percocet and gabapentin increased to 800mg TID - pt denies side effects but feels it is not working as well for his leg pain  DOMINGO obtained today for monitoring purposes. Last dose of medication was  today   Follow up appointment made for 4 weeks    I have reviewed the chief complaint and history of present illness (including ROS and PFSH) and vital documentation by my staff and I agree with their documentation and have added where applicable.

## 2022-12-24 LAB
6-ACETYLMORPHINE, UR: NOT DETECTED
7-AMINOCLONAZEPAM, URINE: NOT DETECTED
ALPHA-OH-ALPRAZ, URINE: NOT DETECTED
ALPHA-OH-MIDAZOLAM, URINE: NOT DETECTED
ALPRAZOLAM, URINE: NOT DETECTED
AMPHETAMINES, URINE: NOT DETECTED
BARBITURATES, URINE: NOT DETECTED
BENZOYLECGONINE, UR: NOT DETECTED
BUPRENORPHINE URINE: NOT DETECTED
CARISOPRODOL, UR: NOT DETECTED
CLONAZEPAM, URINE: NOT DETECTED
CODEINE, URINE: NOT DETECTED
CREATININE URINE: 148.6 MG/DL (ref 20–400)
DIAZEPAM, URINE: NOT DETECTED
DRUGS EXPECTED, UR: NORMAL
EER HI RES INTERP UR: NORMAL
ETHYL GLUCURONIDE UR: NOT DETECTED
FENTANYL URINE: NOT DETECTED
GABAPENTIN: PRESENT
HYDROCODONE, URINE: NOT DETECTED
HYDROMORPHONE, URINE: NOT DETECTED
LORAZEPAM, URINE: NOT DETECTED
MARIJUANA METAB, UR: NOT DETECTED
MDA, UR: NOT DETECTED
MDEA, EVE, UR: NOT DETECTED
MDMA URINE: NOT DETECTED
MEPERIDINE METAB, UR: NOT DETECTED
METHADONE, URINE: NOT DETECTED
METHAMPHETAMINE, URINE: NOT DETECTED
METHYLPHENIDATE: NOT DETECTED
MIDAZOLAM, URINE: NOT DETECTED
MORPHINE URINE: NOT DETECTED
NALOXONE URINE: NOT DETECTED
NORBUPRENORPHINE, URINE: NOT DETECTED
NORDIAZEPAM, URINE: NOT DETECTED
NORFENTANYL, URINE: NOT DETECTED
NORHYDROCODONE, URINE: NOT DETECTED
NOROXYCODONE, URINE: PRESENT
NOROXYMORPHONE, URINE: PRESENT
OXAZEPAM, URINE: NOT DETECTED
OXYCODONE URINE: PRESENT
OXYMORPHONE, URINE: PRESENT
PAIN MANAGEMENT DRUG PANEL INTERP, URINE: NORMAL
PAIN MGT DRUG PANEL, HI RES, UR: NORMAL
PCP,URINE: NOT DETECTED
PHENTERMINE, UR: NOT DETECTED
PREGABALIN: NOT DETECTED
TAPENTADOL, URINE: NOT DETECTED
TAPENTADOL-O-SULFATE, URINE: NOT DETECTED
TEMAZEPAM, URINE: NOT DETECTED
TRAMADOL, URINE: NOT DETECTED
ZOLPIDEM METABOLITE (ZCA), URINE: NOT DETECTED
ZOLPIDEM, URINE: NOT DETECTED

## 2023-01-06 ENCOUNTER — OFFICE VISIT (OUTPATIENT)
Dept: INTERNAL MEDICINE CLINIC | Age: 73
End: 2023-01-06

## 2023-01-06 VITALS
DIASTOLIC BLOOD PRESSURE: 82 MMHG | RESPIRATION RATE: 16 BRPM | BODY MASS INDEX: 34.69 KG/M2 | HEIGHT: 67 IN | TEMPERATURE: 97.1 F | OXYGEN SATURATION: 95 % | WEIGHT: 221 LBS | HEART RATE: 74 BPM | SYSTOLIC BLOOD PRESSURE: 130 MMHG

## 2023-01-06 DIAGNOSIS — F39 MOOD DISORDER (HCC): ICD-10-CM

## 2023-01-06 DIAGNOSIS — M79.7 FIBROMYALGIA: ICD-10-CM

## 2023-01-06 DIAGNOSIS — M47.817 LUMBOSACRAL SPONDYLOSIS WITHOUT MYELOPATHY: Chronic | ICD-10-CM

## 2023-01-06 DIAGNOSIS — I10 ESSENTIAL HYPERTENSION: ICD-10-CM

## 2023-01-06 DIAGNOSIS — R73.03 PRE-DIABETES: ICD-10-CM

## 2023-01-06 DIAGNOSIS — Z79.891 USE OF OPIATES FOR THERAPEUTIC PURPOSES: ICD-10-CM

## 2023-01-06 DIAGNOSIS — M17.11 PRIMARY OSTEOARTHRITIS OF RIGHT KNEE: ICD-10-CM

## 2023-01-06 DIAGNOSIS — N18.30 STAGE 3 CHRONIC KIDNEY DISEASE, UNSPECIFIED WHETHER STAGE 3A OR 3B CKD (HCC): ICD-10-CM

## 2023-01-06 DIAGNOSIS — Z98.890 S/P KNEE SURGERY: ICD-10-CM

## 2023-01-06 DIAGNOSIS — Z51.81 MEDICATION MONITORING ENCOUNTER: ICD-10-CM

## 2023-01-06 DIAGNOSIS — M25.562 CHRONIC PAIN OF LEFT KNEE: ICD-10-CM

## 2023-01-06 DIAGNOSIS — M17.0 PRIMARY OSTEOARTHRITIS OF BOTH KNEES: ICD-10-CM

## 2023-01-06 DIAGNOSIS — Z79.891 CHRONIC USE OF OPIATE DRUG FOR THERAPEUTIC PURPOSE: ICD-10-CM

## 2023-01-06 DIAGNOSIS — G89.29 CHRONIC PAIN OF LEFT KNEE: ICD-10-CM

## 2023-01-06 DIAGNOSIS — F51.04 PSYCHOPHYSIOLOGICAL INSOMNIA: ICD-10-CM

## 2023-01-06 DIAGNOSIS — L98.9 DERMATOSIS: Primary | ICD-10-CM

## 2023-01-06 DIAGNOSIS — M20.21 ACQUIRED HALLUX RIGIDUS, RIGHT: ICD-10-CM

## 2023-01-06 DIAGNOSIS — Z51.81 ENCOUNTER FOR MEDICATION MONITORING: ICD-10-CM

## 2023-01-06 DIAGNOSIS — Z23 NEED FOR INFLUENZA VACCINATION: ICD-10-CM

## 2023-01-06 DIAGNOSIS — M17.12 PRIMARY OSTEOARTHRITIS OF LEFT KNEE: ICD-10-CM

## 2023-01-06 DIAGNOSIS — Z98.890 S/P LEFT KNEE SURGERY: ICD-10-CM

## 2023-01-06 DIAGNOSIS — M17.0 OSTEOARTHRITIS OF BOTH KNEES, UNSPECIFIED OSTEOARTHRITIS TYPE: ICD-10-CM

## 2023-01-06 DIAGNOSIS — E78.5 DYSLIPIDEMIA: ICD-10-CM

## 2023-01-06 RX ORDER — GABAPENTIN 600 MG/1
TABLET ORAL
Qty: 90 TABLET | Refills: 1 | OUTPATIENT
Start: 2023-01-06

## 2023-01-06 ASSESSMENT — PATIENT HEALTH QUESTIONNAIRE - PHQ9
2. FEELING DOWN, DEPRESSED OR HOPELESS: 0
SUM OF ALL RESPONSES TO PHQ QUESTIONS 1-9: 0
1. LITTLE INTEREST OR PLEASURE IN DOING THINGS: 0
SUM OF ALL RESPONSES TO PHQ9 QUESTIONS 1 & 2: 0
SUM OF ALL RESPONSES TO PHQ QUESTIONS 1-9: 0

## 2023-01-06 ASSESSMENT — ENCOUNTER SYMPTOMS
GASTROINTESTINAL NEGATIVE: 1
BACK PAIN: 1
RESPIRATORY NEGATIVE: 1
ALLERGIC/IMMUNOLOGIC NEGATIVE: 1
EYES NEGATIVE: 1

## 2023-01-06 NOTE — PROGRESS NOTES
Subjective:      Patient ID: Leo Pacheco is a 67 y.o. male. Rash  This is a chronic problem. The current episode started more than 1 month ago. The problem is unchanged. Location: Anterior neck. The rash is characterized by dryness, itchiness and redness. It is unknown if there was an exposure to a precipitant. Past treatments include nothing. The treatment provided no relief. Review of Systems   Constitutional: Negative. HENT: Negative. Eyes: Negative. Respiratory: Negative. Cardiovascular: Negative. Gastrointestinal: Negative. Endocrine: Negative. Musculoskeletal:  Positive for arthralgias and back pain. Skin:  Positive for rash. Allergic/Immunologic: Negative. Neurological: Negative. Hematological: Negative. Psychiatric/Behavioral: Negative. Past family and social history unremarkable. Diagnosis Orders   1. Dermatosis        2. Lumbosacral spondylosis without myelopathy        3. Mood disorder (HCC)        4. Stage 3 chronic kidney disease, unspecified whether stage 3a or 3b CKD ([facility] [address]        5. Acquired hallux rigidus, right        6. Chronic use of opiate drug for therapeutic purpose        7. Pre-diabetes        8. Dyslipidemia        9. Essential hypertension        10. Psychophysiological insomnia        11. Fibromyalgia            Objective:   Physical Exam  Vitals and nursing note reviewed. Constitutional:       Appearance: He is well-developed. Comments: Increased BMI   HENT:      Head: Normocephalic and atraumatic. Right Ear: External ear normal.      Left Ear: External ear normal.      Nose: Nose normal.   Eyes:      Conjunctiva/sclera: Conjunctivae normal.      Pupils: Pupils are equal, round, and reactive to light. Cardiovascular:      Rate and Rhythm: Normal rate and regular rhythm. Heart sounds: Normal heart sounds.       Comments: Hypertension  Dyslipidemia  Pulmonary:      Effort: Pulmonary effort is normal.      Breath sounds: Normal breath sounds. Abdominal:      General: Bowel sounds are normal.      Palpations: Abdomen is soft. Genitourinary:     Comments: CKD 3  Musculoskeletal:         General: Normal range of motion. Cervical back: Normal range of motion and neck supple. Comments: Hallux valgus status post surgery. He is established with podiatry on nextvalgus hallux    Skin:     General: Skin is warm and dry. Comments: Dermatosis/xerosis cutis anterior neck   Neurological:      Mental Status: He is alert and oriented to person, place, and time. Deep Tendon Reflexes: Reflexes are normal and symmetric. Comments: Fibromyalgia   Psychiatric:         Thought Content: Thought content normal.      Comments: Anxiety/depression       Assessment:       Diagnosis Orders   1. Dermatosis        2. Lumbosacral spondylosis without myelopathy        3. Mood disorder (HCC)        4. Stage 3 chronic kidney disease, unspecified whether stage 3a or 3b CKD ([facility] [address]        5. Acquired hallux rigidus, right        6. Chronic use of opiate drug for therapeutic purpose        7. Pre-diabetes        8. Dyslipidemia        9. Essential hypertension        10. Psychophysiological insomnia        11. Fibromyalgia                Plan:      Dermatosis/cirrhosis anterior neck. Moderate skin breakdown. Avoid aggressive itching. He is placed on topical steroid alternating with Vaseline. Cool compresses  Status post hallux valgus surgery. He is complaining of pain symptom and deformity. He is advised to follow-up with podiatry. Chronic pain syndrome opiate dependent, gabapentin 800 mg 3 times daily as provided by pain management. Avoid constipation  CKD with creatinine of 1.10. Avoid nephrotoxic drugs, anti-inflammatory radiocontrast  Hypertension well-controlled to Aldactone that he is tolerating well. Consume less than 2 g of salt a day  Increased BMI.   He would benefit from low-fat high-fiber diet, daily moderate exercise, lifestyle change and weight reduction to keep BMI around 25  Fibromyalgia. Continue gabapentin  Insomnia. .  Stress reduction is advised, lose weight, continue melatonin  He denies excessive alcohol or illicit drug use or smoking  Med list and available labs reviewed, discussed with patient, questions answered  Call for any concern  This note is created with a voice recognition program and while intend to generate a document that accurately reflects the content of the visit, no guarantee can be provided that every mistake has been identified and corrected by editing.             Alis Rocha MD

## 2023-01-17 ENCOUNTER — OFFICE VISIT (OUTPATIENT)
Dept: PODIATRY | Age: 73
End: 2023-01-17
Payer: MEDICARE

## 2023-01-17 ENCOUNTER — HOSPITAL ENCOUNTER (OUTPATIENT)
Dept: PAIN MANAGEMENT | Age: 73
Discharge: HOME | End: 2023-01-17
Payer: MEDICARE

## 2023-01-17 VITALS
DIASTOLIC BLOOD PRESSURE: 84 MMHG | RESPIRATION RATE: 20 BRPM | SYSTOLIC BLOOD PRESSURE: 120 MMHG | HEART RATE: 79 BPM | OXYGEN SATURATION: 96 % | TEMPERATURE: 97.2 F

## 2023-01-17 VITALS — HEIGHT: 67 IN | BODY MASS INDEX: 34.69 KG/M2 | WEIGHT: 221 LBS

## 2023-01-17 DIAGNOSIS — M79.671 RIGHT FOOT PAIN: Primary | ICD-10-CM

## 2023-01-17 DIAGNOSIS — G89.29 CHRONIC PAIN OF LEFT KNEE: ICD-10-CM

## 2023-01-17 DIAGNOSIS — Z51.81 ENCOUNTER FOR MEDICATION MONITORING: ICD-10-CM

## 2023-01-17 DIAGNOSIS — Z98.890 S/P LEFT KNEE SURGERY: ICD-10-CM

## 2023-01-17 DIAGNOSIS — Z79.891 USE OF OPIATES FOR THERAPEUTIC PURPOSES: ICD-10-CM

## 2023-01-17 DIAGNOSIS — M17.11 PRIMARY OSTEOARTHRITIS OF RIGHT KNEE: ICD-10-CM

## 2023-01-17 DIAGNOSIS — M25.562 CHRONIC PAIN OF LEFT KNEE: ICD-10-CM

## 2023-01-17 DIAGNOSIS — G89.29 CHRONIC PAIN OF BOTH KNEES: ICD-10-CM

## 2023-01-17 DIAGNOSIS — M25.562 CHRONIC PAIN OF BOTH KNEES: ICD-10-CM

## 2023-01-17 DIAGNOSIS — Z79.891 CHRONIC USE OF OPIATE DRUG FOR THERAPEUTIC PURPOSE: ICD-10-CM

## 2023-01-17 DIAGNOSIS — M17.12 PRIMARY OSTEOARTHRITIS OF LEFT KNEE: ICD-10-CM

## 2023-01-17 DIAGNOSIS — M47.817 LUMBOSACRAL SPONDYLOSIS WITHOUT MYELOPATHY: Primary | Chronic | ICD-10-CM

## 2023-01-17 DIAGNOSIS — M17.0 PRIMARY OSTEOARTHRITIS OF BOTH KNEES: ICD-10-CM

## 2023-01-17 DIAGNOSIS — M17.0 OSTEOARTHRITIS OF BOTH KNEES, UNSPECIFIED OSTEOARTHRITIS TYPE: ICD-10-CM

## 2023-01-17 DIAGNOSIS — Z98.890 S/P KNEE SURGERY: ICD-10-CM

## 2023-01-17 DIAGNOSIS — Z51.81 MEDICATION MONITORING ENCOUNTER: ICD-10-CM

## 2023-01-17 DIAGNOSIS — M25.561 CHRONIC PAIN OF BOTH KNEES: ICD-10-CM

## 2023-01-17 PROBLEM — M16.0 PRIMARY OSTEOARTHRITIS OF BOTH HIPS: Status: ACTIVE | Noted: 2022-12-18

## 2023-01-17 PROCEDURE — 1123F ACP DISCUSS/DSCN MKR DOCD: CPT | Performed by: PODIATRIST

## 2023-01-17 PROCEDURE — 3017F COLORECTAL CA SCREEN DOC REV: CPT | Performed by: PODIATRIST

## 2023-01-17 PROCEDURE — 99214 OFFICE O/P EST MOD 30 MIN: CPT | Performed by: PODIATRIST

## 2023-01-17 PROCEDURE — G8417 CALC BMI ABV UP PARAM F/U: HCPCS | Performed by: PODIATRIST

## 2023-01-17 PROCEDURE — 99213 OFFICE O/P EST LOW 20 MIN: CPT

## 2023-01-17 PROCEDURE — G8484 FLU IMMUNIZE NO ADMIN: HCPCS | Performed by: PODIATRIST

## 2023-01-17 PROCEDURE — 99213 OFFICE O/P EST LOW 20 MIN: CPT | Performed by: NURSE PRACTITIONER

## 2023-01-17 PROCEDURE — 1036F TOBACCO NON-USER: CPT | Performed by: PODIATRIST

## 2023-01-17 PROCEDURE — G8427 DOCREV CUR MEDS BY ELIG CLIN: HCPCS | Performed by: PODIATRIST

## 2023-01-17 RX ORDER — PREDNISONE 10 MG/1
TABLET ORAL
Qty: 28 TABLET | Refills: 0 | Status: SHIPPED | OUTPATIENT
Start: 2023-01-17

## 2023-01-17 RX ORDER — GABAPENTIN 800 MG/1
800 TABLET ORAL 3 TIMES DAILY
Qty: 90 TABLET | Refills: 1 | Status: SHIPPED | OUTPATIENT
Start: 2023-01-17 | End: 2023-02-16

## 2023-01-17 RX ORDER — OXYCODONE HYDROCHLORIDE AND ACETAMINOPHEN 5; 325 MG/1; MG/1
1 TABLET ORAL EVERY 6 HOURS PRN
Qty: 120 TABLET | Refills: 0 | Status: SHIPPED | OUTPATIENT
Start: 2023-01-21 | End: 2023-02-20

## 2023-01-17 ASSESSMENT — PAIN SCALES - GENERAL: PAINLEVEL_OUTOF10: 6

## 2023-01-17 ASSESSMENT — ENCOUNTER SYMPTOMS
BACK PAIN: 1
SHORTNESS OF BREATH: 0
CONSTIPATION: 0
COUGH: 0

## 2023-01-17 NOTE — PROGRESS NOTES
Chief Complaint   Patient presents with    Knee Pain    Foot Pain        [facility]     Pt reports bilat. Knee pain with no known injury, and has had multiple surgeries/procedures on left knee but pain continues. He had appt with ortho last month and given biofreeze to try  He has had PT in the past with no relief. Discussed genicular blocks but he declines. He also complains of back pain. MRI lumbar with multilevel degenerative disc disease. He completed PT for his back with some benefit. He has been using Voltaren gel with some relief. He had LESI 4/4/22 and reported 50% relief. Pt had lumbar RFA 11/22 with 50% improvement in axial lower back pain    Pending prior authorization for intercept procedure requested in Aug - case was closed d/t needing pt auth. Knee Pain   The incident occurred more than 1 week ago. The injury mechanism was an inversion injury. The pain is present in the right knee and left knee. The quality of the pain is described as aching. The pain is at a severity of 6/10. The pain is moderate. The pain has been Constant since onset. Associated symptoms include muscle weakness, numbness and tingling. Pertinent negatives include no inability to bear weight. Possible foreign bodies include metal. The symptoms are aggravated by movement and weight bearing. He has tried acetaminophen, elevation, ice, heat, non-weight bearing, immobilization, NSAIDs and rest for the symptoms. Foot Pain   The pain is present in the right foot. This is a chronic problem. The current episode started more than 1 year ago. The problem occurs constantly. The problem has been unchanged. The quality of the pain is described as burning and sharp. The pain is at a severity of 6/10. The pain is moderate. Associated symptoms include numbness and tingling. Pertinent negatives include no fever or inability to bear weight. The symptoms are aggravated by heat, contact and standing.  He has tried rest and oral narcotics for the symptoms. Family history does not include gout or rheumatoid arthritis. There is no history of diabetes, gout or rheumatoid arthritis. Patient denies any new neurological symptoms. No bowel or bladder incontinence, no weakness, and no falling. Pill count: Percocet 15 1/21    Morphine equivalent: 30    Controlled Substance Monitoring:    Acute and Chronic Pain Monitoring:   RX Monitoring 1/17/2023   Attestation -   Acute Pain Prescriptions -   Periodic Controlled Substance Monitoring Possible medication side effects, risk of tolerance/dependence & alternative treatments discussed. ;No signs of potential drug abuse or diversion identified. ;Assessed functional status. ;Obtaining appropriate analgesic effect of treatment. Chronic Pain > 50 MEDD -   Chronic Pain > 80 MEDD -          Periodic Controlled Substance Monitoring: Possible medication side effects, risk of tolerance/dependence & alternative treatments discussed., No signs of potential drug abuse or diversion identified. , Assessed functional status., Obtaining appropriate analgesic effect of treatment.  Octavia Brooks, APRN - CNP)      Past Medical History:   Diagnosis Date    Arthritis     History of blood transfusion     AFTER KNEE SURGERY    Hx of blood clots     after one of his knee surgeries     Hyperlipidemia     Left knee pain 04/09/2014    Neuropathy     Teeth problem     abcessw    Wears glasses        Past Surgical History:   Procedure Laterality Date    BUNIONECTOMY N/A 12/15/2021    AKIN OSTEOTOMY WITH BONE STAPLE CHEILECTOMY RIGHT FOOT performed by Meena Castaneda DPM at [facility] Right 09/16/2022    RIGHT FOOT BUNIONECTOMY WITH IMPLANT AND RIGHT SESMOIDECTOMY TIBIA- PETERSON   GAY performed by Wu Redman DPM at [address] [place] [address] Bilateral     [address]  10/2015    DENTAL SURGERY  03/2021    EYE SURGERY      bilat cataracts     FOOT SURGERY Right 12/15/2021 AKIN OSTEOTOMY WITH BONE STAPLE CHEILECTOMY RIGHT FOOT (N/A Foot)    FOOT SURGERY Right 04/06/2022    EXCISION SOFT TISSUE MASS PLANTAR 1ST METHEAD RIGHT performed by Pavel Her DPM at [place] [address] Right 09/30/2022    JOINT REPLACEMENT Left     KNEE  X 6 pt unsure of date    JOINT REPLACEMENT Right 07/20/2018    ROTATOR CUFF REPAIR Right     TONSILLECTOMY         Allergies   Allergen Reactions    Oxycontin [Oxycodone Hcl] Nausea And Vomiting         Current Outpatient Medications:     PERCOCET 5-325 MG per tablet, Take 1 tablet by mouth every 6 hours as needed for Pain for up to 30 days. , Disp: 120 tablet, Rfl: 0    gabapentin (NEURONTIN) 800 MG tablet, Take 1 tablet by mouth 3 times daily for 30 days. TAKE ONE TABLET BY MOUTH THREE TIMES A DAY, Disp: 90 tablet, Rfl: 1    spironolactone (ALDACTONE) 50 MG tablet, TAKE ONE TABLET BY MOUTH DAILY, Disp: 90 tablet, Rfl: 1    diclofenac sodium (VOLTAREN) 1 % GEL, Apply 2 g topically 2 times daily, Disp: 120 g, Rfl: 2    melatonin 3 MG TABS tablet, Take 6 mg by mouth nightly as needed (insomnia), Disp: , Rfl:     Family History   Problem Relation Age of Onset    Diabetes Mother     No Known Problems Father        Social History     Socioeconomic History    Marital status:       Spouse name: Not on file    Number of children: Not on file    Years of education: Not on file    Highest education level: Not on file   Occupational History    Occupation: retired   Tobacco Use    Smoking status: Never    Smokeless tobacco: Never   Vaping Use    Vaping Use: Never used   Substance and Sexual Activity    Alcohol use: No    Drug use: No    Sexual activity: Never   Other Topics Concern    Not on file   Social History Narrative    Not on file     Social Determinants of Health     Financial Resource Strain: Low Risk     Difficulty of Paying Living Expenses: Not hard at all   Food Insecurity: No Food Insecurity    Worried About [employer] in the Last Year: Never true    Ran Out of Food in the Last Year: Never true   Transportation Needs: Not on file   Physical Activity: Not on file   Stress: Not on file   Social Connections: Not on file   Intimate Partner Violence: Not on file   Housing Stability: Not on file       Review of Systems:  Review of Systems   Constitutional: Negative for chills and fever. Cardiovascular:  Negative for chest pain. Respiratory:  Negative for cough and shortness of breath. Musculoskeletal:  Positive for back pain. Negative for gout. Gastrointestinal:  Negative for constipation. Neurological:  Positive for numbness and tingling. Physical Exam:  /84   Pulse 79   Temp 97.2 °F (36.2 °C)   Resp 20   SpO2 96%     Physical Exam  Cardiovascular:      Rate and Rhythm: Normal rate. Pulmonary:      Effort: Pulmonary effort is normal.   Musculoskeletal:         General: Normal range of motion. Skin:     General: Skin is warm and dry. Neurological:      Mental Status: He is alert and oriented to person, place, and time. Record/Diagnostics Review:    Last dominga 12/22 and was appropriate     Assessment:  Problem List Items Addressed This Visit       Lumbosacral spondylosis without myelopathy - Primary (Chronic)    Chronic use of opiate drug for therapeutic purpose    Chronic pain of both knees     Other Visit Diagnoses       Primary osteoarthritis of both knees        Encounter for medication monitoring        Medication monitoring encounter        S/P knee surgery -Multiple        Primary osteoarthritis of right knee        Primary osteoarthritis of left knee        Chronic pain of left knee        Use of opiates for therapeutic purposes        Osteoarthritis of both knees, unspecified osteoarthritis type        S/P left knee surgery                   Treatment Plan:  Patient relates current medications are helping the pain.  Patient reports taking pain medications as prescribed, denies obtaining medications from different sources and denies use of illegal drugs. Medication risk and benefits have been discussed. Patient denies side effects from medications like nausea, vomiting, constipation or drowsiness. Patient reports current activities of daily living are possible due to medications and would like to continue them. As always, we encourage daily stretching and strengthening exercises, and recommend minimizing use of pain medications unless patient cannot get through daily activities due to pain. Due to the high risk nature of this patient's pain medication close monitoring is required. Continue current medication management, pt has been stable and compliant. Script written for percocet  Office to reach out for status re intracept   Follow up appointment made for 4 weeks    I have reviewed the chief complaint and history of present illness (including ROS and PFSH) and vital documentation by my staff and I agree with their documentation and have added where applicable.

## 2023-01-18 NOTE — PROGRESS NOTES
[address] PODIATRY [facility]  [address]  [address]  Dept: [phone]  Dept Fax: [phone]    RETURN PATIENT PROGRESS NOTE  Date of patient's visit: 1/18/2023  Patient's Name:  Iesha Fuentes YOB: 1950            Patient Care Team:  Luciana Halsted, MD as PCP - General (Family Medicine)  Luciana Halsted, MD as PCP - [facility]  Manasa Mao MD as Consulting Physician (Pain Management)  Flaquita Cooper MD as Consulting Physician  Sam Jimenez DPM as Physician (Podiatry)       Iesha Fuentes 67 y.o. male that presents for follow-up of   Chief Complaint   Patient presents with    Follow-up     Possible injection     Foot Pain     Right foot pain with xrays today     Pt's primary care physician is Luciana Halsted, MD last seen 01/06/2023  Symptoms began 6 month(s) ago and are decreased . Patient relates pain is Present. Pain is rated 1 out of 10 and is described as intermittent, mild. Treatments prior to today's visit include: sesamoidectomy with implant . Currently denies F/C/N/V. Allergies   Allergen Reactions    Oxycontin [Oxycodone Hcl] Nausea And Vomiting       Past Medical History:   Diagnosis Date    Arthritis     History of blood transfusion     AFTER KNEE SURGERY    Hx of blood clots     after one of his knee surgeries     Hyperlipidemia     Left knee pain 04/09/2014    Neuropathy     Teeth problem     abcessw    Wears glasses        Prior to Admission medications    Medication Sig Start Date End Date Taking? Authorizing Provider   PERCOCET 5-325 MG per tablet Take 1 tablet by mouth every 6 hours as needed for Pain for up to 30 days. 1/21/23 2/20/23 Yes Tati Bonilla APRN - CNP   gabapentin (NEURONTIN) 800 MG tablet Take 1 tablet by mouth 3 times daily for 30 days.  TAKE ONE TABLET BY MOUTH THREE TIMES A DAY 1/17/23 2/16/23 Yes Tati Bonilla APRN - CNP   predniSONE (Gwendolyn Augustus) 10 MG tablet Take one PO BID x 14 days 1/17/23  Yes Timothy Parmar DPM   spironolactone (ALDACTONE) 50 MG tablet TAKE ONE TABLET BY MOUTH DAILY 11/1/22  Yes Ashlyn Combs MD   diclofenac sodium (VOLTAREN) 1 % GEL Apply 2 g topically 2 times daily 2/23/22  Yes Shmuel Abraham APRN - CNP   melatonin 3 MG TABS tablet Take 6 mg by mouth nightly as needed (insomnia)   Yes Historical Provider, MD       Review of Systems    Review of Systems:  History obtained from chart review and the patient  General ROS: negative for - chills, fatigue, fever, night sweats or weight gain  Constitutional: Negative for chills, diaphoresis, fatigue, fever and unexpected weight change. Musculoskeletal: Positive for arthralgias, gait problem and joint swelling. Neurological ROS: negative for - behavioral changes, confusion, headaches or seizures. Negative for weakness and numbness. Dermatological ROS: negative for - mole changes, rash  Cardiovascular: Negative for leg swelling. Gastrointestinal: Negative for constipation, diarrhea, nausea and vomiting. Lower Extremity Physical Examination:     Vitals: There were no vitals filed for this visit. General: AAO x 3 in NAD. Dermatologic Exam:  Skin lesion/ulceration Absent . Skin No rashes or nodules noted. .       Musculoskeletal:     1st MPJ ROM decreased, Bilateral.  Muscle strength 5/5, Bilateral.  Pain present upon palpation of right foot plantar 1st metatarsal head . Medial longitudinal arch, Bilateral WNL.   Ankle ROM WNL,Bilateral.    Dorsally contracted digits absent digits 1-5 Bilateral.     Vascular: DP and PT pulses palpable 2/4, Bilateral.  CFT <3 seconds, Bilateral.  Hair growth present to the level of the digits, Bilateral.  Edema absent, Bilateral.  Varicosities absent, Bilateral. Erythema absent, Bilateral    Neurological: Sensation intact to light touch to level of digits, Bilateral.  Protective sensation intact 10/10 sites via 5.07/10g Carlton-Kristen Monofilament, Bilateral.  negative Tinel's, Bilateral.  negative Valleix sign, Bilateral.      Integument: Warm, dry, supple, Bilateral.  Open lesion absent, Bilateral.  Interdigital maceration absent to web spaces 1-4, Bilateral.  Nails are normal in length, thickness and color 1-5 bilateral.  Fissures absent, Bilateral.     X rays right foot 3 views:  intact implant with resected sesamoid tibial .    Asessment: Patient is a 67 y.o. male with:   1. Right foot pain        Plan: Patient examined and evaluated. Current condition and treatment options discussed in detail. Advised pt to his conditon. Rx provided for prednisone to take as directed. Xrays right foot 3 views show the above results and was personally reivewed with the pt  . Verbal and written instructions given to patient. Contact office with any questions/problems/concerns. All labs were reviewed and all imagining including the above findings were reviewed PRIOR to the patients arrival and with the patient today. Previous patient encounter was reviewed. Encounters from the patients other medical providers were reviewed and noted. Time was spent educating the patient and their families/caregivers on proper care of the feet and ankles. All the above diagnosis were addressed at todays visit and all questions were answered. A total of 30 minutes was spent with this patients encounter which included charting after the patients visit    Orders Placed This Encounter   Procedures    XR FOOT RIGHT (MIN 3 VIEWS)     Orders Placed This Encounter   Medications    predniSONE (DELTASONE) 10 MG tablet     Sig: Take one PO BID x 14 days     Dispense:  28 tablet     Refill:  0        RTC in 4week(s).     1/17/2023      Electronically signed by Nahun Regan DPM on 1/18/2023 at 9:29 AM  1/17/2023

## 2023-02-14 ENCOUNTER — OFFICE VISIT (OUTPATIENT)
Dept: PODIATRY | Age: 73
End: 2023-02-14
Payer: MEDICARE

## 2023-02-14 VITALS — BODY MASS INDEX: 33.27 KG/M2 | HEIGHT: 67 IN | WEIGHT: 212 LBS

## 2023-02-14 DIAGNOSIS — M79.671 RIGHT FOOT PAIN: Primary | ICD-10-CM

## 2023-02-14 DIAGNOSIS — M19.071 PRIMARY OSTEOARTHRITIS OF RIGHT FOOT: ICD-10-CM

## 2023-02-14 PROCEDURE — 99214 OFFICE O/P EST MOD 30 MIN: CPT | Performed by: PODIATRIST

## 2023-02-14 PROCEDURE — G8417 CALC BMI ABV UP PARAM F/U: HCPCS | Performed by: PODIATRIST

## 2023-02-14 PROCEDURE — 1123F ACP DISCUSS/DSCN MKR DOCD: CPT | Performed by: PODIATRIST

## 2023-02-14 PROCEDURE — 3017F COLORECTAL CA SCREEN DOC REV: CPT | Performed by: PODIATRIST

## 2023-02-14 PROCEDURE — 1036F TOBACCO NON-USER: CPT | Performed by: PODIATRIST

## 2023-02-14 PROCEDURE — G8427 DOCREV CUR MEDS BY ELIG CLIN: HCPCS | Performed by: PODIATRIST

## 2023-02-14 PROCEDURE — G8484 FLU IMMUNIZE NO ADMIN: HCPCS | Performed by: PODIATRIST

## 2023-02-14 NOTE — PROGRESS NOTES
lg  [address] [facility] PODIATRY [facility]  [address] [id number]  Dept: [phone]  Dept Fax: [phone]    RETURN PATIENT PROGRESS NOTE  Date of patient's visit: 2/14/2023  Patient's Name:  Francisco Parks YOB: 1950            Patient Care Team:  Dilan Crawford MD as PCP - General (Family Medicine)  Dilan Crawford MD as PCP - Empaneled Provider  Javon Gaffney MD as Consulting Physician (Pain Management)  Tete Anderson MD as Consulting Physician  Juan Ruiz DPM as Physician (Podiatry)       Francisco Parks 67 y.o. male that presents for follow-up of   Chief Complaint   Patient presents with    Foot Pain     Right foot     Pt's primary care physician is Dilan Crawford MD last seen January 6 2023  Symptoms began 7 month(s) ago and are increased . Patient relates pain is Present. Pain is rated 7 out of 10 and is described as intermittent. Treatments prior to today's visit include: previous podiatry treatment, sesamoidectomy with implant. Pt states he has pain to the mid foot and this is new. Pt has no pain to the great toe and states that the pain before the surgery is different then before the surgery Currently denies F/C/N/V. Allergies   Allergen Reactions    Oxycontin [Oxycodone Hcl] Nausea And Vomiting       Past Medical History:   Diagnosis Date    Arthritis     History of blood transfusion     AFTER KNEE SURGERY    Hx of blood clots     after one of his knee surgeries     Hyperlipidemia     Left knee pain 04/09/2014    Neuropathy     Teeth problem     abcessw    Wears glasses        Prior to Admission medications    Medication Sig Start Date End Date Taking? Authorizing Provider   PERCOCET 5-325 MG per tablet Take 1 tablet by mouth every 6 hours as needed for Pain for up to 30 days.  1/21/23 2/20/23 Yes QUIQUE Ness - CNP   gabapentin (NEURONTIN) 800 MG tablet Take 1 tablet by mouth 3 times daily for 30 days. TAKE ONE TABLET BY MOUTH THREE TIMES A DAY 1/17/23 2/16/23 Yes QUIQUE Arias CNP   predniSONE (DELTASONE) 10 MG tablet Take one PO BID x 14 days 1/17/23  Yes Timothy Parmar DPM   spironolactone (ALDACTONE) 50 MG tablet TAKE ONE TABLET BY MOUTH DAILY 11/1/22  Yes Ashlyn Combs MD   diclofenac sodium (VOLTAREN) 1 % GEL Apply 2 g topically 2 times daily 2/23/22  Yes QUIQUE Vallejo CNP   melatonin 3 MG TABS tablet Take 6 mg by mouth nightly as needed (insomnia)   Yes Historical Provider, MD       Review of Systems    Review of Systems:  History obtained from chart review and the patient  General ROS: negative for - chills, fatigue, fever, night sweats or weight gain  Constitutional: Negative for chills, diaphoresis, fatigue, fever and unexpected weight change. Musculoskeletal: Positive for arthralgias, gait problem and joint swelling. Neurological ROS: negative for - behavioral changes, confusion, headaches or seizures. Negative for weakness and numbness. Dermatological ROS: negative for - mole changes, rash  Cardiovascular: Negative for leg swelling. Gastrointestinal: Negative for constipation, diarrhea, nausea and vomiting. Lower Extremity Physical Examination:     Vitals: There were no vitals filed for this visit. General: AAO x 3 in NAD. Dermatologic Exam:  Skin lesion/ulceration Absent . Skin No rashes or nodules noted. .       Musculoskeletal:     1st MPJ ROM decreased, Bilateral.  Muscle strength 5/5, Bilateral.  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 Medial longitudinal arch, Bilateral WNL.   Ankle ROM WNL,Bilateral.    Dorsally contracted digits absent digits 1-5 Bilateral.     Vascular: DP and PT pulses palpable 2/4, Bilateral.  CFT <3 seconds, Bilateral.  Hair growth present to the level of the digits, Bilateral.  Edema absent, Bilateral.  Varicosities absent, Bilateral. Erythema absent, Bilateral    Neurological: Sensation intact to light touch to level of digits, Bilateral.  Protective sensation intact 10/10 sites via 5.07/10g Pickens-Kristen Monofilament, Bilateral.  negative Tinel's, Bilateral.  negative Valleix sign, Bilateral.      Integument: Warm, dry, supple, Bilateral.  Open lesion absent, Bilateral.  Interdigital maceration absent to web spaces 1-4, Bilateral.  Nails are normal in length, thickness and color 1-5 bilateral.  Fissures absent, Bilateral.       Xrays right foot 3 views:  djd of the midfoot with dorsal spurring of the midtarsal joints. Implant has no signs of back out or SOI  Asessment: Patient is a 67 y.o. male with:    Diagnosis Orders   1. Right foot pain  XR FOOT RIGHT (MIN 3 VIEWS)    CT FOOT RIGHT WO CONTRAST      2. Primary osteoarthritis of right foot              Plan: Patient examined and evaluated. Current condition and treatment options discussed in detail. Advised pt to his conditon and the right foot x rays 3 views    With the DJD of the midfoot I will order a CT Scan to see if there is a joint I can use with an injection to help with the pain. All labs were reviewed and all imagining including the above findings were reviewed PRIOR to the patients arrival and with the patient today. Previous patient encounter was reviewed. Encounters from the patients other medical providers were reviewed and noted. Time was spent educating the patient and their families/caregivers on proper care of the feet and ankles. All the above diagnosis were addressed at todays visit and all questions were answered. A total of 30 minutes was spent with this patients encounter which included charting after the patients visit  . Verbal and written instructions given to patient. Contact office with any questions/problems/concerns. No orders of the defined types were placed in this encounter.     No orders of the defined types were placed in this encounter. RTC in 2week(s).     2/14/2023      Electronically signed by Wooga AVA Ruzi on 2/14/2023 at 9:26 AM  2/14/2023

## 2023-02-16 ENCOUNTER — HOSPITAL ENCOUNTER (OUTPATIENT)
Dept: PAIN MANAGEMENT | Age: 73
Discharge: HOME | End: 2023-02-16
Payer: MEDICARE

## 2023-02-16 VITALS
DIASTOLIC BLOOD PRESSURE: 71 MMHG | SYSTOLIC BLOOD PRESSURE: 125 MMHG | RESPIRATION RATE: 20 BRPM | OXYGEN SATURATION: 95 % | TEMPERATURE: 97.6 F | HEART RATE: 82 BPM

## 2023-02-16 DIAGNOSIS — Z79.891 CHRONIC USE OF OPIATE DRUG FOR THERAPEUTIC PURPOSE: ICD-10-CM

## 2023-02-16 DIAGNOSIS — M47.817 LUMBOSACRAL SPONDYLOSIS WITHOUT MYELOPATHY: Primary | Chronic | ICD-10-CM

## 2023-02-16 DIAGNOSIS — M17.0 PRIMARY OSTEOARTHRITIS OF BOTH KNEES: ICD-10-CM

## 2023-02-16 PROCEDURE — 99213 OFFICE O/P EST LOW 20 MIN: CPT

## 2023-02-16 PROCEDURE — 99213 OFFICE O/P EST LOW 20 MIN: CPT | Performed by: NURSE PRACTITIONER

## 2023-02-16 RX ORDER — OXYCODONE HYDROCHLORIDE AND ACETAMINOPHEN 5; 325 MG/1; MG/1
1 TABLET ORAL EVERY 6 HOURS PRN
Qty: 120 TABLET | Refills: 0 | Status: SHIPPED | OUTPATIENT
Start: 2023-02-21 | End: 2023-03-23

## 2023-02-16 ASSESSMENT — ENCOUNTER SYMPTOMS
CONSTIPATION: 0
SHORTNESS OF BREATH: 0
COUGH: 0
BACK PAIN: 1

## 2023-02-16 NOTE — PROGRESS NOTES
Chief Complaint   Patient presents with    Knee Pain    Foot Pain    Medication Refill         PMH   Pt reports bilat. Knee pain with no known injury, and has had multiple surgeries/procedures on left knee but pain continues. He had appt with ortho last month and given biofreeze to try  He has had PT in the past with no relief. Discussed genicular blocks but he declines. He also complains of back pain. MRI lumbar with multilevel degenerative disc disease. He completed PT for his back with some benefit. He has been using Voltaren gel with some relief. He had LESI 4/4/22 and reported 50% relief. Pt had lumbar RFA 11/22 with 50% improvement in axial lower back pain    Pending prior authorization for intracept procedure requested in Aug - case was closed d/t pt not responding to the email sent to him regarding the procedure from [employer] will work on getting this re-opened    Knee Pain   The incident occurred more than 1 week ago. The injury mechanism is unknown. The pain is present in the right knee and left knee. The quality of the pain is described as aching. The pain is at a severity of 7/10. The pain is severe (left knee; between moderate and severe). The pain has been Constant since onset. Associated symptoms include a loss of motion, muscle weakness and numbness. Pertinent negatives include no tingling. Associated symptoms comments: Right knee numb  . He reports no foreign bodies present. The symptoms are aggravated by movement and weight bearing. He has tried ice and heat for the symptoms. Foot Pain   The pain is present in the right foot. This is a chronic problem. The current episode started more than 1 year ago. The problem occurs constantly. The problem has been unchanged. The quality of the pain is described as aching. The pain is at a severity of 8/10. The pain is severe. Associated symptoms include numbness. Pertinent negatives include no fever or tingling.  The symptoms are aggravated by activity. He has tried cold, heat and oral narcotics for the symptoms. Patient denies any new neurological symptoms. No bowel or bladder incontinence, no weakness, and no falling. Pill count: appropriate / Aundra Milligan due 2/20 - will fill 2/21    Morphine equivalent: 30    Controlled Substance Monitoring:    Acute and Chronic Pain Monitoring:   RX Monitoring 2/16/2023   Attestation -   Acute Pain Prescriptions -   Periodic Controlled Substance Monitoring Possible medication side effects, risk of tolerance/dependence & alternative treatments discussed. ;No signs of potential drug abuse or diversion identified.;Obtaining appropriate analgesic effect of treatment.    Chronic Pain > 50 MEDD -   Chronic Pain > 80 MEDD -            Past Medical History:   Diagnosis Date    Arthritis     History of blood transfusion     AFTER KNEE SURGERY    Hx of blood clots     after one of his knee surgeries     Hyperlipidemia     Left knee pain 04/09/2014    Neuropathy     Teeth problem     abcessw    Wears glasses        Past Surgical History:   Procedure Laterality Date    BUNIONECTOMY N/A 12/15/2021    AKIN OSTEOTOMY WITH BONE STAPLE CHEILECTOMY RIGHT FOOT performed by Cecil Bernardo DPM at [place] [address] Right 09/16/2022    RIGHT FOOT BUNIONECTOMY WITH IMPLANT AND RIGHT SESMOIDECTOMY TIBIA- PETERSON   GAY performed by Libby Easton DPM at [address] [place] [address] Bilateral     [address]  10/2015    DENTAL SURGERY  03/2021    EYE SURGERY      bilat cataracts     FOOT SURGERY Right 12/15/2021    AKIN OSTEOTOMY WITH BONE STAPLE CHEILECTOMY RIGHT FOOT (N/A Foot)    FOOT SURGERY Right 04/06/2022    EXCISION SOFT TISSUE MASS PLANTAR 1ST METHEAD RIGHT performed by Cecil Bernardo DPM at [address] [place] [address] Right 09/30/2022    JOINT REPLACEMENT Left     KNEE  X 6 pt unsure of date    JOINT REPLACEMENT Right 07/20/2018    ROTATOR CUFF REPAIR Right TONSILLECTOMY         Allergies   Allergen Reactions    Oxycontin [Oxycodone Hcl] Nausea And Vomiting         Current Outpatient Medications:     diclofenac sodium (VOLTAREN) 1 % GEL, Apply 4 g topically 4 times daily, Disp: 350 g, Rfl: 1    PERCOCET 5-325 MG per tablet, Take 1 tablet by mouth every 6 hours as needed for Pain for up to 30 days. , Disp: 120 tablet, Rfl: 0    gabapentin (NEURONTIN) 800 MG tablet, Take 1 tablet by mouth 3 times daily for 30 days. TAKE ONE TABLET BY MOUTH THREE TIMES A DAY, Disp: 90 tablet, Rfl: 1    predniSONE (DELTASONE) 10 MG tablet, Take one PO BID x 14 days, Disp: 28 tablet, Rfl: 0    spironolactone (ALDACTONE) 50 MG tablet, TAKE ONE TABLET BY MOUTH DAILY, Disp: 90 tablet, Rfl: 1    diclofenac sodium (VOLTAREN) 1 % GEL, Apply 2 g topically 2 times daily, Disp: 120 g, Rfl: 2    melatonin 3 MG TABS tablet, Take 6 mg by mouth nightly as needed (insomnia), Disp: , Rfl:     Family History   Problem Relation Age of Onset    Diabetes Mother     No Known Problems Father        Social History     Socioeconomic History    Marital status:       Spouse name: Not on file    Number of children: Not on file    Years of education: Not on file    Highest education level: Not on file   Occupational History    Occupation: retired   Tobacco Use    Smoking status: Never    Smokeless tobacco: Never   Vaping Use    Vaping Use: Never used   Substance and Sexual Activity    Alcohol use: No    Drug use: No    Sexual activity: Never   Other Topics Concern    Not on file   Social History Narrative    Not on file     Social Determinants of Health     Financial Resource Strain: Low Risk     Difficulty of Paying Living Expenses: Not hard at all   Food Insecurity: No Food Insecurity    Worried About Running Out of Food in the Last Year: Never true    Ran Out of Food in the Last Year: Never true   Transportation Needs: Not on file   Physical Activity: Not on file   Stress: Not on file   Social Connections: Not on file   Intimate Partner Violence: Not on file   Housing Stability: Not on file       Review of Systems:  Review of Systems   Constitutional: Negative for chills and fever. Cardiovascular:  Negative for chest pain and palpitations. Respiratory:  Negative for cough and shortness of breath. Musculoskeletal:  Positive for back pain and joint pain. Gastrointestinal:  Negative for constipation. Neurological:  Positive for numbness. Negative for disturbances in coordination, loss of balance and tingling. Physical Exam:  /71   Pulse 82   Temp 97.6 °F (36.4 °C)   Resp 20   SpO2 95%     Physical Exam  HENT:      Head: Normocephalic. Pulmonary:      Effort: Pulmonary effort is normal.   Musculoskeletal:         General: Normal range of motion. Cervical back: Normal range of motion. Lumbar back: Tenderness present. Skin:     General: Skin is warm and dry. Neurological:      Mental Status: He is alert and oriented to person, place, and time. Record/Diagnostics Review:    Last dominga 12/2022 and was appropriate     Assessment:  Problem List Items Addressed This Visit       Lumbosacral spondylosis without myelopathy - Primary (Chronic)    Relevant Medications    PERCOCET 5-325 MG per tablet (Start on 2/21/2023)    Primary osteoarthritis of both knees    Relevant Medications    PERCOCET 5-325 MG per tablet (Start on 2/21/2023)    Chronic use of opiate drug for therapeutic purpose          Treatment Plan:  Patient relates current medications are helping the pain. Patient reports taking pain medications as prescribed, denies obtaining medications from different sources and denies use of illegal drugs. Medication risk and benefits have been discussed. Patient denies side effects from medications like nausea, vomiting, constipation or drowsiness. Patient reports current activities of daily living are possible due to medications and would like to continue them.      As always, we encourage daily stretching and strengthening exercises, and recommend minimizing use of pain medications unless patient cannot get through daily activities due to pain. Due to the high risk nature of this patient's pain medication close monitoring is required. Continue current medication management, pt has been stable and compliant. Script written for jes Cruz is working on getting Intracept case re-opened   Follow up appointment made for 4 weeks    I have reviewed the chief complaint and history of present illness (including ROS and PFSH) and vital documentation by my staff and I agree with their documentation and have added where applicable.

## 2023-02-17 ENCOUNTER — HOSPITAL ENCOUNTER (OUTPATIENT)
Dept: CT IMAGING | Age: 73
End: 2023-02-17
Payer: MEDICARE

## 2023-02-17 DIAGNOSIS — M79.671 RIGHT FOOT PAIN: ICD-10-CM

## 2023-02-17 PROCEDURE — 73700 CT LOWER EXTREMITY W/O DYE: CPT

## 2023-02-22 ENCOUNTER — TELEPHONE (OUTPATIENT)
Dept: PAIN MANAGEMENT | Age: 73
End: 2023-02-22

## 2023-02-23 DIAGNOSIS — M47.817 LUMBOSACRAL SPONDYLOSIS WITHOUT MYELOPATHY: Primary | ICD-10-CM

## 2023-02-23 RX ORDER — OXYCODONE HYDROCHLORIDE AND ACETAMINOPHEN 5; 325 MG/1; MG/1
1 TABLET ORAL EVERY 6 HOURS PRN
Qty: 120 TABLET | Refills: 0 | Status: SHIPPED | OUTPATIENT
Start: 2023-02-23 | End: 2023-03-25

## 2023-02-28 ENCOUNTER — OFFICE VISIT (OUTPATIENT)
Dept: PODIATRY | Age: 73
End: 2023-02-28
Payer: MEDICARE

## 2023-02-28 VITALS — BODY MASS INDEX: 33.27 KG/M2 | WEIGHT: 212 LBS | HEIGHT: 67 IN

## 2023-02-28 DIAGNOSIS — M19.071 PRIMARY OSTEOARTHRITIS OF RIGHT FOOT: ICD-10-CM

## 2023-02-28 DIAGNOSIS — M79.671 RIGHT FOOT PAIN: Primary | ICD-10-CM

## 2023-02-28 PROCEDURE — 1036F TOBACCO NON-USER: CPT | Performed by: PODIATRIST

## 2023-02-28 PROCEDURE — 3017F COLORECTAL CA SCREEN DOC REV: CPT | Performed by: PODIATRIST

## 2023-02-28 PROCEDURE — G8417 CALC BMI ABV UP PARAM F/U: HCPCS | Performed by: PODIATRIST

## 2023-02-28 PROCEDURE — G8484 FLU IMMUNIZE NO ADMIN: HCPCS | Performed by: PODIATRIST

## 2023-02-28 PROCEDURE — G8427 DOCREV CUR MEDS BY ELIG CLIN: HCPCS | Performed by: PODIATRIST

## 2023-02-28 PROCEDURE — 1123F ACP DISCUSS/DSCN MKR DOCD: CPT | Performed by: PODIATRIST

## 2023-02-28 PROCEDURE — 99213 OFFICE O/P EST LOW 20 MIN: CPT | Performed by: PODIATRIST

## 2023-02-28 NOTE — PROGRESS NOTES
[address] PODIATRY [facility]  [address] [address]  Dept: [phone]  Dept Fax: [phone]    RETURN PATIENT PROGRESS NOTE  Date of patient's visit: 2/28/2023  Patient's Name:  Fredis Jin YOB: 1950            Patient Care Team:  Herb Rogers MD as PCP - General (Family Medicine)  Herb Rogers MD as PCP - Empaneled Provider  Lissette Call MD as Consulting Physician (Pain Management)  Niko Bryant MD as Consulting Physician  Luis Alberto Coffman DPM as Physician (Podiatry)       Fredis Jin 67 y.o. male that presents for follow-up of   Chief Complaint   Patient presents with    Foot Pain     Right foot pain     Pt's primary care physician is Herb Rogers MD last seen 01/06/2023  Symptoms began 7 month(s) ago and are unchanged . Patient relates pain is Present. Pain is rated 1 out of 10 and is described as intermittent, mild, moderate. Treatments prior to today's visit include: previous injection to the right foot. .  Currently denies F/C/N/V. Allergies   Allergen Reactions    Oxycontin [Oxycodone Hcl] Nausea And Vomiting       Past Medical History:   Diagnosis Date    Arthritis     History of blood transfusion     AFTER KNEE SURGERY    Hx of blood clots     after one of his knee surgeries     Hyperlipidemia     Left knee pain 04/09/2014    Neuropathy     Teeth problem     abcessw    Wears glasses        Prior to Admission medications    Medication Sig Start Date End Date Taking? Authorizing Provider   oxyCODONE-acetaminophen (PERCOCET) 5-325 MG per tablet Take 1 tablet by mouth every 6 hours as needed for Pain for up to 30 days.  Take lowest dose possible to manage pain Max Daily Amount: 4 tablets 2/23/23 3/25/23 Yes QUIQUE Campoverde - CNP   diclofenac sodium (VOLTAREN) 1 % GEL Apply 4 g topically 4 times daily 2/14/23  Yes Luis Alberto Coffman DPM   predniSONE (DELTASONE) 10 MG tablet Take one PO BID x 14 days 1/17/23  Yes Uzma Leon DPM   spironolactone (ALDACTONE) 50 MG tablet TAKE ONE TABLET BY MOUTH DAILY 11/1/22  Yes Yahaira Cat MD   diclofenac sodium (VOLTAREN) 1 % GEL Apply 2 g topically 2 times daily 2/23/22  Yes QUIQUE Kuhn CNP   melatonin 3 MG TABS tablet Take 6 mg by mouth nightly as needed (insomnia)   Yes Historical Provider, MD   gabapentin (NEURONTIN) 800 MG tablet Take 1 tablet by mouth 3 times daily for 30 days. TAKE ONE TABLET BY MOUTH THREE TIMES A DAY 1/17/23 2/16/23  QUIQUE Nicholas - CNP       Review of Systems    Review of Systems:  History obtained from chart review and the patient  General ROS: negative for - chills, fatigue, fever, night sweats or weight gain  Constitutional: Negative for chills, diaphoresis, fatigue, fever and unexpected weight change. Musculoskeletal: Positive for arthralgias, gait problem and joint swelling. Neurological ROS: negative for - behavioral changes, confusion, headaches or seizures. Negative for weakness and numbness. Dermatological ROS: negative for - mole changes, rash  Cardiovascular: Negative for leg swelling. Gastrointestinal: Negative for constipation, diarrhea, nausea and vomiting. Lower Extremity Physical Examination:     Vitals: There were no vitals filed for this visit. General: AAO x 3 in NAD. Dermatologic Exam:  Skin lesion/ulceration Absent . Skin No rashes or nodules noted. .       Musculoskeletal:     Muscle strength 5/5, Bilateral.  Pain present upon palpation of right foot at the plantar 1st mtpj. There is exellent  ROM of the hallux . Medial longitudinal arch, Bilateral WNL.   Ankle ROM WNL,Bilateral.    Dorsally contracted digits absent digits 1-5 Bilateral.     Vascular: DP and PT pulses palpable 2/4, Bilateral.  CFT <3 seconds, Bilateral.  Hair growth present to the level of the digits, Bilateral.  Edema absent, Bilateral.  Varicosities absent, Bilateral. Erythema absent, Bilateral    Neurological: Sensation intact to light touch to level of digits, Bilateral.  Protective sensation intact 10/10 sites via 5.07/10g Closplint-Kristen Monofilament, Bilateral.  negative Tinel's, Bilateral.  negative Valleix sign, Bilateral.      Integument: Warm, dry, supple, Bilateral.  Open lesion absent, Bilateral.  Interdigital maceration absent to web spaces 1-4, Bilateral.  Nails are normal in length, thickness and color 1-5 bilateral.  Fissures absent, Bilateral.     Asessment: Patient is a 67 y.o. male with:    Diagnosis Orders   1. Right foot pain        2. Primary osteoarthritis of right foot              Plan: Patient examined and evaluated. Current condition and treatment options discussed in detail. Advised pt to her condition. Pt to continue to use his orthtiocs and work on the ROM of the hallux. His FHL tendon needs to stretch . Pt to work on deep tissue massage to the arch pt to see Pain managmeent for his nerve pain that may be radiating from his lumbar spine  . Verbal and written instructions given to patient. Contact office with any questions/problems/concerns. No orders of the defined types were placed in this encounter. No orders of the defined types were placed in this encounter. RTC in 4week(s)    All labs were reviewed and all imagining including the above findings were reviewed PRIOR to the patients arrival and with the patient today. Previous patient encounter was reviewed. Encounters from the patients other medical providers were reviewed and noted. I personally interpreted the imaging and labs and discussed the results with the patient Time was spent educating the patient and their families/caregivers on proper care of the feet and ankles. All the above diagnosis were addressed at todays visit and all questions were answered. A total of 20 minutes was spent with this patients encounter which included charting after the patients visit  . 2/28/2023      Electronically signed by Meliza Wong DPM on 2/28/2023 at 9:25 AM  2/28/2023

## 2023-03-14 ENCOUNTER — OFFICE VISIT (OUTPATIENT)
Dept: PODIATRY | Age: 73
End: 2023-03-14
Payer: MEDICARE

## 2023-03-14 VITALS — BODY MASS INDEX: 33.27 KG/M2 | HEIGHT: 67 IN | WEIGHT: 212 LBS

## 2023-03-14 DIAGNOSIS — M79.671 RIGHT FOOT PAIN: Primary | ICD-10-CM

## 2023-03-14 PROCEDURE — G8427 DOCREV CUR MEDS BY ELIG CLIN: HCPCS | Performed by: PODIATRIST

## 2023-03-14 PROCEDURE — G8417 CALC BMI ABV UP PARAM F/U: HCPCS | Performed by: PODIATRIST

## 2023-03-14 PROCEDURE — 1123F ACP DISCUSS/DSCN MKR DOCD: CPT | Performed by: PODIATRIST

## 2023-03-14 PROCEDURE — 3017F COLORECTAL CA SCREEN DOC REV: CPT | Performed by: PODIATRIST

## 2023-03-14 PROCEDURE — G8484 FLU IMMUNIZE NO ADMIN: HCPCS | Performed by: PODIATRIST

## 2023-03-14 PROCEDURE — 1036F TOBACCO NON-USER: CPT | Performed by: PODIATRIST

## 2023-03-14 PROCEDURE — 99213 OFFICE O/P EST LOW 20 MIN: CPT | Performed by: PODIATRIST

## 2023-03-14 NOTE — PROGRESS NOTES
[address] [facility] PODIATRY [facility]  [address] [address]  Dept: [phone]  Dept Fax: [phone]    RETURN PATIENT PROGRESS NOTE  Date of patient's visit: 3/14/2023  Patient's Name:  Dayday Barnes YOB: 1950            Patient Care Team:  Olga Nesbitt MD as PCP - General (Family Medicine)  Olga Nesbitt MD as PCP - Empaneled Provider  Alexis Avila MD as Consulting Physician (Pain Management)  Ramila Farooq MD as Consulting Physician  Breonna Rivero DPM as Physician (Podiatry)       Dayday Barnes 67 y.o. male that presents for follow-up of   Chief Complaint   Patient presents with    Foot Pain     Right foot     Pt's primary care physician is Olga Nesbitt MD last seen 01/06/2023  Symptoms began 7 month(s) ago and are unchanged . Patient relates pain is Present. Pain is rated 1 out of 10 and is described as intermittent, mild, moderate, severe. Treatments prior to today's visit include: previous implant to the great toe . Currently denies F/C/N/V. Allergies   Allergen Reactions    Oxycontin [Oxycodone Hcl] Nausea And Vomiting       Past Medical History:   Diagnosis Date    Arthritis     History of blood transfusion     AFTER KNEE SURGERY    Hx of blood clots     after one of his knee surgeries     Hyperlipidemia     Left knee pain 04/09/2014    Neuropathy     Teeth problem     abcessw    Wears glasses        Prior to Admission medications    Medication Sig Start Date End Date Taking? Authorizing Provider   oxyCODONE-acetaminophen (PERCOCET) 5-325 MG per tablet Take 1 tablet by mouth every 6 hours as needed for Pain for up to 30 days.  Take lowest dose possible to manage pain Max Daily Amount: 4 tablets 2/23/23 3/25/23 Yes QUIQUE Sullivan - CNP   diclofenac sodium (VOLTAREN) 1 % GEL Apply 4 g topically 4 times daily 2/14/23  Yes Breonna Rivero DPM   predniSONE (DELTASONE) 10 MG

## 2023-03-23 ENCOUNTER — HOSPITAL ENCOUNTER (OUTPATIENT)
Dept: PAIN MANAGEMENT | Age: 73
Discharge: HOME | End: 2023-03-23
Payer: MEDICARE

## 2023-03-23 VITALS
BODY MASS INDEX: 33.27 KG/M2 | SYSTOLIC BLOOD PRESSURE: 123 MMHG | OXYGEN SATURATION: 96 % | HEIGHT: 67 IN | WEIGHT: 212 LBS | HEART RATE: 76 BPM | DIASTOLIC BLOOD PRESSURE: 86 MMHG

## 2023-03-23 DIAGNOSIS — M47.817 LUMBOSACRAL SPONDYLOSIS WITHOUT MYELOPATHY: ICD-10-CM

## 2023-03-23 DIAGNOSIS — M17.0 OSTEOARTHRITIS OF BOTH KNEES, UNSPECIFIED OSTEOARTHRITIS TYPE: ICD-10-CM

## 2023-03-23 DIAGNOSIS — M17.0 PRIMARY OSTEOARTHRITIS OF BOTH KNEES: ICD-10-CM

## 2023-03-23 DIAGNOSIS — Z98.890 S/P LEFT KNEE SURGERY: ICD-10-CM

## 2023-03-23 DIAGNOSIS — Z98.890 S/P KNEE SURGERY: ICD-10-CM

## 2023-03-23 DIAGNOSIS — Z51.81 MEDICATION MONITORING ENCOUNTER: ICD-10-CM

## 2023-03-23 DIAGNOSIS — M25.562 CHRONIC PAIN OF LEFT KNEE: ICD-10-CM

## 2023-03-23 DIAGNOSIS — M17.12 PRIMARY OSTEOARTHRITIS OF LEFT KNEE: ICD-10-CM

## 2023-03-23 DIAGNOSIS — Z51.81 ENCOUNTER FOR MEDICATION MONITORING: ICD-10-CM

## 2023-03-23 DIAGNOSIS — Z79.891 CHRONIC USE OF OPIATE DRUG FOR THERAPEUTIC PURPOSE: Primary | ICD-10-CM

## 2023-03-23 DIAGNOSIS — Z79.891 USE OF OPIATES FOR THERAPEUTIC PURPOSES: ICD-10-CM

## 2023-03-23 DIAGNOSIS — M17.11 PRIMARY OSTEOARTHRITIS OF RIGHT KNEE: ICD-10-CM

## 2023-03-23 DIAGNOSIS — G89.29 CHRONIC PAIN OF LEFT KNEE: ICD-10-CM

## 2023-03-23 PROCEDURE — 99215 OFFICE O/P EST HI 40 MIN: CPT | Performed by: ANESTHESIOLOGY

## 2023-03-23 PROCEDURE — 99213 OFFICE O/P EST LOW 20 MIN: CPT

## 2023-03-23 RX ORDER — GABAPENTIN 800 MG/1
800 TABLET ORAL 3 TIMES DAILY
Qty: 90 TABLET | Refills: 1 | Status: SHIPPED | OUTPATIENT
Start: 2023-03-23 | End: 2023-04-22

## 2023-03-23 RX ORDER — OXYCODONE HYDROCHLORIDE AND ACETAMINOPHEN 5; 325 MG/1; MG/1
1 TABLET ORAL EVERY 6 HOURS PRN
Qty: 120 TABLET | Refills: 0 | Status: SHIPPED | OUTPATIENT
Start: 2023-03-23 | End: 2023-04-22

## 2023-03-23 ASSESSMENT — ENCOUNTER SYMPTOMS
VOMITING: 0
NAUSEA: 0
DIARRHEA: 0
CONSTIPATION: 0
BACK PAIN: 1

## 2023-03-23 NOTE — PROGRESS NOTES
Lungs:  Normal effort and normal respiratory rate. He is not in respiratory distress. Heart: Normal rate. Extremities: Normal range of motion. There is no deformity. Neurological: Patient is alert and oriented to person, place and time. Patient has normal coordination. Pupils:  Pupils are equal, round, and reactive to light. Pupils are equal.   Skin:  Warm and dry. No rash or cyanosis.    Lumbar spine examination  No apparent deformity on inspection  Range of motion moderately limited  Tenderness to palpation in lumbar region midline  Gait stable  Motor strength 5/5 in both lower extremities    Assessment & Plan  Chronic multisite body pain  Identified pain location in low back and knee pain    Previous knee surgical history  On chronic opioid therapy  Finds the medication helpful  Inadequate pain relief  Denies any illicit substance use  No side effects from the medication  Daily morphine equivalent 30  Automated prescription report reviewed  Safe use of medication discussed    Back pain is the major pain issue  Located in the lumbar area in midline  Pain aggravated with bending and lifting  Pain is constant describes it as aching that becomes sharp with bending  No dermatomal radiation in leg  No changes in bladder or bowel control  No previous lumbar spine surgical history  Had recent MRI lumbar spine  Images reviewed independently  Findings discussed with patient  Multilevel severe degenerative disc changes with Modic type I and type II changes involving L2 L3-L4-L5 vertebrae    We have discussed Intracept procedure for basivertebral nerve ablation  Information material provided  Will sign informed consent  We will submit for prior authorization    MRI lumbar spine  Report is reviewed  Images were reviewed independently  Finding discussed in detail with patient  Severe multilevel lumbar degenerative disc disease with near complete loss of disc height at L2-L3-L4 disc and severe Modic changes

## 2023-04-06 ENCOUNTER — OFFICE VISIT (OUTPATIENT)
Dept: INTERNAL MEDICINE CLINIC | Age: 73
End: 2023-04-06

## 2023-04-06 ENCOUNTER — TELEPHONE (OUTPATIENT)
Dept: INTERNAL MEDICINE CLINIC | Age: 73
End: 2023-04-06

## 2023-04-06 VITALS
WEIGHT: 214 LBS | RESPIRATION RATE: 16 BRPM | HEIGHT: 67 IN | OXYGEN SATURATION: 98 % | TEMPERATURE: 97.2 F | HEART RATE: 79 BPM | SYSTOLIC BLOOD PRESSURE: 112 MMHG | BODY MASS INDEX: 33.59 KG/M2 | DIASTOLIC BLOOD PRESSURE: 82 MMHG

## 2023-04-06 DIAGNOSIS — M79.7 FIBROMYALGIA: ICD-10-CM

## 2023-04-06 DIAGNOSIS — Z12.11 COLON CANCER SCREENING: ICD-10-CM

## 2023-04-06 DIAGNOSIS — N52.01 ERECTILE DYSFUNCTION DUE TO ARTERIAL INSUFFICIENCY: ICD-10-CM

## 2023-04-06 DIAGNOSIS — Z79.891 CHRONIC USE OF OPIATE DRUG FOR THERAPEUTIC PURPOSE: ICD-10-CM

## 2023-04-06 DIAGNOSIS — M47.817 LUMBOSACRAL SPONDYLOSIS WITHOUT MYELOPATHY: Primary | Chronic | ICD-10-CM

## 2023-04-06 DIAGNOSIS — I10 ESSENTIAL HYPERTENSION: ICD-10-CM

## 2023-04-06 DIAGNOSIS — E78.5 DYSLIPIDEMIA: ICD-10-CM

## 2023-04-06 DIAGNOSIS — M16.0 PRIMARY OSTEOARTHRITIS OF BOTH HIPS: ICD-10-CM

## 2023-04-06 DIAGNOSIS — F51.04 PSYCHOPHYSIOLOGICAL INSOMNIA: ICD-10-CM

## 2023-04-06 DIAGNOSIS — F39 MOOD DISORDER (HCC): ICD-10-CM

## 2023-04-06 DIAGNOSIS — R73.03 PRE-DIABETES: ICD-10-CM

## 2023-04-06 PROBLEM — M20.21 ACQUIRED HALLUX RIGIDUS, RIGHT: Status: RESOLVED | Noted: 2022-09-19 | Resolved: 2023-04-06

## 2023-04-06 PROBLEM — M25.562 CHRONIC PAIN OF BOTH KNEES: Status: RESOLVED | Noted: 2021-01-27 | Resolved: 2023-04-06

## 2023-04-06 PROBLEM — M54.51 VERTEBROGENIC LOW BACK PAIN: Status: RESOLVED | Noted: 2021-05-10 | Resolved: 2023-04-06

## 2023-04-06 PROBLEM — G89.29 CHRONIC PAIN OF BOTH KNEES: Status: RESOLVED | Noted: 2021-01-27 | Resolved: 2023-04-06

## 2023-04-06 PROBLEM — M25.561 CHRONIC PAIN OF BOTH KNEES: Status: RESOLVED | Noted: 2021-01-27 | Resolved: 2023-04-06

## 2023-04-06 RX ORDER — TADALAFIL 5 MG/1
5 TABLET ORAL PRN
Qty: 30 TABLET | Refills: 1 | Status: SHIPPED | OUTPATIENT
Start: 2023-04-06

## 2023-04-06 SDOH — ECONOMIC STABILITY: INCOME INSECURITY: HOW HARD IS IT FOR YOU TO PAY FOR THE VERY BASICS LIKE FOOD, HOUSING, MEDICAL CARE, AND HEATING?: NOT HARD AT ALL

## 2023-04-06 SDOH — ECONOMIC STABILITY: FOOD INSECURITY: WITHIN THE PAST 12 MONTHS, THE FOOD YOU BOUGHT JUST DIDN'T LAST AND YOU DIDN'T HAVE MONEY TO GET MORE.: NEVER TRUE

## 2023-04-06 SDOH — ECONOMIC STABILITY: HOUSING INSECURITY
IN THE LAST 12 MONTHS, WAS THERE A TIME WHEN YOU DID NOT HAVE A STEADY PLACE TO SLEEP OR SLEPT IN A SHELTER (INCLUDING NOW)?: NO

## 2023-04-06 SDOH — ECONOMIC STABILITY: FOOD INSECURITY: WITHIN THE PAST 12 MONTHS, YOU WORRIED THAT YOUR FOOD WOULD RUN OUT BEFORE YOU GOT MONEY TO BUY MORE.: NEVER TRUE

## 2023-04-06 ASSESSMENT — ENCOUNTER SYMPTOMS
EYES NEGATIVE: 1
GASTROINTESTINAL NEGATIVE: 1
RESPIRATORY NEGATIVE: 1
ALLERGIC/IMMUNOLOGIC NEGATIVE: 1

## 2023-04-06 NOTE — PROGRESS NOTES
Aldactone  Insomnia and melatonin  History of anxiety/depression. Chronic pain syndrome. Consider SSRI. CKD with creatinine of 1.10. Avoid nephrotoxic drugs, anti-inflammatory radiocontrast.  Maintain oral hydration  Dyslipidemia being nonpharmacologically treated  Med list and available labs reviewed, discussed with patient, questions answered. Reassurance provided  Call for any concern  This note is created with a voice recognition program and while intend to generate a document that accurately reflects the content of the visit, no guarantee can be provided that every mistake has been identified and corrected by editing.           Tamara Laughlin MD

## 2023-04-06 NOTE — TELEPHONE ENCOUNTER
[address] called and needs a frequency or max per day clarified on rx for the Tadalafil. Please advise or contact pharmacy back with update.

## 2023-04-17 PROBLEM — M54.51 VERTEBROGENIC LOW BACK PAIN: Status: ACTIVE | Noted: 2023-04-17

## 2023-04-18 ENCOUNTER — HOSPITAL ENCOUNTER (OUTPATIENT)
Dept: PREADMISSION TESTING | Age: 73
Discharge: HOME | End: 2023-04-22
Payer: MEDICARE

## 2023-04-18 VITALS
HEIGHT: 67 IN | SYSTOLIC BLOOD PRESSURE: 114 MMHG | WEIGHT: 212 LBS | BODY MASS INDEX: 33.27 KG/M2 | TEMPERATURE: 97.8 F | OXYGEN SATURATION: 95 % | RESPIRATION RATE: 18 BRPM | DIASTOLIC BLOOD PRESSURE: 76 MMHG | HEART RATE: 73 BPM

## 2023-04-18 LAB
ANION GAP SERPL CALCULATED.3IONS-SCNC: 10 MMOL/L (ref 9–17)
BUN SERPL-MCNC: 14 MG/DL (ref 8–23)
CHLORIDE SERPL-SCNC: 102 MMOL/L (ref 98–107)
CO2 SERPL-SCNC: 25 MMOL/L (ref 20–31)
CREAT SERPL-MCNC: 1.25 MG/DL (ref 0.7–1.2)
GFR SERPL CREATININE-BSD FRML MDRD: >60 ML/MIN/1.73M2
HCT VFR BLD AUTO: 40.7 % (ref 40.7–50.3)
HGB BLD-MCNC: 12.6 G/DL (ref 13–17)
MCH RBC QN AUTO: 28.5 PG (ref 25.2–33.5)
MCHC RBC AUTO-ENTMCNC: 31 G/DL (ref 28.4–34.8)
MCV RBC AUTO: 92.1 FL (ref 82.6–102.9)
NRBC AUTOMATED: 0 PER 100 WBC
PDW BLD-RTO: 13 % (ref 11.8–14.4)
PLATELET # BLD AUTO: 187 K/UL (ref 138–453)
PMV BLD AUTO: 8.8 FL (ref 8.1–13.5)
POTASSIUM SERPL-SCNC: 3.9 MMOL/L (ref 3.7–5.3)
RBC # BLD: 4.42 M/UL (ref 4.21–5.77)
SODIUM SERPL-SCNC: 137 MMOL/L (ref 135–144)
WBC # BLD AUTO: 4.4 K/UL (ref 3.5–11.3)

## 2023-04-18 PROCEDURE — 82565 ASSAY OF CREATININE: CPT

## 2023-04-18 PROCEDURE — 93005 ELECTROCARDIOGRAM TRACING: CPT | Performed by: ANESTHESIOLOGY

## 2023-04-18 PROCEDURE — 84520 ASSAY OF UREA NITROGEN: CPT

## 2023-04-18 PROCEDURE — 80051 ELECTROLYTE PANEL: CPT

## 2023-04-18 PROCEDURE — 85027 COMPLETE CBC AUTOMATED: CPT

## 2023-04-18 PROCEDURE — 36415 COLL VENOUS BLD VENIPUNCTURE: CPT

## 2023-04-18 ASSESSMENT — PAIN DESCRIPTION - FREQUENCY: FREQUENCY: CONTINUOUS

## 2023-04-18 ASSESSMENT — PAIN DESCRIPTION - ORIENTATION: ORIENTATION: LOWER;MID

## 2023-04-18 ASSESSMENT — PAIN DESCRIPTION - PAIN TYPE: TYPE: CHRONIC PAIN

## 2023-04-18 ASSESSMENT — PAIN SCALES - GENERAL: PAINLEVEL_OUTOF10: 6

## 2023-04-18 ASSESSMENT — PAIN DESCRIPTION - DESCRIPTORS: DESCRIPTORS: ACHING

## 2023-04-18 ASSESSMENT — PAIN DESCRIPTION - LOCATION: LOCATION: BACK

## 2023-04-18 NOTE — PRE-PROCEDURE INSTRUCTIONS
to have a cast or brace bring clothing that will fit over them. In case of illness - If you have cold or flu like symptoms (high fever, runny nose, sore throat, cough, etc.) rash, nausea, vomiting, loose stools, and/or recent contact with someone who has a contagious disease (chicken pox, measles, etc.) Please call your doctor before coming to the hospital.         Day of Surgery/Procedure:    As a patient at [facility] you can expect quality medical and nursing care that is centered on your individual needs. Our goal is to make your surgical experience as comfortable as possible    . Transportation After Your Surgery/Procedure: You will need a friend or family member to drive you home after your procedure. Your  must be 25years of age or older and able to sign off on your discharge instructions. A taxi cab or any other form of public transportation is not acceptable. Your friend or family member must stay at the hospital throughout your procedure. Someone must remain with you for the first 24 hours after your surgery if you receive anesthesia or medication. If you do not have someone to stay with you, your procedure may be cancelled.       If you have any other questions regarding your procedure or the day of surgery, please call [phone]      _________________________  ____________________________  Signature (Patient)              Signature (Provider)               Date

## 2023-04-18 NOTE — PROGRESS NOTES
results for input(s): COVID19 in the last 720 hours.     QUIQUE Haque CNP    Electronically signed 4/18/2023 at 9:12 AM

## 2023-04-19 LAB
EKG ATRIAL RATE: 72 BPM
EKG P AXIS: 49 DEGREES
EKG P-R INTERVAL: 168 MS
EKG Q-T INTERVAL: 390 MS
EKG QRS DURATION: 84 MS
EKG QTC CALCULATION (BAZETT): 427 MS
EKG R AXIS: 22 DEGREES
EKG T AXIS: 45 DEGREES
EKG VENTRICULAR RATE: 72 BPM

## 2023-04-20 ENCOUNTER — HOSPITAL ENCOUNTER (OUTPATIENT)
Dept: PAIN MANAGEMENT | Age: 73
Discharge: HOME | End: 2023-04-20
Payer: MEDICARE

## 2023-04-20 VITALS — HEART RATE: 74 BPM | WEIGHT: 212 LBS | BODY MASS INDEX: 33.27 KG/M2 | OXYGEN SATURATION: 98 % | HEIGHT: 67 IN

## 2023-04-20 DIAGNOSIS — M47.817 LUMBOSACRAL SPONDYLOSIS WITHOUT MYELOPATHY: ICD-10-CM

## 2023-04-20 DIAGNOSIS — M16.0 PRIMARY OSTEOARTHRITIS OF BOTH HIPS: Primary | ICD-10-CM

## 2023-04-20 DIAGNOSIS — M79.7 FIBROMYALGIA: ICD-10-CM

## 2023-04-20 DIAGNOSIS — Z79.891 CHRONIC USE OF OPIATE DRUG FOR THERAPEUTIC PURPOSE: ICD-10-CM

## 2023-04-20 PROCEDURE — 99213 OFFICE O/P EST LOW 20 MIN: CPT

## 2023-04-20 PROCEDURE — 99213 OFFICE O/P EST LOW 20 MIN: CPT | Performed by: NURSE PRACTITIONER

## 2023-04-20 RX ORDER — OXYCODONE HYDROCHLORIDE AND ACETAMINOPHEN 5; 325 MG/1; MG/1
1 TABLET ORAL EVERY 6 HOURS PRN
Qty: 120 TABLET | Refills: 0 | Status: CANCELLED | OUTPATIENT
Start: 2023-04-26 | End: 2023-05-26

## 2023-04-20 RX ORDER — OXYCODONE HYDROCHLORIDE AND ACETAMINOPHEN 5; 325 MG/1; MG/1
1 TABLET ORAL EVERY 6 HOURS PRN
Qty: 120 TABLET | Refills: 0 | Status: SHIPPED | OUTPATIENT
Start: 2023-04-26 | End: 2023-05-26

## 2023-04-20 ASSESSMENT — PAIN DESCRIPTION - DESCRIPTORS: DESCRIPTORS: ACHING;BURNING;CRAMPING;STABBING;SHOOTING

## 2023-04-20 ASSESSMENT — PAIN DESCRIPTION - FREQUENCY: FREQUENCY: CONTINUOUS

## 2023-04-20 ASSESSMENT — PAIN DESCRIPTION - LOCATION: LOCATION: FOOT;KNEE

## 2023-04-20 ASSESSMENT — ENCOUNTER SYMPTOMS
BACK PAIN: 1
SHORTNESS OF BREATH: 0
CONSTIPATION: 0
COUGH: 0

## 2023-04-20 ASSESSMENT — PAIN SCALES - GENERAL: PAINLEVEL_OUTOF10: 7

## 2023-04-20 ASSESSMENT — PAIN DESCRIPTION - PAIN TYPE: TYPE: CHRONIC PAIN

## 2023-04-20 NOTE — PROGRESS NOTES
4/26    Morphine equivalent: 30    Controlled Substance Monitoring:    Acute and Chronic Pain Monitoring:   RX Monitoring 4/20/2023   Attestation -   Acute Pain Prescriptions -   Periodic Controlled Substance Monitoring Possible medication side effects, risk of tolerance/dependence & alternative treatments discussed. ;No signs of potential drug abuse or diversion identified.;Obtaining appropriate analgesic effect of treatment.    Chronic Pain > 50 MEDD -   Chronic Pain > 80 MEDD -            Past Medical History:   Diagnosis Date    Arthritis     Gait instability     at times    History of blood transfusion     AFTER KNEE SURGERY    Hx of blood clots     after one of his knee surgeries     Hyperlipidemia     Left knee pain 04/09/2014    Neuropathy     Teeth problem     abcessw    Wears glasses        Past Surgical History:   Procedure Laterality Date    BUNIONECTOMY N/A 12/15/2021    AKIN OSTEOTOMY WITH BONE STAPLE CHEILECTOMY RIGHT FOOT performed by Tri Caballero DPM at [facility] 6 Right 09/16/2022    RIGHT FOOT BUNIONECTOMY WITH IMPLANT AND RIGHT SESMOIDECTOMY TIBIA- PETERSON   GAY performed by Stefano Martínez DPM at [facility]ilateral     [address]  10/2015    DENTAL SURGERY  03/2021    EYE SURGERY      bilat cataracts     FOOT SURGERY Right 12/15/2021    AKIN OSTEOTOMY WITH BONE STAPLE CHEILECTOMY RIGHT FOOT (N/A Foot)    FOOT SURGERY Right 04/06/2022    EXCISION SOFT TISSUE MASS PLANTAR 1ST METHEAD RIGHT performed by Tri Caballero DPM at [facility] 11 Right 09/30/2022    JOINT REPLACEMENT Left     KNEE  X 6 pt unsure of date    JOINT REPLACEMENT Right 07/20/2018    ROTATOR CUFF REPAIR Right     TONSILLECTOMY         Allergies   Allergen Reactions    Oxycontin [Oxycodone Hcl] Nausea And Vomiting         Current Outpatient Medications:     tadalafil (CIALIS) 5 MG tablet, Take 1 tablet a day for erectile dysfunction, Disp: 30 tablet,

## 2023-04-22 LAB — NONINV COLON CA DNA+OCC BLD SCRN STL QL: NEGATIVE

## 2023-05-01 ENCOUNTER — APPOINTMENT (OUTPATIENT)
Dept: GENERAL RADIOLOGY | Age: 73
End: 2023-05-01
Attending: ANESTHESIOLOGY
Payer: MEDICARE

## 2023-05-01 ENCOUNTER — ANESTHESIA (OUTPATIENT)
Dept: OPERATING ROOM | Age: 73
End: 2023-05-01
Payer: MEDICARE

## 2023-05-01 ENCOUNTER — ANESTHESIA EVENT (OUTPATIENT)
Dept: OPERATING ROOM | Age: 73
End: 2023-05-01
Payer: MEDICARE

## 2023-05-01 ENCOUNTER — HOSPITAL ENCOUNTER (OUTPATIENT)
Age: 73
Setting detail: OUTPATIENT SURGERY
Discharge: HOME | End: 2023-05-01
Attending: ANESTHESIOLOGY | Admitting: ANESTHESIOLOGY
Payer: MEDICARE

## 2023-05-01 VITALS
WEIGHT: 212 LBS | SYSTOLIC BLOOD PRESSURE: 131 MMHG | OXYGEN SATURATION: 96 % | DIASTOLIC BLOOD PRESSURE: 93 MMHG | BODY MASS INDEX: 33.27 KG/M2 | TEMPERATURE: 97 F | HEIGHT: 67 IN | HEART RATE: 78 BPM | RESPIRATION RATE: 17 BRPM

## 2023-05-01 DIAGNOSIS — M54.51 VERTEBROGENIC LOW BACK PAIN: ICD-10-CM

## 2023-05-01 PROCEDURE — 2500000003 HC RX 250 WO HCPCS: Performed by: ANESTHESIOLOGY

## 2023-05-01 PROCEDURE — 3700000000 HC ANESTHESIA ATTENDED CARE: Performed by: ANESTHESIOLOGY

## 2023-05-01 PROCEDURE — C1889 IMPLANT/INSERT DEVICE, NOC: HCPCS | Performed by: ANESTHESIOLOGY

## 2023-05-01 PROCEDURE — 6360000002 HC RX W HCPCS: Performed by: ANESTHESIOLOGY

## 2023-05-01 PROCEDURE — 7100000011 HC PHASE II RECOVERY - ADDTL 15 MIN: Performed by: ANESTHESIOLOGY

## 2023-05-01 PROCEDURE — 3600000054 HC PAIN LEVEL 3 BASE: Performed by: ANESTHESIOLOGY

## 2023-05-01 PROCEDURE — 2709999900 HC NON-CHARGEABLE SUPPLY: Performed by: ANESTHESIOLOGY

## 2023-05-01 PROCEDURE — 3600000055 HC PAIN LEVEL 3 ADDL 15 MIN: Performed by: ANESTHESIOLOGY

## 2023-05-01 PROCEDURE — 3209999900 FLUORO FOR SURGICAL PROCEDURES

## 2023-05-01 PROCEDURE — 6360000002 HC RX W HCPCS: Performed by: NURSE ANESTHETIST, CERTIFIED REGISTERED

## 2023-05-01 PROCEDURE — 2720000010 HC SURG SUPPLY STERILE: Performed by: ANESTHESIOLOGY

## 2023-05-01 PROCEDURE — 2500000003 HC RX 250 WO HCPCS: Performed by: NURSE ANESTHETIST, CERTIFIED REGISTERED

## 2023-05-01 PROCEDURE — 2580000003 HC RX 258: Performed by: ANESTHESIOLOGY

## 2023-05-01 PROCEDURE — 7100000010 HC PHASE II RECOVERY - FIRST 15 MIN: Performed by: ANESTHESIOLOGY

## 2023-05-01 PROCEDURE — 3700000001 HC ADD 15 MINUTES (ANESTHESIA): Performed by: ANESTHESIOLOGY

## 2023-05-01 RX ORDER — SODIUM CHLORIDE, SODIUM LACTATE, POTASSIUM CHLORIDE, CALCIUM CHLORIDE 600; 310; 30; 20 MG/100ML; MG/100ML; MG/100ML; MG/100ML
INJECTION, SOLUTION INTRAVENOUS CONTINUOUS
Status: DISCONTINUED | OUTPATIENT
Start: 2023-05-01 | End: 2023-05-01 | Stop reason: HOSPADM

## 2023-05-01 RX ORDER — LIDOCAINE HYDROCHLORIDE 20 MG/ML
INJECTION, SOLUTION EPIDURAL; INFILTRATION; INTRACAUDAL; PERINEURAL PRN
Status: DISCONTINUED | OUTPATIENT
Start: 2023-05-01 | End: 2023-05-01 | Stop reason: SDUPTHER

## 2023-05-01 RX ORDER — SPIRONOLACTONE 50 MG/1
TABLET, FILM COATED ORAL
Qty: 90 TABLET | Refills: 0 | Status: SHIPPED | OUTPATIENT
Start: 2023-05-01

## 2023-05-01 RX ORDER — SODIUM CHLORIDE 0.9 % (FLUSH) 0.9 %
5-40 SYRINGE (ML) INJECTION PRN
Status: DISCONTINUED | OUTPATIENT
Start: 2023-05-01 | End: 2023-05-01 | Stop reason: HOSPADM

## 2023-05-01 RX ORDER — SODIUM CHLORIDE 9 MG/ML
INJECTION, SOLUTION INTRAVENOUS CONTINUOUS
Status: DISCONTINUED | OUTPATIENT
Start: 2023-05-01 | End: 2023-05-01

## 2023-05-01 RX ORDER — FENTANYL CITRATE 50 UG/ML
25 INJECTION, SOLUTION INTRAMUSCULAR; INTRAVENOUS EVERY 5 MIN PRN
Status: DISCONTINUED | OUTPATIENT
Start: 2023-05-01 | End: 2023-05-01 | Stop reason: HOSPADM

## 2023-05-01 RX ORDER — ONDANSETRON 2 MG/ML
4 INJECTION INTRAMUSCULAR; INTRAVENOUS
Status: DISCONTINUED | OUTPATIENT
Start: 2023-05-01 | End: 2023-05-01 | Stop reason: HOSPADM

## 2023-05-01 RX ORDER — TIZANIDINE 4 MG/1
4 TABLET ORAL 3 TIMES DAILY
Qty: 20 TABLET | Refills: 0 | Status: SHIPPED | OUTPATIENT
Start: 2023-05-01 | End: 2023-05-08

## 2023-05-01 RX ORDER — PROPOFOL 10 MG/ML
INJECTION, EMULSION INTRAVENOUS CONTINUOUS PRN
Status: DISCONTINUED | OUTPATIENT
Start: 2023-05-01 | End: 2023-05-01 | Stop reason: SDUPTHER

## 2023-05-01 RX ORDER — OXYCODONE HYDROCHLORIDE 5 MG/1
5 TABLET ORAL PRN
Status: DISCONTINUED | OUTPATIENT
Start: 2023-05-01 | End: 2023-05-01 | Stop reason: HOSPADM

## 2023-05-01 RX ORDER — LABETALOL HYDROCHLORIDE 5 MG/ML
10 INJECTION, SOLUTION INTRAVENOUS
Status: DISCONTINUED | OUTPATIENT
Start: 2023-05-01 | End: 2023-05-01 | Stop reason: HOSPADM

## 2023-05-01 RX ORDER — FENTANYL CITRATE 50 UG/ML
INJECTION, SOLUTION INTRAMUSCULAR; INTRAVENOUS PRN
Status: DISCONTINUED | OUTPATIENT
Start: 2023-05-01 | End: 2023-05-01 | Stop reason: SDUPTHER

## 2023-05-01 RX ORDER — BUPIVACAINE HYDROCHLORIDE AND EPINEPHRINE 5; 5 MG/ML; UG/ML
INJECTION, SOLUTION EPIDURAL; INTRACAUDAL; PERINEURAL PRN
Status: DISCONTINUED | OUTPATIENT
Start: 2023-05-01 | End: 2023-05-01 | Stop reason: ALTCHOICE

## 2023-05-01 RX ORDER — HYDROMORPHONE HYDROCHLORIDE 1 MG/ML
0.5 INJECTION, SOLUTION INTRAMUSCULAR; INTRAVENOUS; SUBCUTANEOUS EVERY 5 MIN PRN
Status: DISCONTINUED | OUTPATIENT
Start: 2023-05-01 | End: 2023-05-01 | Stop reason: HOSPADM

## 2023-05-01 RX ORDER — PROCHLORPERAZINE EDISYLATE 5 MG/ML
5 INJECTION INTRAMUSCULAR; INTRAVENOUS
Status: DISCONTINUED | OUTPATIENT
Start: 2023-05-01 | End: 2023-05-01 | Stop reason: HOSPADM

## 2023-05-01 RX ORDER — MIDAZOLAM HYDROCHLORIDE 1 MG/ML
INJECTION INTRAMUSCULAR; INTRAVENOUS PRN
Status: DISCONTINUED | OUTPATIENT
Start: 2023-05-01 | End: 2023-05-01 | Stop reason: SDUPTHER

## 2023-05-01 RX ORDER — OXYCODONE HYDROCHLORIDE 5 MG/1
10 TABLET ORAL PRN
Status: DISCONTINUED | OUTPATIENT
Start: 2023-05-01 | End: 2023-05-01 | Stop reason: HOSPADM

## 2023-05-01 RX ORDER — LIDOCAINE HYDROCHLORIDE 10 MG/ML
1 INJECTION, SOLUTION EPIDURAL; INFILTRATION; INTRACAUDAL; PERINEURAL
Status: DISCONTINUED | OUTPATIENT
Start: 2023-05-02 | End: 2023-05-01 | Stop reason: HOSPADM

## 2023-05-01 RX ORDER — SODIUM CHLORIDE 0.9 % (FLUSH) 0.9 %
5-40 SYRINGE (ML) INJECTION EVERY 12 HOURS SCHEDULED
Status: DISCONTINUED | OUTPATIENT
Start: 2023-05-01 | End: 2023-05-01 | Stop reason: HOSPADM

## 2023-05-01 RX ORDER — SODIUM CHLORIDE 9 MG/ML
INJECTION, SOLUTION INTRAVENOUS PRN
Status: DISCONTINUED | OUTPATIENT
Start: 2023-05-01 | End: 2023-05-01 | Stop reason: HOSPADM

## 2023-05-01 RX ADMIN — MIDAZOLAM 2 MG: 1 INJECTION INTRAMUSCULAR; INTRAVENOUS at 09:42

## 2023-05-01 RX ADMIN — FENTANYL CITRATE 50 MCG: 50 INJECTION INTRAMUSCULAR; INTRAVENOUS at 09:45

## 2023-05-01 RX ADMIN — FENTANYL CITRATE 25 MCG: 50 INJECTION INTRAMUSCULAR; INTRAVENOUS at 09:57

## 2023-05-01 RX ADMIN — SODIUM CHLORIDE, POTASSIUM CHLORIDE, SODIUM LACTATE AND CALCIUM CHLORIDE: 600; 310; 30; 20 INJECTION, SOLUTION INTRAVENOUS at 09:10

## 2023-05-01 RX ADMIN — LIDOCAINE HYDROCHLORIDE 80 MG: 20 INJECTION, SOLUTION EPIDURAL; INFILTRATION; INTRACAUDAL; PERINEURAL at 09:45

## 2023-05-01 RX ADMIN — Medication 2000 MG: at 09:48

## 2023-05-01 RX ADMIN — PROPOFOL 45 MCG/KG/MIN: 10 INJECTION, EMULSION INTRAVENOUS at 09:46

## 2023-05-01 RX ADMIN — FENTANYL CITRATE 25 MCG: 50 INJECTION INTRAMUSCULAR; INTRAVENOUS at 10:25

## 2023-05-01 ASSESSMENT — PAIN DESCRIPTION - LOCATION: LOCATION: BACK

## 2023-05-01 ASSESSMENT — PAIN SCALES - GENERAL
PAINLEVEL_OUTOF10: 0
PAINLEVEL_OUTOF10: 0
PAINLEVEL_OUTOF10: 6

## 2023-05-01 ASSESSMENT — PAIN DESCRIPTION - FREQUENCY: FREQUENCY: CONTINUOUS

## 2023-05-01 ASSESSMENT — PAIN DESCRIPTION - PAIN TYPE: TYPE: CHRONIC PAIN

## 2023-05-01 ASSESSMENT — PAIN DESCRIPTION - ORIENTATION: ORIENTATION: LOWER

## 2023-05-01 ASSESSMENT — PAIN DESCRIPTION - DESCRIPTORS: DESCRIPTORS: ACHING

## 2023-05-01 NOTE — ANESTHESIA PRE PROCEDURE
Department of Anesthesiology  Preprocedure Note       Name:  Marielle Lewis   Age:  67 y.o.  :  1950                                          MRN:  [id number]         Date:  2023      Surgeon: Jaskaran Nicholson):  Mahsa Gordon MD    Procedure: Procedure(s):  BASIVERTEBRAL NERVE NERVE RADIOFREQUENCY ABLATIONINTRACEPT PROCEDURE at  2/ 3/4/5    Medications prior to admission:   Prior to Admission medications    Medication Sig Start Date End Date Taking? Authorizing Provider   oxyCODONE-acetaminophen (PERCOCET) 5-325 MG per tablet Take 1 tablet by mouth every 6 hours as needed for Pain for up to 30 days. Take lowest dose possible to manage pain Max Daily Amount: 4 tablets 23  QUIQUE Bryan CNP   tadalafil (CIALIS) 5 MG tablet Take 1 tablet a day for erectile dysfunction 23   Roxy Mora MD   gabapentin (NEURONTIN) 800 MG tablet Take 1 tablet by mouth 3 times daily for 30 days. TAKE ONE TABLET BY MOUTH THREE TIMES A DAY 3/23/23 5/1/23  Mahsa Gordon MD   spironolactone (ALDACTONE) 50 MG tablet TAKE ONE TABLET BY MOUTH DAILY 22   Roxy Mora MD   diclofenac sodium (VOLTAREN) 1 % GEL Apply 2 g topically 2 times daily  Patient not taking: Reported on 2023   QUIQUE Jesus CNP   melatonin 3 MG TABS tablet Take 2 tablets by mouth nightly as needed (insomnia)    Historical Provider, MD       Current medications:    No current facility-administered medications for this encounter. Allergies:     Allergies   Allergen Reactions    Oxycontin [Oxycodone Hcl] Nausea And Vomiting       Problem List:    Patient Active Problem List   Diagnosis Code    Chronic use of opiate drug for therapeutic purpose Z79.891    Pre-diabetes R73.03    Dyslipidemia E78.5    Essential hypertension I10    Psychophysiological insomnia F51.04    Fibromyalgia M79.7    Mood disorder (HCC) F39    Stage 3 chronic kidney disease ([facility] [address] N18.30    Lumbosacral spondylosis without

## 2023-05-01 NOTE — ANESTHESIA POSTPROCEDURE EVALUATION
Department of Anesthesiology  Postprocedure Note    Patient: Vish Mar  MRN: [id number]  YOB: 1950  Date of evaluation: 5/1/2023      Procedure Summary     Date: 05/01/23 Room / Location: [facility] - INPATIENT    Anesthesia Start: 1176 Anesthesia Stop: 4398    Procedure: BASIVERTEBRAL NERVE NERVE RADIOFREQUENCY ABLATIONINTRACEPT PROCEDURE at L 2/ 3/4/5 (Back) Diagnosis:       Vertebrogenic low back pain      (Vertebrogenic low back pain [M54.51])    Surgeons: Abhi Healy MD Responsible Provider: Miguel Duong MD    Anesthesia Type: MAC ASA Status: 2          Anesthesia Type: MAC    Pamela Phase I:      Pamela Phase II: Pamela Score: 9      Anesthesia Post Evaluation    Patient location during evaluation: PACU  Patient participation: complete - patient participated  Level of consciousness: awake  Airway patency: patent  Nausea & Vomiting: no nausea and no vomiting  Complications: no  Cardiovascular status: hemodynamically stable  Respiratory status: acceptable  Hydration status: stable  Multimodal analgesia pain management approach

## 2023-05-01 NOTE — OP NOTE
was then advanced using a mallet in 1-2 mm increments. The J-stylet was observed to traverse the vertebral body in the AP and lateral views. Target was reached when the tip of the stylet was noted to be between 30-50% forward of the posterior wall of the L4 in the lateral view (midway between the superior and inferior endplates) and it crossed the midline of the L4 spinous process in the AP view. The stylet was then removed. The bipolar radiofrequency (RF) probe was removed from the previous vertebral body, cleaned and then inserted into the introducer cannula. The wingnut was rotated clockwise to retract the PEEK sleeve to expose the proximal electrode on the radiofrequency probe. The BVN nerve was then ablated at 85 degrees Celsius for 7 minutes using [employer]s RFG standard algorithm. L5 LEVEL  While the ablation was occurring at L5, the C-arm was moved to visualize the target at the superolateral aspect of the L5 vertebral body using theapproach similar to the L5 vertebral body. The C-arm was rotated to square off the superior endplate at L5 and rotated approximately 35 degrees to the to obtain an oblique view with the facet in the midpoint of the vertebral body. The superolateral  L5 pedicle was identified, and the skin entry point identified and infiltrated with 1% lidocaine using a 25-gauge 1-1/2 inch needle. A 22-gauge 5-inch spinal needle was used to anesthetize the track to the pedicle and periosteum and confirm the introducer cannula trajectory. A skin incision was made with 10 scalpel blade. The 8-gauge introducer cannula with jaswant tip was then introduced through the skin, subcutaneous tissue and paraspinal muscle until bony contact was made. The position was checked in the AP and Lateral plane.  Using a mallet, the trocar was then advanced thru the pedicle to the posterior aspect of the vertebral body using a combination of AP and lateral views to ensure appropriate traversing of the pedicle and

## 2023-05-01 NOTE — TELEPHONE ENCOUNTER
Dirk Harris is calling to request a refill on the following medication(s):    Medication Request:  Requested Prescriptions     Pending Prescriptions Disp Refills    spironolactone (ALDACTONE) 50 MG tablet [Pharmacy Med Name: SPIRONOLACTONE 50 MG TABLET] 90 tablet 0     Sig: TAKE ONE TABLET BY MOUTH DAILY       Last Visit Date (If Applicable):  0/9/2252    Next Visit Date:    8/7/2023
16

## 2023-05-01 NOTE — H&P
Interval H&P Note    Pt Name: Rich Turcios  MRN: [id number]  YOB: 1950  Date of evaluation: 5/1/2023      [x] I have reviewed in epic the pain management progress note by Carolee JANG dated 4/20/2023 attached below for an Interval History and Physical note. [x] I have examined  Duane J Garland  There are no changes to the patient who is scheduled for BASIVERTEBRAL NERVE NERVE RADIOFREQUENCY ABLATIONINTRACEPT PROCEDURE at  2/ 3/4/5 by Rhett Jamison MD for Vertebrogenic low back pain. The patient denies new health changes, fever, chills, wheezing, cough, increased SOB, chest pain, open sores or wounds. Denies hx diabetes and taking any blood thinning medications in the last 10 days. Vital signs: BP (!) 135/92   Pulse 68   Temp 97.8 °F (36.6 °C) (Temporal)   Resp 16   Ht 5' 7\" (1.702 m)   Wt 212 lb (96.2 kg)   SpO2 96%   BMI 33.20 kg/m²     Allergies:  Oxycontin [oxycodone hcl]    Medications:    Prior to Admission medications    Medication Sig Start Date End Date Taking? Authorizing Provider   oxyCODONE-acetaminophen (PERCOCET) 5-325 MG per tablet Take 1 tablet by mouth every 6 hours as needed for Pain for up to 30 days. Take lowest dose possible to manage pain Max Daily Amount: 4 tablets 4/26/23 5/26/23  QUIQUE Ang CNP   tadalafil (CIALIS) 5 MG tablet Take 1 tablet a day for erectile dysfunction 4/11/23   Damon Osman MD   gabapentin (NEURONTIN) 800 MG tablet Take 1 tablet by mouth 3 times daily for 30 days.  TAKE ONE TABLET BY MOUTH THREE TIMES A DAY 3/23/23 5/1/23  Rhett Jamison MD   spironolactone (ALDACTONE) 50 MG tablet TAKE ONE TABLET BY MOUTH DAILY 11/1/22   Damon Osman MD   diclofenac sodium (VOLTAREN) 1 % GEL Apply 2 g topically 2 times daily  Patient not taking: Reported on 5/1/2023 2/23/22   QUIQUE Cleary CNP   melatonin 3 MG TABS tablet Take 2 tablets by mouth nightly as needed (insomnia)    Historical Provider, MD         This is a

## 2023-05-01 NOTE — DISCHARGE INSTRUCTIONS
To achieve optimal recovery and results,   follow these instructions carefully. What to Expect After the Procedure      _    You should be discharged and able to walk on the same day of the procedure. _    As with any procedure, you may feel sore afterwards. If you feel extreme discomfort or pain, contact your physician immediately. Medication      Tell your physician about any prescribed or over-the-counter medications you are currently taking. Continue taking them as directed by your physician. If you feel soreness or minor discomfort, you may take the prescribe pain medication directed      Activity Restrictions & Resumption      Avoid strenuous exercise and heavy lifting for 7 days. Walking is the best exercise, and daily walking is strongly recommended. Gradually increase the length and distance of your walks. Start inside your home, then progress to out and around your home, as tolerated, and progress to your neighborhood or shopping center. Try to limit long car rides (greater than 1 hour) for a few weeks, or as tolerated. Do not drive while on pain medications. Start Home exercise program /physical therapy / Rehab as planned 1 week post op for physical conditioning and core strengthening. Incision Care      Remove the outer dressings 3 days after the procedure. Adhesive strips will cover the incision(s) and should begin to fall off within 7-10 days after the procedure. If they have not fallen off after 14 days, you may remove them. You may begin to shower 3 days after the procedure. Do not sit in the bath. Do not rub the incision. Do not apply lotion, medication, or cream to the incision. Post-Procedure Office Visits      __  If your follow-up appointments have not been scheduled, please contact STAFF at the phone number below within 24 hours after your procedure to schedule your appointments.   [address] [facility]  [facility]

## 2023-05-02 ENCOUNTER — TELEPHONE (OUTPATIENT)
Dept: PAIN MANAGEMENT | Age: 73
End: 2023-05-02

## 2023-05-04 RX ORDER — METHOCARBAMOL 500 MG/1
500 TABLET, FILM COATED ORAL 2 TIMES DAILY
Qty: 20 TABLET | Refills: 0 | Status: SHIPPED | OUTPATIENT
Start: 2023-05-04 | End: 2023-05-14

## 2023-05-16 ENCOUNTER — HOSPITAL ENCOUNTER (OUTPATIENT)
Dept: PAIN MANAGEMENT | Age: 73
Discharge: HOME | End: 2023-05-16
Payer: MEDICARE

## 2023-05-16 VITALS
OXYGEN SATURATION: 92 % | SYSTOLIC BLOOD PRESSURE: 124 MMHG | BODY MASS INDEX: 33.27 KG/M2 | TEMPERATURE: 97.3 F | RESPIRATION RATE: 20 BRPM | HEART RATE: 74 BPM | HEIGHT: 67 IN | DIASTOLIC BLOOD PRESSURE: 85 MMHG | WEIGHT: 212 LBS

## 2023-05-16 DIAGNOSIS — M16.0 PRIMARY OSTEOARTHRITIS OF BOTH HIPS: ICD-10-CM

## 2023-05-16 DIAGNOSIS — Z79.891 CHRONIC USE OF OPIATE DRUG FOR THERAPEUTIC PURPOSE: ICD-10-CM

## 2023-05-16 DIAGNOSIS — M47.817 LUMBOSACRAL SPONDYLOSIS WITHOUT MYELOPATHY: Primary | Chronic | ICD-10-CM

## 2023-05-16 PROCEDURE — 99213 OFFICE O/P EST LOW 20 MIN: CPT

## 2023-05-16 PROCEDURE — 99213 OFFICE O/P EST LOW 20 MIN: CPT | Performed by: NURSE PRACTITIONER

## 2023-05-16 RX ORDER — OXYCODONE HYDROCHLORIDE AND ACETAMINOPHEN 5; 325 MG/1; MG/1
1 TABLET ORAL EVERY 6 HOURS PRN
Qty: 120 TABLET | Refills: 0 | Status: SHIPPED | OUTPATIENT
Start: 2023-05-26 | End: 2023-06-25

## 2023-05-16 ASSESSMENT — ENCOUNTER SYMPTOMS
SHORTNESS OF BREATH: 0
CONSTIPATION: 0
COUGH: 0
BACK PAIN: 1

## 2023-05-16 NOTE — PROGRESS NOTES
this patient's pain medication close monitoring is required. Continue current medication management, pt has been stable and compliant. Script written for percocet  Consider PT for lumbar pain once healed from intracept  Follow up appointment made for 4 weeks    I have reviewed the chief complaint and history of present illness (including ROS and PFSH) and vital documentation by my staff and I agree with their documentation and have added where applicable.

## 2023-05-31 ENCOUNTER — OFFICE VISIT (OUTPATIENT)
Dept: INTERNAL MEDICINE CLINIC | Age: 73
End: 2023-05-31
Payer: MEDICARE

## 2023-05-31 ENCOUNTER — HOSPITAL ENCOUNTER (OUTPATIENT)
Age: 73
Setting detail: SPECIMEN
Discharge: HOME | End: 2023-05-31

## 2023-05-31 VITALS
HEIGHT: 67 IN | RESPIRATION RATE: 16 BRPM | TEMPERATURE: 96.9 F | OXYGEN SATURATION: 97 % | HEART RATE: 78 BPM | WEIGHT: 216 LBS | BODY MASS INDEX: 33.9 KG/M2 | SYSTOLIC BLOOD PRESSURE: 130 MMHG | DIASTOLIC BLOOD PRESSURE: 84 MMHG

## 2023-05-31 DIAGNOSIS — Z79.891 CHRONIC USE OF OPIATE DRUG FOR THERAPEUTIC PURPOSE: ICD-10-CM

## 2023-05-31 DIAGNOSIS — R73.03 PRE-DIABETES: ICD-10-CM

## 2023-05-31 DIAGNOSIS — E78.5 DYSLIPIDEMIA: ICD-10-CM

## 2023-05-31 DIAGNOSIS — F51.04 PSYCHOPHYSIOLOGICAL INSOMNIA: ICD-10-CM

## 2023-05-31 DIAGNOSIS — F39 MOOD DISORDER (HCC): ICD-10-CM

## 2023-05-31 DIAGNOSIS — M79.7 FIBROMYALGIA: ICD-10-CM

## 2023-05-31 DIAGNOSIS — M16.0 PRIMARY OSTEOARTHRITIS OF BOTH HIPS: ICD-10-CM

## 2023-05-31 DIAGNOSIS — N52.01 ERECTILE DYSFUNCTION DUE TO ARTERIAL INSUFFICIENCY: ICD-10-CM

## 2023-05-31 DIAGNOSIS — N18.30 STAGE 3 CHRONIC KIDNEY DISEASE, UNSPECIFIED WHETHER STAGE 3A OR 3B CKD (HCC): ICD-10-CM

## 2023-05-31 DIAGNOSIS — M47.817 LUMBOSACRAL SPONDYLOSIS WITHOUT MYELOPATHY: Primary | Chronic | ICD-10-CM

## 2023-05-31 DIAGNOSIS — I10 ESSENTIAL HYPERTENSION: ICD-10-CM

## 2023-05-31 LAB
ANION GAP SERPL CALCULATED.3IONS-SCNC: 13 MMOL/L (ref 9–17)
BILIRUB UR QL STRIP: NEGATIVE
BUN SERPL-MCNC: 11 MG/DL (ref 8–23)
CALCIUM SERPL-MCNC: 8.9 MG/DL (ref 8.6–10.4)
CHLORIDE SERPL-SCNC: 105 MMOL/L (ref 98–107)
CHOLEST SERPL-MCNC: 184 MG/DL
CHOLESTEROL/HDL RATIO: 5
CLARITY UR: CLEAR
CO2 SERPL-SCNC: 22 MMOL/L (ref 20–31)
COLOR UR: YELLOW
CREAT SERPL-MCNC: 1.1 MG/DL (ref 0.7–1.2)
CREAT UR-MCNC: 152 MG/DL (ref 39–259)
EPI CELLS #/AREA URNS HPF: NORMAL /HPF (ref 0–5)
EST. AVERAGE GLUCOSE BLD GHB EST-MCNC: 123 MG/DL
GFR SERPL CREATININE-BSD FRML MDRD: >60 ML/MIN/1.73M2
GLUCOSE SERPL-MCNC: 95 MG/DL (ref 70–99)
GLUCOSE UR STRIP-MCNC: NEGATIVE MG/DL
HBA1C MFR BLD: 5.9 % (ref 4–6)
HDLC SERPL-MCNC: 37 MG/DL
HGB UR QL STRIP.AUTO: NEGATIVE
KETONES UR STRIP-MCNC: NEGATIVE MG/DL
LDLC SERPL CALC-MCNC: 127 MG/DL (ref 0–130)
LEUKOCYTE ESTERASE UR QL STRIP: NEGATIVE
MICROALBUMIN UR-MCNC: <12 MG/L
MICROALBUMIN/CREAT UR-RTO: NORMAL MCG/MG CREAT
NITRITE UR QL STRIP: NEGATIVE
PH UR STRIP: 5.5 [PH] (ref 5–8)
POTASSIUM SERPL-SCNC: 4.3 MMOL/L (ref 3.7–5.3)
PROT UR STRIP-MCNC: NEGATIVE MG/DL
PSA SERPL-MCNC: 2.59 NG/ML
RBC #/AREA URNS HPF: NORMAL /HPF (ref 0–4)
SODIUM SERPL-SCNC: 140 MMOL/L (ref 135–144)
SP GR UR STRIP: 1.02 (ref 1–1.03)
TRIGL SERPL-MCNC: 100 MG/DL
TSH SERPL-MCNC: 1.2 UIU/ML (ref 0.3–5)
UROBILINOGEN UR STRIP-ACNC: NORMAL
WBC #/AREA URNS HPF: NORMAL /HPF (ref 0–5)

## 2023-05-31 PROCEDURE — 99214 OFFICE O/P EST MOD 30 MIN: CPT | Performed by: FAMILY MEDICINE

## 2023-05-31 PROCEDURE — 1036F TOBACCO NON-USER: CPT | Performed by: FAMILY MEDICINE

## 2023-05-31 PROCEDURE — G8417 CALC BMI ABV UP PARAM F/U: HCPCS | Performed by: FAMILY MEDICINE

## 2023-05-31 PROCEDURE — G8427 DOCREV CUR MEDS BY ELIG CLIN: HCPCS | Performed by: FAMILY MEDICINE

## 2023-05-31 PROCEDURE — 1123F ACP DISCUSS/DSCN MKR DOCD: CPT | Performed by: FAMILY MEDICINE

## 2023-05-31 PROCEDURE — 3075F SYST BP GE 130 - 139MM HG: CPT | Performed by: FAMILY MEDICINE

## 2023-05-31 PROCEDURE — 3017F COLORECTAL CA SCREEN DOC REV: CPT | Performed by: FAMILY MEDICINE

## 2023-05-31 PROCEDURE — 3079F DIAST BP 80-89 MM HG: CPT | Performed by: FAMILY MEDICINE

## 2023-05-31 ASSESSMENT — ENCOUNTER SYMPTOMS
RESPIRATORY NEGATIVE: 1
ALLERGIC/IMMUNOLOGIC NEGATIVE: 1
EYES NEGATIVE: 1
BACK PAIN: 1
GASTROINTESTINAL NEGATIVE: 1

## 2023-05-31 NOTE — PROGRESS NOTES
Subjective:      Patient ID: Amari Lanier is a 67 y.o. male. Hip Pain   The incident occurred more than 1 week ago. There was no injury mechanism. Pain location: Right hip. The quality of the pain is described as shooting and cramping. The pain is at a severity of 8/10. The pain is severe. Associated symptoms include an inability to bear weight, a loss of motion and muscle weakness. The symptoms are aggravated by weight bearing and movement. Treatments tried: Opiates, gabapentin. The treatment provided mild relief. Review of Systems   Constitutional: Negative. HENT: Negative. Eyes: Negative. Respiratory: Negative. Cardiovascular: Negative. Gastrointestinal: Negative. Endocrine: Negative. Musculoskeletal:  Positive for arthralgias and back pain. Skin: Negative. Allergic/Immunologic: Negative. Neurological: Negative. Hematological: Negative. Psychiatric/Behavioral:  Positive for dysphoric mood. The patient is nervous/anxious. Past family and social history unremarkable. Diagnosis Orders   1. Lumbosacral spondylosis without myelopathy        2. Primary osteoarthritis of both hips        3. Chronic use of opiate drug for therapeutic purpose        4. Pre-diabetes  Basic Metabolic Panel    Hemoglobin A1C    Lipid Panel    Urinalysis with Microscopic    TSH    Microalbumin, Ur    PSA Screening      5. Dyslipidemia  Basic Metabolic Panel    Hemoglobin A1C    Lipid Panel    Urinalysis with Microscopic    TSH    Microalbumin, Ur    PSA Screening      6. Essential hypertension        7. Psychophysiological insomnia        8. Fibromyalgia  Basic Metabolic Panel    Hemoglobin A1C    Lipid Panel    Urinalysis with Microscopic    TSH    Microalbumin, Ur    PSA Screening      9. Mood disorder ([facility] [address]        10.  Stage 3 chronic kidney disease, unspecified whether stage 3a or 3b CKD (HCC)  Basic Metabolic Panel    Hemoglobin A1C    Lipid Panel    Urinalysis with Microscopic    TSH

## 2023-06-10 DIAGNOSIS — G89.29 CHRONIC PAIN OF LEFT KNEE: ICD-10-CM

## 2023-06-10 DIAGNOSIS — M17.0 OSTEOARTHRITIS OF BOTH KNEES, UNSPECIFIED OSTEOARTHRITIS TYPE: ICD-10-CM

## 2023-06-10 DIAGNOSIS — Z51.81 MEDICATION MONITORING ENCOUNTER: ICD-10-CM

## 2023-06-10 DIAGNOSIS — M17.0 PRIMARY OSTEOARTHRITIS OF BOTH KNEES: ICD-10-CM

## 2023-06-10 DIAGNOSIS — M25.562 CHRONIC PAIN OF LEFT KNEE: ICD-10-CM

## 2023-06-10 DIAGNOSIS — M17.12 PRIMARY OSTEOARTHRITIS OF LEFT KNEE: ICD-10-CM

## 2023-06-10 DIAGNOSIS — Z79.891 USE OF OPIATES FOR THERAPEUTIC PURPOSES: ICD-10-CM

## 2023-06-10 DIAGNOSIS — Z98.890 S/P KNEE SURGERY: ICD-10-CM

## 2023-06-10 DIAGNOSIS — M17.11 PRIMARY OSTEOARTHRITIS OF RIGHT KNEE: ICD-10-CM

## 2023-06-10 DIAGNOSIS — Z51.81 ENCOUNTER FOR MEDICATION MONITORING: ICD-10-CM

## 2023-06-10 DIAGNOSIS — Z98.890 S/P LEFT KNEE SURGERY: ICD-10-CM

## 2023-06-10 RX ORDER — GABAPENTIN 800 MG/1
TABLET ORAL
Qty: 90 TABLET | Refills: 1 | Status: SHIPPED | OUTPATIENT
Start: 2023-06-10 | End: 2023-07-10

## 2023-06-22 ENCOUNTER — HOSPITAL ENCOUNTER (OUTPATIENT)
Dept: PAIN MANAGEMENT | Age: 73
Discharge: HOME | End: 2023-06-22
Payer: MEDICARE

## 2023-06-22 VITALS
DIASTOLIC BLOOD PRESSURE: 86 MMHG | SYSTOLIC BLOOD PRESSURE: 138 MMHG | BODY MASS INDEX: 33.9 KG/M2 | WEIGHT: 216 LBS | OXYGEN SATURATION: 96 % | HEART RATE: 73 BPM | HEIGHT: 67 IN

## 2023-06-22 DIAGNOSIS — M16.0 PRIMARY OSTEOARTHRITIS OF BOTH HIPS: ICD-10-CM

## 2023-06-22 DIAGNOSIS — Z79.891 CHRONIC USE OF OPIATE DRUG FOR THERAPEUTIC PURPOSE: Primary | ICD-10-CM

## 2023-06-22 DIAGNOSIS — M47.817 LUMBOSACRAL SPONDYLOSIS WITHOUT MYELOPATHY: Chronic | ICD-10-CM

## 2023-06-22 DIAGNOSIS — M79.7 FIBROMYALGIA: ICD-10-CM

## 2023-06-22 PROCEDURE — 99213 OFFICE O/P EST LOW 20 MIN: CPT

## 2023-06-22 PROCEDURE — 80307 DRUG TEST PRSMV CHEM ANLYZR: CPT

## 2023-06-22 PROCEDURE — G0481 DRUG TEST DEF 8-14 CLASSES: HCPCS

## 2023-06-22 PROCEDURE — 99213 OFFICE O/P EST LOW 20 MIN: CPT | Performed by: NURSE PRACTITIONER

## 2023-06-22 RX ORDER — OXYCODONE HYDROCHLORIDE AND ACETAMINOPHEN 5; 325 MG/1; MG/1
1 TABLET ORAL EVERY 6 HOURS PRN
Qty: 120 TABLET | Refills: 0 | Status: SHIPPED | OUTPATIENT
Start: 2023-06-25 | End: 2023-07-25

## 2023-06-22 ASSESSMENT — PAIN DESCRIPTION - FREQUENCY: FREQUENCY: CONTINUOUS

## 2023-06-22 ASSESSMENT — ENCOUNTER SYMPTOMS
CONSTIPATION: 0
BACK PAIN: 1
BOWEL INCONTINENCE: 0
COUGH: 0
SHORTNESS OF BREATH: 0

## 2023-06-22 ASSESSMENT — PAIN DESCRIPTION - LOCATION: LOCATION: BACK;KNEE;FOOT

## 2023-06-22 ASSESSMENT — PAIN DESCRIPTION - PAIN TYPE: TYPE: CHRONIC PAIN

## 2023-06-22 ASSESSMENT — PAIN SCALES - GENERAL: PAINLEVEL_OUTOF10: 7

## 2023-06-22 NOTE — PROGRESS NOTES
for bladder incontinence. Neurological:  Positive for numbness and tingling. Negative for disturbances in coordination and loss of balance. Physical Exam:  /86   Pulse 73   Ht 5' 7\" (1.702 m)   Wt 216 lb (98 kg)   SpO2 96%   BMI 33.83 kg/m²     Physical Exam  HENT:      Head: Normocephalic. Pulmonary:      Effort: Pulmonary effort is normal.   Musculoskeletal:         General: Normal range of motion. Cervical back: Normal range of motion. Lumbar back: Tenderness present. Left hip: Tenderness present. Skin:     General: Skin is warm and dry. Neurological:      Mental Status: He is alert and oriented to person, place, and time. Record/Diagnostics Review:    Last dominga 12/2022 and was appropriate     Assessment:  Problem List Items Addressed This Visit       Lumbosacral spondylosis without myelopathy (Chronic)    Relevant Medications    oxyCODONE-acetaminophen (PERCOCET) 5-325 MG per tablet (Start on 6/25/2023)    Primary osteoarthritis of both hips    Relevant Medications    oxyCODONE-acetaminophen (PERCOCET) 5-325 MG per tablet (Start on 6/25/2023)    Chronic use of opiate drug for therapeutic purpose - Primary    Relevant Orders    DRUG SCREEN, PAIN    Fibromyalgia    Relevant Medications    oxyCODONE-acetaminophen (PERCOCET) 5-325 MG per tablet (Start on 6/25/2023)          Treatment Plan:  Patient relates current medications are helping the pain. Patient reports taking pain medications as prescribed, denies obtaining medications from different sources and denies use of illegal drugs. Medication risk and benefits have been discussed. Patient denies side effects from medications like nausea, vomiting, constipation or drowsiness. Patient reports current activities of daily living are possible due to medications and would like to continue them.      As always, we encourage daily stretching and strengthening exercises, and recommend minimizing use of pain medications unless

## 2023-06-27 LAB
6-ACETYLMORPHINE, UR: NOT DETECTED
7-AMINOCLONAZEPAM, URINE: NOT DETECTED
ALPHA-OH-ALPRAZ, URINE: NOT DETECTED
ALPHA-OH-MIDAZOLAM, URINE: NOT DETECTED
ALPRAZOLAM, URINE: NOT DETECTED
AMPHETAMINES, URINE: NOT DETECTED
BARBITURATES, URINE: NOT DETECTED
BENZOYLECGONINE, UR: NOT DETECTED
BUPRENORPHINE URINE: NOT DETECTED
CARISOPRODOL, UR: NOT DETECTED
CLONAZEPAM, URINE: NOT DETECTED
CODEINE, URINE: NOT DETECTED
CREATININE URINE: 102.2 MG/DL (ref 20–400)
DIAZEPAM, URINE: NOT DETECTED
DRUGS EXPECTED, UR: NORMAL
EER HI RES INTERP UR: NORMAL
ETHYL GLUCURONIDE UR: NOT DETECTED
FENTANYL URINE: NOT DETECTED
GABAPENTIN: PRESENT
HYDROCODONE, URINE: NOT DETECTED
HYDROMORPHONE, URINE: NOT DETECTED
LORAZEPAM, URINE: NOT DETECTED
MARIJUANA METAB, UR: NOT DETECTED
MDA, UR: NOT DETECTED
MDEA, EVE, UR: NOT DETECTED
MDMA URINE: NOT DETECTED
MEPERIDINE METAB, UR: NOT DETECTED
METHADONE, URINE: NOT DETECTED
METHAMPHETAMINE, URINE: NOT DETECTED
METHYLPHENIDATE: NOT DETECTED
MIDAZOLAM, URINE: NOT DETECTED
MORPHINE URINE: NOT DETECTED
NALOXONE URINE: NOT DETECTED
NORBUPRENORPHINE, URINE: NOT DETECTED
NORDIAZEPAM, URINE: NOT DETECTED
NORFENTANYL, URINE: NOT DETECTED
NORHYDROCODONE, URINE: NOT DETECTED
NOROXYCODONE, URINE: PRESENT
NOROXYMORPHONE, URINE: PRESENT
OXAZEPAM, URINE: NOT DETECTED
OXYCODONE URINE: PRESENT
OXYMORPHONE, URINE: PRESENT
PAIN MANAGEMENT DRUG PANEL INTERP, URINE: NORMAL
PAIN MGT DRUG PANEL, HI RES, UR: NORMAL
PCP,URINE: NOT DETECTED
PHENTERMINE, UR: NOT DETECTED
PREGABALIN: NOT DETECTED
TAPENTADOL, URINE: NOT DETECTED
TAPENTADOL-O-SULFATE, URINE: NOT DETECTED
TEMAZEPAM, URINE: NOT DETECTED
TRAMADOL, URINE: NOT DETECTED
ZOLPIDEM METABOLITE (ZCA), URINE: NOT DETECTED
ZOLPIDEM, URINE: NOT DETECTED

## 2023-07-24 ENCOUNTER — HOSPITAL ENCOUNTER (OUTPATIENT)
Dept: PAIN MANAGEMENT | Age: 73
Discharge: HOME | End: 2023-07-24
Payer: MEDICARE

## 2023-07-24 VITALS
DIASTOLIC BLOOD PRESSURE: 79 MMHG | BODY MASS INDEX: 34.53 KG/M2 | SYSTOLIC BLOOD PRESSURE: 128 MMHG | RESPIRATION RATE: 16 BRPM | HEART RATE: 75 BPM | TEMPERATURE: 97.3 F | WEIGHT: 220 LBS | OXYGEN SATURATION: 96 % | HEIGHT: 67 IN

## 2023-07-24 DIAGNOSIS — M47.817 LUMBOSACRAL SPONDYLOSIS WITHOUT MYELOPATHY: Chronic | ICD-10-CM

## 2023-07-24 DIAGNOSIS — M16.0 PRIMARY OSTEOARTHRITIS OF BOTH HIPS: ICD-10-CM

## 2023-07-24 DIAGNOSIS — Z79.891 CHRONIC USE OF OPIATE DRUG FOR THERAPEUTIC PURPOSE: Primary | ICD-10-CM

## 2023-07-24 PROCEDURE — 99213 OFFICE O/P EST LOW 20 MIN: CPT

## 2023-07-24 PROCEDURE — 99213 OFFICE O/P EST LOW 20 MIN: CPT | Performed by: NURSE PRACTITIONER

## 2023-07-24 RX ORDER — OXYCODONE HYDROCHLORIDE AND ACETAMINOPHEN 5; 325 MG/1; MG/1
1 TABLET ORAL EVERY 6 HOURS PRN
Qty: 120 TABLET | Refills: 0 | Status: SHIPPED | OUTPATIENT
Start: 2023-07-25 | End: 2023-08-24

## 2023-07-24 ASSESSMENT — ENCOUNTER SYMPTOMS
BACK PAIN: 1
SHORTNESS OF BREATH: 0
COUGH: 0
CONSTIPATION: 0

## 2023-07-24 ASSESSMENT — PAIN SCALES - GENERAL: PAINLEVEL_OUTOF10: 8

## 2023-07-24 ASSESSMENT — PAIN DESCRIPTION - LOCATION: LOCATION: KNEE;FOOT

## 2023-07-24 NOTE — PROGRESS NOTES
Chief Complaint   Patient presents with    Knee Pain     Med refill    Foot Pain        [facility] Pt reports bilat. Knee pain with no known injury, and has had multiple surgeries/procedures on knees but pain continues. He has had PT in the past with no relief. Discussed genicular blocks but he declines. He also complains of back pain. MRI lumbar with Multilevel severe degenerative disc changes with Modic type I and type II changes involving L2 L3-L4-L5 vertebrae   He completed PT for his back with some benefit. He has been using Voltaren gel with some relief. He had LESI 4/4/22 and reported 50% relief. Pt had lumbar RFA 11/22 with 50% improvement in axial lower back pain    Pt had  Intracept procedure 5/1/2023 with over 50% improvement in axial lower back pain  Follows with Dr. Toño Florence for left hip. He was supposed to have replacement but had imaging completed and was referred to [facility]. He was seen there and completed new imaging. Follow up appt pending. Knee Pain   The incident occurred more than 1 week ago. The pain is present in the left knee and right knee. The quality of the pain is described as aching, burning, cramping, shooting and stabbing. The pain has been Constant since onset. Associated symptoms include muscle weakness and numbness. Pertinent negatives include no tingling. He reports no foreign bodies present. The symptoms are aggravated by movement and weight bearing. He has tried heat, ice, elevation, non-weight bearing and rest for the symptoms. Foot Pain   The pain is present in the right foot. This is a chronic problem. The problem occurs constantly. The problem has been unchanged. The quality of the pain is described as aching, burning, pounding and sharp. The pain is at a severity of 8/10. Associated symptoms include numbness. Pertinent negatives include no fever or tingling. He has tried cold, heat and rest for the symptoms.       Patient denies any new neurological
RELEASE Bilateral     COLONOSCOPY      DENTAL SURGERY  10/2015    DENTAL SURGERY  03/2021    EYE SURGERY      bilat cataracts     FOOT SURGERY Right 12/15/2021    AKIN OSTEOTOMY WITH BONE STAPLE CHEILECTOMY RIGHT FOOT (N/A Foot)    FOOT SURGERY Right 04/06/2022    EXCISION SOFT TISSUE MASS PLANTAR 1ST METHEAD RIGHT performed by Merline Sickles, DPM at [address] Right 09/30/2022    JOINT REPLACEMENT Left     KNEE  X 6 pt unsure of date    JOINT REPLACEMENT Right 07/20/2018    PAIN MANAGEMENT PROCEDURE N/A 5/1/2023    BASIVERTEBRAL NERVE NERVE RADIOFREQUENCY ABLATIONINTRACEPT PROCEDURE at L 2/ 3/4/5 performed by Radha Mendez MD at [address] [place] [address] Right     TONSILLECTOMY         Allergies   Allergen Reactions    Oxycontin [Oxycodone Hcl] Nausea And Vomiting         Current Outpatient Medications:     oxyCODONE-acetaminophen (PERCOCET) 5-325 MG per tablet, Take 1 tablet by mouth every 6 hours as needed for Pain for up to 30 days. Take lowest dose possible to manage pain Max Daily Amount: 4 tablets, Disp: 120 tablet, Rfl: 0    gabapentin (NEURONTIN) 800 MG tablet, TAKE ONE TABLET BY MOUTH THREE TIMES A DAY, Disp: 90 tablet, Rfl: 1    spironolactone (ALDACTONE) 50 MG tablet, TAKE ONE TABLET BY MOUTH DAILY, Disp: 90 tablet, Rfl: 0    tadalafil (CIALIS) 5 MG tablet, Take 1 tablet a day for erectile dysfunction, Disp: 30 tablet, Rfl: 1    diclofenac sodium (VOLTAREN) 1 % GEL, Apply 2 g topically 2 times daily, Disp: 120 g, Rfl: 2    melatonin 3 MG TABS tablet, Take 2 tablets by mouth nightly as needed (insomnia), Disp: , Rfl:     Family History   Problem Relation Age of Onset    Diabetes Mother     No Known Problems Father        Social History     Socioeconomic History    Marital status:       Spouse name: Not on file    Number of children: Not on file    Years of education: Not on file    Highest education level: Not on file   Occupational History    Occupation: retired   Tobacco Use

## 2023-07-27 RX ORDER — SPIRONOLACTONE 50 MG/1
TABLET, FILM COATED ORAL
Qty: 90 TABLET | Refills: 0 | Status: SHIPPED | OUTPATIENT
Start: 2023-07-27

## 2023-07-27 NOTE — TELEPHONE ENCOUNTER
[facility] is calling to request a refill on the following medication(s):    Medication Request:  Requested Prescriptions     Pending Prescriptions Disp Refills    spironolactone (ALDACTONE) 50 MG tablet [Pharmacy Med Name: SPIRONOLACTONE 50 MG TABLET] 90 tablet 0     Sig: TAKE ONE TABLET BY MOUTH DAILY       Last Visit Date (If Applicable):  6/07/4020    Next Visit Date:    8/7/2023        Last refll 5/1/23. Prescription pending.

## 2023-08-10 ENCOUNTER — OFFICE VISIT (OUTPATIENT)
Dept: INTERNAL MEDICINE CLINIC | Age: 73
End: 2023-08-10
Payer: MEDICARE

## 2023-08-10 VITALS
BODY MASS INDEX: 33.43 KG/M2 | RESPIRATION RATE: 10 BRPM | HEART RATE: 65 BPM | HEIGHT: 67 IN | SYSTOLIC BLOOD PRESSURE: 100 MMHG | OXYGEN SATURATION: 96 % | WEIGHT: 213 LBS | TEMPERATURE: 97.4 F | DIASTOLIC BLOOD PRESSURE: 80 MMHG

## 2023-08-10 DIAGNOSIS — N52.01 ERECTILE DYSFUNCTION DUE TO ARTERIAL INSUFFICIENCY: ICD-10-CM

## 2023-08-10 DIAGNOSIS — M16.0 PRIMARY OSTEOARTHRITIS OF BOTH HIPS: ICD-10-CM

## 2023-08-10 DIAGNOSIS — F39 MOOD DISORDER (HCC): ICD-10-CM

## 2023-08-10 DIAGNOSIS — F51.04 PSYCHOPHYSIOLOGICAL INSOMNIA: ICD-10-CM

## 2023-08-10 DIAGNOSIS — N18.30 STAGE 3 CHRONIC KIDNEY DISEASE, UNSPECIFIED WHETHER STAGE 3A OR 3B CKD (HCC): ICD-10-CM

## 2023-08-10 DIAGNOSIS — M79.7 FIBROMYALGIA: ICD-10-CM

## 2023-08-10 DIAGNOSIS — Z79.891 CHRONIC USE OF OPIATE DRUG FOR THERAPEUTIC PURPOSE: ICD-10-CM

## 2023-08-10 DIAGNOSIS — M47.817 LUMBOSACRAL SPONDYLOSIS WITHOUT MYELOPATHY: Chronic | ICD-10-CM

## 2023-08-10 DIAGNOSIS — R73.03 PRE-DIABETES: ICD-10-CM

## 2023-08-10 DIAGNOSIS — Z00.00 MEDICARE ANNUAL WELLNESS VISIT, SUBSEQUENT: Primary | ICD-10-CM

## 2023-08-10 DIAGNOSIS — E78.5 DYSLIPIDEMIA: ICD-10-CM

## 2023-08-10 DIAGNOSIS — I10 ESSENTIAL HYPERTENSION: ICD-10-CM

## 2023-08-10 DIAGNOSIS — M54.51 VERTEBROGENIC LOW BACK PAIN: ICD-10-CM

## 2023-08-10 PROCEDURE — G0439 PPPS, SUBSEQ VISIT: HCPCS | Performed by: FAMILY MEDICINE

## 2023-08-10 PROCEDURE — 3079F DIAST BP 80-89 MM HG: CPT | Performed by: FAMILY MEDICINE

## 2023-08-10 PROCEDURE — 3017F COLORECTAL CA SCREEN DOC REV: CPT | Performed by: FAMILY MEDICINE

## 2023-08-10 PROCEDURE — 3074F SYST BP LT 130 MM HG: CPT | Performed by: FAMILY MEDICINE

## 2023-08-10 PROCEDURE — 1123F ACP DISCUSS/DSCN MKR DOCD: CPT | Performed by: FAMILY MEDICINE

## 2023-08-10 RX ORDER — TADALAFIL 5 MG/1
TABLET ORAL
Qty: 30 TABLET | Refills: 1 | Status: SHIPPED | OUTPATIENT
Start: 2023-08-10

## 2023-08-10 ASSESSMENT — PATIENT HEALTH QUESTIONNAIRE - PHQ9
2. FEELING DOWN, DEPRESSED OR HOPELESS: 0
SUM OF ALL RESPONSES TO PHQ QUESTIONS 1-9: 2
SUM OF ALL RESPONSES TO PHQ QUESTIONS 1-9: 2
SUM OF ALL RESPONSES TO PHQ9 QUESTIONS 1 & 2: 2
1. LITTLE INTEREST OR PLEASURE IN DOING THINGS: 2
SUM OF ALL RESPONSES TO PHQ QUESTIONS 1-9: 2
SUM OF ALL RESPONSES TO PHQ QUESTIONS 1-9: 2

## 2023-08-10 ASSESSMENT — LIFESTYLE VARIABLES
HOW MANY STANDARD DRINKS CONTAINING ALCOHOL DO YOU HAVE ON A TYPICAL DAY: PATIENT DOES NOT DRINK
HOW OFTEN DO YOU HAVE A DRINK CONTAINING ALCOHOL: NEVER

## 2023-08-10 NOTE — PROGRESS NOTES
Medicare Annual Wellness Visit    Ralston Olszewski is here for Medicare AWV and Procedure (Patient having hip surgery 8/28/23)  Vitals  Afebrile hemodynamically stable  Systemic exam  HEENT unremarkable  Neck unremarkable  Lungs bilateral CTA  CVS S1-S2 regular rate and rhythm  Abdomen soft discomfort benign to palpation normoactive bowel sound  CNS no acute focal sensorimotor deficit  Lower extremity no edema no calf tenderness. Skin no tenting no lesion  Assessment & Plan   Medicare annual wellness visit, subsequent     Diagnosis Orders   1. Medicare annual wellness visit, subsequent        2. Lumbosacral spondylosis without myelopathy        3. Primary osteoarthritis of both hips        4. Chronic use of opiate drug for therapeutic purpose        5. Pre-diabetes        6. Dyslipidemia        7. Essential hypertension        8. Psychophysiological insomnia        9. Fibromyalgia        10. Mood disorder ([address])        11. Stage 3 chronic kidney disease, unspecified whether stage 3a or 3b CKD ([address])        12. Erectile dysfunction due to arterial insufficiency        13. Vertebrogenic low back pain        67year-old pleasant -American male is presented requesting Medicare annual exam.  He does not voice any new distress. He is afebrile hemodynamically stable  He is compliant with all his medications that he is tolerating well  He eats healthy with modest activity with his chronic back pain complaint. He ambulates with a cane  He denies tobacco, excessive alcohol or illicit drug use  He has good family support  Chronic pain syndrome. History of epidural injection and radiofrequency ablation with improvement. He is currently on gabapentin and Percocet that he is tolerating well. Avoid constipation. Continue activity as tolerated with fall precaution  Fibromyalgia. Reassurance provided. Continue gabapentin  Erectile dysfunction he wished to have refill on Cialis that has been tolerating well  Insomnia.

## 2023-08-14 DIAGNOSIS — M17.12 PRIMARY OSTEOARTHRITIS OF LEFT KNEE: ICD-10-CM

## 2023-08-14 DIAGNOSIS — Z98.890 S/P LEFT KNEE SURGERY: ICD-10-CM

## 2023-08-14 DIAGNOSIS — M25.562 CHRONIC PAIN OF LEFT KNEE: ICD-10-CM

## 2023-08-14 DIAGNOSIS — Z98.890 S/P KNEE SURGERY: ICD-10-CM

## 2023-08-14 DIAGNOSIS — M17.0 PRIMARY OSTEOARTHRITIS OF BOTH KNEES: ICD-10-CM

## 2023-08-14 DIAGNOSIS — Z51.81 MEDICATION MONITORING ENCOUNTER: ICD-10-CM

## 2023-08-14 DIAGNOSIS — M17.11 PRIMARY OSTEOARTHRITIS OF RIGHT KNEE: ICD-10-CM

## 2023-08-14 DIAGNOSIS — G89.29 CHRONIC PAIN OF LEFT KNEE: ICD-10-CM

## 2023-08-14 DIAGNOSIS — Z51.81 ENCOUNTER FOR MEDICATION MONITORING: ICD-10-CM

## 2023-08-14 DIAGNOSIS — Z79.891 USE OF OPIATES FOR THERAPEUTIC PURPOSES: ICD-10-CM

## 2023-08-14 DIAGNOSIS — M17.0 OSTEOARTHRITIS OF BOTH KNEES, UNSPECIFIED OSTEOARTHRITIS TYPE: ICD-10-CM

## 2023-08-14 PROBLEM — M16.12 PRIMARY OSTEOARTHRITIS OF LEFT HIP: Status: ACTIVE | Noted: 2022-12-18

## 2023-08-14 PROBLEM — R25.2 HAND CRAMPS: Status: ACTIVE | Noted: 2023-08-04

## 2023-08-14 PROBLEM — M50.10 CERVICAL DISC DISORDER WITH RADICULOPATHY: Status: ACTIVE | Noted: 2023-08-04

## 2023-08-14 PROBLEM — R05.9 COUGH: Status: ACTIVE | Noted: 2023-08-04

## 2023-08-14 RX ORDER — GABAPENTIN 800 MG/1
TABLET ORAL
Qty: 90 TABLET | Refills: 1 | OUTPATIENT
Start: 2023-08-14 | End: 2023-09-13

## 2023-08-22 ENCOUNTER — HOSPITAL ENCOUNTER (OUTPATIENT)
Dept: PAIN MANAGEMENT | Age: 73
Discharge: HOME | End: 2023-08-22
Payer: MEDICARE

## 2023-08-22 VITALS — BODY MASS INDEX: 33.43 KG/M2 | HEIGHT: 67 IN | TEMPERATURE: 97.3 F | WEIGHT: 213 LBS

## 2023-08-22 DIAGNOSIS — Z79.891 CHRONIC USE OF OPIATE DRUG FOR THERAPEUTIC PURPOSE: ICD-10-CM

## 2023-08-22 DIAGNOSIS — M16.12 PRIMARY OSTEOARTHRITIS OF LEFT HIP: ICD-10-CM

## 2023-08-22 DIAGNOSIS — M47.817 LUMBOSACRAL SPONDYLOSIS WITHOUT MYELOPATHY: Primary | Chronic | ICD-10-CM

## 2023-08-22 PROBLEM — T84.54XA INFECTION OF PROSTHETIC LEFT KNEE JOINT (HCC): Status: ACTIVE | Noted: 2017-06-01

## 2023-08-22 PROBLEM — M54.2 NECK PAIN: Status: ACTIVE | Noted: 2023-08-04

## 2023-08-22 PROCEDURE — 99213 OFFICE O/P EST LOW 20 MIN: CPT

## 2023-08-22 RX ORDER — OXYCODONE HYDROCHLORIDE AND ACETAMINOPHEN 5; 325 MG/1; MG/1
1 TABLET ORAL EVERY 6 HOURS PRN
Qty: 120 TABLET | Refills: 0 | Status: SHIPPED | OUTPATIENT
Start: 2023-08-24 | End: 2023-09-23

## 2023-08-22 RX ORDER — GABAPENTIN 800 MG/1
800 TABLET ORAL 3 TIMES DAILY
Qty: 270 TABLET | Refills: 0 | Status: SHIPPED | OUTPATIENT
Start: 2023-08-22 | End: 2023-11-20

## 2023-08-22 ASSESSMENT — ENCOUNTER SYMPTOMS
CONSTIPATION: 0
COUGH: 0
BACK PAIN: 1
SHORTNESS OF BREATH: 0

## 2023-08-22 ASSESSMENT — PAIN SCALES - GENERAL: PAINLEVEL_OUTOF10: 7

## 2023-09-07 ENCOUNTER — TELEPHONE (OUTPATIENT)
Dept: PAIN MANAGEMENT | Age: 73
End: 2023-09-07

## 2023-09-07 NOTE — TELEPHONE ENCOUNTER
Pt called stated after hip replacement at  [facility] [place] so he took meds given by PM instead.

## 2023-09-13 PROBLEM — R05.9 COUGH: Status: RESOLVED | Noted: 2023-08-04 | Resolved: 2023-09-13

## 2023-10-05 ENCOUNTER — HOSPITAL ENCOUNTER (OUTPATIENT)
Dept: PAIN MANAGEMENT | Age: 73
Discharge: HOME | End: 2023-10-05
Payer: MEDICARE

## 2023-10-05 VITALS
WEIGHT: 213 LBS | HEART RATE: 80 BPM | DIASTOLIC BLOOD PRESSURE: 89 MMHG | HEIGHT: 67 IN | SYSTOLIC BLOOD PRESSURE: 129 MMHG | OXYGEN SATURATION: 98 % | BODY MASS INDEX: 33.43 KG/M2

## 2023-10-05 DIAGNOSIS — M54.51 VERTEBROGENIC LOW BACK PAIN: ICD-10-CM

## 2023-10-05 DIAGNOSIS — M47.817 LUMBOSACRAL SPONDYLOSIS WITHOUT MYELOPATHY: Chronic | ICD-10-CM

## 2023-10-05 DIAGNOSIS — Z79.891 CHRONIC USE OF OPIATE DRUG FOR THERAPEUTIC PURPOSE: Primary | ICD-10-CM

## 2023-10-05 PROCEDURE — 99213 OFFICE O/P EST LOW 20 MIN: CPT

## 2023-10-05 PROCEDURE — 99213 OFFICE O/P EST LOW 20 MIN: CPT | Performed by: NURSE PRACTITIONER

## 2023-10-05 RX ORDER — OXYCODONE HYDROCHLORIDE AND ACETAMINOPHEN 5; 325 MG/1; MG/1
1 TABLET ORAL EVERY 6 HOURS PRN
Qty: 120 TABLET | Refills: 0 | Status: SHIPPED | OUTPATIENT
Start: 2023-10-11 | End: 2023-11-10

## 2023-10-05 ASSESSMENT — ENCOUNTER SYMPTOMS
CONSTIPATION: 0
SHORTNESS OF BREATH: 0
COUGH: 0
BACK PAIN: 1

## 2023-10-05 ASSESSMENT — PAIN DESCRIPTION - PAIN TYPE: TYPE: CHRONIC PAIN

## 2023-10-05 ASSESSMENT — PAIN SCALES - GENERAL: PAINLEVEL_OUTOF10: 5

## 2023-10-05 ASSESSMENT — PAIN DESCRIPTION - FREQUENCY: FREQUENCY: CONTINUOUS

## 2023-10-05 ASSESSMENT — PAIN DESCRIPTION - LOCATION: LOCATION: FOOT;KNEE

## 2023-10-05 NOTE — PROGRESS NOTES
present. Skin:     General: Skin is warm and dry. Neurological:      Mental Status: He is alert and oriented to person, place, and time. Record/Diagnostics Review:    Last dominga 6/2023 and was appropriate     Assessment:  Problem List Items Addressed This Visit       Lumbosacral spondylosis without myelopathy (Chronic)    Relevant Medications    oxyCODONE-acetaminophen (PERCOCET) 5-325 MG per tablet (Start on 10/11/2023)    Chronic use of opiate drug for therapeutic purpose - Primary    Vertebrogenic low back pain    Relevant Medications    oxyCODONE-acetaminophen (PERCOCET) 5-325 MG per tablet (Start on 10/11/2023)          Treatment Plan:  Patient relates current medications are helping the pain. Patient reports taking pain medications as prescribed, denies obtaining medications from different sources and denies use of illegal drugs. Medication risk and benefits have been discussed. Patient denies side effects from medications like nausea, vomiting, constipation or drowsiness. Patient reports current activities of daily living are possible due to medications and would like to continue them. As always, we encourage daily stretching and strengthening exercises, and recommend minimizing use of pain medications unless patient cannot get through daily activities due to pain. Due to the high risk nature of this patient's pain medication close monitoring is required. Continue current medication management, pt has been stable and compliant. Script written for percocet  Pt is in PT following left hip surgery at [facility] 9/28/23  Follow up appointment made for 4 weeks    I have reviewed the chief complaint and history of present illness (including ROS and [address]) and vital documentation by my staff and I agree with their documentation and have added where applicable.

## 2023-11-02 ENCOUNTER — HOSPITAL ENCOUNTER (OUTPATIENT)
Dept: PAIN MANAGEMENT | Age: 73
Discharge: HOME | End: 2023-11-02
Payer: MEDICARE

## 2023-11-02 VITALS — OXYGEN SATURATION: 97 % | WEIGHT: 213 LBS | BODY MASS INDEX: 33.43 KG/M2 | HEART RATE: 79 BPM | HEIGHT: 67 IN

## 2023-11-02 DIAGNOSIS — Z96.652 STATUS POST REVISION OF TOTAL KNEE REPLACEMENT, LEFT: ICD-10-CM

## 2023-11-02 DIAGNOSIS — Z79.891 CHRONIC USE OF OPIATE DRUG FOR THERAPEUTIC PURPOSE: ICD-10-CM

## 2023-11-02 DIAGNOSIS — M47.817 LUMBOSACRAL SPONDYLOSIS WITHOUT MYELOPATHY: Primary | Chronic | ICD-10-CM

## 2023-11-02 DIAGNOSIS — M54.51 VERTEBROGENIC LOW BACK PAIN: ICD-10-CM

## 2023-11-02 DIAGNOSIS — M17.11 PRIMARY OSTEOARTHRITIS OF RIGHT KNEE: ICD-10-CM

## 2023-11-02 PROBLEM — M79.2 NEUROPATHIC PAIN: Status: ACTIVE | Noted: 2023-10-30

## 2023-11-02 PROBLEM — G62.9 NEUROPATHY: Status: ACTIVE | Noted: 2023-11-02

## 2023-11-02 PROCEDURE — 99214 OFFICE O/P EST MOD 30 MIN: CPT | Performed by: NURSE PRACTITIONER

## 2023-11-02 PROCEDURE — 99213 OFFICE O/P EST LOW 20 MIN: CPT

## 2023-11-02 RX ORDER — METHOCARBAMOL 500 MG/1
TABLET, FILM COATED ORAL
COMMUNITY
Start: 2023-08-29

## 2023-11-02 RX ORDER — OMEPRAZOLE 20 MG/1
CAPSULE, DELAYED RELEASE ORAL
COMMUNITY
Start: 2023-08-29

## 2023-11-02 RX ORDER — MELOXICAM 15 MG/1
TABLET ORAL
COMMUNITY
Start: 2023-08-29

## 2023-11-02 RX ORDER — OXYCODONE HYDROCHLORIDE AND ACETAMINOPHEN 5; 325 MG/1; MG/1
1 TABLET ORAL EVERY 6 HOURS PRN
Qty: 120 TABLET | Refills: 0 | Status: SHIPPED | OUTPATIENT
Start: 2023-11-10 | End: 2023-12-10

## 2023-11-02 RX ORDER — GABAPENTIN 800 MG/1
800 TABLET ORAL 3 TIMES DAILY
Qty: 270 TABLET | Refills: 0 | Status: SHIPPED | OUTPATIENT
Start: 2023-11-02 | End: 2024-01-31

## 2023-11-02 ASSESSMENT — PAIN DESCRIPTION - PAIN TYPE: TYPE: CHRONIC PAIN

## 2023-11-02 ASSESSMENT — PAIN DESCRIPTION - LOCATION: LOCATION: FOOT;KNEE

## 2023-11-02 ASSESSMENT — PAIN SCALES - GENERAL: PAINLEVEL_OUTOF10: 5

## 2023-11-02 ASSESSMENT — ENCOUNTER SYMPTOMS
SHORTNESS OF BREATH: 0
CONSTIPATION: 0
BACK PAIN: 1
COUGH: 0

## 2023-11-02 ASSESSMENT — PAIN DESCRIPTION - FREQUENCY: FREQUENCY: CONTINUOUS

## 2023-11-02 NOTE — PROGRESS NOTES
5-325 MG per tablet (Start on 11/10/2023)          Treatment Plan:  Patient relates current medications are helping the pain. Patient reports taking pain medications as prescribed, denies obtaining medications from different sources and denies use of illegal drugs. Medication risk and benefits have been discussed. Patient denies side effects from medications like nausea, vomiting, constipation or drowsiness. Patient reports current activities of daily living are possible due to medications and would like to continue them. As always, we encourage daily stretching and strengthening exercises, and recommend minimizing use of pain medications unless patient cannot get through daily activities due to pain. Due to the high risk nature of this patient's pain medication close monitoring is required. Continue current medication management, pt has been stable and compliant. Script written for percocet and gabapentin   Pt to continue with PT for hip pain   Pt to f/u with neurology as planned   Follow up appointment made for 4 weeks    I have reviewed the chief complaint and history of present illness (including ROS and PFSH) and vital documentation by my staff and I agree with their documentation and have added where applicable.

## 2023-11-20 RX ORDER — SPIRONOLACTONE 50 MG/1
50 TABLET, FILM COATED ORAL DAILY
Qty: 90 TABLET | Refills: 1 | Status: SHIPPED | OUTPATIENT
Start: 2023-11-20

## 2023-11-20 NOTE — TELEPHONE ENCOUNTER
Isaías Phelan is calling to request a refill on the following medication(s):    Medication Request:  Requested Prescriptions     Pending Prescriptions Disp Refills    spironolactone (ALDACTONE) 50 MG tablet [Pharmacy Med Name: SPIRONOLACTONE 50 MG TABLET] 90 tablet 0     Sig: TAKE 1 TABLET BY MOUTH DAILY       Last Visit Date (If Applicable):  3/83/3994    Next Visit Date:    2/9/2024      Last refill 7/27/23. Prescription pending.

## 2023-11-29 ENCOUNTER — TELEPHONE (OUTPATIENT)
Dept: PAIN MANAGEMENT | Age: 73
End: 2023-11-29

## 2023-11-29 NOTE — TELEPHONE ENCOUNTER
Pt calls in requesting us to call in and start ordering script for something digestive; pt was not aware of what the medication was called let him know to discuss at next OV with provider on 12/7

## 2023-12-07 ENCOUNTER — HOSPITAL ENCOUNTER (OUTPATIENT)
Dept: PAIN MANAGEMENT | Age: 73
Discharge: HOME | End: 2023-12-07
Payer: MEDICARE

## 2023-12-07 VITALS
DIASTOLIC BLOOD PRESSURE: 74 MMHG | SYSTOLIC BLOOD PRESSURE: 126 MMHG | OXYGEN SATURATION: 95 % | WEIGHT: 213 LBS | BODY MASS INDEX: 33.43 KG/M2 | HEART RATE: 89 BPM | HEIGHT: 67 IN

## 2023-12-07 DIAGNOSIS — Z96.652 STATUS POST REVISION OF TOTAL KNEE REPLACEMENT, LEFT: ICD-10-CM

## 2023-12-07 DIAGNOSIS — M54.51 VERTEBROGENIC LOW BACK PAIN: ICD-10-CM

## 2023-12-07 DIAGNOSIS — M47.817 LUMBOSACRAL SPONDYLOSIS WITHOUT MYELOPATHY: Primary | Chronic | ICD-10-CM

## 2023-12-07 DIAGNOSIS — Z79.891 CHRONIC USE OF OPIATE DRUG FOR THERAPEUTIC PURPOSE: ICD-10-CM

## 2023-12-07 DIAGNOSIS — M16.12 PRIMARY OSTEOARTHRITIS OF LEFT HIP: ICD-10-CM

## 2023-12-07 PROCEDURE — 99214 OFFICE O/P EST MOD 30 MIN: CPT | Performed by: NURSE PRACTITIONER

## 2023-12-07 PROCEDURE — 99213 OFFICE O/P EST LOW 20 MIN: CPT

## 2023-12-07 RX ORDER — MELOXICAM 15 MG/1
15 TABLET ORAL DAILY
Qty: 30 TABLET | Refills: 0 | Status: SHIPPED | OUTPATIENT
Start: 2023-12-07 | End: 2024-01-06

## 2023-12-07 RX ORDER — DOCUSATE SODIUM 100 MG/1
100 CAPSULE, LIQUID FILLED ORAL 2 TIMES DAILY
Qty: 60 CAPSULE | Refills: 0 | Status: SHIPPED | OUTPATIENT
Start: 2023-12-07 | End: 2024-01-06

## 2023-12-07 RX ORDER — TIZANIDINE 2 MG/1
2 TABLET ORAL NIGHTLY PRN
Qty: 30 TABLET | Refills: 0 | Status: SHIPPED | OUTPATIENT
Start: 2023-12-07 | End: 2024-01-06

## 2023-12-07 RX ORDER — OXYCODONE HYDROCHLORIDE AND ACETAMINOPHEN 5; 325 MG/1; MG/1
1 TABLET ORAL EVERY 6 HOURS PRN
Qty: 120 TABLET | Refills: 0 | Status: SHIPPED | OUTPATIENT
Start: 2023-12-10 | End: 2024-01-09

## 2023-12-07 ASSESSMENT — PAIN DESCRIPTION - PAIN TYPE: TYPE: CHRONIC PAIN

## 2023-12-07 ASSESSMENT — PAIN DESCRIPTION - DESCRIPTORS: DESCRIPTORS: ACHING;BURNING;CRAMPING;STABBING;SHOOTING

## 2023-12-07 ASSESSMENT — PAIN DESCRIPTION - FREQUENCY: FREQUENCY: CONTINUOUS

## 2023-12-07 ASSESSMENT — PAIN DESCRIPTION - LOCATION: LOCATION: FOOT;KNEE

## 2023-12-07 ASSESSMENT — ENCOUNTER SYMPTOMS
BACK PAIN: 1
SHORTNESS OF BREATH: 0
CONSTIPATION: 0
COUGH: 0

## 2023-12-07 ASSESSMENT — PAIN SCALES - GENERAL: PAINLEVEL_OUTOF10: 8

## 2023-12-07 ASSESSMENT — PAIN DESCRIPTION - ORIENTATION: ORIENTATION: RIGHT;LEFT

## 2023-12-07 NOTE — PROGRESS NOTES
Chief Complaint   Patient presents with    Foot Pain    Knee Pain     [facility]     Pt c/o chronic  back hip and knee and right foot pain     Pt reports bilat. Knee pain with no known injury, and has had multiple surgeries/procedures on knees but pain continues. He has had PT in the past with no relief. Discussed genicular blocks but he declines. also chronic right foot pain  EMG done 7/2023 to rule out neuropathy RLE and with normal results - given referral to neurology by ortho office  Pt saw [facility] 11/2023 with robaxin started but pt states was denied by insurance and to continue with our medication and f/u in 2-3 months  He has seen ortho with no need for surgery at this time      He also complains of back pain. MRI lumbar 6/2021 with Multilevel severe degenerative disc changes with Modic type I and type II changes involving L2 L3-L4-L5 vertebrae  He completed PT for his back with some benefit. He has been using Voltaren gel with some relief. He had LESI 4/4/22 and reported 50% relief not ready to repeat. Pt had lumbar RFA 11/22 with 50% improvement in axial lower back pain not ready to repeat   Pt had Intracept procedure 5/1/2023 with over 50% improvement in axial lower back pain     Pt reports chronic left hip pain and follows with Dr. Erskine Heimlich. He had replacement with [facility]. 9/28. still in PT for hip pain    Asking for increase in medication d/t increase in pain. Discussed need to exhaust other modalities - delined PT - doing PT right now for his hip, offered to repeat interventions - declined offered to update imaiging  agrees      Foot Pain   The pain is present in the right knee, right ankle, left foot and left knee. This is a chronic problem. The current episode started more than 1 year ago. There has been no history of extremity trauma. The problem occurs constantly. The problem has been unchanged. The quality of the pain is described as aching, burning, pounding and sharp.

## 2023-12-08 ENCOUNTER — OFFICE VISIT (OUTPATIENT)
Dept: INTERNAL MEDICINE CLINIC | Age: 73
End: 2023-12-08

## 2023-12-08 ENCOUNTER — HOSPITAL ENCOUNTER (OUTPATIENT)
Age: 73
Setting detail: SPECIMEN
Discharge: HOME | End: 2023-12-08

## 2023-12-08 VITALS
HEIGHT: 67 IN | HEART RATE: 62 BPM | WEIGHT: 212 LBS | SYSTOLIC BLOOD PRESSURE: 116 MMHG | DIASTOLIC BLOOD PRESSURE: 72 MMHG | TEMPERATURE: 97.8 F | OXYGEN SATURATION: 98 % | RESPIRATION RATE: 10 BRPM | BODY MASS INDEX: 33.27 KG/M2

## 2023-12-08 DIAGNOSIS — I10 ESSENTIAL HYPERTENSION: ICD-10-CM

## 2023-12-08 DIAGNOSIS — Z96.652 STATUS POST REVISION OF TOTAL KNEE REPLACEMENT, LEFT: ICD-10-CM

## 2023-12-08 DIAGNOSIS — M16.12 PRIMARY OSTEOARTHRITIS OF LEFT HIP: ICD-10-CM

## 2023-12-08 DIAGNOSIS — E78.5 DYSLIPIDEMIA: ICD-10-CM

## 2023-12-08 DIAGNOSIS — M79.7 FIBROMYALGIA: ICD-10-CM

## 2023-12-08 DIAGNOSIS — H40.1131 PRIMARY OPEN ANGLE GLAUCOMA OF BOTH EYES, MILD STAGE: ICD-10-CM

## 2023-12-08 DIAGNOSIS — M47.817 LUMBOSACRAL SPONDYLOSIS WITHOUT MYELOPATHY: Primary | Chronic | ICD-10-CM

## 2023-12-08 DIAGNOSIS — N18.30 STAGE 3 CHRONIC KIDNEY DISEASE, UNSPECIFIED WHETHER STAGE 3A OR 3B CKD (HCC): ICD-10-CM

## 2023-12-08 DIAGNOSIS — F51.04 PSYCHOPHYSIOLOGICAL INSOMNIA: ICD-10-CM

## 2023-12-08 DIAGNOSIS — F39 MOOD DISORDER (HCC): ICD-10-CM

## 2023-12-08 DIAGNOSIS — Z12.11 COLON CANCER SCREENING: ICD-10-CM

## 2023-12-08 DIAGNOSIS — Z79.891 CHRONIC USE OF OPIATE DRUG FOR THERAPEUTIC PURPOSE: ICD-10-CM

## 2023-12-08 DIAGNOSIS — R73.03 PRE-DIABETES: ICD-10-CM

## 2023-12-08 DIAGNOSIS — M50.10 CERVICAL DISC DISORDER WITH RADICULOPATHY: ICD-10-CM

## 2023-12-08 PROBLEM — G62.9 NEUROPATHY: Status: RESOLVED | Noted: 2023-11-02 | Resolved: 2023-12-08

## 2023-12-08 PROBLEM — N52.01 ERECTILE DYSFUNCTION DUE TO ARTERIAL INSUFFICIENCY: Status: RESOLVED | Noted: 2023-04-06 | Resolved: 2023-12-08

## 2023-12-08 PROBLEM — M54.2 NECK PAIN: Status: RESOLVED | Noted: 2023-08-04 | Resolved: 2023-12-08

## 2023-12-08 PROBLEM — T84.54XA INFECTION OF PROSTHETIC LEFT KNEE JOINT (HCC): Status: RESOLVED | Noted: 2017-06-01 | Resolved: 2023-12-08

## 2023-12-08 PROBLEM — M79.2 NEUROPATHIC PAIN: Status: RESOLVED | Noted: 2023-10-30 | Resolved: 2023-12-08

## 2023-12-08 PROBLEM — M54.51 VERTEBROGENIC LOW BACK PAIN: Status: RESOLVED | Noted: 2023-04-17 | Resolved: 2023-12-08

## 2023-12-08 PROBLEM — M17.11 OSTEOARTHRITIS OF RIGHT KNEE: Status: RESOLVED | Noted: 2018-02-06 | Resolved: 2023-12-08

## 2023-12-08 PROBLEM — R29.898 WEAKNESS OF LEFT LEG: Status: RESOLVED | Noted: 2018-02-06 | Resolved: 2023-12-08

## 2023-12-08 PROBLEM — R25.2 HAND CRAMPS: Status: RESOLVED | Noted: 2023-08-04 | Resolved: 2023-12-08

## 2023-12-08 LAB
ANION GAP SERPL CALCULATED.3IONS-SCNC: 13 MMOL/L (ref 9–17)
BUN SERPL-MCNC: 16 MG/DL (ref 8–23)
CALCIUM SERPL-MCNC: 9.4 MG/DL (ref 8.6–10.4)
CHLORIDE SERPL-SCNC: 104 MMOL/L (ref 98–107)
CO2 SERPL-SCNC: 25 MMOL/L (ref 20–31)
CREAT SERPL-MCNC: 1.2 MG/DL (ref 0.7–1.2)
GFR SERPL CREATININE-BSD FRML MDRD: >60 ML/MIN/1.73M2
GLUCOSE SERPL-MCNC: 93 MG/DL (ref 70–99)
POTASSIUM SERPL-SCNC: 4.7 MMOL/L (ref 3.7–5.3)
SODIUM SERPL-SCNC: 142 MMOL/L (ref 135–144)

## 2023-12-08 NOTE — PROGRESS NOTES
9. Essential hypertension        10. Psychophysiological insomnia        11. Fibromyalgia        12. Mood disorder ([address])        13. Stage 3 chronic kidney disease, unspecified whether stage 3a or 3b CKD (HCC)  Basic Metabolic Panel      14. Cervical disc disorder with radiculopathy                Plan:      77-year-old very anxious male is presented with known history of erectile dysfunction with subjective complaint of maintaining his erection long enough to his satisfaction. However he feels to tumescence and able to perform. He is expressing perform anxiety. He is on Cialis 5 mg p.o. daily. He is advised to increase it to 10 mg and observe. I also offered him urology consultation that he declined. Stress reduction is advised. Patient states that his wife is more than 12 years younger. Suggest his wife discuss with her gynecologist as necessary  He also understand that aging, medications including opiates, chronic pain with associated anxiety and depression will also impact his sexual performance  Degenerative spondylosis without any sign of myelopathy. He is established with pain management on polypharmacy-Percocet, tizanidine, gabapentin, Voltaren gel. Avoid constipation. Insomnia continue melatonin  Fibromyalgia on gabapentin  Hemodynamically stable in response to Aldactone    12 inhibitor  He denies tobacco, excessive alcohol or illicit drug use  CKD with creatinine of 1.10 as of 5/2023 with BUN of 11. Avoid nephrotoxic drugs, anti-inflammatory radiocontrast.  We will recheck his labs  Further recommendations to follow labs  Call for any concern  This note is created with a voice recognition program and while intend to generate a document that accurately reflects the content of the visit, no guarantee can be provided that every mistake has been identified and corrected by editing.       Fiorella Ceballos MD

## 2023-12-12 ENCOUNTER — TELEPHONE (OUTPATIENT)
Dept: INTERNAL MEDICINE CLINIC | Age: 73
End: 2023-12-12

## 2023-12-15 ENCOUNTER — HOSPITAL ENCOUNTER (OUTPATIENT)
Dept: MRI IMAGING | Age: 73
End: 2023-12-15
Payer: MEDICARE

## 2023-12-15 DIAGNOSIS — M47.817 LUMBOSACRAL SPONDYLOSIS WITHOUT MYELOPATHY: Chronic | ICD-10-CM

## 2023-12-15 DIAGNOSIS — M54.51 VERTEBROGENIC LOW BACK PAIN: ICD-10-CM

## 2023-12-15 PROCEDURE — 72148 MRI LUMBAR SPINE W/O DYE: CPT

## 2023-12-26 NOTE — TELEPHONE ENCOUNTER
Patient calling to request medication   state you had  told him before that he can take two if needed

## 2023-12-27 PROBLEM — N52.9 ERECTILE DYSFUNCTION: Status: ACTIVE | Noted: 2023-12-27

## 2023-12-27 RX ORDER — TADALAFIL 5 MG/1
TABLET ORAL
Qty: 30 TABLET | Refills: 1 | Status: SHIPPED | OUTPATIENT
Start: 2023-12-27

## 2024-01-08 ENCOUNTER — HOSPITAL ENCOUNTER (OUTPATIENT)
Dept: PAIN MANAGEMENT | Age: 74
Discharge: HOME | End: 2024-01-08
Payer: MEDICARE

## 2024-01-08 ENCOUNTER — HOSPITAL ENCOUNTER (OUTPATIENT)
Dept: GENERAL RADIOLOGY | Age: 74
Discharge: HOME | End: 2024-01-10
Payer: MEDICARE

## 2024-01-08 ENCOUNTER — HOSPITAL ENCOUNTER (OUTPATIENT)
Age: 74
Discharge: HOME | End: 2024-01-10
Payer: MEDICARE

## 2024-01-08 VITALS — HEIGHT: 67 IN | WEIGHT: 208 LBS | BODY MASS INDEX: 32.65 KG/M2

## 2024-01-08 DIAGNOSIS — Z79.891 CHRONIC USE OF OPIATE DRUG FOR THERAPEUTIC PURPOSE: ICD-10-CM

## 2024-01-08 DIAGNOSIS — M47.817 LUMBOSACRAL SPONDYLOSIS WITHOUT MYELOPATHY: Primary | Chronic | ICD-10-CM

## 2024-01-08 DIAGNOSIS — M47.817 LUMBOSACRAL SPONDYLOSIS WITHOUT MYELOPATHY: Chronic | ICD-10-CM

## 2024-01-08 DIAGNOSIS — M79.7 FIBROMYALGIA: ICD-10-CM

## 2024-01-08 PROCEDURE — G0481 DRUG TEST DEF 8-14 CLASSES: HCPCS

## 2024-01-08 PROCEDURE — 80307 DRUG TEST PRSMV CHEM ANLYZR: CPT

## 2024-01-08 PROCEDURE — 99213 OFFICE O/P EST LOW 20 MIN: CPT

## 2024-01-08 PROCEDURE — 99214 OFFICE O/P EST MOD 30 MIN: CPT | Performed by: NURSE PRACTITIONER

## 2024-01-08 PROCEDURE — 72100 X-RAY EXAM L-S SPINE 2/3 VWS: CPT

## 2024-01-08 RX ORDER — GABAPENTIN 800 MG/1
800 TABLET ORAL 3 TIMES DAILY
Qty: 270 TABLET | Refills: 0 | Status: SHIPPED | OUTPATIENT
Start: 2024-01-08 | End: 2024-04-07

## 2024-01-08 RX ORDER — MELOXICAM 15 MG/1
15 TABLET ORAL DAILY
Qty: 30 TABLET | Refills: 0 | Status: SHIPPED | OUTPATIENT
Start: 2024-01-08 | End: 2024-01-09 | Stop reason: HOSPADM

## 2024-01-08 RX ORDER — OXYCODONE HYDROCHLORIDE AND ACETAMINOPHEN 5; 325 MG/1; MG/1
1 TABLET ORAL EVERY 6 HOURS PRN
Qty: 120 TABLET | Refills: 0 | Status: SHIPPED | OUTPATIENT
Start: 2024-01-09 | End: 2024-02-08

## 2024-01-08 ASSESSMENT — ENCOUNTER SYMPTOMS
COUGH: 0
BACK PAIN: 1
SHORTNESS OF BREATH: 0
CONSTIPATION: 0

## 2024-01-08 NOTE — PROGRESS NOTES
Chief Complaint   Patient presents with    Back Pain     Med refill    Knee Pain    Foot Pain        [facility]       HPI:     Back Pain  This is a chronic problem. The current episode started more than 1 year ago. The problem occurs constantly. The problem has been gradually worsening (fell a few days ago) since onset. The pain is present in the lumbar spine. The quality of the pain is described as aching, burning, cramping, shooting and stabbing. The pain radiates to the left foot and right foot. The pain is at a severity of 9/10. The pain is moderate. The pain is Worse during the day. The symptoms are aggravated by position, standing, twisting and bending. Associated symptoms include leg pain, numbness, paresthesias and tingling. Pertinent negatives include no chest pain or fever. He has tried analgesics, heat, muscle relaxant and NSAIDs for the symptoms. The treatment provided mild relief.   Knee Pain   The incident occurred more than 1 week ago. There was no injury mechanism. The pain is present in the left knee, left foot, right knee and right foot. The quality of the pain is described as aching, burning, cramping, shooting and stabbing. The pain is at a severity of 8/10. The pain has been Constant since onset. Associated symptoms include an inability to bear weight, numbness and tingling. He reports no foreign bodies present. The symptoms are aggravated by movement and weight bearing. He has tried ice, heat, non-weight bearing, rest and NSAIDs for the symptoms.   Foot Pain   The pain is present in the left knee, left foot, right knee and right foot. This is a chronic problem. The current episode started more than 1 year ago. The problem occurs constantly. The problem has been unchanged. The quality of the pain is described as aching. The pain is at a severity of 8/10. Associated symptoms include an inability to bear weight, numbness and tingling. Pertinent negatives include no fever. The symptoms are 
oriented to person, place, and time.         Record/Diagnostics Review:    Last dominga 6/2023 and was appropriate     Assessment:  Problem List Items Addressed This Visit       Lumbosacral spondylosis without myelopathy - Primary (Chronic)    Relevant Medications    oxyCODONE-acetaminophen (PERCOCET) 5-325 MG per tablet (Start on 1/9/2024)    meloxicam (MOBIC) 15 MG tablet    Other Relevant Orders    XR LUMBAR SPINE (2-3 VIEWS)    Chronic use of opiate drug for therapeutic purpose    Relevant Orders    DRUG SCREEN, PAIN    Fibromyalgia    Relevant Medications    oxyCODONE-acetaminophen (PERCOCET) 5-325 MG per tablet (Start on 1/9/2024)    meloxicam (MOBIC) 15 MG tablet    gabapentin (NEURONTIN) 800 MG tablet          Treatment Plan:  Patient relates current medications are helping the pain. Patient reports taking pain medications as prescribed, denies obtaining medications from different sources and denies use of illegal drugs. Medication risk and benefits have been discussed. Patient denies side effects from medications like nausea, vomiting, constipation or drowsiness. Patient reports current activities of daily living are possible due to medications and would like to continue them.     As always, we encourage daily stretching and strengthening exercises, and recommend minimizing use of pain medications unless patient cannot get through daily activities due to pain.     Due to the high risk nature of this patient's pain medication close monitoring is required.   Continue current medication management, pt has been stable and compliant.  Script written for percocet  Pt fell two days ago - was not seen in ER. Has increased back pain - denies red-flag symptoms - will obtain XR lumbar spine.   Discussed repeating lumbar iinjections and he declines.   UDS for standard monitoring purposes  Follow up appointment made for 4 weeks    I have reviewed the chief complaint and history of present illness (including ROS and PFSH)

## 2024-01-09 ENCOUNTER — OFFICE VISIT (OUTPATIENT)
Dept: INTERNAL MEDICINE CLINIC | Age: 74
End: 2024-01-09
Payer: MEDICARE

## 2024-01-09 VITALS
HEART RATE: 82 BPM | OXYGEN SATURATION: 97 % | DIASTOLIC BLOOD PRESSURE: 80 MMHG | SYSTOLIC BLOOD PRESSURE: 120 MMHG | WEIGHT: 207 LBS | RESPIRATION RATE: 11 BRPM | TEMPERATURE: 97.1 F | BODY MASS INDEX: 32.49 KG/M2 | HEIGHT: 67 IN

## 2024-01-09 DIAGNOSIS — M47.817 LUMBOSACRAL SPONDYLOSIS WITHOUT MYELOPATHY: Primary | Chronic | ICD-10-CM

## 2024-01-09 DIAGNOSIS — M50.10 CERVICAL DISC DISORDER WITH RADICULOPATHY: ICD-10-CM

## 2024-01-09 DIAGNOSIS — E78.5 DYSLIPIDEMIA: ICD-10-CM

## 2024-01-09 DIAGNOSIS — Z79.891 CHRONIC USE OF OPIATE DRUG FOR THERAPEUTIC PURPOSE: ICD-10-CM

## 2024-01-09 DIAGNOSIS — I10 ESSENTIAL HYPERTENSION: ICD-10-CM

## 2024-01-09 DIAGNOSIS — H40.1131 PRIMARY OPEN ANGLE GLAUCOMA OF BOTH EYES, MILD STAGE: ICD-10-CM

## 2024-01-09 DIAGNOSIS — Z96.652 STATUS POST REVISION OF TOTAL KNEE REPLACEMENT, LEFT: ICD-10-CM

## 2024-01-09 DIAGNOSIS — F51.04 PSYCHOPHYSIOLOGICAL INSOMNIA: ICD-10-CM

## 2024-01-09 DIAGNOSIS — N18.30 STAGE 3 CHRONIC KIDNEY DISEASE, UNSPECIFIED WHETHER STAGE 3A OR 3B CKD (HCC): ICD-10-CM

## 2024-01-09 DIAGNOSIS — M79.7 FIBROMYALGIA: ICD-10-CM

## 2024-01-09 DIAGNOSIS — N52.01 ERECTILE DYSFUNCTION DUE TO ARTERIAL INSUFFICIENCY: ICD-10-CM

## 2024-01-09 DIAGNOSIS — R73.03 PRE-DIABETES: ICD-10-CM

## 2024-01-09 DIAGNOSIS — F39 MOOD DISORDER (HCC): ICD-10-CM

## 2024-01-09 DIAGNOSIS — M16.12 PRIMARY OSTEOARTHRITIS OF LEFT HIP: ICD-10-CM

## 2024-01-09 PROCEDURE — 3017F COLORECTAL CA SCREEN DOC REV: CPT | Performed by: FAMILY MEDICINE

## 2024-01-09 PROCEDURE — 3074F SYST BP LT 130 MM HG: CPT | Performed by: FAMILY MEDICINE

## 2024-01-09 PROCEDURE — 1123F ACP DISCUSS/DSCN MKR DOCD: CPT | Performed by: FAMILY MEDICINE

## 2024-01-09 PROCEDURE — 99214 OFFICE O/P EST MOD 30 MIN: CPT | Performed by: FAMILY MEDICINE

## 2024-01-09 PROCEDURE — G8484 FLU IMMUNIZE NO ADMIN: HCPCS | Performed by: FAMILY MEDICINE

## 2024-01-09 PROCEDURE — 3079F DIAST BP 80-89 MM HG: CPT | Performed by: FAMILY MEDICINE

## 2024-01-09 PROCEDURE — 1036F TOBACCO NON-USER: CPT | Performed by: FAMILY MEDICINE

## 2024-01-09 PROCEDURE — G8427 DOCREV CUR MEDS BY ELIG CLIN: HCPCS | Performed by: FAMILY MEDICINE

## 2024-01-09 PROCEDURE — G8417 CALC BMI ABV UP PARAM F/U: HCPCS | Performed by: FAMILY MEDICINE

## 2024-01-09 RX ORDER — TADALAFIL 5 MG/1
TABLET ORAL
Qty: 20 TABLET | Refills: 3 | Status: SHIPPED | OUTPATIENT
Start: 2024-01-09 | End: 2024-01-11 | Stop reason: SDUPTHER

## 2024-01-09 ASSESSMENT — ENCOUNTER SYMPTOMS
BACK PAIN: 1
EYES NEGATIVE: 1
ALLERGIC/IMMUNOLOGIC NEGATIVE: 1
RESPIRATORY NEGATIVE: 1
GASTROINTESTINAL NEGATIVE: 1

## 2024-01-09 NOTE — PROGRESS NOTES
insufficiency                Plan:      73-year-old -American male with history of significant spinal stenosis for which she is established with pain management and orthopedic service.  He recently sustained a fall.  He was subsequently seen by pain management with negative x-rays.  MRI of the lumbar spine shows significant spinal stenosis with multilevel degenerative spondylosis.  No apparent sign of myelopathy.  No bowel or bladder dysfunction or foot drop.  He ambulates with a cane.  He does have access to a walker.  Fall precaution is advised.  He is advised to continue Percocet, gabapentin that he is tolerating well.  Caution sedative side effect, driving and operating  Hemodynamically stable in response to Aldactone.  Consume less than 2 g of salt a day  Impaired fasting glucose with increased BMI, metabolic syndrome.  He will benefit from low-fat high-fiber diet, continue activity as tolerated  GERD stable on proton pump inhibitor  CKD with creatinine of 1.2.  He is advised to stop meloxicam.  He is advised to refrain from nephrotoxic drugs including but not limited to anti-inflammatory and radiocontrast.  Maintain oral hydration maintain urine output  Erectile dysfunction.  He wished to have refill on Cialis that he has been tolerating well  He is advised to update influenza, COVID vaccination, RSV vaccination, shingles vaccination in his pharmacy  He does not offer any further question or concern  Insomnia on melatonin  Med list and available labs reviewed, discussed with patient, questions answered  He is encouraged to call for any concern  This note is created with a voice recognition program and while intend to generate a document that accurately reflects the content of the visit, no guarantee can be provided that every mistake has been identified and corrected by editing.          Seth Ramirez MD

## 2024-01-11 RX ORDER — TADALAFIL 5 MG/1
TABLET ORAL
Qty: 20 TABLET | Refills: 3 | Status: SHIPPED | OUTPATIENT
Start: 2024-01-11

## 2024-01-11 NOTE — TELEPHONE ENCOUNTER
Duane J Garland is calling to request a refill on the following medication(s):    Medication Request:  Requested Prescriptions     Pending Prescriptions Disp Refills    tadalafil (CIALIS) 5 MG tablet 20 tablet 3     Sig: Take 1 tablet a day for erectile dysfunction       Last Visit Date (If Applicable):  1/9/2024    Next Visit Date:    5/9/2024

## 2024-01-12 LAB
6-ACETYLMORPHINE, UR: NOT DETECTED
7-AMINOCLONAZEPAM, URINE: NOT DETECTED
ALPHA-OH-ALPRAZ, URINE: NOT DETECTED
ALPHA-OH-MIDAZOLAM, URINE: NOT DETECTED
ALPRAZOLAM, URINE: NOT DETECTED
AMPHETAMINES, URINE: NOT DETECTED
BARBITURATES, URINE: NEGATIVE
BENZOYLECGONINE, UR: NEGATIVE
BUPRENORPHINE URINE: NOT DETECTED
CARISOPRODOL, UR: NEGATIVE
CLONAZEPAM, URINE: NOT DETECTED
CODEINE, URINE: NOT DETECTED
CREAT UR-MCNC: 143.2 MG/DL (ref 20–400)
DIAZEPAM, URINE: NOT DETECTED
DRUGS EXPECTED, UR: NORMAL
EER HI RES INTERP UR: NORMAL
ETHYL GLUCURONIDE UR: NEGATIVE
FENTANYL URINE: NOT DETECTED
GABAPENTIN: PRESENT
HYDROCODONE, OPI6M: NOT DETECTED
HYDROMORPHONE, OPI3M: NOT DETECTED
LORAZEPAM, URINE: NOT DETECTED
MARIJUANA METAB, UR: NEGATIVE
MDA, UR: NOT DETECTED
MDEA, EVE, UR: NOT DETECTED
MDMA URINE: NOT DETECTED
MEPERIDINE METAB, UR: NOT DETECTED
METHADONE, URINE: NEGATIVE
METHAMPHETAMINE, URINE: NOT DETECTED
METHYLPHENIDATE: NOT DETECTED
MIDAZOLAM, URINE: NOT DETECTED
MORPHINE, OPI1M: NOT DETECTED
NALOXONE URINE: NOT DETECTED
NORBUPRENORPHINE, URINE: NOT DETECTED
NORDIAZEPAM, URINE: NOT DETECTED
NORFENTANYL, URINE: NOT DETECTED
NORHYDROCODONE, URINE: NOT DETECTED
NOROXYCODONE, URINE: PRESENT
NOROXYMORPHONE, URINE: PRESENT
OXAZEPAM, URINE: NOT DETECTED
OXYCODONE URINE: PRESENT
OXYMORPHONE, URINE: PRESENT
PAIN MANAGEMENT DRUG PANEL INTERP, URINE: NORMAL
PAIN MGT DRUG PANEL, HI RES, UR: NORMAL
PCP,URINE: NEGATIVE
PHENTERMINE, UR: NOT DETECTED
PREGABALIN: NOT DETECTED
TAPENTADOL, URINE: NOT DETECTED
TAPENTADOL-O-SULFATE, URINE: NOT DETECTED
TEMAZEPAM, URINE: NOT DETECTED
TRAMADOL, URINE: NEGATIVE
ZOLPIDEM METABOLITE (ZCA), URINE: NOT DETECTED
ZOLPIDEM, URINE: NOT DETECTED

## 2024-02-01 ENCOUNTER — HOSPITAL ENCOUNTER (OUTPATIENT)
Dept: PAIN MANAGEMENT | Age: 74
Discharge: HOME | End: 2024-02-01
Payer: MEDICARE

## 2024-02-01 VITALS — HEIGHT: 67 IN | WEIGHT: 207 LBS | BODY MASS INDEX: 32.49 KG/M2

## 2024-02-01 DIAGNOSIS — Z96.652 STATUS POST REVISION OF TOTAL KNEE REPLACEMENT, LEFT: ICD-10-CM

## 2024-02-01 DIAGNOSIS — M47.817 LUMBOSACRAL SPONDYLOSIS WITHOUT MYELOPATHY: Primary | Chronic | ICD-10-CM

## 2024-02-01 DIAGNOSIS — Z79.891 CHRONIC USE OF OPIATE DRUG FOR THERAPEUTIC PURPOSE: ICD-10-CM

## 2024-02-01 DIAGNOSIS — M50.10 CERVICAL DISC DISORDER WITH RADICULOPATHY: ICD-10-CM

## 2024-02-01 PROCEDURE — 99213 OFFICE O/P EST LOW 20 MIN: CPT

## 2024-02-01 PROCEDURE — 99214 OFFICE O/P EST MOD 30 MIN: CPT | Performed by: ANESTHESIOLOGY

## 2024-02-01 RX ORDER — OXYCODONE HYDROCHLORIDE AND ACETAMINOPHEN 5; 325 MG/1; MG/1
1 TABLET ORAL EVERY 6 HOURS PRN
Qty: 120 TABLET | Refills: 0 | Status: SHIPPED | OUTPATIENT
Start: 2024-02-01 | End: 2024-03-02

## 2024-02-01 RX ORDER — GABAPENTIN 800 MG/1
800 TABLET ORAL 3 TIMES DAILY
Qty: 270 TABLET | Refills: 0 | Status: SHIPPED | OUTPATIENT
Start: 2024-02-01 | End: 2024-05-01

## 2024-02-01 ASSESSMENT — ENCOUNTER SYMPTOMS
DIARRHEA: 0
VOMITING: 0
BACK PAIN: 1
CONSTIPATION: 0
NAUSEA: 0

## 2024-02-01 NOTE — H&P (VIEW-ONLY)
pain Max Daily Amount: 4 tablets     Dispense:  120 tablet     Refill:  0     Reduce doses taken as pain becomes manageable      Controlled Substance Monitoring:    Acute and Chronic Pain Monitoring:   RX Monitoring Periodic Controlled Substance Monitoring Chronic Pain > 50 MEDD   2/1/2024  11:23 AM Possible medication side effects, risk of tolerance/dependence & alternative treatments discussed.;No signs of potential drug abuse or diversion identified. Obtained or confirmed \"Consent for Opioid Use\" on file.               Electronically signed by Juan Savage MD on 2/1/2024 at 11:32 AM

## 2024-02-02 ENCOUNTER — TELEPHONE (OUTPATIENT)
Dept: INTERNAL MEDICINE CLINIC | Age: 74
End: 2024-02-02

## 2024-02-07 ENCOUNTER — OFFICE VISIT (OUTPATIENT)
Dept: INTERNAL MEDICINE CLINIC | Age: 74
End: 2024-02-07
Payer: MEDICARE

## 2024-02-07 VITALS
WEIGHT: 203 LBS | BODY MASS INDEX: 31.86 KG/M2 | HEIGHT: 67 IN | RESPIRATION RATE: 10 BRPM | OXYGEN SATURATION: 99 % | HEART RATE: 83 BPM | TEMPERATURE: 97.9 F | SYSTOLIC BLOOD PRESSURE: 128 MMHG | DIASTOLIC BLOOD PRESSURE: 82 MMHG

## 2024-02-07 DIAGNOSIS — Z79.891 CHRONIC USE OF OPIATE DRUG FOR THERAPEUTIC PURPOSE: ICD-10-CM

## 2024-02-07 DIAGNOSIS — M47.817 LUMBOSACRAL SPONDYLOSIS WITHOUT MYELOPATHY: Primary | Chronic | ICD-10-CM

## 2024-02-07 DIAGNOSIS — F39 MOOD DISORDER (HCC): ICD-10-CM

## 2024-02-07 DIAGNOSIS — N52.01 ERECTILE DYSFUNCTION DUE TO ARTERIAL INSUFFICIENCY: ICD-10-CM

## 2024-02-07 DIAGNOSIS — H40.1131 PRIMARY OPEN ANGLE GLAUCOMA OF BOTH EYES, MILD STAGE: ICD-10-CM

## 2024-02-07 DIAGNOSIS — E78.5 DYSLIPIDEMIA: ICD-10-CM

## 2024-02-07 DIAGNOSIS — I10 ESSENTIAL HYPERTENSION: ICD-10-CM

## 2024-02-07 DIAGNOSIS — R73.03 PRE-DIABETES: ICD-10-CM

## 2024-02-07 DIAGNOSIS — F51.04 PSYCHOPHYSIOLOGICAL INSOMNIA: ICD-10-CM

## 2024-02-07 DIAGNOSIS — M50.10 CERVICAL DISC DISORDER WITH RADICULOPATHY: ICD-10-CM

## 2024-02-07 DIAGNOSIS — N18.30 STAGE 3 CHRONIC KIDNEY DISEASE, UNSPECIFIED WHETHER STAGE 3A OR 3B CKD (HCC): ICD-10-CM

## 2024-02-07 DIAGNOSIS — M16.11 ARTHRITIS OF RIGHT HIP: ICD-10-CM

## 2024-02-07 DIAGNOSIS — M79.7 FIBROMYALGIA: ICD-10-CM

## 2024-02-07 PROBLEM — M16.12 PRIMARY OSTEOARTHRITIS OF LEFT HIP: Status: RESOLVED | Noted: 2022-12-18 | Resolved: 2024-02-07

## 2024-02-07 PROCEDURE — 3074F SYST BP LT 130 MM HG: CPT | Performed by: FAMILY MEDICINE

## 2024-02-07 PROCEDURE — G8484 FLU IMMUNIZE NO ADMIN: HCPCS | Performed by: FAMILY MEDICINE

## 2024-02-07 PROCEDURE — 99214 OFFICE O/P EST MOD 30 MIN: CPT | Performed by: FAMILY MEDICINE

## 2024-02-07 PROCEDURE — G8427 DOCREV CUR MEDS BY ELIG CLIN: HCPCS | Performed by: FAMILY MEDICINE

## 2024-02-07 PROCEDURE — 3079F DIAST BP 80-89 MM HG: CPT | Performed by: FAMILY MEDICINE

## 2024-02-07 PROCEDURE — G8417 CALC BMI ABV UP PARAM F/U: HCPCS | Performed by: FAMILY MEDICINE

## 2024-02-07 PROCEDURE — 1036F TOBACCO NON-USER: CPT | Performed by: FAMILY MEDICINE

## 2024-02-07 PROCEDURE — 1123F ACP DISCUSS/DSCN MKR DOCD: CPT | Performed by: FAMILY MEDICINE

## 2024-02-07 PROCEDURE — 3017F COLORECTAL CA SCREEN DOC REV: CPT | Performed by: FAMILY MEDICINE

## 2024-02-07 ASSESSMENT — ENCOUNTER SYMPTOMS
BACK PAIN: 1
ALLERGIC/IMMUNOLOGIC NEGATIVE: 1
GASTROINTESTINAL NEGATIVE: 1
RESPIRATORY NEGATIVE: 1
EYES NEGATIVE: 1

## 2024-02-07 NOTE — PROGRESS NOTES
reactive to light.   Cardiovascular:      Rate and Rhythm: Normal rate and regular rhythm.      Heart sounds: Normal heart sounds.      Comments: Hypertension  Dyslipidemia  Pulmonary:      Effort: Pulmonary effort is normal.      Breath sounds: Normal breath sounds.   Abdominal:      General: Bowel sounds are normal.      Palpations: Abdomen is soft.   Genitourinary:     Comments: CKD  Erectile dysfunction  Musculoskeletal:         General: Normal range of motion.      Cervical back: Normal range of motion and neck supple.      Comments: Arthropathy.  He is on Percocet and gabapentin and coordination with pain management that he is tolerating well  History of bilateral knee replacement arthroplasty.  History of left hip partial arthroplasty  He is scheduled for total right hip arthroplasty   Skin:     General: Skin is warm and dry.   Neurological:      Mental Status: He is alert and oriented to person, place, and time.      Deep Tendon Reflexes: Reflexes are normal and symmetric.      Comments: Fibromyalgia   Psychiatric:         Behavior: Behavior normal.         Thought Content: Thought content normal.         Assessment:       Diagnosis Orders   1. Lumbosacral spondylosis without myelopathy        2. Chronic use of opiate drug for therapeutic purpose        3. Pre-diabetes        4. Dyslipidemia        5. Primary open angle glaucoma of both eyes, mild stage        6. Essential hypertension        7. Psychophysiological insomnia        8. Fibromyalgia        9. Stage 3 chronic kidney disease, unspecified whether stage 3a or 3b CKD ([facility])        10. Cervical disc disorder with radiculopathy        11. Erectile dysfunction due to arterial insufficiency        12. Mood disorder (HCC)        13. Arthritis of right hip                Plan:      73-year-old pleasant -American male came along with his wife for routine follow-up.  He is known to have a history of advanced degenerative arthropathy.  He is

## 2024-02-21 ENCOUNTER — HOSPITAL ENCOUNTER (OUTPATIENT)
Dept: PAIN MANAGEMENT | Facility: CLINIC | Age: 74
Discharge: HOME | End: 2024-02-21
Payer: MEDICARE

## 2024-02-21 VITALS
DIASTOLIC BLOOD PRESSURE: 80 MMHG | SYSTOLIC BLOOD PRESSURE: 130 MMHG | WEIGHT: 203 LBS | HEIGHT: 67 IN | HEART RATE: 91 BPM | OXYGEN SATURATION: 95 % | TEMPERATURE: 97.1 F | RESPIRATION RATE: 10 BRPM | BODY MASS INDEX: 31.86 KG/M2

## 2024-02-21 DIAGNOSIS — M47.817 LUMBOSACRAL SPONDYLOSIS WITHOUT MYELOPATHY: Primary | Chronic | ICD-10-CM

## 2024-02-21 DIAGNOSIS — R52 PAIN MANAGEMENT: ICD-10-CM

## 2024-02-21 PROCEDURE — 64636 DESTROY L/S FACET JNT ADDL: CPT | Performed by: ANESTHESIOLOGY

## 2024-02-21 PROCEDURE — 64636 DESTROY L/S FACET JNT ADDL: CPT

## 2024-02-21 PROCEDURE — 2500000003 HC RX 250 WO HCPCS: Performed by: ANESTHESIOLOGY

## 2024-02-21 PROCEDURE — 64635 DESTROY LUMB/SAC FACET JNT: CPT

## 2024-02-21 PROCEDURE — 64635 DESTROY LUMB/SAC FACET JNT: CPT | Performed by: ANESTHESIOLOGY

## 2024-02-21 PROCEDURE — 6360000002 HC RX W HCPCS: Performed by: ANESTHESIOLOGY

## 2024-02-21 PROCEDURE — 99152 MOD SED SAME PHYS/QHP 5/>YRS: CPT | Performed by: ANESTHESIOLOGY

## 2024-02-21 RX ORDER — FENTANYL CITRATE 50 UG/ML
INJECTION, SOLUTION INTRAMUSCULAR; INTRAVENOUS
Status: COMPLETED | OUTPATIENT
Start: 2024-02-21 | End: 2024-02-21

## 2024-02-21 RX ORDER — LIDOCAINE HYDROCHLORIDE 40 MG/ML
INJECTION, SOLUTION RETROBULBAR; TOPICAL
Status: COMPLETED | OUTPATIENT
Start: 2024-02-21 | End: 2024-02-21

## 2024-02-21 RX ORDER — MIDAZOLAM HYDROCHLORIDE 2 MG/2ML
INJECTION, SOLUTION INTRAMUSCULAR; INTRAVENOUS
Status: COMPLETED | OUTPATIENT
Start: 2024-02-21 | End: 2024-02-21

## 2024-02-21 RX ORDER — LIDOCAINE HYDROCHLORIDE 10 MG/ML
INJECTION, SOLUTION EPIDURAL; INFILTRATION; INTRACAUDAL; PERINEURAL
Status: COMPLETED | OUTPATIENT
Start: 2024-02-21 | End: 2024-02-21

## 2024-02-21 RX ADMIN — LIDOCAINE HYDROCHLORIDE 5 ML: 40 SOLUTION RETROBULBAR; TOPICAL at 14:59

## 2024-02-21 RX ADMIN — LIDOCAINE HYDROCHLORIDE 10 ML: 10 INJECTION, SOLUTION EPIDURAL; INFILTRATION; INTRACAUDAL at 14:55

## 2024-02-21 RX ADMIN — MIDAZOLAM HYDROCHLORIDE 2 MG: 1 INJECTION, SOLUTION INTRAMUSCULAR; INTRAVENOUS at 14:54

## 2024-02-21 RX ADMIN — FENTANYL CITRATE 50 MCG: 50 INJECTION, SOLUTION INTRAMUSCULAR; INTRAVENOUS at 14:54

## 2024-02-21 ASSESSMENT — PAIN - FUNCTIONAL ASSESSMENT
PAIN_FUNCTIONAL_ASSESSMENT: 0-10
PAIN_FUNCTIONAL_ASSESSMENT: PREVENTS OR INTERFERES WITH ALL ACTIVE AND SOME PASSIVE ACTIVITIES
PAIN_FUNCTIONAL_ASSESSMENT: 0-10

## 2024-02-21 ASSESSMENT — PAIN DESCRIPTION - DESCRIPTORS: DESCRIPTORS: SHARP;ACHING

## 2024-02-21 NOTE — INTERVAL H&P NOTE
Update History & Physical    The patient's History and Physical of February 1, 2024 was reviewed with the patient and I examined the patient. There was no change. The surgical site was confirmed by the patient and me.     Plan: The risks, benefits, expected outcome, and alternative to the recommended procedure have been discussed with the patient. Patient understands and wants to proceed with the procedure.     ASA 3  MP 3    Electronically signed by Juan Savage MD on 2/21/2024 at 2:41 PM

## 2024-02-21 NOTE — OP NOTE
Preoperative Diagnosis: Lumbar spondylosis w/o myelopathy, chronic low back pain  Postoperative Diagnosis: Lumbar spondylosis w/o myelopathy, chronic low back pain  SEDATION: SEE SEDATION NOTE  BLOOD LOSS: NONE    Procedure Performed:  :Radiofrequency ablation of median branches at the Transverse processes of L4, L5 ND SACRAL ALA for L4/5 AND L5/S1 facet joints on Bilateral under fluoroscopic guidance with IV sedation    Procedure:    After starting an IV, the patient was taken to procedure room.  The patient was placed in prone position and skin over the back was prepped and draped in sterile manner.    Standard monitors were connected and vitals were monitored during the case and they remained stable during the procedure.    A meaningful communication was kept up with the patient throughout the procedure.    Then using fluoroscopy the junction of the transverse process of the target vertebra with the superior process of the facet joint was observed and the view was optimized.  The skin and deep tissues were infiltrated with 2 ml of  1 % lidocaine. The RF canula with the 10 mm active tip was introduced through the skin wheal under fluoroscopy guidance such that the tip of the needle lies in the groove of the transverse process with the superior processes of the facet joint.  Then a lateral and AP view of the lumbar spine was obtained to make sure the tip of needle is not in the neural foramen.  Then electric impedence was checked to make sure it is acceptable. Then a sensory stimulus was applied at 50 Hz up to 0.5 volt and concordant pain symptoms were reproduced. Then a motor stimulus was applied at 2 Hz up to 2 volts or 3 x times the sensory stimulus and no motor stimulation was seen in lower extremities.  Some multifidus stimulus was seen.  Then after negative aspiration 1 ml of 4% lidocaine was injected through the needle at each level.The radiofrequency lesion was done at 85 degrees centigrade for 110 seconds.

## 2024-03-07 ENCOUNTER — HOSPITAL ENCOUNTER (OUTPATIENT)
Dept: PAIN MANAGEMENT | Age: 74
Discharge: HOME | End: 2024-03-07
Payer: MEDICARE

## 2024-03-07 VITALS — HEIGHT: 67 IN | WEIGHT: 207 LBS | BODY MASS INDEX: 32.49 KG/M2

## 2024-03-07 DIAGNOSIS — M50.10 CERVICAL DISC DISORDER WITH RADICULOPATHY: ICD-10-CM

## 2024-03-07 DIAGNOSIS — M47.817 LUMBOSACRAL SPONDYLOSIS WITHOUT MYELOPATHY: Primary | Chronic | ICD-10-CM

## 2024-03-07 DIAGNOSIS — Z79.891 CHRONIC USE OF OPIATE DRUG FOR THERAPEUTIC PURPOSE: ICD-10-CM

## 2024-03-07 PROCEDURE — 99213 OFFICE O/P EST LOW 20 MIN: CPT

## 2024-03-07 PROCEDURE — 99213 OFFICE O/P EST LOW 20 MIN: CPT | Performed by: ANESTHESIOLOGY

## 2024-03-07 RX ORDER — GABAPENTIN 800 MG/1
800 TABLET ORAL 3 TIMES DAILY
Qty: 270 TABLET | Refills: 0 | Status: SHIPPED | OUTPATIENT
Start: 2024-03-07 | End: 2024-06-05

## 2024-03-07 RX ORDER — OXYCODONE HYDROCHLORIDE AND ACETAMINOPHEN 5; 325 MG/1; MG/1
1 TABLET ORAL EVERY 6 HOURS PRN
Qty: 120 TABLET | Refills: 0 | Status: SHIPPED | OUTPATIENT
Start: 2024-03-07 | End: 2024-04-06

## 2024-03-07 ASSESSMENT — ENCOUNTER SYMPTOMS
VOMITING: 0
CONSTIPATION: 0
DIARRHEA: 0
BACK PAIN: 1
NAUSEA: 0

## 2024-03-07 NOTE — PROGRESS NOTES
The patient is a 73 y.o. /  male.    Chief Complaint   Patient presents with    Back Pain    Medication Refill     Percocet     Follow Up After Procedure     Radiofrequency ablation of median branches at the Transverse processes of L4, L5 ND SACRAL ALA for L4/5 AND L5/S1 facet         Back Pain  Associated symptoms include numbness and weakness. Pertinent negatives include no fever or headaches.   Medication Refill  Associated symptoms include numbness and weakness. Pertinent negatives include no chills, fatigue, fever, headaches, nausea or vomiting.       PMH     Pt c/o chronic  back hip and knee and right foot pain      - Pt reports bilat. Knee pain with no known injury, and has had multiple surgeries/procedures on knees but pain continues.    He has had PT in the past with no relief. .   - chronic right foot pain  EMG done 7/2023 to rule out neuropathy RLE and with normal results - given referral to neurology by ortho office    [facility]/2023 with robaxin started but pt states was denied by insurance and to continue with our medication and f/u in 2-3 months  He has seen ortho with no need for surgery at this time      He also complains of back pain.   MRI lumbar 6/2021 with Multilevel severe degenerative disc changes with Modic type I and type II changes involving L2 L3-L4-L5 vertebrae  He completed PT for his back with some benefit. He has been using Voltaren gel with some relief.    He had LESI 4/4/22 and reported 50% relief not ready to repeat.      Pt had lumbar RFA  with 50% improvement in axial lower back pain not ready to repeat   Pt had Intracept procedure 5/1/2023 with over 50% improvement in axial lower back pain     Pt reports chronic left hip pain and follows with Dr. Garcia. He had replacement with [facility]. 9/28. , revision surgery 03/2024 planned  still in PT for hip pain     Asking for increase in medication d/t increase in pain. Discussed need to exhaust other

## 2024-04-07 SDOH — ECONOMIC STABILITY: INCOME INSECURITY: HOW HARD IS IT FOR YOU TO PAY FOR THE VERY BASICS LIKE FOOD, HOUSING, MEDICAL CARE, AND HEATING?: NOT VERY HARD

## 2024-04-07 SDOH — ECONOMIC STABILITY: FOOD INSECURITY: WITHIN THE PAST 12 MONTHS, THE FOOD YOU BOUGHT JUST DIDN'T LAST AND YOU DIDN'T HAVE MONEY TO GET MORE.: PATIENT DECLINED

## 2024-04-07 SDOH — ECONOMIC STABILITY: HOUSING INSECURITY
IN THE LAST 12 MONTHS, WAS THERE A TIME WHEN YOU DID NOT HAVE A STEADY PLACE TO SLEEP OR SLEPT IN A SHELTER (INCLUDING NOW)?: PATIENT DECLINED

## 2024-04-07 SDOH — ECONOMIC STABILITY: FOOD INSECURITY: WITHIN THE PAST 12 MONTHS, YOU WORRIED THAT YOUR FOOD WOULD RUN OUT BEFORE YOU GOT MONEY TO BUY MORE.: PATIENT DECLINED

## 2024-04-07 SDOH — ECONOMIC STABILITY: TRANSPORTATION INSECURITY
IN THE PAST 12 MONTHS, HAS LACK OF TRANSPORTATION KEPT YOU FROM MEETINGS, WORK, OR FROM GETTING THINGS NEEDED FOR DAILY LIVING?: PATIENT DECLINED

## 2024-04-09 ENCOUNTER — HOSPITAL ENCOUNTER (OUTPATIENT)
Dept: PAIN MANAGEMENT | Age: 74
Discharge: HOME | End: 2024-04-09
Payer: MEDICARE

## 2024-04-09 VITALS — HEIGHT: 67 IN | WEIGHT: 194 LBS | BODY MASS INDEX: 30.45 KG/M2

## 2024-04-09 DIAGNOSIS — M25.551 PAIN IN RIGHT HIP: ICD-10-CM

## 2024-04-09 DIAGNOSIS — M25.552 PAIN IN LEFT HIP: ICD-10-CM

## 2024-04-09 DIAGNOSIS — Z79.891 CHRONIC USE OF OPIATE DRUG FOR THERAPEUTIC PURPOSE: ICD-10-CM

## 2024-04-09 DIAGNOSIS — M47.817 LUMBOSACRAL SPONDYLOSIS WITHOUT MYELOPATHY: Primary | Chronic | ICD-10-CM

## 2024-04-09 PROBLEM — Z96.653 HISTORY OF TOTAL BILATERAL KNEE REPLACEMENT: Status: ACTIVE | Noted: 2018-08-08

## 2024-04-09 PROBLEM — M79.2 NEUROPATHIC PAIN: Status: ACTIVE | Noted: 2023-10-30

## 2024-04-09 PROCEDURE — 99213 OFFICE O/P EST LOW 20 MIN: CPT

## 2024-04-09 PROCEDURE — 99214 OFFICE O/P EST MOD 30 MIN: CPT | Performed by: NURSE PRACTITIONER

## 2024-04-09 RX ORDER — OXYCODONE HYDROCHLORIDE AND ACETAMINOPHEN 5; 325 MG/1; MG/1
1 TABLET ORAL EVERY 6 HOURS PRN
Qty: 120 TABLET | Refills: 0 | Status: SHIPPED | OUTPATIENT
Start: 2024-04-15 | End: 2024-05-15

## 2024-04-09 ASSESSMENT — PAIN SCALES - GENERAL: PAINLEVEL_OUTOF10: 2

## 2024-04-09 ASSESSMENT — ENCOUNTER SYMPTOMS
BACK PAIN: 1
SHORTNESS OF BREATH: 0
COUGH: 0
BOWEL INCONTINENCE: 0
CONSTIPATION: 0

## 2024-04-09 NOTE — PROGRESS NOTES
Chief Complaint   Patient presents with    Back Pain     Med refill         PMH     Pt c/o chronic  back hip and knee and right foot pain   Pt reports bilat. Knee pain with no known injury but has had multiple surgeries/procedures on knees.  He has had PT in the past with no relief.   Discussed genicular blocks but he declines.      Pt also c/o chronic right foot pain with Hx of 3 right foot surgeries with continued numbness and tingling  EMG done 7/2023 to rule out neuropathy RLE and normal results  Pt saw [facility] neurology 11/2023 with robaxin started but pt states was denied by insurance and to continue with our medication and f/u in 2-3 months  He has seen ortho with no need for surgery at this time      Pt also c/o chronic back pain. MRI lumbar 6/2021 with Multilevel severe degenerative disc changes with Modic type I and type II changes involving L2 L3-L4-L5 vertebrae  He completed PT for his back with some benefit.  He had LESI 4/4/22 and reported 50% relief.      Pt had  Intracept procedure 5/1/2023 with over 50% improvement in axial lower back pain  Pt had lumbar RFA 2/21/24  with 60% improvement in axial lower back pain       Pt also c/o chronic left hip pain. He had left hip [facility] at [facility] 9/28  and right total hip arthroplasty done 3/11/24 with admission 4/2024 for post op infection and debridement sutures and wound vac removed today. Has noticed some leaking and may f/u tomorrow. Still on IV atb.        HPI:     Back Pain  This is a chronic problem. The current episode started more than 1 year ago. The problem occurs constantly. The problem is unchanged. The pain is present in the lumbar spine. The pain radiates to the left foot, left knee, left thigh, right knee, right foot and right thigh. The pain is at a severity of 2/10. The pain is The same all the time. The symptoms are aggravated by bending, sitting and standing. Associated symptoms include numbness, tingling and weakness. Pertinent

## 2024-04-11 ENCOUNTER — OFFICE VISIT (OUTPATIENT)
Dept: INTERNAL MEDICINE CLINIC | Age: 74
End: 2024-04-11
Payer: MEDICARE

## 2024-04-11 VITALS
RESPIRATION RATE: 16 BRPM | TEMPERATURE: 97.2 F | SYSTOLIC BLOOD PRESSURE: 116 MMHG | WEIGHT: 199.6 LBS | DIASTOLIC BLOOD PRESSURE: 76 MMHG | HEART RATE: 96 BPM | OXYGEN SATURATION: 98 % | BODY MASS INDEX: 31.33 KG/M2 | HEIGHT: 67 IN

## 2024-04-11 DIAGNOSIS — Z09 HOSPITAL DISCHARGE FOLLOW-UP: Primary | ICD-10-CM

## 2024-04-11 DIAGNOSIS — H40.1131 PRIMARY OPEN ANGLE GLAUCOMA OF BOTH EYES, MILD STAGE: ICD-10-CM

## 2024-04-11 DIAGNOSIS — I10 ESSENTIAL HYPERTENSION: ICD-10-CM

## 2024-04-11 DIAGNOSIS — F39 MOOD DISORDER (HCC): ICD-10-CM

## 2024-04-11 DIAGNOSIS — E78.5 DYSLIPIDEMIA: ICD-10-CM

## 2024-04-11 DIAGNOSIS — N52.01 ERECTILE DYSFUNCTION DUE TO ARTERIAL INSUFFICIENCY: ICD-10-CM

## 2024-04-11 DIAGNOSIS — M79.2 NEUROPATHIC PAIN: ICD-10-CM

## 2024-04-11 DIAGNOSIS — N18.30 STAGE 3 CHRONIC KIDNEY DISEASE, UNSPECIFIED WHETHER STAGE 3A OR 3B CKD (HCC): ICD-10-CM

## 2024-04-11 DIAGNOSIS — M47.817 LUMBOSACRAL SPONDYLOSIS WITHOUT MYELOPATHY: Chronic | ICD-10-CM

## 2024-04-11 DIAGNOSIS — F51.04 PSYCHOPHYSIOLOGICAL INSOMNIA: ICD-10-CM

## 2024-04-11 DIAGNOSIS — Z96.653 HISTORY OF TOTAL BILATERAL KNEE REPLACEMENT: ICD-10-CM

## 2024-04-11 DIAGNOSIS — M50.10 CERVICAL DISC DISORDER WITH RADICULOPATHY: ICD-10-CM

## 2024-04-11 DIAGNOSIS — R73.03 PRE-DIABETES: ICD-10-CM

## 2024-04-11 DIAGNOSIS — Z79.891 CHRONIC USE OF OPIATE DRUG FOR THERAPEUTIC PURPOSE: ICD-10-CM

## 2024-04-11 DIAGNOSIS — T84.51XA INFECTION ASSOCIATED WITH INTERNAL RIGHT HIP PROSTHESIS, INITIAL ENCOUNTER (HCC): ICD-10-CM

## 2024-04-11 DIAGNOSIS — M79.7 FIBROMYALGIA: ICD-10-CM

## 2024-04-11 PROBLEM — M25.551 PAIN IN RIGHT HIP: Status: RESOLVED | Noted: 2024-01-29 | Resolved: 2024-04-11

## 2024-04-11 PROBLEM — M25.552 PAIN IN LEFT HIP: Status: RESOLVED | Noted: 2023-12-01 | Resolved: 2024-04-11

## 2024-04-11 PROCEDURE — 1036F TOBACCO NON-USER: CPT | Performed by: FAMILY MEDICINE

## 2024-04-11 PROCEDURE — 3078F DIAST BP <80 MM HG: CPT | Performed by: FAMILY MEDICINE

## 2024-04-11 PROCEDURE — 1111F DSCHRG MED/CURRENT MED MERGE: CPT | Performed by: FAMILY MEDICINE

## 2024-04-11 PROCEDURE — 99214 OFFICE O/P EST MOD 30 MIN: CPT | Performed by: FAMILY MEDICINE

## 2024-04-11 PROCEDURE — 3074F SYST BP LT 130 MM HG: CPT | Performed by: FAMILY MEDICINE

## 2024-04-11 PROCEDURE — G8417 CALC BMI ABV UP PARAM F/U: HCPCS | Performed by: FAMILY MEDICINE

## 2024-04-11 PROCEDURE — 3017F COLORECTAL CA SCREEN DOC REV: CPT | Performed by: FAMILY MEDICINE

## 2024-04-11 PROCEDURE — G8427 DOCREV CUR MEDS BY ELIG CLIN: HCPCS | Performed by: FAMILY MEDICINE

## 2024-04-11 PROCEDURE — 1123F ACP DISCUSS/DSCN MKR DOCD: CPT | Performed by: FAMILY MEDICINE

## 2024-04-11 ASSESSMENT — ENCOUNTER SYMPTOMS
GASTROINTESTINAL NEGATIVE: 1
RESPIRATORY NEGATIVE: 1
EYES NEGATIVE: 1
ALLERGIC/IMMUNOLOGIC NEGATIVE: 1

## 2024-04-11 NOTE — PROGRESS NOTES
Subjective:      Patient ID: Duane J Garland is a 73 y.o. male.    Hip Pain   The incident occurred 5 to 7 days ago. Injury mechanism: Status post right hip arthroplasty. Pain location: Right hip. The quality of the pain is described as aching and cramping. The pain is at a severity of 6/10. The pain is moderate. The pain has been Constant since onset. Associated symptoms include an inability to bear weight, a loss of motion and muscle weakness. The symptoms are aggravated by palpation and weight bearing. Treatments tried: Opiates. The treatment provided moderate relief.       Review of Systems   Constitutional: Negative.    HENT: Negative.     Eyes: Negative.    Respiratory: Negative.     Cardiovascular: Negative.    Gastrointestinal: Negative.    Endocrine: Negative.    Musculoskeletal:  Positive for arthralgias and gait problem.   Skin: Negative.    Allergic/Immunologic: Negative.    Hematological: Negative.    Psychiatric/Behavioral: Negative.     Past family and social history unremarkable.   Diagnosis Orders   1. Hospital discharge follow-up  VT DISCHARGE MEDS RECONCILED W/ CURRENT OUTPATIENT MED LIST      2. Infection associated with internal right hip prosthesis, initial encounter ([facility])        3. Lumbosacral spondylosis without myelopathy        4. Chronic use of opiate drug for therapeutic purpose        5. Pre-diabetes        6. Dyslipidemia        7. Primary open angle glaucoma of both eyes, mild stage        8. Essential hypertension        9. Psychophysiological insomnia        10. Fibromyalgia        11. Mood disorder ([facility])        12. Stage 3 chronic kidney disease, unspecified whether stage 3a or 3b CKD ([facility])        13. Cervical disc disorder with radiculopathy        14. Erectile dysfunction due to arterial insufficiency        15. History of total bilateral knee replacement        16. Neuropathic pain              Objective:   Physical Exam  Vitals and nursing note reviewed.   Constitutional:

## 2024-04-22 ENCOUNTER — HOSPITAL ENCOUNTER (OUTPATIENT)
Age: 74
Setting detail: SPECIMEN
Discharge: HOME | End: 2024-04-22

## 2024-04-22 LAB
ALBUMIN SERPL-MCNC: 3.7 G/DL (ref 3.5–5.2)
ALP SERPL-CCNC: 100 U/L (ref 40–129)
ALT SERPL-CCNC: 32 U/L (ref 5–41)
ANION GAP SERPL CALCULATED.3IONS-SCNC: 10 MMOL/L (ref 9–17)
ANION GAP SERPL CALCULATED.3IONS-SCNC: 12 MMOL/L (ref 9–17)
AST SERPL-CCNC: 27 U/L
BILIRUB SERPL-MCNC: 0.3 MG/DL (ref 0.3–1.2)
BUN SERPL-MCNC: 22 MG/DL (ref 8–23)
BUN SERPL-MCNC: 25 MG/DL (ref 8–23)
BUN/CREAT SERPL: 18 (ref 9–20)
BUN/CREAT SERPL: 18 (ref 9–20)
CALCIUM SERPL-MCNC: 9.2 MG/DL (ref 8.6–10.4)
CALCIUM SERPL-MCNC: 9.3 MG/DL (ref 8.6–10.4)
CHLORIDE SERPL-SCNC: 100 MMOL/L (ref 98–107)
CHLORIDE SERPL-SCNC: 99 MMOL/L (ref 98–107)
CO2 SERPL-SCNC: 26 MMOL/L (ref 20–31)
CO2 SERPL-SCNC: 26 MMOL/L (ref 20–31)
CREAT SERPL-MCNC: 1.2 MG/DL (ref 0.7–1.2)
CREAT SERPL-MCNC: 1.4 MG/DL (ref 0.7–1.2)
ERYTHROCYTE [DISTWIDTH] IN BLOOD BY AUTOMATED COUNT: 14.4 % (ref 11.8–14.4)
ERYTHROCYTE [DISTWIDTH] IN BLOOD BY AUTOMATED COUNT: 14.6 % (ref 11.8–14.4)
GFR SERPL CREATININE-BSD FRML MDRD: 53 ML/MIN/1.73M2
GFR SERPL CREATININE-BSD FRML MDRD: 64 ML/MIN/1.73M2
GLUCOSE SERPL-MCNC: 125 MG/DL (ref 70–99)
GLUCOSE SERPL-MCNC: 93 MG/DL (ref 70–99)
HCT VFR BLD AUTO: 24 % (ref 40.7–50.3)
HCT VFR BLD AUTO: 25.5 % (ref 40.7–50.3)
HGB BLD-MCNC: 7 G/DL (ref 13–17)
HGB BLD-MCNC: 7.5 G/DL (ref 13–17)
MCH RBC QN AUTO: 28.1 PG (ref 25.2–33.5)
MCH RBC QN AUTO: 28.3 PG (ref 25.2–33.5)
MCHC RBC AUTO-ENTMCNC: 29.2 G/DL (ref 28–38)
MCHC RBC AUTO-ENTMCNC: 29.4 G/DL (ref 28.4–34.8)
MCV RBC AUTO: 95.5 FL (ref 82.6–102.9)
MCV RBC AUTO: 97.2 FL (ref 82.6–102.9)
NRBC BLD-RTO: 0.3 PER 100 WBC
PLATELET # BLD AUTO: 391 K/UL (ref 138–453)
PLATELET # BLD AUTO: 407 K/UL (ref 138–453)
PMV BLD AUTO: 8.6 FL (ref 8.1–13.5)
PMV BLD AUTO: 8.6 FL (ref 8.1–13.5)
POTASSIUM SERPL-SCNC: 5.4 MMOL/L (ref 3.7–5.3)
POTASSIUM SERPL-SCNC: 5.6 MMOL/L (ref 3.7–5.3)
PROT SERPL-MCNC: 6.5 G/DL (ref 6.4–8.3)
RBC # BLD AUTO: 2.47 M/UL (ref 4.21–5.77)
RBC # BLD AUTO: 2.67 M/UL (ref 4.21–5.77)
SODIUM SERPL-SCNC: 135 MMOL/L (ref 135–144)
SODIUM SERPL-SCNC: 138 MMOL/L (ref 135–144)
WBC OTHER # BLD: 7.3 K/UL (ref 3.5–11.3)
WBC OTHER # BLD: 7.6 K/UL (ref 3.5–11.3)

## 2024-04-22 PROCEDURE — 80053 COMPREHEN METABOLIC PANEL: CPT

## 2024-04-22 PROCEDURE — 36415 COLL VENOUS BLD VENIPUNCTURE: CPT

## 2024-04-22 PROCEDURE — 85027 COMPLETE CBC AUTOMATED: CPT

## 2024-04-22 PROCEDURE — 80048 BASIC METABOLIC PNL TOTAL CA: CPT

## 2024-04-22 PROCEDURE — P9603 ONE-WAY ALLOW PRORATED MILES: HCPCS

## 2024-04-23 ENCOUNTER — HOSPITAL ENCOUNTER (OUTPATIENT)
Age: 74
Setting detail: SPECIMEN
Discharge: HOME | End: 2024-04-23

## 2024-04-23 LAB
ANION GAP SERPL CALCULATED.3IONS-SCNC: 9 MMOL/L (ref 9–17)
BUN SERPL-MCNC: 23 MG/DL (ref 8–23)
BUN/CREAT SERPL: 18 (ref 9–20)
CALCIUM SERPL-MCNC: 9.4 MG/DL (ref 8.6–10.4)
CHLORIDE SERPL-SCNC: 101 MMOL/L (ref 98–107)
CO2 SERPL-SCNC: 27 MMOL/L (ref 20–31)
CREAT SERPL-MCNC: 1.3 MG/DL (ref 0.7–1.2)
ERYTHROCYTE [DISTWIDTH] IN BLOOD BY AUTOMATED COUNT: 14.7 % (ref 11.8–14.4)
GFR SERPL CREATININE-BSD FRML MDRD: 58 ML/MIN/1.73M2
GLUCOSE SERPL-MCNC: 91 MG/DL (ref 70–99)
HCT VFR BLD AUTO: 25.5 % (ref 40.7–50.3)
HGB BLD-MCNC: 7.4 G/DL (ref 13–17)
MCH RBC QN AUTO: 27.8 PG (ref 25.2–33.5)
MCHC RBC AUTO-ENTMCNC: 29 G/DL (ref 28.4–34.8)
MCV RBC AUTO: 95.9 FL (ref 82.6–102.9)
NRBC BLD-RTO: 0.3 PER 100 WBC
PLATELET # BLD AUTO: 411 K/UL (ref 138–453)
PMV BLD AUTO: 8.6 FL (ref 8.1–13.5)
POTASSIUM SERPL-SCNC: 5 MMOL/L (ref 3.7–5.3)
RBC # BLD AUTO: 2.66 M/UL (ref 4.21–5.77)
SODIUM SERPL-SCNC: 137 MMOL/L (ref 135–144)
WBC OTHER # BLD: 6.6 K/UL (ref 3.5–11.3)

## 2024-04-23 PROCEDURE — 36415 COLL VENOUS BLD VENIPUNCTURE: CPT

## 2024-04-23 PROCEDURE — 80048 BASIC METABOLIC PNL TOTAL CA: CPT

## 2024-04-23 PROCEDURE — P9603 ONE-WAY ALLOW PRORATED MILES: HCPCS

## 2024-04-23 PROCEDURE — 85027 COMPLETE CBC AUTOMATED: CPT

## 2024-04-26 ENCOUNTER — HOSPITAL ENCOUNTER (OUTPATIENT)
Age: 74
Setting detail: SPECIMEN
Discharge: HOME | End: 2024-04-26

## 2024-04-26 LAB
ANION GAP SERPL CALCULATED.3IONS-SCNC: 8 MMOL/L (ref 9–17)
BUN SERPL-MCNC: 22 MG/DL (ref 8–23)
BUN/CREAT SERPL: 20 (ref 9–20)
CALCIUM SERPL-MCNC: 9 MG/DL (ref 8.6–10.4)
CHLORIDE SERPL-SCNC: 102 MMOL/L (ref 98–107)
CO2 SERPL-SCNC: 26 MMOL/L (ref 20–31)
CREAT SERPL-MCNC: 1.1 MG/DL (ref 0.7–1.2)
ERYTHROCYTE [DISTWIDTH] IN BLOOD BY AUTOMATED COUNT: 15.4 % (ref 11.8–14.4)
GFR SERPL CREATININE-BSD FRML MDRD: 71 ML/MIN/1.73M2
GLUCOSE SERPL-MCNC: 67 MG/DL (ref 70–99)
HCT VFR BLD AUTO: 25.1 % (ref 40.7–50.3)
HGB BLD-MCNC: 7.4 G/DL (ref 13–17)
MCH RBC QN AUTO: 28.2 PG (ref 25.2–33.5)
MCHC RBC AUTO-ENTMCNC: 29.5 G/DL (ref 28.4–34.8)
MCV RBC AUTO: 95.8 FL (ref 82.6–102.9)
NRBC BLD-RTO: 0 PER 100 WBC
PLATELET # BLD AUTO: 370 K/UL (ref 138–453)
PMV BLD AUTO: 8.7 FL (ref 8.1–13.5)
POTASSIUM SERPL-SCNC: 4.7 MMOL/L (ref 3.7–5.3)
RBC # BLD AUTO: 2.62 M/UL (ref 4.21–5.77)
SODIUM SERPL-SCNC: 136 MMOL/L (ref 135–144)
WBC OTHER # BLD: 5.9 K/UL (ref 3.5–11.3)

## 2024-04-26 PROCEDURE — 85027 COMPLETE CBC AUTOMATED: CPT

## 2024-04-26 PROCEDURE — 36415 COLL VENOUS BLD VENIPUNCTURE: CPT

## 2024-04-26 PROCEDURE — 80048 BASIC METABOLIC PNL TOTAL CA: CPT

## 2024-04-26 PROCEDURE — P9603 ONE-WAY ALLOW PRORATED MILES: HCPCS

## 2024-05-06 RX ORDER — SPIRONOLACTONE 50 MG/1
50 TABLET, FILM COATED ORAL DAILY
Qty: 90 TABLET | Refills: 1 | Status: SHIPPED | OUTPATIENT
Start: 2024-05-06

## 2024-05-06 NOTE — TELEPHONE ENCOUNTER
Duane J Garland is calling to request a refill on the following medication(s):    Medication Request:  Requested Prescriptions     Pending Prescriptions Disp Refills    spironolactone (ALDACTONE) 50 MG tablet 90 tablet 1     Sig: Take 1 tablet by mouth daily       Last Visit Date (If Applicable):  Visit date not found    Next Visit Date:    Visit date not found

## 2024-05-10 ENCOUNTER — HOSPITAL ENCOUNTER (OUTPATIENT)
Dept: PAIN MANAGEMENT | Age: 74
Discharge: HOME | End: 2024-05-10
Payer: MEDICARE

## 2024-05-10 VITALS — BODY MASS INDEX: 31.23 KG/M2 | WEIGHT: 199 LBS | HEIGHT: 67 IN

## 2024-05-10 DIAGNOSIS — M47.817 LUMBOSACRAL SPONDYLOSIS WITHOUT MYELOPATHY: Chronic | ICD-10-CM

## 2024-05-10 DIAGNOSIS — M79.7 FIBROMYALGIA: Primary | ICD-10-CM

## 2024-05-10 DIAGNOSIS — Z79.891 CHRONIC USE OF OPIATE DRUG FOR THERAPEUTIC PURPOSE: ICD-10-CM

## 2024-05-10 PROCEDURE — 99213 OFFICE O/P EST LOW 20 MIN: CPT | Performed by: NURSE PRACTITIONER

## 2024-05-10 PROCEDURE — 99213 OFFICE O/P EST LOW 20 MIN: CPT

## 2024-05-10 ASSESSMENT — ENCOUNTER SYMPTOMS
BACK PAIN: 1
SHORTNESS OF BREATH: 0
BOWEL INCONTINENCE: 0
CONSTIPATION: 0
COUGH: 0

## 2024-05-10 ASSESSMENT — PAIN SCALES - GENERAL: PAINLEVEL_OUTOF10: 3

## 2024-05-10 NOTE — PROGRESS NOTES
General: Skin is warm and dry.   Neurological:      Mental Status: He is alert and oriented to person, place, and time.         Record/Diagnostics Review:    Last dominga 1/2024 and was appropriate     Assessment:  Problem List Items Addressed This Visit       Lumbosacral spondylosis without myelopathy (Chronic)    Chronic use of opiate drug for therapeutic purpose    Fibromyalgia - Primary          Treatment Plan:  Patient relates current medications are helping the pain. Patient reports taking pain medications as prescribed, denies obtaining medications from different sources and denies use of illegal drugs. Medication risk and benefits have been discussed. Patient denies side effects from medications like nausea, vomiting, constipation or drowsiness. Patient reports current activities of daily living are possible due to medications and would like to continue them.     As always, we encourage daily stretching and strengthening exercises, and recommend minimizing use of pain medications unless patient cannot get through daily activities due to pain.     Due to the high risk nature of this patient's pain medication close monitoring is required.   Continue current medication management, pt has been stable and compliant.  Does not need a prescription at this time  Plans to refill meds from surgeon   Policy regarding pain medications from surgeon is discussed.  Patient not to take pain medication from pain clinic and from surgeon at the same time.   Follow up appointment made for 4 weeks    I have reviewed the chief complaint and history of present illness (including ROS and PFSH) and vital documentation by my staff and I agree with their documentation and have added where applicable.

## 2024-05-17 ENCOUNTER — TELEPHONE (OUTPATIENT)
Dept: INTERNAL MEDICINE CLINIC | Age: 74
End: 2024-05-17

## 2024-05-17 NOTE — TELEPHONE ENCOUNTER
I called and left message that per Dr Ramirez...  It is a postoperative complication and he needs to address that with his orthopedic surgeon.  He may go to urgent care as necessary

## 2024-05-17 NOTE — TELEPHONE ENCOUNTER
Patient is having spasms in right leg . Patient states he had recent hip replacement with Dr Garcia. I advised him to call their office but he stated that he does not prescribe medication. Please advise.

## 2024-05-17 NOTE — TELEPHONE ENCOUNTER
It is a postoperative complication and he needs to address that with his orthopedic surgeon.  He may go to urgent care as necessary

## 2024-05-28 ENCOUNTER — TELEPHONE (OUTPATIENT)
Dept: PAIN MANAGEMENT | Age: 74
End: 2024-05-28

## 2024-05-29 ENCOUNTER — TELEPHONE (OUTPATIENT)
Dept: PAIN MANAGEMENT | Age: 74
End: 2024-05-29

## 2024-05-29 NOTE — TELEPHONE ENCOUNTER
Patient calls [facility] and states he is out of his medication; asking for refill.  Will route to CNP.

## 2024-06-05 DIAGNOSIS — M47.817 LUMBOSACRAL SPONDYLOSIS WITHOUT MYELOPATHY: Chronic | ICD-10-CM

## 2024-06-05 RX ORDER — OXYCODONE HYDROCHLORIDE AND ACETAMINOPHEN 5; 325 MG/1; MG/1
1 TABLET ORAL EVERY 6 HOURS PRN
Qty: 120 TABLET | Refills: 0 | Status: SHIPPED | OUTPATIENT
Start: 2024-06-05 | End: 2024-07-05

## 2024-06-06 RX ORDER — DOCUSATE SODIUM 100 MG/1
100 CAPSULE, LIQUID FILLED ORAL 2 TIMES DAILY
Qty: 60 CAPSULE | Refills: 0 | OUTPATIENT
Start: 2024-06-06

## 2024-06-07 RX ORDER — SPIRONOLACTONE 50 MG/1
50 TABLET, FILM COATED ORAL DAILY
Qty: 90 TABLET | Refills: 1 | Status: SHIPPED | OUTPATIENT
Start: 2024-06-07

## 2024-06-07 NOTE — TELEPHONE ENCOUNTER
Duane J Garland is calling to request a refill on the following medication(s):    Medication Request:  Requested Prescriptions     Pending Prescriptions Disp Refills    spironolactone (ALDACTONE) 50 MG tablet [Pharmacy Med Name: SPIRONOLACTONE 50 MG TABLET] 90 tablet 1     Sig: TAKE 1 TABLET BY MOUTH DAILY       Last Visit Date (If Applicable):  4/11/2024    Next Visit Date:    7/11/2024

## 2024-06-18 ENCOUNTER — OFFICE VISIT (OUTPATIENT)
Dept: FAMILY MEDICINE CLINIC | Age: 74
End: 2024-06-18
Payer: MEDICARE

## 2024-06-18 VITALS
TEMPERATURE: 97.1 F | OXYGEN SATURATION: 97 % | WEIGHT: 196 LBS | RESPIRATION RATE: 16 BRPM | HEART RATE: 87 BPM | HEIGHT: 67 IN | BODY MASS INDEX: 30.76 KG/M2 | SYSTOLIC BLOOD PRESSURE: 122 MMHG | DIASTOLIC BLOOD PRESSURE: 74 MMHG

## 2024-06-18 DIAGNOSIS — M47.817 LUMBOSACRAL SPONDYLOSIS WITHOUT MYELOPATHY: Chronic | ICD-10-CM

## 2024-06-18 DIAGNOSIS — H40.1131 PRIMARY OPEN ANGLE GLAUCOMA OF BOTH EYES, MILD STAGE: ICD-10-CM

## 2024-06-18 DIAGNOSIS — N18.30 STAGE 3 CHRONIC KIDNEY DISEASE, UNSPECIFIED WHETHER STAGE 3A OR 3B CKD (HCC): ICD-10-CM

## 2024-06-18 DIAGNOSIS — Z01.818 PREOPERATIVE CLEARANCE: Primary | ICD-10-CM

## 2024-06-18 DIAGNOSIS — E78.5 DYSLIPIDEMIA: ICD-10-CM

## 2024-06-18 DIAGNOSIS — M79.7 FIBROMYALGIA: ICD-10-CM

## 2024-06-18 DIAGNOSIS — R73.03 PRE-DIABETES: ICD-10-CM

## 2024-06-18 DIAGNOSIS — F39 MOOD DISORDER (HCC): ICD-10-CM

## 2024-06-18 DIAGNOSIS — L08.9: ICD-10-CM

## 2024-06-18 DIAGNOSIS — S70.211A: ICD-10-CM

## 2024-06-18 DIAGNOSIS — I10 ESSENTIAL HYPERTENSION: ICD-10-CM

## 2024-06-18 DIAGNOSIS — M16.11 ARTHRITIS OF RIGHT HIP: ICD-10-CM

## 2024-06-18 DIAGNOSIS — D50.0 IRON DEFICIENCY ANEMIA DUE TO CHRONIC BLOOD LOSS: ICD-10-CM

## 2024-06-18 DIAGNOSIS — Z96.653 HISTORY OF TOTAL BILATERAL KNEE REPLACEMENT: ICD-10-CM

## 2024-06-18 DIAGNOSIS — M50.10 CERVICAL DISC DISORDER WITH RADICULOPATHY: ICD-10-CM

## 2024-06-18 DIAGNOSIS — Z79.891 CHRONIC USE OF OPIATE DRUG FOR THERAPEUTIC PURPOSE: ICD-10-CM

## 2024-06-18 DIAGNOSIS — F51.04 PSYCHOPHYSIOLOGICAL INSOMNIA: ICD-10-CM

## 2024-06-18 DIAGNOSIS — N52.01 ERECTILE DYSFUNCTION DUE TO ARTERIAL INSUFFICIENCY: ICD-10-CM

## 2024-06-18 PROBLEM — D64.9 ANEMIA: Status: ACTIVE | Noted: 2024-06-18

## 2024-06-18 PROCEDURE — 3078F DIAST BP <80 MM HG: CPT | Performed by: FAMILY MEDICINE

## 2024-06-18 PROCEDURE — 1036F TOBACCO NON-USER: CPT | Performed by: FAMILY MEDICINE

## 2024-06-18 PROCEDURE — 3074F SYST BP LT 130 MM HG: CPT | Performed by: FAMILY MEDICINE

## 2024-06-18 PROCEDURE — 99214 OFFICE O/P EST MOD 30 MIN: CPT | Performed by: FAMILY MEDICINE

## 2024-06-18 PROCEDURE — G8427 DOCREV CUR MEDS BY ELIG CLIN: HCPCS | Performed by: FAMILY MEDICINE

## 2024-06-18 PROCEDURE — 1123F ACP DISCUSS/DSCN MKR DOCD: CPT | Performed by: FAMILY MEDICINE

## 2024-06-18 PROCEDURE — G8417 CALC BMI ABV UP PARAM F/U: HCPCS | Performed by: FAMILY MEDICINE

## 2024-06-18 PROCEDURE — 3017F COLORECTAL CA SCREEN DOC REV: CPT | Performed by: FAMILY MEDICINE

## 2024-06-18 ASSESSMENT — ENCOUNTER SYMPTOMS
GASTROINTESTINAL NEGATIVE: 1
ALLERGIC/IMMUNOLOGIC NEGATIVE: 1
RESPIRATORY NEGATIVE: 1
BACK PAIN: 1
EYES NEGATIVE: 1

## 2024-06-18 NOTE — PROGRESS NOTES
Subjective:      Patient ID: Duane J Garland is a 73 y.o. male.    Hip Pain   The incident occurred more than 1 week ago. There was no injury mechanism. Pain location: Right hip. The pain is at a severity of 6/10. The pain is severe. Associated symptoms include an inability to bear weight, a loss of motion and muscle weakness. Possible foreign bodies include metal. The symptoms are aggravated by palpation, weight bearing and movement. Treatments tried: Opiates. The treatment provided moderate relief.       Review of Systems   Constitutional: Negative.    HENT: Negative.     Eyes: Negative.    Respiratory: Negative.     Cardiovascular: Negative.    Gastrointestinal: Negative.    Endocrine: Negative.    Musculoskeletal:  Positive for arthralgias, back pain and gait problem.   Skin: Negative.    Allergic/Immunologic: Negative.    Hematological: Negative.    Psychiatric/Behavioral: Negative.     Past family and social history unremarkable.   Diagnosis Orders   1. Preoperative clearance        2. Arthritis of right hip        3. Lumbosacral spondylosis without myelopathy        4. Chronic use of opiate drug for therapeutic purpose        5. Pre-diabetes        6. Dyslipidemia        7. Primary open angle glaucoma of both eyes, mild stage        8. Essential hypertension        9. Psychophysiological insomnia        10. Stage 3 chronic kidney disease, unspecified whether stage 3a or 3b CKD (HCC)        11. Cervical disc disorder with radiculopathy        12. History of total bilateral knee replacement        13. Mood disorder (HCC)        14. Fibromyalgia        15. Erectile dysfunction due to arterial insufficiency        16. Iron deficiency anemia due to chronic blood loss        17. Infected abrasion of right hip, initial encounter              Objective:   Physical Exam  Vitals and nursing note reviewed.   Constitutional:       Appearance: He is well-developed.   HENT:      Head: Normocephalic and atraumatic.

## 2024-06-19 ENCOUNTER — HOSPITAL ENCOUNTER (OUTPATIENT)
Dept: PAIN MANAGEMENT | Age: 74
Discharge: HOME | End: 2024-06-19
Payer: MEDICARE

## 2024-06-19 VITALS — BODY MASS INDEX: 30.76 KG/M2 | WEIGHT: 196 LBS | HEIGHT: 67 IN

## 2024-06-19 DIAGNOSIS — Z79.891 CHRONIC USE OF OPIATE DRUG FOR THERAPEUTIC PURPOSE: ICD-10-CM

## 2024-06-19 DIAGNOSIS — M79.7 FIBROMYALGIA: ICD-10-CM

## 2024-06-19 DIAGNOSIS — M47.817 LUMBOSACRAL SPONDYLOSIS WITHOUT MYELOPATHY: Primary | Chronic | ICD-10-CM

## 2024-06-19 DIAGNOSIS — M50.10 CERVICAL DISC DISORDER WITH RADICULOPATHY: ICD-10-CM

## 2024-06-19 PROCEDURE — 99213 OFFICE O/P EST LOW 20 MIN: CPT | Performed by: NURSE PRACTITIONER

## 2024-06-19 PROCEDURE — 99213 OFFICE O/P EST LOW 20 MIN: CPT

## 2024-06-19 ASSESSMENT — ENCOUNTER SYMPTOMS
COUGH: 0
CONSTIPATION: 0
BOWEL INCONTINENCE: 0
BACK PAIN: 1
SHORTNESS OF BREATH: 0

## 2024-06-19 ASSESSMENT — PAIN DESCRIPTION - ORIENTATION: ORIENTATION: LOWER

## 2024-06-19 ASSESSMENT — PAIN DESCRIPTION - FREQUENCY: FREQUENCY: CONTINUOUS

## 2024-06-19 ASSESSMENT — PAIN SCALES - GENERAL: PAINLEVEL_OUTOF10: 0

## 2024-06-19 ASSESSMENT — PAIN DESCRIPTION - LOCATION: LOCATION: BACK

## 2024-06-19 ASSESSMENT — PAIN DESCRIPTION - DESCRIPTORS: DESCRIPTORS: ACHING

## 2024-06-19 ASSESSMENT — PAIN DESCRIPTION - PAIN TYPE: TYPE: CHRONIC PAIN

## 2024-06-19 NOTE — PROGRESS NOTES
Mother     No Known Problems Father        Social History     Socioeconomic History    Marital status:      Spouse name: Not on file    Number of children: Not on file    Years of education: Not on file    Highest education level: Not on file   Occupational History    Occupation: retired   Tobacco Use    Smoking status: Never    Smokeless tobacco: Never   Vaping Use    Vaping Use: Never used   Substance and Sexual Activity    Alcohol use: No    Drug use: No    Sexual activity: Never   Other Topics Concern    Not on file   Social History Narrative    Not on file     Social Determinants of Health     Financial Resource Strain: Low Risk  (4/6/2023)    Overall Financial Resource Strain (CARDIA)     Difficulty of Paying Living Expenses: Not hard at all   Food Insecurity: Not on file (4/7/2024)   Transportation Needs: Unknown (4/6/2023)    PRAPARE - Transportation     Lack of Transportation (Medical): Not on file     Lack of Transportation (Non-Medical): No   Physical Activity: Sufficiently Active (8/10/2023)    Exercise Vital Sign     Days of Exercise per Week: 3 days     Minutes of Exercise per Session: 60 min   Stress: Not on file   Social Connections: Not on file   Intimate Partner Violence: Not on file   Housing Stability: Unknown (4/6/2023)    Housing Stability Vital Sign     Unable to Pay for Housing in the Last Year: Not on file     Number of Places Lived in the Last Year: Not on file     Unstable Housing in the Last Year: No       Review of Systems:  Review of Systems   Constitutional: Negative for chills and fever.   Cardiovascular:  Negative for chest pain and palpitations.   Respiratory:  Negative for cough and shortness of breath.    Musculoskeletal:  Positive for back pain and joint pain.   Gastrointestinal:  Negative for bowel incontinence and constipation.   Genitourinary:  Negative for bladder incontinence.   Neurological:  Positive for numbness and tingling. Negative for disturbances in

## 2024-07-01 ENCOUNTER — TELEPHONE (OUTPATIENT)
Dept: PAIN MANAGEMENT | Age: 74
End: 2024-07-01

## 2024-07-01 DIAGNOSIS — M47.817 LUMBOSACRAL SPONDYLOSIS WITHOUT MYELOPATHY: Chronic | ICD-10-CM

## 2024-07-01 RX ORDER — OXYCODONE HYDROCHLORIDE AND ACETAMINOPHEN 5; 325 MG/1; MG/1
1 TABLET ORAL EVERY 6 HOURS PRN
Qty: 120 TABLET | Refills: 0 | Status: SHIPPED | OUTPATIENT
Start: 2024-07-05 | End: 2024-08-04

## 2024-07-01 NOTE — TELEPHONE ENCOUNTER
Duane J Garland is calling to request a refill on the following medication(s):    Last Visit Date (If Applicable):  6/18/2024    Next Visit Date:    8/12/2024    Medication Request:  Requested Prescriptions     Pending Prescriptions Disp Refills    tadalafil (CIALIS) 5 MG tablet 20 tablet 3     Sig: Take 1 tablet a day for erectile dysfunction

## 2024-07-02 RX ORDER — TADALAFIL 5 MG/1
TABLET ORAL
Qty: 20 TABLET | Refills: 3 | Status: SHIPPED | OUTPATIENT
Start: 2024-07-02

## 2024-08-01 ENCOUNTER — HOSPITAL ENCOUNTER (OUTPATIENT)
Dept: PAIN MANAGEMENT | Age: 74
Discharge: HOME | End: 2024-08-01
Payer: MEDICARE

## 2024-08-01 VITALS — HEIGHT: 67 IN | WEIGHT: 196 LBS | BODY MASS INDEX: 30.76 KG/M2

## 2024-08-01 DIAGNOSIS — M79.2 NEUROPATHIC PAIN: ICD-10-CM

## 2024-08-01 DIAGNOSIS — Z96.653 HISTORY OF TOTAL BILATERAL KNEE REPLACEMENT: ICD-10-CM

## 2024-08-01 DIAGNOSIS — Z79.891 CHRONIC USE OF OPIATE DRUG FOR THERAPEUTIC PURPOSE: ICD-10-CM

## 2024-08-01 DIAGNOSIS — Z96.641 STATUS POST RIGHT HIP REPLACEMENT: ICD-10-CM

## 2024-08-01 DIAGNOSIS — M47.817 LUMBOSACRAL SPONDYLOSIS WITHOUT MYELOPATHY: Primary | Chronic | ICD-10-CM

## 2024-08-01 PROBLEM — Z96.642 HISTORY OF LEFT HIP REPLACEMENT: Status: ACTIVE | Noted: 2024-05-02

## 2024-08-01 PROCEDURE — 80307 DRUG TEST PRSMV CHEM ANLYZR: CPT

## 2024-08-01 PROCEDURE — G0481 DRUG TEST DEF 8-14 CLASSES: HCPCS

## 2024-08-01 PROCEDURE — 99213 OFFICE O/P EST LOW 20 MIN: CPT

## 2024-08-01 PROCEDURE — 99214 OFFICE O/P EST MOD 30 MIN: CPT | Performed by: NURSE PRACTITIONER

## 2024-08-01 RX ORDER — OXYCODONE HYDROCHLORIDE AND ACETAMINOPHEN 5; 325 MG/1; MG/1
1 TABLET ORAL EVERY 6 HOURS PRN
Qty: 120 TABLET | Refills: 0 | Status: SHIPPED | OUTPATIENT
Start: 2024-08-04 | End: 2024-09-03

## 2024-08-01 ASSESSMENT — PAIN SCALES - GENERAL: PAINLEVEL_OUTOF10: 0

## 2024-08-01 ASSESSMENT — ENCOUNTER SYMPTOMS
BOWEL INCONTINENCE: 0
CONSTIPATION: 0
SHORTNESS OF BREATH: 0
COUGH: 0
BACK PAIN: 1

## 2024-08-01 NOTE — PROGRESS NOTES
include no inability to bear weight or loss of motion. He reports no foreign bodies present. The symptoms are aggravated by movement and weight bearing. He has tried heat, ice and acetaminophen for the symptoms. The treatment provided moderate relief.   Hip Pain   The incident occurred more than 1 week ago. There was no injury mechanism. The pain is present in the right hip and right thigh. The pain is at a severity of 7/10. The pain is moderate. The pain has been constant since onset. Associated symptoms include numbness and tingling. The symptoms are aggravated by movement, palpation and weight bearing. He has tried immobilization and rest for the symptoms. The treatment provided mild relief.   Back Pain  This is a chronic problem. The current episode started more than 1 year ago. The problem occurs intermittently. The problem has been gradually improving since onset. The pain is present in the lumbar spine. The pain radiates to the left foot, left thigh, left knee, right foot, right knee and right thigh. The pain is at a severity of 0/10. The symptoms are aggravated by bending, sitting and standing. Associated symptoms include numbness, tingling and weakness. Pertinent negatives include no bladder incontinence, bowel incontinence, chest pain, fever or headaches. He has tried ice and heat for the symptoms.         Patient denies any new neurological symptoms. No bowel or bladder incontinence, no weakness, and no falling.    Pill count: appropriate / Percocet - 12 8/4    Morphine equivalent: 30    Periodic Controlled Substance Monitoring: Possible medication side effects, risk of tolerance/dependence & alternative treatments discussed., No signs of potential drug abuse or diversion identified., Assessed functional status (ability to engage in work or other purposeful activities, the pain intensity and its interference with activities of daily living, quality of family life and social activities, and the physical

## 2024-08-05 LAB
6-ACETYLMORPHINE, UR: NOT DETECTED
7-AMINOCLONAZEPAM, URINE: NOT DETECTED
ALPHA-OH-ALPRAZ, URINE: NOT DETECTED
ALPHA-OH-MIDAZOLAM, URINE: NOT DETECTED
ALPRAZOLAM, URINE: NOT DETECTED
AMPHETAMINE, URINE: NOT DETECTED
BARBITURATES, URINE: NEGATIVE
BENZOYLECGONINE, UR: NEGATIVE
BUPRENORPHINE URINE: NOT DETECTED
CARISOPRODOL, UR: NEGATIVE
CLONAZEPAM, URINE: NOT DETECTED
CODEINE, URINE: NOT DETECTED
CREAT UR-MCNC: 106.5 MG/DL (ref 20–400)
DIAZEPAM, URINE: NOT DETECTED
DRUGS EXPECTED, UR: NORMAL
EER HI RES INTERP UR: NORMAL
ETHYL GLUCURONIDE UR: NEGATIVE
FENTANYL URINE: NOT DETECTED
GABAPENTIN: PRESENT
HYDROCODONE, URINE: NOT DETECTED
HYDROMORPHONE, URINE: NOT DETECTED
LORAZEPAM, URINE: NOT DETECTED
MARIJUANA METAB, UR: NEGATIVE
MDA, URINE: NOT DETECTED
MDEA, EVE, UR: NOT DETECTED
MDMA, URINE: NOT DETECTED
MEPERIDINE METAB, UR: NOT DETECTED
METHADONE, URINE: NEGATIVE
METHAMPHETAMINE, URINE: NOT DETECTED
METHYLPHENIDATE: NOT DETECTED
MIDAZOLAM, URINE: NOT DETECTED
MORPHINE, OPI1M: NOT DETECTED
NALOXONE URINE: NOT DETECTED
NORBUPRENORPHINE, URINE: NOT DETECTED
NORDIAZEPAM, URINE: NOT DETECTED
NORFENTANYL, URINE: NOT DETECTED
NORHYDROCODONE, URINE: NOT DETECTED
NOROXYCODONE, URINE: PRESENT
NOROXYMORPHONE, URINE: PRESENT
OXAZEPAM, URINE: NOT DETECTED
OXYCODONE URINE: PRESENT
OXYMORPHONE, URINE: PRESENT
PAIN MANAGEMENT DRUG PANEL INTERP, URINE: NORMAL
PAIN MGT DRUG PANEL, HI RES, UR: NORMAL
PCP,URINE: NEGATIVE
PHENTERMINE, UR: NOT DETECTED
PREGABALIN: NOT DETECTED
TAPENTADOL, URINE: NOT DETECTED
TAPENTADOL-O-SULFATE, URINE: NOT DETECTED
TEMAZEPAM, URINE: NOT DETECTED
TRAMADOL, URINE: NEGATIVE
ZOLPIDEM METABOLITE (ZCA), URINE: NOT DETECTED
ZOLPIDEM, URINE: NOT DETECTED

## 2024-08-22 ENCOUNTER — HOSPITAL ENCOUNTER (OUTPATIENT)
Dept: PAIN MANAGEMENT | Age: 74
Discharge: HOME | End: 2024-08-22
Payer: MEDICARE

## 2024-08-22 VITALS — BODY MASS INDEX: 30.76 KG/M2 | WEIGHT: 196 LBS | HEIGHT: 67 IN

## 2024-08-22 DIAGNOSIS — M47.817 LUMBOSACRAL SPONDYLOSIS WITHOUT MYELOPATHY: Chronic | ICD-10-CM

## 2024-08-22 DIAGNOSIS — Z79.891 CHRONIC USE OF OPIATE DRUG FOR THERAPEUTIC PURPOSE: Primary | ICD-10-CM

## 2024-08-22 PROCEDURE — 99213 OFFICE O/P EST LOW 20 MIN: CPT

## 2024-08-22 RX ORDER — OXYCODONE HYDROCHLORIDE AND ACETAMINOPHEN 5; 325 MG/1; MG/1
1 TABLET ORAL EVERY 6 HOURS PRN
Qty: 120 TABLET | Refills: 0 | Status: SHIPPED | OUTPATIENT
Start: 2024-08-22 | End: 2024-09-21

## 2024-08-22 RX ORDER — GABAPENTIN 800 MG/1
800 TABLET ORAL 3 TIMES DAILY
Qty: 270 TABLET | Refills: 0 | Status: SHIPPED | OUTPATIENT
Start: 2024-08-22 | End: 2024-11-20

## 2024-08-22 ASSESSMENT — ENCOUNTER SYMPTOMS
GASTROINTESTINAL NEGATIVE: 1
BACK PAIN: 1

## 2024-08-22 NOTE — PROGRESS NOTES
The patient is a 73 y.o. /  male.    Chief Complaint   Patient presents with    Back Pain      -Chronic low back pain history, diagnosed with lumbar spondylosis and facet mediated pain had lumbar radiofrequency ablation in February 2024  Report ongoing relief with more than 50% improvement in pain and improved range of motion    On chronic medication management with oxycodone and gabapentin  Finds the medication helpful allowing him to continue daily life activities  Denies any illicit substance use  No sign or symptom of any aberrancy  Automated prescription report reviewed  Refill ordered    Patient is following up with Dr. Acharya and is planning for hip replacement surgery  He is asking for medication increase in perioperative.  Advised patient that he will need to discuss postop pain controlled with his surgeon  If he is given a prescription from the surgeon then he should not take our medication along with it  If he is not prescribed medications by the surgeon then he can only take all prescribed medication as prescribed and not in a higher dose    If postop pain is more we will not be able to increase the medication as it will be the surgical responsibility to manage the postop pain    May consider for repeat lumbar RFA in future      Patient is here today for: back pain   Pain level: 3/10  Character: stabbing  Radiating: no  Weakness or numbness: no  Aggravating Factors: movement  Alleviating Factors: nothing  Constant or intermitting: constant  Bladder/bowel loss: no        Past Medical History:   Diagnosis Date    Arthritis     Gait instability     at times    History of blood transfusion     AFTER KNEE SURGERY    Hx of blood clots     after one of his knee surgeries     Hyperlipidemia     Insomnia     Left knee pain 04/09/2014    Lumbosacral spondylolysis     Neuropathy     Teeth problem     abcessw    Wears glasses         Past Surgical History:   Procedure Laterality Date

## 2024-08-28 ENCOUNTER — TELEPHONE (OUTPATIENT)
Dept: FAMILY MEDICINE CLINIC | Age: 74
End: 2024-08-28

## 2024-08-28 NOTE — TELEPHONE ENCOUNTER
Ailyn from [facility] office called and stated the patient will need surgery clearance and that she sent over al the information via fax on 8/20/24. She confirmed our fax number and stated she will refax it to the office.

## 2024-09-03 ENCOUNTER — TELEPHONE (OUTPATIENT)
Dept: PAIN MANAGEMENT | Age: 74
End: 2024-09-03

## 2024-09-03 NOTE — TELEPHONE ENCOUNTER
Patient calls [facility] and states he was admitted to [facility] on Wed, Aug 28th; hopes to be discharged today.  He wanted to let us know.

## 2024-09-09 ENCOUNTER — OFFICE VISIT (OUTPATIENT)
Dept: FAMILY MEDICINE CLINIC | Age: 74
End: 2024-09-09
Payer: MEDICARE

## 2024-09-09 VITALS
TEMPERATURE: 97.8 F | HEIGHT: 67 IN | WEIGHT: 197 LBS | RESPIRATION RATE: 16 BRPM | DIASTOLIC BLOOD PRESSURE: 68 MMHG | SYSTOLIC BLOOD PRESSURE: 114 MMHG | BODY MASS INDEX: 30.92 KG/M2 | OXYGEN SATURATION: 94 % | HEART RATE: 100 BPM

## 2024-09-09 DIAGNOSIS — Z79.891 CHRONIC USE OF OPIATE DRUG FOR THERAPEUTIC PURPOSE: ICD-10-CM

## 2024-09-09 DIAGNOSIS — H25.10 NUCLEAR SCLEROTIC CATARACT, UNSPECIFIED LATERALITY: ICD-10-CM

## 2024-09-09 DIAGNOSIS — Z96.641 STATUS POST RIGHT HIP REPLACEMENT: ICD-10-CM

## 2024-09-09 DIAGNOSIS — I10 ESSENTIAL HYPERTENSION: ICD-10-CM

## 2024-09-09 DIAGNOSIS — E78.5 DYSLIPIDEMIA: ICD-10-CM

## 2024-09-09 DIAGNOSIS — D63.8 ANEMIA OF CHRONIC DISEASE: ICD-10-CM

## 2024-09-09 DIAGNOSIS — R73.03 PRE-DIABETES: ICD-10-CM

## 2024-09-09 DIAGNOSIS — Z01.818 PREOPERATIVE CLEARANCE: Primary | ICD-10-CM

## 2024-09-09 DIAGNOSIS — G90.521 COMPLEX REGIONAL PAIN SYNDROME TYPE 1 OF RIGHT LOWER EXTREMITY: ICD-10-CM

## 2024-09-09 DIAGNOSIS — H40.009 OPEN ANGLE GLAUCOMA SUSPECT: ICD-10-CM

## 2024-09-09 DIAGNOSIS — Z96.642 HISTORY OF LEFT HIP REPLACEMENT: ICD-10-CM

## 2024-09-09 PROBLEM — E66.9 OBESITY, CLASS I, BMI 30.0-34.9 (SEE ACTUAL BMI): Status: ACTIVE | Noted: 2023-08-18

## 2024-09-09 PROBLEM — Z96.652 HISTORY OF REVISION OF TOTAL REPLACEMENT OF LEFT KNEE JOINT: Status: ACTIVE | Noted: 2018-02-06

## 2024-09-09 PROBLEM — R20.2 PARESTHESIA: Status: RESOLVED | Noted: 2024-01-08 | Resolved: 2024-09-09

## 2024-09-09 PROBLEM — N18.31 STAGE 3A CHRONIC KIDNEY DISEASE (HCC): Status: RESOLVED | Noted: 2024-07-10 | Resolved: 2024-09-09

## 2024-09-09 PROBLEM — R69 ILLNESS, UNSPECIFIED: Status: RESOLVED | Noted: 2024-05-02 | Resolved: 2024-09-09

## 2024-09-09 PROBLEM — Z96.651 STATUS POST TOTAL RIGHT KNEE REPLACEMENT: Status: ACTIVE | Noted: 2024-02-04

## 2024-09-09 PROBLEM — M79.2 NEUROPATHIC PAIN: Status: RESOLVED | Noted: 2023-10-30 | Resolved: 2024-09-09

## 2024-09-09 PROBLEM — T84.51XA INFECTION ASSOCIATED WITH INTERNAL RIGHT HIP PROSTHESIS (HCC): Status: ACTIVE | Noted: 2024-03-26

## 2024-09-09 PROBLEM — N52.9 ERECTILE DYSFUNCTION: Status: RESOLVED | Noted: 2023-12-27 | Resolved: 2024-09-09

## 2024-09-09 PROBLEM — M16.11 PRIMARY OSTEOARTHRITIS OF RIGHT HIP: Status: ACTIVE | Noted: 2024-02-04

## 2024-09-09 PROBLEM — M25.551 PAIN OF RIGHT HIP: Status: RESOLVED | Noted: 2024-08-28 | Resolved: 2024-09-09

## 2024-09-09 PROBLEM — R20.2 PARESTHESIA: Status: ACTIVE | Noted: 2024-01-08

## 2024-09-09 PROBLEM — M79.671 RIGHT FOOT PAIN: Status: RESOLVED | Noted: 2024-01-08 | Resolved: 2024-09-09

## 2024-09-09 PROBLEM — M67.919 DISORDER OF ROTATOR CUFF: Status: ACTIVE | Noted: 2024-02-27

## 2024-09-09 PROBLEM — E66.9 OBESITY, CLASS I, BMI 30.0-34.9 (SEE ACTUAL BMI): Status: RESOLVED | Noted: 2023-08-18 | Resolved: 2024-09-09

## 2024-09-09 PROBLEM — T84.54XA INFECTION ASSOCIATED WITH INTERNAL LEFT KNEE PROSTHESIS (HCC): Status: ACTIVE | Noted: 2017-06-01

## 2024-09-09 PROBLEM — M25.551 PAIN OF RIGHT HIP: Status: ACTIVE | Noted: 2024-08-28

## 2024-09-09 PROBLEM — M50.10 CERVICAL DISC DISORDER WITH RADICULOPATHY: Status: RESOLVED | Noted: 2023-08-04 | Resolved: 2024-09-09

## 2024-09-09 PROBLEM — M79.7 FIBROMYALGIA: Status: RESOLVED | Noted: 2019-09-13 | Resolved: 2024-09-09

## 2024-09-09 PROBLEM — M79.606 PAIN IN LOWER LIMB: Status: RESOLVED | Noted: 2024-03-18 | Resolved: 2024-09-09

## 2024-09-09 PROBLEM — Z96.653 HISTORY OF TOTAL BILATERAL KNEE REPLACEMENT: Status: RESOLVED | Noted: 2018-08-08 | Resolved: 2024-09-09

## 2024-09-09 PROBLEM — R29.898 WEAKNESS OF LEFT LOWER EXTREMITY: Status: RESOLVED | Noted: 2018-02-06 | Resolved: 2024-09-09

## 2024-09-09 PROBLEM — M47.817 LUMBOSACRAL SPONDYLOSIS WITHOUT MYELOPATHY: Chronic | Status: RESOLVED | Noted: 2022-08-11 | Resolved: 2024-09-09

## 2024-09-09 PROBLEM — T82.898A OCCLUDED PICC LINE (HCC): Status: RESOLVED | Noted: 2024-05-03 | Resolved: 2024-09-09

## 2024-09-09 PROBLEM — E66.811 OBESITY, CLASS I, BMI 30.0-34.9 (SEE ACTUAL BMI): Status: ACTIVE | Noted: 2023-08-18

## 2024-09-09 PROBLEM — N18.31 STAGE 3A CHRONIC KIDNEY DISEASE (HCC): Status: ACTIVE | Noted: 2024-07-10

## 2024-09-09 PROBLEM — T84.51XA INFECTION ASSOCIATED WITH INTERNAL RIGHT HIP PROSTHESIS (HCC): Status: RESOLVED | Noted: 2024-03-26 | Resolved: 2024-09-09

## 2024-09-09 PROBLEM — M79.671 RIGHT FOOT PAIN: Status: ACTIVE | Noted: 2024-01-08

## 2024-09-09 PROBLEM — R29.898 WEAKNESS OF LEFT LOWER EXTREMITY: Status: ACTIVE | Noted: 2018-02-06

## 2024-09-09 PROBLEM — B49 UNSPECIFIED MYCOSIS: Status: RESOLVED | Noted: 2024-05-30 | Resolved: 2024-09-09

## 2024-09-09 PROBLEM — Z96.659 HISTORY OF TOTAL KNEE REPLACEMENT: Status: RESOLVED | Noted: 2024-02-04 | Resolved: 2024-09-09

## 2024-09-09 PROBLEM — R69 ILLNESS, UNSPECIFIED: Status: ACTIVE | Noted: 2024-05-02

## 2024-09-09 PROBLEM — E66.811 OBESITY, CLASS I, BMI 30.0-34.9 (SEE ACTUAL BMI): Status: RESOLVED | Noted: 2023-08-18 | Resolved: 2024-09-09

## 2024-09-09 PROBLEM — N18.30 STAGE 3 CHRONIC KIDNEY DISEASE (HCC): Status: RESOLVED | Noted: 2021-07-08 | Resolved: 2024-09-09

## 2024-09-09 PROBLEM — F51.04 PSYCHOPHYSIOLOGICAL INSOMNIA: Status: RESOLVED | Noted: 2019-05-22 | Resolved: 2024-09-09

## 2024-09-09 PROBLEM — Z41.8 ENCOUNTER FOR OTHER PROCEDURES FOR PURPOSES OTHER THAN REMEDYING HEALTH STATE: Status: RESOLVED | Noted: 2024-07-10 | Resolved: 2024-09-09

## 2024-09-09 PROBLEM — D50.9 IRON (FE) DEFICIENCY ANEMIA: Status: ACTIVE | Noted: 2024-07-01

## 2024-09-09 PROBLEM — M16.11 PRIMARY OSTEOARTHRITIS OF RIGHT HIP: Status: RESOLVED | Noted: 2024-02-04 | Resolved: 2024-09-09

## 2024-09-09 PROBLEM — F39 MOOD DISORDER (HCC): Status: RESOLVED | Noted: 2019-09-13 | Resolved: 2024-09-09

## 2024-09-09 PROBLEM — M67.919 DISORDER OF ROTATOR CUFF: Status: RESOLVED | Noted: 2024-02-27 | Resolved: 2024-09-09

## 2024-09-09 PROBLEM — M54.50 LOW BACK PAIN: Status: RESOLVED | Noted: 2024-01-29 | Resolved: 2024-09-09

## 2024-09-09 PROBLEM — Z96.659 HISTORY OF TOTAL KNEE REPLACEMENT: Status: ACTIVE | Noted: 2024-02-04

## 2024-09-09 PROBLEM — Z96.652 HISTORY OF REVISION OF TOTAL REPLACEMENT OF LEFT KNEE JOINT: Status: RESOLVED | Noted: 2018-02-06 | Resolved: 2024-09-09

## 2024-09-09 PROBLEM — Z96.651 STATUS POST TOTAL RIGHT KNEE REPLACEMENT: Status: RESOLVED | Noted: 2024-02-04 | Resolved: 2024-09-09

## 2024-09-09 PROBLEM — D50.9 IRON (FE) DEFICIENCY ANEMIA: Status: RESOLVED | Noted: 2024-07-01 | Resolved: 2024-09-09

## 2024-09-09 PROBLEM — M79.606 PAIN IN LOWER LIMB: Status: ACTIVE | Noted: 2024-03-18

## 2024-09-09 PROBLEM — Z41.8 ENCOUNTER FOR OTHER PROCEDURES FOR PURPOSES OTHER THAN REMEDYING HEALTH STATE: Status: ACTIVE | Noted: 2024-07-10

## 2024-09-09 PROBLEM — B49 UNSPECIFIED MYCOSIS: Status: ACTIVE | Noted: 2024-05-30

## 2024-09-09 PROBLEM — M54.50 LOW BACK PAIN: Status: ACTIVE | Noted: 2024-01-29

## 2024-09-09 PROBLEM — T84.54XA INFECTION ASSOCIATED WITH INTERNAL LEFT KNEE PROSTHESIS (HCC): Status: RESOLVED | Noted: 2017-06-01 | Resolved: 2024-09-09

## 2024-09-09 PROBLEM — T82.898A OCCLUDED PICC LINE (HCC): Status: ACTIVE | Noted: 2024-05-03

## 2024-09-09 PROCEDURE — G8427 DOCREV CUR MEDS BY ELIG CLIN: HCPCS | Performed by: FAMILY MEDICINE

## 2024-09-09 PROCEDURE — 1123F ACP DISCUSS/DSCN MKR DOCD: CPT | Performed by: FAMILY MEDICINE

## 2024-09-09 PROCEDURE — 3078F DIAST BP <80 MM HG: CPT | Performed by: FAMILY MEDICINE

## 2024-09-09 PROCEDURE — 3017F COLORECTAL CA SCREEN DOC REV: CPT | Performed by: FAMILY MEDICINE

## 2024-09-09 PROCEDURE — G8417 CALC BMI ABV UP PARAM F/U: HCPCS | Performed by: FAMILY MEDICINE

## 2024-09-09 PROCEDURE — 3074F SYST BP LT 130 MM HG: CPT | Performed by: FAMILY MEDICINE

## 2024-09-09 PROCEDURE — 99214 OFFICE O/P EST MOD 30 MIN: CPT | Performed by: FAMILY MEDICINE

## 2024-09-09 PROCEDURE — 1036F TOBACCO NON-USER: CPT | Performed by: FAMILY MEDICINE

## 2024-09-09 RX ORDER — NORTRIPTYLINE HCL 10 MG
10 CAPSULE ORAL NIGHTLY
COMMUNITY
Start: 2024-04-03 | End: 2024-09-09

## 2024-09-09 RX ORDER — ACETAMINOPHEN 500 MG
1000 TABLET ORAL
COMMUNITY
Start: 2024-09-04

## 2024-09-09 ASSESSMENT — ENCOUNTER SYMPTOMS
EYES NEGATIVE: 1
RESPIRATORY NEGATIVE: 1
ALLERGIC/IMMUNOLOGIC NEGATIVE: 1
BACK PAIN: 1
GASTROINTESTINAL NEGATIVE: 1

## 2024-10-07 RX ORDER — SPIRONOLACTONE 50 MG/1
50 TABLET, FILM COATED ORAL DAILY
Qty: 90 TABLET | Refills: 1 | Status: SHIPPED | OUTPATIENT
Start: 2024-10-07

## 2024-10-07 NOTE — TELEPHONE ENCOUNTER
Per chart this med was taken out of current mediations. Per patient he is taking this medication for his blood pressure.       Duane J Garland is calling to request a refill on the following medication(s):    Medication Request:  Requested Prescriptions     Pending Prescriptions Disp Refills    spironolactone (ALDACTONE) 50 MG tablet 90 tablet 1     Sig: Take 1 tablet by mouth daily       Last Visit Date (If Applicable):  9/9/2024    Next Visit Date:    10/18/2024      Last refill 6/7/24. Prescription pending.

## 2024-10-17 ENCOUNTER — HOSPITAL ENCOUNTER (OUTPATIENT)
Dept: PAIN MANAGEMENT | Age: 74
Discharge: HOME | End: 2024-10-17
Payer: MEDICARE

## 2024-10-17 VITALS — BODY MASS INDEX: 30.92 KG/M2 | WEIGHT: 197 LBS | HEIGHT: 67 IN

## 2024-10-17 DIAGNOSIS — Z96.641 STATUS POST RIGHT HIP REPLACEMENT: ICD-10-CM

## 2024-10-17 DIAGNOSIS — M47.817 LUMBOSACRAL SPONDYLOSIS WITHOUT MYELOPATHY: Chronic | ICD-10-CM

## 2024-10-17 DIAGNOSIS — Z79.891 CHRONIC USE OF OPIATE DRUG FOR THERAPEUTIC PURPOSE: Primary | ICD-10-CM

## 2024-10-17 DIAGNOSIS — T81.40XS POSTOPERATIVE INFECTION, UNSPECIFIED TYPE, SEQUELA: ICD-10-CM

## 2024-10-17 PROCEDURE — 99213 OFFICE O/P EST LOW 20 MIN: CPT

## 2024-10-17 PROCEDURE — 99213 OFFICE O/P EST LOW 20 MIN: CPT | Performed by: ANESTHESIOLOGY

## 2024-10-17 RX ORDER — OXYCODONE AND ACETAMINOPHEN 5; 325 MG/1; MG/1
1 TABLET ORAL EVERY 6 HOURS PRN
Qty: 120 TABLET | Refills: 0 | Status: SHIPPED | OUTPATIENT
Start: 2024-10-17 | End: 2024-11-16

## 2024-10-17 RX ORDER — GABAPENTIN 800 MG/1
800 TABLET ORAL 3 TIMES DAILY
Qty: 270 TABLET | Refills: 0 | Status: SHIPPED | OUTPATIENT
Start: 2024-10-17 | End: 2025-01-15

## 2024-10-17 ASSESSMENT — ENCOUNTER SYMPTOMS
BACK PAIN: 1
EYES NEGATIVE: 1
RESPIRATORY NEGATIVE: 1

## 2024-10-17 ASSESSMENT — PAIN SCALES - GENERAL: PAINLEVEL_OUTOF10: 3

## 2024-10-17 NOTE — PROGRESS NOTES
The patient is a 73 y.o. /  male.    Chief Complaint   Patient presents with    Back Pain        HPI  He is a 73 years old male presented today for a follow-up of his back pain. He is on chronic opoid use. Today, he is here for the medication refill (percocet).     Two weeks ago he had a right hip replacement surgery, that was complicated with infection. He is on IV antibiotics for two months (IV micafungin as per notes), prescribed by infection disease.     Today, his back pain was under control. Pain is not aggravated with movements such as bending forward, backwards and on sides.     He is taking percocet chronically for pain.     Medication Refill:  Percocet    Pain score Today:  3  Adverse effects (Constipation / Nausea / Sedation / sexual Dysfunction / others) :  no  Mood: fair  Sleep pattern and quality: poor  Activity level: poor    Pill count Today: 21  Last dose taken  10/17/2024 morning   OARRS report reviewed today: yes  ER/Hospitalizations/PCP visit related to pain since last visit:no   Any legal problems e.g. DUI etc.:No  Satisfied with current management: Yes    Opioid Contract: 12/07/2023  Last Urine Dug screen dated: 08/01/2024    Hemoglobin A1C   Date Value Ref Range Status   05/31/2023 5.9 4.0 - 6.0 % Final       Past Medical History, Past Surgical History, Social History, Allergies and Medications reviewed and updated in EPIC as indicated    Family History reviewed and is noncontributory.    Past Medical History:   Diagnosis Date    Arthritis     Gait instability     at times    History of blood transfusion     AFTER KNEE SURGERY    Hx of blood clots     after one of his knee surgeries     Hyperlipidemia     Insomnia     Left knee pain 04/09/2014    Lumbosacral spondylolysis     Neuropathy     Teeth problem     abcessw    Wears glasses         Past Surgical History:   Procedure Laterality Date    BUNIONECTOMY N/A 12/15/2021    AKIN OSTEOTOMY WITH BONE STAPLE CHEILECTOMY RIGHT FOOT

## 2024-10-18 ENCOUNTER — OFFICE VISIT (OUTPATIENT)
Dept: FAMILY MEDICINE CLINIC | Age: 74
End: 2024-10-18

## 2024-10-18 VITALS
BODY MASS INDEX: 32.02 KG/M2 | HEART RATE: 93 BPM | RESPIRATION RATE: 14 BRPM | DIASTOLIC BLOOD PRESSURE: 70 MMHG | TEMPERATURE: 97.6 F | OXYGEN SATURATION: 96 % | SYSTOLIC BLOOD PRESSURE: 104 MMHG | HEIGHT: 67 IN | WEIGHT: 204 LBS

## 2024-10-18 DIAGNOSIS — T84.51XA INFLAMMATORY REACTION DUE TO INTERNAL PROSTHESIS OF RIGHT HIP, INITIAL ENCOUNTER (HCC): Primary | ICD-10-CM

## 2024-10-18 DIAGNOSIS — N18.31 STAGE 3A CHRONIC KIDNEY DISEASE (HCC): ICD-10-CM

## 2024-10-18 DIAGNOSIS — E78.5 DYSLIPIDEMIA: ICD-10-CM

## 2024-10-18 DIAGNOSIS — I10 ESSENTIAL HYPERTENSION: ICD-10-CM

## 2024-10-18 DIAGNOSIS — H40.009 OPEN ANGLE GLAUCOMA SUSPECT: ICD-10-CM

## 2024-10-18 DIAGNOSIS — R73.03 PRE-DIABETES: ICD-10-CM

## 2024-10-18 DIAGNOSIS — D63.8 ANEMIA OF CHRONIC DISEASE: ICD-10-CM

## 2024-10-18 DIAGNOSIS — F32.A ANXIETY AND DEPRESSION: ICD-10-CM

## 2024-10-18 DIAGNOSIS — Z79.891 CHRONIC USE OF OPIATE DRUG FOR THERAPEUTIC PURPOSE: ICD-10-CM

## 2024-10-18 DIAGNOSIS — F41.9 ANXIETY AND DEPRESSION: ICD-10-CM

## 2024-10-18 PROBLEM — M25.559 ARTHRALGIA OF HIP: Status: RESOLVED | Noted: 2023-12-01 | Resolved: 2024-10-18

## 2024-10-18 PROBLEM — T85.698A: Status: ACTIVE | Noted: 2024-05-03

## 2024-10-18 PROBLEM — M25.559 ARTHRALGIA OF HIP: Status: ACTIVE | Noted: 2023-12-01

## 2024-10-18 PROBLEM — D50.9 IRON DEFICIENCY ANEMIA: Status: RESOLVED | Noted: 2024-07-01 | Resolved: 2024-10-18

## 2024-10-18 PROBLEM — Z96.641 HISTORY OF TOTAL RIGHT HIP REPLACEMENT: Status: RESOLVED | Noted: 2024-03-26 | Resolved: 2024-10-18

## 2024-10-18 PROBLEM — G90.521 COMPLEX REGIONAL PAIN SYNDROME TYPE 1 OF RIGHT LOWER EXTREMITY: Status: RESOLVED | Noted: 2024-01-29 | Resolved: 2024-10-18

## 2024-10-18 PROBLEM — M67.919 DISORDER OF ROTATOR CUFF: Status: RESOLVED | Noted: 2024-02-27 | Resolved: 2024-10-18

## 2024-10-18 PROBLEM — M47.817 LUMBOSACRAL SPONDYLOSIS WITHOUT MYELOPATHY: Chronic | Status: RESOLVED | Noted: 2022-08-11 | Resolved: 2024-10-18

## 2024-10-18 PROBLEM — T84.54XA INFECTION ASSOCIATED WITH INTERNAL LEFT KNEE PROSTHESIS (HCC): Status: RESOLVED | Noted: 2017-06-01 | Resolved: 2024-10-18

## 2024-10-18 PROBLEM — B49 UNSPECIFIED MYCOSIS: Status: RESOLVED | Noted: 2024-05-30 | Resolved: 2024-10-18

## 2024-10-18 PROBLEM — M54.50 LOW BACK PAIN: Status: RESOLVED | Noted: 2024-01-29 | Resolved: 2024-10-18

## 2024-10-18 PROBLEM — T85.698A: Status: RESOLVED | Noted: 2024-05-03 | Resolved: 2024-10-18

## 2024-10-18 PROBLEM — Z96.641 STATUS POST RIGHT HIP REPLACEMENT: Status: RESOLVED | Noted: 2024-05-07 | Resolved: 2024-10-18

## 2024-10-18 PROBLEM — Z96.642 HISTORY OF LEFT HIP REPLACEMENT: Status: RESOLVED | Noted: 2024-05-02 | Resolved: 2024-10-18

## 2024-10-18 RX ORDER — MICAFUNGIN 20 MG/ML
INJECTION, POWDER, LYOPHILIZED, FOR SOLUTION INTRAVENOUS
COMMUNITY
Start: 2024-10-17

## 2024-10-18 RX ORDER — CITALOPRAM HYDROBROMIDE 10 MG/1
10 TABLET ORAL DAILY
Qty: 90 TABLET | Refills: 0 | Status: SHIPPED | OUTPATIENT
Start: 2024-10-18

## 2024-10-18 RX ORDER — FERROUS SULFATE 325(65) MG
325 TABLET ORAL 2 TIMES DAILY
Qty: 200 TABLET | Refills: 2 | Status: SHIPPED | OUTPATIENT
Start: 2024-10-18

## 2024-10-18 ASSESSMENT — ENCOUNTER SYMPTOMS
GASTROINTESTINAL NEGATIVE: 1
ALLERGIC/IMMUNOLOGIC NEGATIVE: 1
RESPIRATORY NEGATIVE: 1
EYES NEGATIVE: 1
BACK PAIN: 1

## 2024-10-18 NOTE — PROGRESS NOTES
Subjective:      Patient ID: Duane J Garland is a 73 y.o. male.    Hospital follow-up.  History of right total knee arthroplasty followed by postop infection with revision.  Currently on antifungal oral medication and ambulation on a walker        Review of Systems   Constitutional: Negative.    HENT: Negative.     Eyes: Negative.    Respiratory: Negative.     Cardiovascular: Negative.    Gastrointestinal: Negative.    Endocrine: Negative.    Musculoskeletal:  Positive for arthralgias, back pain and gait problem.   Skin: Negative.    Allergic/Immunologic: Negative.    Hematological: Negative.    Psychiatric/Behavioral:  Positive for dysphoric mood and sleep disturbance. The patient is nervous/anxious.    Past family and social history unremarkable.  .iag      Objective:   Physical Exam  Vitals and nursing note reviewed.   Constitutional:       Appearance: He is well-developed.   HENT:      Head: Normocephalic and atraumatic.      Right Ear: External ear normal.      Left Ear: External ear normal.      Nose: Nose normal.   Eyes:      Conjunctiva/sclera: Conjunctivae normal.      Pupils: Pupils are equal, round, and reactive to light.      Comments: Glaucoma   Cardiovascular:      Rate and Rhythm: Normal rate and regular rhythm.      Heart sounds: Normal heart sounds.      Comments: Hypertension  Dyslipidemia  Impaired fasting glucose  Metabolic syndrome  Pulmonary:      Effort: Pulmonary effort is normal.      Breath sounds: Normal breath sounds.   Abdominal:      General: Bowel sounds are normal.      Palpations: Abdomen is soft.   Genitourinary:     Comments: CKD  Musculoskeletal:         General: Normal range of motion.      Cervical back: Normal range of motion and neck supple.      Comments: History of bilateral total hip  History of knee replacement  Postop infection of the right hip total replacement   Skin:     General: Skin is warm and dry.   Neurological:      Mental Status: He is alert and oriented to

## 2024-11-15 ENCOUNTER — HOSPITAL ENCOUNTER (OUTPATIENT)
Dept: PAIN MANAGEMENT | Age: 74
Discharge: HOME | End: 2024-11-15
Payer: MEDICARE

## 2024-11-15 VITALS — BODY MASS INDEX: 32.02 KG/M2 | WEIGHT: 204 LBS | HEIGHT: 67 IN

## 2024-11-15 DIAGNOSIS — M47.817 LUMBOSACRAL SPONDYLOSIS WITHOUT MYELOPATHY: Chronic | ICD-10-CM

## 2024-11-15 DIAGNOSIS — T81.40XS POSTOPERATIVE INFECTION, UNSPECIFIED TYPE, SEQUELA: ICD-10-CM

## 2024-11-15 DIAGNOSIS — Z96.641 STATUS POST RIGHT HIP REPLACEMENT: ICD-10-CM

## 2024-11-15 DIAGNOSIS — Z79.891 CHRONIC USE OF OPIATE DRUG FOR THERAPEUTIC PURPOSE: ICD-10-CM

## 2024-11-15 PROCEDURE — 99213 OFFICE O/P EST LOW 20 MIN: CPT

## 2024-11-15 RX ORDER — GABAPENTIN 800 MG/1
800 TABLET ORAL 3 TIMES DAILY
Qty: 270 TABLET | Refills: 0 | Status: SHIPPED | OUTPATIENT
Start: 2024-11-15 | End: 2025-02-13

## 2024-11-15 RX ORDER — OXYCODONE AND ACETAMINOPHEN 5; 325 MG/1; MG/1
1 TABLET ORAL EVERY 6 HOURS PRN
Qty: 120 TABLET | Refills: 0 | Status: SHIPPED | OUTPATIENT
Start: 2024-11-21 | End: 2024-12-21

## 2024-11-15 ASSESSMENT — ENCOUNTER SYMPTOMS
SHORTNESS OF BREATH: 0
COUGH: 0
CONSTIPATION: 0
BACK PAIN: 1

## 2024-11-15 NOTE — PROGRESS NOTES
three times a week     Frequency of Social Gatherings with Friends and Family: More than three times a week     Attends Jewish Services: More than 4 times per year     Active Member of Clubs or Organizations: No     Attends Club or Organization Meetings: Never     Marital Status:    Intimate Partner Violence: Not on file   Housing Stability: Low Risk  (9/30/2024)    Received from [facility]    Housing Instability     Are you worried or concerned that in the next two months you may not have stable housing that you own, rent or stay in as a part of a household?: No       Review of Systems:  Review of Systems   Constitutional: Negative for chills and fever.   Cardiovascular:  Negative for chest pain and palpitations.   Respiratory:  Negative for cough and shortness of breath.    Musculoskeletal:  Positive for back pain.   Gastrointestinal:  Negative for constipation.   Neurological:  Positive for numbness, tingling and weakness. Negative for disturbances in coordination, headaches and loss of balance.       Physical Exam:  Ht 1.702 m (5' 7\")   Wt 92.5 kg (204 lb)   BMI 31.95 kg/m²     Physical Exam  HENT:      Head: Normocephalic.   Pulmonary:      Effort: Pulmonary effort is normal.   Musculoskeletal:         General: Normal range of motion.      Cervical back: Normal range of motion.      Lumbar back: Tenderness present.   Skin:     General: Skin is warm and dry.   Neurological:      Mental Status: He is alert and oriented to person, place, and time.         Record/Diagnostics Review:    Last dominga 8/2024 and was appropriate     Assessment:  Problem List Items Addressed This Visit       Chronic use of opiate drug for therapeutic purpose    Relevant Medications    oxyCODONE-acetaminophen (PERCOCET) 5-325 MG per tablet (Start on 11/21/2024)     Other Visit Diagnoses       Lumbosacral spondylosis without myelopathy  (Chronic)       Relevant Medications    oxyCODONE-acetaminophen (PERCOCET) 5-325

## 2024-11-19 DIAGNOSIS — N52.01 ERECTILE DYSFUNCTION DUE TO ARTERIAL INSUFFICIENCY: Primary | ICD-10-CM

## 2024-11-20 RX ORDER — TADALAFIL 5 MG/1
TABLET ORAL
Qty: 30 TABLET | Refills: 2 | Status: SHIPPED | OUTPATIENT
Start: 2024-11-20

## 2024-11-20 NOTE — TELEPHONE ENCOUNTER
Duane J Garland is calling to request a refill on the following medication(s):    Medication Request:  Requested Prescriptions     Pending Prescriptions Disp Refills    tadalafil (CIALIS) 5 MG tablet [Pharmacy Med Name: TADALAFIL 5 MG TABLET] 30 tablet      Sig: TAKE 1 TABLET BY MOUTH DAILY FOR ERECTILE DYSFUNCTION       Last Visit Date (If Applicable):  10/18/2024    Next Visit Date:    2/26/2025    Last refilled on 7/2/24. Prescription pending.

## 2024-12-09 ENCOUNTER — HOSPITAL ENCOUNTER (OUTPATIENT)
Dept: PAIN MANAGEMENT | Age: 74
Discharge: HOME | End: 2024-12-09
Payer: MEDICARE

## 2024-12-09 VITALS — BODY MASS INDEX: 32.02 KG/M2 | WEIGHT: 204 LBS | HEIGHT: 67 IN

## 2024-12-09 DIAGNOSIS — Z96.641 STATUS POST RIGHT HIP REPLACEMENT: ICD-10-CM

## 2024-12-09 DIAGNOSIS — M47.817 LUMBOSACRAL SPONDYLOSIS WITHOUT MYELOPATHY: Chronic | ICD-10-CM

## 2024-12-09 DIAGNOSIS — Z79.891 CHRONIC USE OF OPIATE DRUG FOR THERAPEUTIC PURPOSE: Primary | ICD-10-CM

## 2024-12-09 DIAGNOSIS — T81.40XS POSTOPERATIVE INFECTION, UNSPECIFIED TYPE, SEQUELA: ICD-10-CM

## 2024-12-09 PROCEDURE — 99213 OFFICE O/P EST LOW 20 MIN: CPT | Performed by: NURSE PRACTITIONER

## 2024-12-09 PROCEDURE — 99213 OFFICE O/P EST LOW 20 MIN: CPT

## 2024-12-09 RX ORDER — OXYCODONE AND ACETAMINOPHEN 5; 325 MG/1; MG/1
1 TABLET ORAL EVERY 6 HOURS PRN
Qty: 120 TABLET | Refills: 0 | Status: SHIPPED | OUTPATIENT
Start: 2024-12-21 | End: 2025-01-20

## 2024-12-09 ASSESSMENT — ENCOUNTER SYMPTOMS
COUGH: 0
SHORTNESS OF BREATH: 0
BACK PAIN: 1
CONSTIPATION: 0

## 2024-12-09 ASSESSMENT — PAIN SCALES - GENERAL: PAINLEVEL_OUTOF10: 3

## 2024-12-09 NOTE — PROGRESS NOTES
(Start on 12/21/2024)    Postoperative infection, unspecified type, sequela        Relevant Medications    oxyCODONE-acetaminophen (PERCOCET) 5-325 MG per tablet (Start on 12/21/2024)               Treatment Plan:  Patient relates current medications are helping the pain. Patient reports taking pain medications as prescribed, denies obtaining medications from different sources and denies use of illegal drugs. Medication risk and benefits have been discussed. Patient denies side effects from medications like nausea, vomiting, constipation or drowsiness. Patient reports current activities of daily living are possible due to medications and would like to continue them.     As always, we encourage daily stretching and strengthening exercises, and recommend minimizing use of pain medications unless patient cannot get through daily activities due to pain.    We have discussed with the patient the effect the patient’s medical condition and opioid medication may have on the patient’s ability to safely operate a vehicle. Pt verbalized understanding.     Due to the high risk nature of this patient's pain medication close monitoring is required.   Continue current medication management, pt has been stable and compliant.  Script written for percocet  Follow up appointment made for 4 weeks    I have reviewed the chief complaint and history of present illness (including ROS and PFSH) and vital documentation by my staff and I agree with their documentation and have added where applicable.

## 2025-01-14 RX ORDER — CITALOPRAM HYDROBROMIDE 10 MG/1
10 TABLET ORAL DAILY
Qty: 90 TABLET | Refills: 0 | Status: SHIPPED | OUTPATIENT
Start: 2025-01-14

## 2025-01-14 NOTE — TELEPHONE ENCOUNTER
Duane J Garland is calling to request a refill on the following medication(s):    Medication Request:  Requested Prescriptions     Pending Prescriptions Disp Refills    citalopram (CELEXA) 10 MG tablet [Pharmacy Med Name: CITALOPRAM HBR 10 MG TABLET] 90 tablet 0     Sig: TAKE 1 TABLET BY MOUTH DAILY       Last Visit Date (If Applicable):  10/18/2024    Next Visit Date:    2/26/2025                            '

## 2025-01-16 ENCOUNTER — HOSPITAL ENCOUNTER (OUTPATIENT)
Dept: PAIN MANAGEMENT | Age: 75
Discharge: HOME | End: 2025-01-16
Payer: MEDICARE

## 2025-01-16 VITALS — BODY MASS INDEX: 32.02 KG/M2 | HEIGHT: 67 IN | WEIGHT: 204 LBS

## 2025-01-16 DIAGNOSIS — T81.40XS POSTOPERATIVE INFECTION, UNSPECIFIED TYPE, SEQUELA: ICD-10-CM

## 2025-01-16 DIAGNOSIS — M47.817 LUMBOSACRAL SPONDYLOSIS WITHOUT MYELOPATHY: Chronic | ICD-10-CM

## 2025-01-16 DIAGNOSIS — Z96.641 HISTORY OF TOTAL RIGHT HIP REPLACEMENT: ICD-10-CM

## 2025-01-16 DIAGNOSIS — Z79.891 CHRONIC USE OF OPIATE DRUG FOR THERAPEUTIC PURPOSE: Primary | ICD-10-CM

## 2025-01-16 DIAGNOSIS — Z96.641 STATUS POST RIGHT HIP REPLACEMENT: ICD-10-CM

## 2025-01-16 PROCEDURE — 99213 OFFICE O/P EST LOW 20 MIN: CPT | Performed by: ANESTHESIOLOGY

## 2025-01-16 PROCEDURE — 99213 OFFICE O/P EST LOW 20 MIN: CPT

## 2025-01-16 RX ORDER — OXYCODONE AND ACETAMINOPHEN 5; 325 MG/1; MG/1
1 TABLET ORAL EVERY 6 HOURS PRN
Qty: 120 TABLET | Refills: 0 | Status: SHIPPED | OUTPATIENT
Start: 2025-01-20 | End: 2025-02-19

## 2025-01-16 ASSESSMENT — ENCOUNTER SYMPTOMS
GASTROINTESTINAL NEGATIVE: 1
BACK PAIN: 1
RESPIRATORY NEGATIVE: 1

## 2025-01-16 ASSESSMENT — PAIN SCALES - GENERAL: PAINLEVEL_OUTOF10: 3

## 2025-01-16 NOTE — PROGRESS NOTES
Days of Exercise per Week: 0 days     Minutes of Exercise per Session: 0 min   Stress: No Stress Concern Present (8/25/2023)    Received from [facility]    [employer] [place] [employer] - Occupational Stress Questionnaire     Feeling of Stress : Only a little   Social Connections: Moderately Isolated (8/25/2023)    Received from [facility]    Social Connection and Isolation Panel [NHANES]     Frequency of Communication with Friends and Family: More than three times a week     Frequency of Social Gatherings with Friends and Family: More than three times a week     Attends Buddhist Services: More than 4 times per year     Active Member of Clubs or Organizations: No     Attends Club or Organization Meetings: Never     Marital Status:    Housing Stability: Low Risk  (9/30/2024)    Received from [facility]    Housing Instability     Are you worried or concerned that in the next two months you may not have stable housing that you own, rent or stay in as a part of a household?: No       Family History   Problem Relation Age of Onset    Diabetes Mother     No Known Problems Father        Allergies   Allergen Reactions    Oxycodone Nausea Only    Oxycontin [Oxycodone Hcl] Nausea And Vomiting       There were no vitals filed for this visit.    Current Outpatient Medications   Medication Sig Dispense Refill    [START ON 1/20/2025] oxyCODONE-acetaminophen (PERCOCET) 5-325 MG per tablet Take 1 tablet by mouth every 6 hours as needed for Pain for up to 30 days. Max Daily Amount: 4 tablets 120 tablet 0    citalopram (CELEXA) 10 MG tablet TAKE 1 TABLET BY MOUTH DAILY 90 tablet 0    tadalafil (CIALIS) 5 MG tablet TAKE 1 TABLET BY MOUTH DAILY FOR ERECTILE DYSFUNCTION 30 tablet 2    gabapentin (NEURONTIN) 800 MG tablet Take 1 tablet by mouth 3 times daily for 90 days. 270 tablet 0    micafungin (MYCAMINE) 100 MG SOLR       ferrous sulfate (IRON 325)

## 2025-02-10 ENCOUNTER — TELEPHONE (OUTPATIENT)
Dept: FAMILY MEDICINE CLINIC | Age: 75
End: 2025-02-10

## 2025-02-10 ENCOUNTER — APPOINTMENT (OUTPATIENT)
Dept: GENERAL RADIOLOGY | Age: 75
End: 2025-02-10
Payer: MEDICARE

## 2025-02-10 ENCOUNTER — HOSPITAL ENCOUNTER (EMERGENCY)
Age: 75
Discharge: HOME | End: 2025-02-10
Attending: EMERGENCY MEDICINE
Payer: MEDICARE

## 2025-02-10 VITALS
WEIGHT: 198 LBS | BODY MASS INDEX: 31.01 KG/M2 | HEART RATE: 72 BPM | SYSTOLIC BLOOD PRESSURE: 114 MMHG | TEMPERATURE: 97.2 F | OXYGEN SATURATION: 96 % | DIASTOLIC BLOOD PRESSURE: 74 MMHG | RESPIRATION RATE: 18 BRPM
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
## 2025-02-10 DIAGNOSIS — S20.212A RIB CONTUSION, LEFT, INITIAL ENCOUNTER: ICD-10-CM

## 2025-02-10 PROCEDURE — 71101 X-RAY EXAM UNILAT RIBS/CHEST: CPT

## 2025-02-10 PROCEDURE — 99283 EMERGENCY DEPT VISIT LOW MDM: CPT

## 2025-02-10 ASSESSMENT — PAIN SCALES - GENERAL: PAINLEVEL_OUTOF10: 6

## 2025-02-10 ASSESSMENT — PAIN - FUNCTIONAL ASSESSMENT: PAIN_FUNCTIONAL_ASSESSMENT: 0-10

## 2025-02-10 NOTE — TELEPHONE ENCOUNTER
Difficulty breathing and discomfort on left side of chest x 3 days. Reports he fell on his chest after stumbling on a curb. Patient was recommended ER as there are no openings and they can evaluate/treat him.

## 2025-02-10 NOTE — ED PROVIDER NOTES
EMERGENCY DEPARTMENT ENCOUNTER    Pt Name: Duane J Garland  MRN: [id number]  Birthdate 1950  Date of evaluation: 2/10/25  CHIEF COMPLAINT       Chief Complaint   Patient presents with    Fall     Pt reports he stumbled and fell from standing to ground position, landing on chest. Denies hitting  head, LOC, and being on blood thinners     Chest Injury     HISTORY OF PRESENT ILLNESS   The history is provided by the patient and medical records.  The patient is a 74-year-old male with history of arthritis, gait instability, hyperlipidemia and neuropathy who presents to the ED for left-sided rib pain.  Patient states that last Saturday, 2 days ago, he had a standing height fall after missing a step at home, landing on the soft dirt ground.  Denies head strike.  Denies LOC.  Patient not on blood thinning medications.    REVIEW OF SYSTEMS     Review of Systems  All other systems reviewed and are negative.    PASTMEDICAL HISTORY     Past Medical History:   Diagnosis Date    Arthritis     Gait instability     at times    History of blood transfusion     AFTER KNEE SURGERY    Hx of blood clots     after one of his knee surgeries     Hyperlipidemia     Insomnia     Left knee pain 04/09/2014    Lumbosacral spondylolysis     Neuropathy     Teeth problem     abcessw    Wears glasses      Past Problem List  Patient Active Problem List   Diagnosis Code    Chronic use of opiate drug for therapeutic purpose Z79.891    Pre-diabetes R73.03    Dyslipidemia E78.5    Open angle glaucoma suspect H40.009    Essential hypertension I10    Stage 3a chronic kidney disease (HCC) N18.31    Lumbosacral spondylosis without myelopathy M47.817    Anemia of chronic disease D63.8    History of total right hip replacement Z96.641    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

## 2025-02-10 NOTE — ED NOTES
Pt accompanied by wife to ER. Pt reports trip & fall at home on Saturday, landing directly on his chest. C/o discomfort under L chest since that time which becomes worse with movement and taking a deep breath. No obvious external trauma or deformity noted. Pt able to speak clearly in full sentences without difficulty.

## 2025-02-12 DIAGNOSIS — N52.01 ERECTILE DYSFUNCTION DUE TO ARTERIAL INSUFFICIENCY: ICD-10-CM

## 2025-02-12 RX ORDER — TADALAFIL 5 MG/1
TABLET ORAL
Qty: 30 TABLET | Refills: 2 | Status: SHIPPED | OUTPATIENT
Start: 2025-02-12

## 2025-02-12 NOTE — TELEPHONE ENCOUNTER
Duane J Garland is calling to request a refill on the following medication(s):    Medication Request:  Requested Prescriptions     Pending Prescriptions Disp Refills    tadalafil (CIALIS) 5 MG tablet [Pharmacy Med Name: TADALAFIL 5 MG TABLET] 30 tablet 2     Sig: TAKE 1 TABLET BY MOUTH DAILY FOR ERECTILE DYSFUNCTION       Last Visit Date (If Applicable):  10/18/2024    Next Visit Date:    2/26/2025

## 2025-02-13 ENCOUNTER — HOSPITAL ENCOUNTER (OUTPATIENT)
Dept: PAIN MANAGEMENT | Age: 75
Discharge: HOME | End: 2025-02-13
Payer: MEDICARE

## 2025-02-13 VITALS — WEIGHT: 198 LBS | HEIGHT: 67 IN | BODY MASS INDEX: 31.08 KG/M2

## 2025-02-13 DIAGNOSIS — Z96.641 HISTORY OF TOTAL RIGHT HIP REPLACEMENT: ICD-10-CM

## 2025-02-13 DIAGNOSIS — Z96.641 STATUS POST RIGHT HIP REPLACEMENT: ICD-10-CM

## 2025-02-13 DIAGNOSIS — T81.40XS POSTOPERATIVE INFECTION, UNSPECIFIED TYPE, SEQUELA: ICD-10-CM

## 2025-02-13 DIAGNOSIS — M47.817 LUMBOSACRAL SPONDYLOSIS WITHOUT MYELOPATHY: Chronic | ICD-10-CM

## 2025-02-13 DIAGNOSIS — Z79.891 CHRONIC USE OF OPIATE DRUG FOR THERAPEUTIC PURPOSE: Primary | ICD-10-CM

## 2025-02-13 PROCEDURE — 99213 OFFICE O/P EST LOW 20 MIN: CPT

## 2025-02-13 PROCEDURE — 99213 OFFICE O/P EST LOW 20 MIN: CPT | Performed by: ANESTHESIOLOGY

## 2025-02-13 RX ORDER — OXYCODONE AND ACETAMINOPHEN 5; 325 MG/1; MG/1
1 TABLET ORAL EVERY 6 HOURS PRN
Qty: 120 TABLET | Refills: 0 | Status: SHIPPED | OUTPATIENT
Start: 2025-02-13 | End: 2025-03-15

## 2025-02-13 ASSESSMENT — ENCOUNTER SYMPTOMS
BACK PAIN: 1
RESPIRATORY NEGATIVE: 1
EYES NEGATIVE: 1

## 2025-02-13 NOTE — PROGRESS NOTES
Assessment & Plan  74-year-old gentleman with history of chronic pain involving multiple body sites including knee and hip and shoulder  Had hip replacement surgery last year  For axial lower back pain diagnosed with lumbar spondylosis had lumbar radiofrequency ablation 1 year ago that provided more than 50% relief with improved range of motion  Report ongoing relief  On chronic opioid therapy with Percocet 5 mg 4 times a day Daily morphine equivalent 30  Reports no side effect finds the medication helpful  No sign or symptom of any aberrancy  Refill ordered  May consider for repeat lumbar RFA in future if axial back pain returns     1. Chronic use of opiate drug for therapeutic purpose    2. Lumbosacral spondylosis without myelopathy    3. Status post right hip replacement    4. Postoperative infection, unspecified type, sequela    5. History of total right hip replacement        No orders of the defined types were placed in this encounter.     Orders Placed This Encounter   Medications    oxyCODONE-acetaminophen (PERCOCET) 5-325 MG per tablet     Sig: Take 1 tablet by mouth every 6 hours as needed for Pain for up to 30 days. Max Daily Amount: 4 tablets     Dispense:  120 tablet     Refill:  0     Reduce doses taken as pain becomes manageable      Controlled Substance Monitoring:    Acute and Chronic Pain Monitoring:   RX Monitoring Periodic Controlled Substance Monitoring Chronic Pain > 50 MEDD   1/16/2025   9:45 AM Possible medication side effects, risk of tolerance/dependence & alternative treatments discussed.;No signs of potential drug abuse or diversion identified.;Obtaining appropriate analgesic effect of treatment. Obtained or confirmed \"Consent for Opioid Use\" on file.               Electronically signed by Juan Savage MD on 2/13/2025 at 11:59 AM

## 2025-02-26 ENCOUNTER — OFFICE VISIT (OUTPATIENT)
Dept: FAMILY MEDICINE CLINIC | Age: 75
End: 2025-02-26
Payer: MEDICARE

## 2025-02-26 VITALS
HEIGHT: 67 IN | OXYGEN SATURATION: 95 % | BODY MASS INDEX: 31.71 KG/M2 | HEART RATE: 65 BPM | DIASTOLIC BLOOD PRESSURE: 74 MMHG | TEMPERATURE: 98 F | SYSTOLIC BLOOD PRESSURE: 110 MMHG | RESPIRATION RATE: 12 BRPM | WEIGHT: 202 LBS

## 2025-02-26 DIAGNOSIS — N18.31 STAGE 3A CHRONIC KIDNEY DISEASE (HCC): ICD-10-CM

## 2025-02-26 DIAGNOSIS — F32.A ANXIETY AND DEPRESSION: ICD-10-CM

## 2025-02-26 DIAGNOSIS — D63.8 ANEMIA OF CHRONIC DISEASE: ICD-10-CM

## 2025-02-26 DIAGNOSIS — R73.03 PRE-DIABETES: ICD-10-CM

## 2025-02-26 DIAGNOSIS — M47.817 LUMBOSACRAL SPONDYLOSIS WITHOUT MYELOPATHY: Chronic | ICD-10-CM

## 2025-02-26 DIAGNOSIS — E78.5 DYSLIPIDEMIA: ICD-10-CM

## 2025-02-26 DIAGNOSIS — Z79.891 CHRONIC USE OF OPIATE DRUG FOR THERAPEUTIC PURPOSE: ICD-10-CM

## 2025-02-26 DIAGNOSIS — H40.009 OPEN ANGLE GLAUCOMA SUSPECT: ICD-10-CM

## 2025-02-26 DIAGNOSIS — Z96.641 HISTORY OF TOTAL RIGHT HIP REPLACEMENT: ICD-10-CM

## 2025-02-26 DIAGNOSIS — F41.9 ANXIETY AND DEPRESSION: ICD-10-CM

## 2025-02-26 DIAGNOSIS — I10 ESSENTIAL HYPERTENSION: ICD-10-CM

## 2025-02-26 DIAGNOSIS — Z00.00 MEDICARE ANNUAL WELLNESS VISIT, SUBSEQUENT: Primary | ICD-10-CM

## 2025-02-26 PROBLEM — T84.51XA: Status: RESOLVED | Noted: 2024-09-30 | Resolved: 2025-02-26

## 2025-02-26 PROCEDURE — 3017F COLORECTAL CA SCREEN DOC REV: CPT | Performed by: FAMILY MEDICINE

## 2025-02-26 PROCEDURE — 3074F SYST BP LT 130 MM HG: CPT | Performed by: FAMILY MEDICINE

## 2025-02-26 PROCEDURE — G0439 PPPS, SUBSEQ VISIT: HCPCS | Performed by: FAMILY MEDICINE

## 2025-02-26 PROCEDURE — 1159F MED LIST DOCD IN RCRD: CPT | Performed by: FAMILY MEDICINE

## 2025-02-26 PROCEDURE — 1123F ACP DISCUSS/DSCN MKR DOCD: CPT | Performed by: FAMILY MEDICINE

## 2025-02-26 PROCEDURE — 3078F DIAST BP <80 MM HG: CPT | Performed by: FAMILY MEDICINE

## 2025-02-26 RX ORDER — CELECOXIB 200 MG/1
CAPSULE ORAL
COMMUNITY
Start: 2024-04-22 | End: 2025-02-26

## 2025-02-26 RX ORDER — FLUCONAZOLE 100 MG/1
100 TABLET ORAL DAILY
COMMUNITY
Start: 2024-12-11 | End: 2025-02-26

## 2025-02-26 SDOH — ECONOMIC STABILITY: FOOD INSECURITY: WITHIN THE PAST 12 MONTHS, THE FOOD YOU BOUGHT JUST DIDN'T LAST AND YOU DIDN'T HAVE MONEY TO GET MORE.: NEVER TRUE

## 2025-02-26 SDOH — ECONOMIC STABILITY: FOOD INSECURITY: WITHIN THE PAST 12 MONTHS, YOU WORRIED THAT YOUR FOOD WOULD RUN OUT BEFORE YOU GOT MONEY TO BUY MORE.: NEVER TRUE

## 2025-02-26 ASSESSMENT — PATIENT HEALTH QUESTIONNAIRE - PHQ9
SUM OF ALL RESPONSES TO PHQ QUESTIONS 1-9: 0
4. FEELING TIRED OR HAVING LITTLE ENERGY: NOT AT ALL
SUM OF ALL RESPONSES TO PHQ QUESTIONS 1-9: 0
3. TROUBLE FALLING OR STAYING ASLEEP: NOT AT ALL
SUM OF ALL RESPONSES TO PHQ QUESTIONS 1-9: 0
7. TROUBLE CONCENTRATING ON THINGS, SUCH AS READING THE NEWSPAPER OR WATCHING TELEVISION: NOT AT ALL
6. FEELING BAD ABOUT YOURSELF - OR THAT YOU ARE A FAILURE OR HAVE LET YOURSELF OR YOUR FAMILY DOWN: NOT AT ALL
1. LITTLE INTEREST OR PLEASURE IN DOING THINGS: NOT AT ALL
8. MOVING OR SPEAKING SO SLOWLY THAT OTHER PEOPLE COULD HAVE NOTICED. OR THE OPPOSITE, BEING SO FIGETY OR RESTLESS THAT YOU HAVE BEEN MOVING AROUND A LOT MORE THAN USUAL: NOT AT ALL
9. THOUGHTS THAT YOU WOULD BE BETTER OFF DEAD, OR OF HURTING YOURSELF: NOT AT ALL
SUM OF ALL RESPONSES TO PHQ QUESTIONS 1-9: 0
SUM OF ALL RESPONSES TO PHQ9 QUESTIONS 1 & 2: 0
10. IF YOU CHECKED OFF ANY PROBLEMS, HOW DIFFICULT HAVE THESE PROBLEMS MADE IT FOR YOU TO DO YOUR WORK, TAKE CARE OF THINGS AT HOME, OR GET ALONG WITH OTHER PEOPLE: NOT DIFFICULT AT ALL
2. FEELING DOWN, DEPRESSED OR HOPELESS: NOT AT ALL
5. POOR APPETITE OR OVEREATING: NOT AT ALL

## 2025-02-26 ASSESSMENT — ENCOUNTER SYMPTOMS
RESPIRATORY NEGATIVE: 1
GASTROINTESTINAL NEGATIVE: 1
EYES NEGATIVE: 1
ALLERGIC/IMMUNOLOGIC NEGATIVE: 1

## 2025-02-26 NOTE — PROGRESS NOTES
Subjective:      Patient ID: Duane J Garland is a 74 y.o. male.    Patient is here requesting Medicare annual exam.  He does not voice any new distress        Review of Systems   Constitutional: Negative.    HENT: Negative.     Eyes: Negative.    Respiratory: Negative.     Cardiovascular: Negative.    Gastrointestinal: Negative.    Endocrine: Negative.    Musculoskeletal: Negative.    Skin: Negative.    Allergic/Immunologic: Negative.    Neurological: Negative.    Hematological: Negative.    Psychiatric/Behavioral: Negative.     Past family and social history unremarkable.   Diagnosis Orders   1. Medicare annual wellness visit, subsequent        2. Chronic use of opiate drug for therapeutic purpose        3. Stage 3a chronic kidney disease (HCC)        4. Pre-diabetes        5. Dyslipidemia        6. Open angle glaucoma suspect        7. Essential hypertension        8. Lumbosacral spondylosis without myelopathy        9. Anemia of chronic disease        10. History of total right hip replacement        11. Anxiety and depression              Objective:   Physical Exam  Vitals and nursing note reviewed.   Constitutional:       Appearance: He is well-developed.   HENT:      Head: Normocephalic and atraumatic.      Right Ear: External ear normal.      Left Ear: External ear normal.      Nose: Nose normal.   Eyes:      Conjunctiva/sclera: Conjunctivae normal.      Pupils: Pupils are equal, round, and reactive to light.      Comments: Glaucoma   Cardiovascular:      Rate and Rhythm: Normal rate and regular rhythm.      Heart sounds: Normal heart sounds.      Comments: Hypertension  Hyperlipidemia  Pulmonary:      Effort: Pulmonary effort is normal.      Breath sounds: Normal breath sounds.   Abdominal:      General: Bowel sounds are normal.      Palpations: Abdomen is soft.      Comments: Anemia of chronic disease   Genitourinary:     Comments: CKD  Musculoskeletal:         General: Normal range of motion.      Cervical

## 2025-03-11 ENCOUNTER — TELEPHONE (OUTPATIENT)
Dept: FAMILY MEDICINE CLINIC | Age: 75
End: 2025-03-11

## 2025-03-11 NOTE — TELEPHONE ENCOUNTER
Patient calling stating he has been having muscle spasms in b/l legs for over a week. Patient is asking for something to be sent in to stop this. Please advise

## 2025-03-11 NOTE — TELEPHONE ENCOUNTER
Patient notified of doctor advising patient to contact pain management. Patient upset and stated pain management will not do anything more for him. Patient stated he will contact pain management but would like to talk to doctor himself.

## 2025-03-17 ENCOUNTER — HOSPITAL ENCOUNTER (OUTPATIENT)
Dept: PAIN MANAGEMENT | Age: 75
Discharge: HOME | End: 2025-03-17
Payer: MEDICARE

## 2025-03-17 VITALS — BODY MASS INDEX: 31.71 KG/M2 | HEIGHT: 67 IN | WEIGHT: 202 LBS

## 2025-03-17 DIAGNOSIS — Z96.641 HISTORY OF TOTAL RIGHT HIP REPLACEMENT: Primary | ICD-10-CM

## 2025-03-17 DIAGNOSIS — T81.40XS POSTOPERATIVE INFECTION, UNSPECIFIED TYPE, SEQUELA: ICD-10-CM

## 2025-03-17 DIAGNOSIS — M47.817 LUMBOSACRAL SPONDYLOSIS WITHOUT MYELOPATHY: Chronic | ICD-10-CM

## 2025-03-17 DIAGNOSIS — Z79.891 CHRONIC USE OF OPIATE DRUG FOR THERAPEUTIC PURPOSE: ICD-10-CM

## 2025-03-17 DIAGNOSIS — Z96.641 STATUS POST RIGHT HIP REPLACEMENT: ICD-10-CM

## 2025-03-17 PROCEDURE — 99213 OFFICE O/P EST LOW 20 MIN: CPT | Performed by: NURSE PRACTITIONER

## 2025-03-17 PROCEDURE — 99213 OFFICE O/P EST LOW 20 MIN: CPT

## 2025-03-17 RX ORDER — OXYCODONE AND ACETAMINOPHEN 5; 325 MG/1; MG/1
1 TABLET ORAL EVERY 6 HOURS PRN
Qty: 120 TABLET | Refills: 0 | Status: SHIPPED | OUTPATIENT
Start: 2025-03-21 | End: 2025-04-20

## 2025-03-17 RX ORDER — TIZANIDINE 2 MG/1
2 TABLET ORAL NIGHTLY PRN
Qty: 30 TABLET | Refills: 0 | Status: SHIPPED | OUTPATIENT
Start: 2025-03-17 | End: 2025-04-16

## 2025-03-17 ASSESSMENT — PAIN SCALES - GENERAL: PAINLEVEL_OUTOF10: 5

## 2025-03-17 ASSESSMENT — ENCOUNTER SYMPTOMS
SHORTNESS OF BREATH: 0
BACK PAIN: 1
BOWEL INCONTINENCE: 0
CONSTIPATION: 0
COUGH: 0

## 2025-03-17 NOTE — PROGRESS NOTES
Chief Complaint   Patient presents with    Back Pain    Medication Refill     Percocet  due 3/15/25         PMH Pt c/o chronic back right hip and bilat knee and right foot pain   Pt reports bilat. Knee pain with no known injury but has had multiple surgeries/procedures on knees right foot and hips.  He has had PT in the past with no relief.   Discussed genicular blocks but he declines.      Pt also c/o chronic right foot pain with Hx of 3 right foot surgeries with continued numbness and tingling  EMG done 7/2023 to rule out neuropathy RLE and normal results  Pt saw [facility] neurolog[facility] 11/2023 with robaxin started but pt states was denied by insurance and to continue with our medication and f/u in 2-3 months  He has seen ortho with no need for surgery at this time      Pt also c/o chronic back pain.   MRI lumbar 6/2021 with Multilevel severe degenerative disc changes with Modic type I and type II changes involving L2 L3-L4-L5 vertebrae  He completed PT for his back with some benefit.  He had LESI 4/4/22 and reported 50% relief.      Pt had  Intracept procedure 5/1/2023 with over 50% improvement in axial lower back pain  Pt had lumbar RFA 2/21/24  with 60% improvement in axial lower back pain       Pt also c/o chronic hip pain.   He had left hip replaced at [facility] 9/28  He had right total hip arthroplasty done at [facility] 3/11/24 with admission 4/2024 for post op infection and debridement sutures and wound vac. Recent aspiration with Dr Garcia for suspected infection   Bilat Hip xray 7/2024 Stable postoperative changes and alignment.   He continues to follow with ortho and ID      He had PICC line - removed on 12/11/24        He complains of muscle spasms in his thighs intermittenlty.     Back Pain  This is a chronic problem. The current episode started more than 1 year ago. The problem occurs constantly. The problem is unchanged. The pain is present in the lumbar spine. The quality of the pain is

## 2025-04-03 RX ORDER — SPIRONOLACTONE 50 MG/1
50 TABLET, FILM COATED ORAL DAILY
Qty: 90 TABLET | Refills: 1 | Status: SHIPPED | OUTPATIENT
Start: 2025-04-03

## 2025-04-03 NOTE — TELEPHONE ENCOUNTER
Duane J Garland is calling to request a refill on the following medication(s):    Medication Request:  Requested Prescriptions     Pending Prescriptions Disp Refills    spironolactone (ALDACTONE) 50 MG tablet [Pharmacy Med Name: SPIRONOLACTONE 50 MG TABLET] 90 tablet 1     Sig: TAKE 1 TABLET BY MOUTH DAILY       Last Visit Date (If Applicable):  2/26/2025    Next Visit Date:    8/26/2025

## 2025-04-16 ENCOUNTER — HOSPITAL ENCOUNTER (OUTPATIENT)
Dept: PAIN MANAGEMENT | Age: 75
Discharge: HOME | End: 2025-04-16
Payer: MEDICARE

## 2025-04-16 VITALS — WEIGHT: 202 LBS | HEIGHT: 67 IN | BODY MASS INDEX: 31.71 KG/M2

## 2025-04-16 DIAGNOSIS — M47.817 LUMBOSACRAL SPONDYLOSIS WITHOUT MYELOPATHY: Chronic | ICD-10-CM

## 2025-04-16 DIAGNOSIS — T81.40XS POSTOPERATIVE INFECTION, UNSPECIFIED TYPE, SEQUELA: ICD-10-CM

## 2025-04-16 DIAGNOSIS — Z79.891 CHRONIC USE OF OPIATE DRUG FOR THERAPEUTIC PURPOSE: Primary | ICD-10-CM

## 2025-04-16 DIAGNOSIS — Z96.641 STATUS POST RIGHT HIP REPLACEMENT: ICD-10-CM

## 2025-04-16 PROCEDURE — G0480 DRUG TEST DEF 1-7 CLASSES: HCPCS

## 2025-04-16 PROCEDURE — 80307 DRUG TEST PRSMV CHEM ANLYZR: CPT

## 2025-04-16 PROCEDURE — 99213 OFFICE O/P EST LOW 20 MIN: CPT

## 2025-04-16 PROCEDURE — 99214 OFFICE O/P EST MOD 30 MIN: CPT | Performed by: NURSE PRACTITIONER

## 2025-04-16 RX ORDER — GABAPENTIN 800 MG/1
800 TABLET ORAL 3 TIMES DAILY
Qty: 270 TABLET | Refills: 0 | Status: SHIPPED | OUTPATIENT
Start: 2025-04-16 | End: 2025-07-15

## 2025-04-16 RX ORDER — CITALOPRAM HYDROBROMIDE 10 MG/1
10 TABLET ORAL DAILY
Qty: 90 TABLET | Refills: 2 | Status: SHIPPED | OUTPATIENT
Start: 2025-04-16

## 2025-04-16 RX ORDER — OXYCODONE AND ACETAMINOPHEN 5; 325 MG/1; MG/1
1 TABLET ORAL EVERY 6 HOURS PRN
Qty: 120 TABLET | Refills: 0 | Status: SHIPPED | OUTPATIENT
Start: 2025-04-21 | End: 2025-05-21

## 2025-04-16 ASSESSMENT — ENCOUNTER SYMPTOMS
BACK PAIN: 1
SHORTNESS OF BREATH: 0
COUGH: 0
CONSTIPATION: 0
BOWEL INCONTINENCE: 0

## 2025-04-16 ASSESSMENT — PAIN SCALES - GENERAL: PAINLEVEL_OUTOF10: 5

## 2025-04-16 NOTE — PROGRESS NOTES
Medications    oxyCODONE-acetaminophen (PERCOCET) 5-325 MG per tablet (Start on 4/21/2025)    Other Relevant Orders    DRUG SCREEN, PAIN     Other Visit Diagnoses         Status post right hip replacement        Relevant Medications    oxyCODONE-acetaminophen (PERCOCET) 5-325 MG per tablet (Start on 4/21/2025)      Postoperative infection, unspecified type, sequela        Relevant Medications    oxyCODONE-acetaminophen (PERCOCET) 5-325 MG per tablet (Start on 4/21/2025)               Treatment Plan:  Patient relates current medications are helping the pain. Patient reports taking pain medications as prescribed, denies obtaining medications from different sources and denies use of illegal drugs. Medication risk and benefits have been discussed. Patient denies side effects from medications like nausea, vomiting, constipation or drowsiness. Patient reports current activities of daily living are possible due to medications and would like to continue them.     As always, we encourage daily stretching and strengthening exercises, and recommend minimizing use of pain medications unless patient cannot get through daily activities due to pain.    We have discussed with the patient the effect the patient’s medical condition and opioid medication may have on the patient’s ability to safely operate a vehicle. Pt verbalized understanding.     Due to the high risk nature of this patient's pain medication close monitoring is required.   Continue current medication management, pt has been stable and compliant.  Script written for percocet  UDS for standard monitoring purposes  Follow up appointment made for 4 weeks    I have reviewed the chief complaint and history of present illness (including ROS and PFSH) and vital documentation by my staff and I agree with their documentation and have added where applicable.

## 2025-04-16 NOTE — TELEPHONE ENCOUNTER
Duane J Garland is calling to request a refill on the following medication(s):    Medication Request:  Requested Prescriptions     Pending Prescriptions Disp Refills    citalopram (CELEXA) 10 MG tablet 90 tablet 0     Sig: Take 1 tablet by mouth daily       Last Visit Date (If Applicable):  2/26/2025    Next Visit Date:    8/26/2025      Last refill 1/14/25. Prescription pending.

## 2025-04-20 LAB
6-ACETYLMORPHINE, UR: NOT DETECTED
7-AMINOCLONAZEPAM, URINE: NOT DETECTED
ALPHA-OH-ALPRAZ, URINE: NOT DETECTED
ALPHA-OH-MIDAZOLAM, URINE: NOT DETECTED
ALPRAZOLAM, URINE: NOT DETECTED
AMPHETAMINE, URINE: NOT DETECTED
BARBITURATES, URINE: NEGATIVE
BENZOYLECGONINE, UR: NEGATIVE
BUPRENORPHINE URINE: NOT DETECTED
CARISOPRODOL, UR: NEGATIVE
CLONAZEPAM, URINE: NOT DETECTED
CODEINE, URINE: NOT DETECTED
CREAT UR-MCNC: 79.5 MG/DL (ref 20–400)
DIAZEPAM, URINE: NOT DETECTED
DRUGS EXPECTED, UR: NORMAL
EER HI RES INTERP UR: NORMAL
ETHYL GLUCURONIDE UR: NEGATIVE
FENTANYL URINE: NOT DETECTED
GABAPENTIN: PRESENT
HYDROCODONE, URINE: NOT DETECTED
HYDROMORPHONE, URINE: NOT DETECTED
LORAZEPAM, URINE: NOT DETECTED
MARIJUANA METAB, UR: NEGATIVE
MDA, URINE: NOT DETECTED
MDEA, EVE, UR: NOT DETECTED
MDMA, URINE: NOT DETECTED
MEPERIDINE METAB, UR: NOT DETECTED
METHADONE, URINE: NEGATIVE
METHAMPHETAMINE, URINE: NOT DETECTED
METHYLPHENIDATE: NOT DETECTED
MIDAZOLAM, URINE: NOT DETECTED
MORPHINE, OPI1M: NOT DETECTED
NALOXONE URINE: NOT DETECTED
NORBUPRENORPHINE, URINE: NOT DETECTED
NORDIAZEPAM, URINE: NOT DETECTED
NORFENTANYL, URINE: NOT DETECTED
NORHYDROCODONE, URINE: NOT DETECTED
NOROXYCODONE, URINE: PRESENT
NOROXYMORPHONE, URINE: PRESENT
OXAZEPAM, URINE: NOT DETECTED
OXYCODONE URINE: PRESENT
OXYMORPHONE, URINE: PRESENT
PAIN MANAGEMENT DRUG PANEL INTERP, URINE: NORMAL
PAIN MGT DRUG PANEL, HI RES, UR: NORMAL
PCP,URINE: NEGATIVE
PHENTERMINE, UR: NOT DETECTED
PREGABALIN: NOT DETECTED
TAPENTADOL, URINE: NOT DETECTED
TAPENTADOL-O-SULFATE, URINE: NOT DETECTED
TEMAZEPAM, URINE: NOT DETECTED
TRAMADOL, URINE: NEGATIVE
ZOLPIDEM METABOLITE (ZCA), URINE: NOT DETECTED
ZOLPIDEM, URINE: NOT DETECTED

## 2025-04-28 ENCOUNTER — OFFICE VISIT (OUTPATIENT)
Dept: FAMILY MEDICINE CLINIC | Age: 75
End: 2025-04-28
Payer: MEDICARE

## 2025-04-28 VITALS
WEIGHT: 199.6 LBS | BODY MASS INDEX: 31.33 KG/M2 | RESPIRATION RATE: 12 BRPM | HEIGHT: 67 IN | DIASTOLIC BLOOD PRESSURE: 64 MMHG | SYSTOLIC BLOOD PRESSURE: 104 MMHG | HEART RATE: 65 BPM | OXYGEN SATURATION: 97 % | TEMPERATURE: 97.5 F

## 2025-04-28 DIAGNOSIS — R73.03 PRE-DIABETES: ICD-10-CM

## 2025-04-28 DIAGNOSIS — D63.8 ANEMIA OF CHRONIC DISEASE: ICD-10-CM

## 2025-04-28 DIAGNOSIS — M47.817 LUMBOSACRAL SPONDYLOSIS WITHOUT MYELOPATHY: Chronic | ICD-10-CM

## 2025-04-28 DIAGNOSIS — L72.0 EPIDERMAL CYST: Primary | ICD-10-CM

## 2025-04-28 DIAGNOSIS — F41.9 ANXIETY AND DEPRESSION: ICD-10-CM

## 2025-04-28 DIAGNOSIS — I10 ESSENTIAL HYPERTENSION: ICD-10-CM

## 2025-04-28 DIAGNOSIS — E78.5 DYSLIPIDEMIA: ICD-10-CM

## 2025-04-28 DIAGNOSIS — Z96.641 HISTORY OF TOTAL RIGHT HIP REPLACEMENT: ICD-10-CM

## 2025-04-28 DIAGNOSIS — N28.9 RENAL INSUFFICIENCY: ICD-10-CM

## 2025-04-28 DIAGNOSIS — H40.009 OPEN ANGLE GLAUCOMA SUSPECT: ICD-10-CM

## 2025-04-28 DIAGNOSIS — F32.A ANXIETY AND DEPRESSION: ICD-10-CM

## 2025-04-28 DIAGNOSIS — D17.1 LIPOMA OF BACK: ICD-10-CM

## 2025-04-28 DIAGNOSIS — Z79.891 CHRONIC USE OF OPIATE DRUG FOR THERAPEUTIC PURPOSE: ICD-10-CM

## 2025-04-28 PROBLEM — N18.31 STAGE 3A CHRONIC KIDNEY DISEASE (HCC): Status: RESOLVED | Noted: 2021-07-08 | Resolved: 2025-04-28

## 2025-04-28 PROCEDURE — G8427 DOCREV CUR MEDS BY ELIG CLIN: HCPCS | Performed by: FAMILY MEDICINE

## 2025-04-28 PROCEDURE — 1160F RVW MEDS BY RX/DR IN RCRD: CPT | Performed by: FAMILY MEDICINE

## 2025-04-28 PROCEDURE — 1159F MED LIST DOCD IN RCRD: CPT | Performed by: FAMILY MEDICINE

## 2025-04-28 PROCEDURE — 3078F DIAST BP <80 MM HG: CPT | Performed by: FAMILY MEDICINE

## 2025-04-28 PROCEDURE — 1036F TOBACCO NON-USER: CPT | Performed by: FAMILY MEDICINE

## 2025-04-28 PROCEDURE — 3074F SYST BP LT 130 MM HG: CPT | Performed by: FAMILY MEDICINE

## 2025-04-28 PROCEDURE — 99214 OFFICE O/P EST MOD 30 MIN: CPT | Performed by: FAMILY MEDICINE

## 2025-04-28 PROCEDURE — 3017F COLORECTAL CA SCREEN DOC REV: CPT | Performed by: FAMILY MEDICINE

## 2025-04-28 PROCEDURE — 1123F ACP DISCUSS/DSCN MKR DOCD: CPT | Performed by: FAMILY MEDICINE

## 2025-04-28 PROCEDURE — G8417 CALC BMI ABV UP PARAM F/U: HCPCS | Performed by: FAMILY MEDICINE

## 2025-04-28 ASSESSMENT — ENCOUNTER SYMPTOMS
GASTROINTESTINAL NEGATIVE: 1
ALLERGIC/IMMUNOLOGIC NEGATIVE: 1
BACK PAIN: 1
EYES NEGATIVE: 1
RESPIRATORY NEGATIVE: 1

## 2025-04-28 NOTE — PROGRESS NOTES
Subjective:      Patient ID: Duane J Garland is a 74 y.o. male.    Patient came along with his wife with concern regarding epidermal cyst/sebaceous cyst right upper back and lipoma left lower back        Review of Systems   Constitutional: Negative.    HENT: Negative.     Eyes: Negative.    Respiratory: Negative.     Cardiovascular: Negative.    Gastrointestinal: Negative.    Endocrine: Negative.    Musculoskeletal:  Positive for arthralgias and back pain.   Skin: Negative.    Allergic/Immunologic: Negative.    Neurological: Negative.    Hematological: Negative.    Psychiatric/Behavioral:  Positive for dysphoric mood. The patient is nervous/anxious.    Past family and social history unremarkable.   Diagnosis Orders   1. Epidermal cyst        2. Lipoma of back        3. Chronic use of opiate drug for therapeutic purpose        4. Pre-diabetes        5. Dyslipidemia        6. Open angle glaucoma suspect        7. Essential hypertension        8. Lumbosacral spondylosis without myelopathy        9. Anemia of chronic disease        10. History of total right hip replacement        11. Anxiety and depression        12. Renal insufficiency              Objective:   Physical Exam  Vitals and nursing note reviewed.   Constitutional:       Appearance: He is well-developed.   HENT:      Head: Normocephalic and atraumatic.      Right Ear: External ear normal.      Left Ear: External ear normal.      Nose: Nose normal.   Eyes:      Conjunctiva/sclera: Conjunctivae normal.      Pupils: Pupils are equal, round, and reactive to light.   Cardiovascular:      Rate and Rhythm: Normal rate and regular rhythm.      Heart sounds: Normal heart sounds.      Comments: Hypertension  Dyslipidemia  Impaired fasting glucose  Metabolic syndrome  Pulmonary:      Effort: Pulmonary effort is normal.      Breath sounds: Normal breath sounds.   Abdominal:      General: Bowel sounds are normal.      Palpations: Abdomen is soft.   Genitourinary:

## 2025-05-04 DIAGNOSIS — N52.01 ERECTILE DYSFUNCTION DUE TO ARTERIAL INSUFFICIENCY: ICD-10-CM

## 2025-05-05 DIAGNOSIS — N52.01 ERECTILE DYSFUNCTION DUE TO ARTERIAL INSUFFICIENCY: ICD-10-CM

## 2025-05-05 RX ORDER — TADALAFIL 5 MG/1
TABLET ORAL
Qty: 30 TABLET | Refills: 2 | Status: SHIPPED | OUTPATIENT
Start: 2025-05-05

## 2025-05-05 RX ORDER — TADALAFIL 5 MG/1
TABLET ORAL
Qty: 30 TABLET | Refills: 2 | Status: SHIPPED | OUTPATIENT
Start: 2025-05-05 | End: 2025-05-05 | Stop reason: SDUPTHER

## 2025-05-05 NOTE — TELEPHONE ENCOUNTER
Duane J Garland is calling to request a refill on the following medication(s):    Medication Request:  Requested Prescriptions     Pending Prescriptions Disp Refills    tadalafil (CIALIS) 5 MG tablet 30 tablet 2     Sig: TAKE 1 TABLET BY MOUTH DAILY FOR ERECTILE DYSFUNCTION       Last Visit Date (If Applicable):  4/28/2025    Next Visit Date:    10/28/2025

## 2025-05-05 NOTE — TELEPHONE ENCOUNTER
Duane J Garland is calling to request a refill on the following medication(s):    Medication Request:  Requested Prescriptions     Pending Prescriptions Disp Refills    tadalafil (CIALIS) 5 MG tablet [Pharmacy Med Name: TADALAFIL 5 MG TABLET] 30 tablet 2     Sig: TAKE 1 TABLET BY MOUTH DAILY FOR ERECTILE DYSFUNCTION       Last Visit Date (If Applicable):  4/28/2025    Next Visit Date:    10/28/2025    Last refilled on 2/12/25. Prescription pending.

## 2025-05-16 ENCOUNTER — HOSPITAL ENCOUNTER (OUTPATIENT)
Dept: PAIN MANAGEMENT | Age: 75
Discharge: HOME | End: 2025-05-16
Payer: MEDICARE

## 2025-05-16 VITALS — HEIGHT: 67 IN | BODY MASS INDEX: 31.23 KG/M2 | WEIGHT: 199 LBS

## 2025-05-16 DIAGNOSIS — T81.40XS POSTOPERATIVE INFECTION, UNSPECIFIED TYPE, SEQUELA: ICD-10-CM

## 2025-05-16 DIAGNOSIS — Z96.641 STATUS POST RIGHT HIP REPLACEMENT: ICD-10-CM

## 2025-05-16 DIAGNOSIS — Z79.891 CHRONIC USE OF OPIATE DRUG FOR THERAPEUTIC PURPOSE: ICD-10-CM

## 2025-05-16 DIAGNOSIS — M47.817 LUMBOSACRAL SPONDYLOSIS WITHOUT MYELOPATHY: Chronic | ICD-10-CM

## 2025-05-16 PROCEDURE — 99213 OFFICE O/P EST LOW 20 MIN: CPT

## 2025-05-16 PROCEDURE — 99213 OFFICE O/P EST LOW 20 MIN: CPT | Performed by: NURSE PRACTITIONER

## 2025-05-16 RX ORDER — OXYCODONE AND ACETAMINOPHEN 5; 325 MG/1; MG/1
1 TABLET ORAL EVERY 6 HOURS PRN
Qty: 120 TABLET | Refills: 0 | Status: SHIPPED | OUTPATIENT
Start: 2025-05-22 | End: 2025-06-21

## 2025-05-16 ASSESSMENT — ENCOUNTER SYMPTOMS
COUGH: 0
CONSTIPATION: 0
BOWEL INCONTINENCE: 0
BACK PAIN: 1
ORTHOPNEA: 0
SHORTNESS OF BREATH: 0

## 2025-05-16 ASSESSMENT — PAIN SCALES - GENERAL: PAINLEVEL_OUTOF10: 4

## 2025-05-16 NOTE — PROGRESS NOTES
Active Member of Clubs or Organizations: No     Attends Club or Organization Meetings: Never     Marital Status:    Intimate Partner Violence: Not on file   Housing Stability: Low Risk  (2/26/2025)    Housing Stability Vital Sign     Unable to Pay for Housing in the Last Year: No     Number of Times Moved in the Last Year: 0     Homeless in the Last Year: No       Review of Systems:  Review of Systems   Constitutional: Negative for chills and fever.   Cardiovascular:  Negative for chest pain and orthopnea.   Respiratory:  Negative for cough and shortness of breath.    Musculoskeletal:  Positive for back pain.   Gastrointestinal:  Negative for bowel incontinence and constipation.   Genitourinary:  Negative for bladder incontinence.   Neurological:  Positive for weakness. Negative for headaches, numbness and tingling.       Physical Exam:  Ht 1.702 m (5' 7\")   Wt 90.3 kg (199 lb)   BMI 31.17 kg/m²     Physical Exam  HENT:      Head: Normocephalic.   Pulmonary:      Effort: Pulmonary effort is normal.   Musculoskeletal:         General: Normal range of motion.      Cervical back: Normal range of motion.      Lumbar back: Tenderness present.   Skin:     General: Skin is warm and dry.   Neurological:      Mental Status: He is alert and oriented to person, place, and time.         Record/Diagnostics Review:    Last dominga 4/2025 and was appropriate     Assessment:  Problem List Items Addressed This Visit       Lumbosacral spondylosis without myelopathy (Chronic)    Relevant Medications    oxyCODONE-acetaminophen (PERCOCET) 5-325 MG per tablet (Start on 5/22/2025)    Chronic use of opiate drug for therapeutic purpose    Relevant Medications    oxyCODONE-acetaminophen (PERCOCET) 5-325 MG per tablet (Start on 5/22/2025)     Other Visit Diagnoses         Status post right hip replacement        Relevant Medications    oxyCODONE-acetaminophen (PERCOCET) 5-325 MG per tablet (Start on 5/22/2025)      Postoperative

## 2025-05-17 ENCOUNTER — APPOINTMENT (OUTPATIENT)
Dept: GENERAL RADIOLOGY | Age: 75
End: 2025-05-17
Payer: MEDICARE

## 2025-05-17 ENCOUNTER — HOSPITAL ENCOUNTER (EMERGENCY)
Age: 75
Discharge: HOME | End: 2025-05-17
Attending: STUDENT IN AN ORGANIZED HEALTH CARE EDUCATION/TRAINING PROGRAM
Payer: MEDICARE

## 2025-05-17 VITALS
DIASTOLIC BLOOD PRESSURE: 72 MMHG | RESPIRATION RATE: 15 BRPM | SYSTOLIC BLOOD PRESSURE: 114 MMHG | TEMPERATURE: 97.9 F | HEART RATE: 59 BPM | OXYGEN SATURATION: 95 %

## 2025-05-17 DIAGNOSIS — R10.9 ABDOMINAL WALL PAIN: Primary | ICD-10-CM

## 2025-05-17 LAB
ALBUMIN SERPL-MCNC: 4.4 G/DL (ref 3.5–5.2)
ALBUMIN/GLOB SERPL: 1.6 {RATIO} (ref 1–2.5)
ALP SERPL-CCNC: 77 U/L (ref 40–129)
ALT SERPL-CCNC: 12 U/L (ref 10–50)
ANION GAP SERPL CALCULATED.3IONS-SCNC: 12 MMOL/L (ref 9–16)
AST SERPL-CCNC: 22 U/L (ref 10–50)
BASOPHILS # BLD: <0.03 K/UL (ref 0–0.2)
BASOPHILS NFR BLD: 0 % (ref 0–2)
BILIRUB DIRECT SERPL-MCNC: 0.1 MG/DL (ref 0–0.2)
BILIRUB INDIRECT SERPL-MCNC: 0.2 MG/DL
BILIRUB SERPL-MCNC: 0.3 MG/DL (ref 0–1.2)
BUN SERPL-MCNC: 20 MG/DL (ref 8–23)
CALCIUM SERPL-MCNC: 9.2 MG/DL (ref 8.8–10.2)
CHLORIDE SERPL-SCNC: 101 MMOL/L (ref 98–107)
CO2 SERPL-SCNC: 26 MMOL/L (ref 20–31)
CREAT SERPL-MCNC: 1.4 MG/DL (ref 0.7–1.2)
EOSINOPHIL # BLD: 0.17 K/UL (ref 0–0.44)
EOSINOPHILS RELATIVE PERCENT: 4 % (ref 1–4)
ERYTHROCYTE [DISTWIDTH] IN BLOOD BY AUTOMATED COUNT: 12.6 % (ref 11.8–14.4)
GFR, ESTIMATED: 55 ML/MIN/1.73M2
GLUCOSE SERPL-MCNC: 108 MG/DL (ref 82–115)
HCT VFR BLD AUTO: 39.3 % (ref 40.7–50.3)
HGB BLD-MCNC: 12.8 G/DL (ref 13–17)
IMM GRANULOCYTES # BLD AUTO: 0.01 K/UL (ref 0–0.3)
IMM GRANULOCYTES NFR BLD: 0 %
LIPASE SERPL-CCNC: 30 U/L (ref 13–60)
LYMPHOCYTES NFR BLD: 1.26 K/UL (ref 1.1–3.7)
LYMPHOCYTES RELATIVE PERCENT: 30 % (ref 24–43)
MCH RBC QN AUTO: 29.1 PG (ref 25.2–33.5)
MCHC RBC AUTO-ENTMCNC: 32.6 G/DL (ref 28.4–34.8)
MCV RBC AUTO: 89.3 FL (ref 82.6–102.9)
MONOCYTES NFR BLD: 0.39 K/UL (ref 0.1–1.2)
MONOCYTES NFR BLD: 9 % (ref 3–12)
MYOGLOBIN SERPL-MCNC: 57 NG/ML (ref 28–72)
NEUTROPHILS NFR BLD: 57 % (ref 36–65)
NEUTS SEG NFR BLD: 2.4 K/UL (ref 1.5–8.1)
NRBC BLD-RTO: 0 PER 100 WBC
PLATELET # BLD AUTO: 191 K/UL (ref 138–453)
PMV BLD AUTO: 9.4 FL (ref 8.1–13.5)
POTASSIUM SERPL-SCNC: 4.5 MMOL/L (ref 3.7–5.3)
PROT SERPL-MCNC: 7.1 G/DL (ref 6.6–8.7)
RBC # BLD AUTO: 4.4 M/UL (ref 4.21–5.77)
SODIUM SERPL-SCNC: 138 MMOL/L (ref 136–145)
TROPONIN I SERPL HS-MCNC: 24 NG/L (ref 0–22)
TROPONIN I SERPL HS-MCNC: 24 NG/L (ref 0–22)
WBC OTHER # BLD: 4.2 K/UL (ref 3.5–11.3)

## 2025-05-17 PROCEDURE — 80048 BASIC METABOLIC PNL TOTAL CA: CPT

## 2025-05-17 PROCEDURE — 84484 ASSAY OF TROPONIN QUANT: CPT

## 2025-05-17 PROCEDURE — 36415 COLL VENOUS BLD VENIPUNCTURE: CPT

## 2025-05-17 PROCEDURE — 85025 COMPLETE CBC W/AUTO DIFF WBC: CPT

## 2025-05-17 PROCEDURE — 96374 THER/PROPH/DIAG INJ IV PUSH: CPT

## 2025-05-17 PROCEDURE — 93005 ELECTROCARDIOGRAM TRACING: CPT | Performed by: STUDENT IN AN ORGANIZED HEALTH CARE EDUCATION/TRAINING PROGRAM

## 2025-05-17 PROCEDURE — 99285 EMERGENCY DEPT VISIT HI MDM: CPT

## 2025-05-17 PROCEDURE — 83874 ASSAY OF MYOGLOBIN: CPT

## 2025-05-17 PROCEDURE — 83690 ASSAY OF LIPASE: CPT

## 2025-05-17 PROCEDURE — 6360000002 HC RX W HCPCS: Performed by: NURSE PRACTITIONER

## 2025-05-17 PROCEDURE — 96375 TX/PRO/DX INJ NEW DRUG ADDON: CPT

## 2025-05-17 PROCEDURE — 71045 X-RAY EXAM CHEST 1 VIEW: CPT

## 2025-05-17 PROCEDURE — 80076 HEPATIC FUNCTION PANEL: CPT

## 2025-05-17 RX ORDER — TIZANIDINE 2 MG/1
2 TABLET ORAL EVERY 8 HOURS PRN
Qty: 12 TABLET | Refills: 0 | Status: SHIPPED | OUTPATIENT
Start: 2025-05-17

## 2025-05-17 RX ORDER — ONDANSETRON 2 MG/ML
4 INJECTION INTRAMUSCULAR; INTRAVENOUS ONCE
Status: COMPLETED | OUTPATIENT
Start: 2025-05-17 | End: 2025-05-17

## 2025-05-17 RX ORDER — DEXAMETHASONE SODIUM PHOSPHATE 10 MG/ML
8 INJECTION, SOLUTION INTRAMUSCULAR; INTRAVENOUS ONCE
Status: COMPLETED | OUTPATIENT
Start: 2025-05-17 | End: 2025-05-17

## 2025-05-17 RX ORDER — MORPHINE SULFATE 2 MG/ML
2 INJECTION, SOLUTION INTRAMUSCULAR; INTRAVENOUS ONCE
Refills: 0 | Status: COMPLETED | OUTPATIENT
Start: 2025-05-17 | End: 2025-05-17

## 2025-05-17 RX ADMIN — MORPHINE SULFATE 2 MG: 2 INJECTION, SOLUTION INTRAMUSCULAR; INTRAVENOUS at 22:50

## 2025-05-17 RX ADMIN — DEXAMETHASONE SODIUM PHOSPHATE 8 MG: 10 INJECTION, SOLUTION INTRAMUSCULAR; INTRAVENOUS at 22:52

## 2025-05-17 RX ADMIN — ONDANSETRON 4 MG: 2 INJECTION, SOLUTION INTRAMUSCULAR; INTRAVENOUS at 22:50

## 2025-05-17 ASSESSMENT — ENCOUNTER SYMPTOMS
DIARRHEA: 0
SHORTNESS OF BREATH: 0
COLOR CHANGE: 0
ABDOMINAL PAIN: 1
VOMITING: 0
NAUSEA: 0
BACK PAIN: 0

## 2025-05-18 NOTE — DISCHARGE INSTRUCTIONS
Return to the ER if your condition worsens.     Follow up with your primary care provider for a recheck. An outpatient cardiac stress test is recommended.     Zanaflex:   WARNING:  May cause drowsiness.  May impair ability to operate vehicles or machinery.  Do not use in combination with alcohol.

## 2025-05-19 LAB
EKG ATRIAL RATE: 62 BPM
EKG P AXIS: 57 DEGREES
EKG P-R INTERVAL: 162 MS
EKG Q-T INTERVAL: 396 MS
EKG QRS DURATION: 76 MS
EKG QTC CALCULATION (BAZETT): 401 MS
EKG R AXIS: 7 DEGREES
EKG T AXIS: 57 DEGREES
EKG VENTRICULAR RATE: 62 BPM

## 2025-05-19 PROCEDURE — 93010 ELECTROCARDIOGRAM REPORT: CPT | Performed by: INTERNAL MEDICINE

## 2025-05-21 NOTE — ED PROVIDER NOTES
eMERGENCY dEPARTMENT eNCOUnter   Independent Attestation     Pt Name: Duane J Garland  MRN: [id number]  Birthdate 1950  Date of evaluation: 5/20/25     Duane J Garland is a 74 y.o. male with CC: Chest Pain (Onset 0930 this morning; reports worse with movement)        This visit was performed by both a physician and an APC. I performed all aspects of the MDM as documented.      Dedrick Finley DO  Attending Emergency Physician         Dedrick Finley DO  05/20/25 [id number]  [facility] [address]  [phone]    Schedule an appointment as soon as possible for a visit       [facility] Emergency Department  [address] [id number] [phone]    If symptoms worsen, As needed      DISCHARGE MEDICATIONS:     New Prescriptions    TIZANIDINE (ZANAFLEX) 2 MG TABLET    Take 1 tablet by mouth every 8 hours as needed (pain, muscle spasms)           (Please note that portions of this note were completed with a voice recognition program.  Efforts were made to edit the dictations but occasionally words are mis-transcribed.)    Elsie Sherman, APRN - CNP

## 2025-06-18 ENCOUNTER — HOSPITAL ENCOUNTER (OUTPATIENT)
Dept: PAIN MANAGEMENT | Age: 75
Discharge: HOME | End: 2025-06-18
Payer: MEDICARE

## 2025-06-18 VITALS — BODY MASS INDEX: 31.23 KG/M2 | WEIGHT: 199 LBS | HEIGHT: 67 IN

## 2025-06-18 DIAGNOSIS — Z79.891 CHRONIC USE OF OPIATE DRUG FOR THERAPEUTIC PURPOSE: ICD-10-CM

## 2025-06-18 DIAGNOSIS — M47.817 LUMBOSACRAL SPONDYLOSIS WITHOUT MYELOPATHY: Chronic | ICD-10-CM

## 2025-06-18 DIAGNOSIS — Z96.641 STATUS POST RIGHT HIP REPLACEMENT: ICD-10-CM

## 2025-06-18 DIAGNOSIS — T81.40XS POSTOPERATIVE INFECTION, UNSPECIFIED TYPE, SEQUELA: ICD-10-CM

## 2025-06-18 PROCEDURE — 99214 OFFICE O/P EST MOD 30 MIN: CPT | Performed by: NURSE PRACTITIONER

## 2025-06-18 PROCEDURE — 99213 OFFICE O/P EST LOW 20 MIN: CPT

## 2025-06-18 RX ORDER — TIZANIDINE 2 MG/1
2 TABLET ORAL EVERY 8 HOURS PRN
Qty: 12 TABLET | Refills: 0 | Status: SHIPPED | OUTPATIENT
Start: 2025-06-18

## 2025-06-18 RX ORDER — OXYCODONE AND ACETAMINOPHEN 5; 325 MG/1; MG/1
1 TABLET ORAL EVERY 6 HOURS PRN
Qty: 120 TABLET | Refills: 0 | Status: SHIPPED | OUTPATIENT
Start: 2025-06-22 | End: 2025-07-22

## 2025-06-18 ASSESSMENT — PAIN SCALES - GENERAL: PAINLEVEL_OUTOF10: 4

## 2025-06-18 ASSESSMENT — ENCOUNTER SYMPTOMS
BACK PAIN: 1
CONSTIPATION: 0
COUGH: 0
BOWEL INCONTINENCE: 0
SHORTNESS OF BREATH: 0

## 2025-06-18 NOTE — PROGRESS NOTES
Chief Complaint   Patient presents with    Back Pain    Medication Refill     Percocet          PMH     Pt c/o chronic back right hip and bilat knee and right foot pain   Pt reports bilat. Knee pain with no known injury but has had multiple surgeries/procedures on knees right foot and hips.  He has had PT in the past with no relief.   Discussed genicular blocks but he declines.      Pt also c/o chronic right foot pain with Hx of 3 right foot surgeries with continued numbness and tingling  EMG done 7/2023 to rule out neuropathy RLE and normal results  Pt saw [facility] neurology 11/2023 with robaxin started but pt states was denied by insurance and to continue with our medication and f/u in 2-3 months  He has seen ortho with no need for surgery at this time      Pt also c/o chronic back pain.   MRI lumbar 6/2021 with Multilevel severe degenerative disc changes with Modic type I and type II changes involving L2 L3-L4-L5 vertebrae  He completed PT for his back with some benefit.  He had LESI 4/4/22 and reported 50% relief.      Pt had  Intracept procedure 5/1/2023 with over 50% improvement in axial lower back pain  Pt had lumbar RFA 2/21/24  with 60% improvement in axial lower back pain      He states about two weeks ago he was on vacation and was in a hot tub. He had difficulty getting out and strained his back. The pain has increased.     Pt also c/o chronic hip pain.   He had left hip replaced at [facility] 9/28  He had right total hip arthroplasty done at [facility] 3/11/24 with admission 4/2024 for post op infection and debridement sutures and wound vac. Recent aspiration with Dr Garcia for suspected infection   Bilat Hip xray 7/2024 Stable postoperative changes and alignment.   He continues to follow with ortho and ID      He had PICC line - removed on 12/11/24         He complains of muscle spasms in his thighs intermittenlty. Started on zanaflex with some benefit      Back Pain  This is a chronic 
06-Jun-2021 16:37

## 2025-06-23 ENCOUNTER — TELEPHONE (OUTPATIENT)
Dept: PAIN MANAGEMENT | Age: 75
End: 2025-06-23

## 2025-07-08 ENCOUNTER — HOSPITAL ENCOUNTER (OUTPATIENT)
Dept: MRI IMAGING | Age: 75
Discharge: HOME | End: 2025-07-10
Payer: MEDICARE

## 2025-07-08 DIAGNOSIS — M47.817 LUMBOSACRAL SPONDYLOSIS WITHOUT MYELOPATHY: Chronic | ICD-10-CM

## 2025-07-08 PROCEDURE — 72148 MRI LUMBAR SPINE W/O DYE: CPT

## 2025-07-14 ENCOUNTER — HOSPITAL ENCOUNTER (OUTPATIENT)
Dept: PAIN MANAGEMENT | Age: 75
Discharge: HOME | End: 2025-07-14
Payer: MEDICARE

## 2025-07-14 VITALS — HEIGHT: 67 IN | BODY MASS INDEX: 31.23 KG/M2 | WEIGHT: 199 LBS

## 2025-07-14 DIAGNOSIS — Z79.891 CHRONIC USE OF OPIATE DRUG FOR THERAPEUTIC PURPOSE: ICD-10-CM

## 2025-07-14 DIAGNOSIS — Z96.641 STATUS POST RIGHT HIP REPLACEMENT: ICD-10-CM

## 2025-07-14 DIAGNOSIS — T81.40XS POSTOPERATIVE INFECTION, UNSPECIFIED TYPE, SEQUELA: ICD-10-CM

## 2025-07-14 DIAGNOSIS — M47.817 LUMBOSACRAL SPONDYLOSIS WITHOUT MYELOPATHY: Chronic | ICD-10-CM

## 2025-07-14 PROCEDURE — 99213 OFFICE O/P EST LOW 20 MIN: CPT | Performed by: NURSE PRACTITIONER

## 2025-07-14 PROCEDURE — 99213 OFFICE O/P EST LOW 20 MIN: CPT

## 2025-07-14 RX ORDER — OXYCODONE AND ACETAMINOPHEN 5; 325 MG/1; MG/1
1 TABLET ORAL EVERY 6 HOURS PRN
Qty: 120 TABLET | Refills: 0 | Status: SHIPPED | OUTPATIENT
Start: 2025-07-22 | End: 2025-08-21

## 2025-07-14 RX ORDER — GABAPENTIN 800 MG/1
800 TABLET ORAL 3 TIMES DAILY
Qty: 270 TABLET | Refills: 0 | Status: SHIPPED | OUTPATIENT
Start: 2025-07-14 | End: 2025-10-12

## 2025-07-14 ASSESSMENT — ENCOUNTER SYMPTOMS
CONSTIPATION: 0
BACK PAIN: 1
SHORTNESS OF BREATH: 0
COUGH: 0

## 2025-07-14 NOTE — PROGRESS NOTES
Chief Complaint   Patient presents with    Back Pain     Med refill         PMH     Pt c/o chronic back right hip and bilat knee and right foot pain     Pt reports bilat. Knee pain with no known injury but has had multiple surgeries/procedures on knees right foot and hips.  He has had PT in the past with no relief.   Discussed genicular blocks but he declines.      Pt also c/o chronic right foot pain with Hx of 3 right foot surgeries with continued numbness and tingling  EMG done 7/2023 to rule out neuropathy RLE and normal results  Pt [facility] 11/2023 with robaxin started but pt states was denied by insurance and to continue with our medication and f/u in 2-3 months  He has seen ortho with no need for surgery at this time      Pt also c/o chronic back pain.   MRI lumbar 6/2021 with Multilevel severe degenerative disc changes with Modic type I and type II changes involving L2 L3-L4-L5 vertebrae  He completed PT for his back with some benefit.  He had LESI 4/4/22 and reported 50% relief.      Pt had  Intracept procedure 5/1/2023 with over 50% improvement in axial lower back pain  Pt had lumbar RFA 2/21/24  with 60% improvement in axial lower back pain          Pt also c/o chronic hip pain.   He had left hip replaced at [facility] 9/28  He had right total hip arthroplasty done at [facility] 3/11/24 with admission 4/2024 for post op infection and debridement sutures and wound vac. Recent aspiration with Dr Garcia for suspected infection - required PICC  Bilat Hip xray 7/2024 Stable postoperative changes and alignment.   He continues to follow with ortho and ID      Back Pain  This is a chronic problem. The current episode started more than 1 year ago. The problem occurs constantly. The problem has been gradually worsening since onset. The pain is present in the lumbar spine. The quality of the pain is described as aching, burning, cramping, shooting and stabbing. The pain does not radiate. The

## 2025-08-20 ENCOUNTER — HOSPITAL ENCOUNTER (OUTPATIENT)
Dept: PAIN MANAGEMENT | Age: 75
Discharge: HOME | End: 2025-08-20
Payer: MEDICARE

## 2025-08-20 VITALS — BODY MASS INDEX: 31.23 KG/M2 | WEIGHT: 199 LBS | HEIGHT: 67 IN

## 2025-08-20 DIAGNOSIS — Z96.641 STATUS POST RIGHT HIP REPLACEMENT: ICD-10-CM

## 2025-08-20 DIAGNOSIS — Z79.891 CHRONIC USE OF OPIATE DRUG FOR THERAPEUTIC PURPOSE: ICD-10-CM

## 2025-08-20 DIAGNOSIS — T81.40XS POSTOPERATIVE INFECTION, UNSPECIFIED TYPE, SEQUELA: ICD-10-CM

## 2025-08-20 DIAGNOSIS — M47.817 LUMBOSACRAL SPONDYLOSIS WITHOUT MYELOPATHY: Primary | Chronic | ICD-10-CM

## 2025-08-20 PROCEDURE — 99214 OFFICE O/P EST MOD 30 MIN: CPT | Performed by: NURSE PRACTITIONER

## 2025-08-20 PROCEDURE — 99213 OFFICE O/P EST LOW 20 MIN: CPT

## 2025-08-20 RX ORDER — OXYCODONE AND ACETAMINOPHEN 5; 325 MG/1; MG/1
1 TABLET ORAL EVERY 6 HOURS PRN
Qty: 120 TABLET | Refills: 0 | Status: SHIPPED | OUTPATIENT
Start: 2025-08-21 | End: 2025-09-20

## 2025-08-20 ASSESSMENT — ENCOUNTER SYMPTOMS
SHORTNESS OF BREATH: 0
BACK PAIN: 1
CONSTIPATION: 0
BOWEL INCONTINENCE: 0
COUGH: 0

## 2025-08-20 ASSESSMENT — PAIN SCALES - GENERAL: PAINLEVEL_OUTOF10: 5

## 2025-08-21 ENCOUNTER — HOSPITAL ENCOUNTER (OUTPATIENT)
Dept: GENERAL RADIOLOGY | Age: 75
Discharge: HOME | End: 2025-08-23
Payer: MEDICARE

## 2025-08-21 DIAGNOSIS — M47.817 LUMBOSACRAL SPONDYLOSIS WITHOUT MYELOPATHY: Chronic | ICD-10-CM

## 2025-08-21 PROCEDURE — 72120 X-RAY BEND ONLY L-S SPINE: CPT

## 2025-09-02 RX ORDER — FERROUS SULFATE 325(65) MG
1 TABLET ORAL 2 TIMES DAILY
Qty: 200 TABLET | Refills: 0 | Status: SHIPPED | OUTPATIENT
Start: 2025-09-02

## (undated) DEVICE — DRAPE,TOWEL,LARGE,INVISISHIELD: Brand: MEDLINE

## (undated) DEVICE — INTENDED FOR TISSUE SEPARATION, AND OTHER PROCEDURES THAT REQUIRE A SHARP SURGICAL BLADE TO PUNCTURE OR CUT.: Brand: BARD-PARKER ® CARBON RIB-BACK BLADES

## (undated) DEVICE — C-ARM: Brand: UNBRANDED

## (undated) DEVICE — Device

## (undated) DEVICE — INTRACEPT ACCESS INSTRUMENTS - ADDITIONAL LEVEL: Brand: INTRACEPT

## (undated) DEVICE — PRECISION THIN (9.0 X 0.38 X 25.0MM)

## (undated) DEVICE — GLOVE SURG SZ 75 CRM LTX FREE POLYISOPRENE POLYMER BEAD ANTI

## (undated) DEVICE — SUTURE PROL SZ 3-0 L18IN NONABSORBABLE BLU L19MM PS-2 3/8 8687H

## (undated) DEVICE — SKIN PREP TRAY W/CHG: Brand: MEDLINE INDUSTRIES, INC.

## (undated) DEVICE — SUTURE ETHLN SZ 3-0 L18IN NONABSORBABLE BLK PS-2 L19MM 3/8 1669H

## (undated) DEVICE — SMALL TEAR CROSS CUT RASP (11.0 X 5.0MM)

## (undated) DEVICE — NEEDLE HYPO 25GA L1.5IN BLU POLYPR HUB S STL REG BVL STR

## (undated) DEVICE — SUTURE ETHLN SZ 4-0 L18IN NONABSORBABLE BLK L19MM PS-2 3/8 1667H

## (undated) DEVICE — MINOR BSIN PK

## (undated) DEVICE — STRIP SKIN CLSR W0.25XL4IN WHT SPUNBOUND FBR NYL HI ADH

## (undated) DEVICE — SOLUTION PREP POVIDONE IOD FOR SKIN MUCOUS MEM PRIOR TO

## (undated) DEVICE — GOWN,AURORA,NONREINFORCED,LARGE: Brand: MEDLINE

## (undated) DEVICE — SUTURE MCRYL SZ 4-0 L27IN ABSRB UD L19MM PS-2 1/2 CIR PRIM Y426H

## (undated) DEVICE — SUTURE VCRL SZ 4-0 L27IN ABSRB UD L19MM PS-2 3/8 CIR PRIM J426H

## (undated) DEVICE — BLANKET WRM W29.9XL79.1IN UP BODY FORC AIR MISTRAL-AIR

## (undated) DEVICE — SUTURE VCRL SZ 3-0 L27IN ABSRB UD L19MM PS-2 3/8 CIR PRIM J427H

## (undated) DEVICE — BLADE SURG SAW OSC MIC S STL 17.5MM LEN 8.85MM W .4MM THCK

## (undated) DEVICE — BANDAGE ADH W1XL3IN NAT FAB WVN FLX DURABLE N ADH PD SEAL

## (undated) DEVICE — GLOVE SURG SZ 8 CRM LTX FREE POLYISOPRENE POLYMER BEAD ANTI

## (undated) DEVICE — SUTURE MCRYL SZ 2-0 L27IN ABSRB VLT SH L26MM 1/2 CIR TAPR Y317H

## (undated) DEVICE — APPLICATOR MEDICATED 26 CC SOLUTION HI LT ORNG CHLORAPREP

## (undated) DEVICE — GLOVE SURG SZ 8 THK118MIL BLK LTX FREE POLYISOPRENE BEAD CUF

## (undated) DEVICE — YANKAUER,FLEXIBLE HANDLE,REGLR CAPACITY: Brand: MEDLINE INDUSTRIES, INC.

## (undated) DEVICE — ADHESIVE SKIN CLOSURE TOP 36 CC HI VISC DERMBND MINI

## (undated) DEVICE — THIN OFFSET (13.3 X 0.38 X 42.0MM)

## (undated) DEVICE — INTRACEPT RF PROBE: Brand: INTRACEPT

## (undated) DEVICE — INTRACEPT ACCESS INSTRUMENTS: Brand: INTRACEPT

## (undated) DEVICE — NEEDLE SPINAL 22GA L3.5IN SPINOCAN

## (undated) DEVICE — STANDARD DRILL BIT , AO

## (undated) DEVICE — SHEET, T, LAPAROTOMY, STERILE: Brand: MEDLINE